# Patient Record
Sex: MALE | Race: WHITE | NOT HISPANIC OR LATINO | Employment: OTHER | URBAN - METROPOLITAN AREA
[De-identification: names, ages, dates, MRNs, and addresses within clinical notes are randomized per-mention and may not be internally consistent; named-entity substitution may affect disease eponyms.]

---

## 2019-01-01 ENCOUNTER — TELEPHONE (OUTPATIENT)
Dept: HEMATOLOGY ONCOLOGY | Facility: CLINIC | Age: 74
End: 2019-01-01

## 2019-01-01 ENCOUNTER — APPOINTMENT (EMERGENCY)
Dept: RADIOLOGY | Facility: HOSPITAL | Age: 74
End: 2019-01-01
Payer: MEDICARE

## 2019-01-01 ENCOUNTER — HOSPITAL ENCOUNTER (EMERGENCY)
Facility: HOSPITAL | Age: 74
Discharge: HOME | End: 2019-11-08
Attending: EMERGENCY MEDICINE | Admitting: EMERGENCY MEDICINE
Payer: MEDICARE

## 2019-01-01 ENCOUNTER — TELEPHONE (OUTPATIENT)
Dept: GASTROENTEROLOGY | Facility: AMBULARY SURGERY CENTER | Age: 74
End: 2019-01-01

## 2019-01-01 ENCOUNTER — HOSPITAL ENCOUNTER (EMERGENCY)
Facility: HOSPITAL | Age: 74
Discharge: HOME | End: 2019-12-27
Attending: EMERGENCY MEDICINE | Admitting: EMERGENCY MEDICINE
Payer: MEDICARE

## 2019-01-01 VITALS
OXYGEN SATURATION: 99 % | DIASTOLIC BLOOD PRESSURE: 76 MMHG | SYSTOLIC BLOOD PRESSURE: 125 MMHG | TEMPERATURE: 98.2 F | HEART RATE: 89 BPM | RESPIRATION RATE: 18 BRPM

## 2019-01-01 VITALS
HEIGHT: 70 IN | TEMPERATURE: 98.1 F | HEART RATE: 84 BPM | SYSTOLIC BLOOD PRESSURE: 140 MMHG | DIASTOLIC BLOOD PRESSURE: 90 MMHG | WEIGHT: 230.16 LBS | OXYGEN SATURATION: 98 % | RESPIRATION RATE: 16 BRPM | BODY MASS INDEX: 32.95 KG/M2

## 2019-01-01 DIAGNOSIS — K83.1 OBSTRUCTIVE JAUNDICE: Primary | ICD-10-CM

## 2019-01-01 DIAGNOSIS — C79.9 METASTATIC CANCER (HCC): ICD-10-CM

## 2019-01-01 DIAGNOSIS — T85.590A: ICD-10-CM

## 2019-01-01 LAB
ALBUMIN SERPL BCP-MCNC: 2.4 G/DL (ref 3.5–5)
ALBUMIN SERPL BCP-MCNC: 3 G/DL (ref 3.5–5)
ALP SERPL-CCNC: 632 U/L (ref 46–116)
ALP SERPL-CCNC: 854 U/L (ref 46–116)
ALT SERPL W P-5'-P-CCNC: 132 U/L (ref 12–78)
ALT SERPL W P-5'-P-CCNC: 155 U/L (ref 12–78)
AMMONIA PLAS-SCNC: 17 UMOL/L (ref 11–35)
AMMONIA PLAS-SCNC: <10 UMOL/L (ref 11–35)
ANION GAP SERPL CALCULATED.3IONS-SCNC: 7 MMOL/L (ref 4–13)
ANION GAP SERPL CALCULATED.3IONS-SCNC: 9 MMOL/L (ref 4–13)
APTT PPP: 27 SECONDS (ref 23–37)
APTT PPP: 42 SECONDS (ref 25–32)
AST SERPL W P-5'-P-CCNC: 134 U/L (ref 5–45)
AST SERPL W P-5'-P-CCNC: 137 U/L (ref 5–45)
ATRIAL RATE: 375 BPM
BACTERIA UR QL AUTO: ABNORMAL /HPF
BACTERIA UR QL AUTO: ABNORMAL /HPF
BASOPHILS # BLD AUTO: 0.04 THOUSANDS/ΜL (ref 0–0.1)
BASOPHILS # BLD AUTO: 0.04 THOUSANDS/ΜL (ref 0–0.1)
BASOPHILS NFR BLD AUTO: 1 % (ref 0–1)
BASOPHILS NFR BLD AUTO: 1 % (ref 0–1)
BILIRUB DIRECT SERPL-MCNC: 14.01 MG/DL (ref 0–0.2)
BILIRUB SERPL-MCNC: 16.1 MG/DL (ref 0.2–1)
BILIRUB SERPL-MCNC: 24.7 MG/DL (ref 0.2–1)
BILIRUB UR QL STRIP: ABNORMAL
BILIRUB UR QL STRIP: ABNORMAL
BUN SERPL-MCNC: 13 MG/DL (ref 5–25)
BUN SERPL-MCNC: 15 MG/DL (ref 5–25)
CALCIUM SERPL-MCNC: 8.2 MG/DL (ref 8.3–10.1)
CALCIUM SERPL-MCNC: 8.5 MG/DL (ref 8.3–10.1)
CHLORIDE SERPL-SCNC: 95 MMOL/L (ref 100–108)
CHLORIDE SERPL-SCNC: 96 MMOL/L (ref 100–108)
CLARITY UR: ABNORMAL
CLARITY UR: CLEAR
CO2 SERPL-SCNC: 27 MMOL/L (ref 21–32)
CO2 SERPL-SCNC: 27 MMOL/L (ref 21–32)
COLOR UR: ABNORMAL
COLOR UR: ABNORMAL
CREAT SERPL-MCNC: 0.68 MG/DL (ref 0.6–1.3)
CREAT SERPL-MCNC: 0.95 MG/DL (ref 0.6–1.3)
EOSINOPHIL # BLD AUTO: 0.03 THOUSAND/ΜL (ref 0–0.61)
EOSINOPHIL # BLD AUTO: 0.05 THOUSAND/ΜL (ref 0–0.61)
EOSINOPHIL NFR BLD AUTO: 1 % (ref 0–6)
EOSINOPHIL NFR BLD AUTO: 1 % (ref 0–6)
ERYTHROCYTE [DISTWIDTH] IN BLOOD BY AUTOMATED COUNT: 13.3 % (ref 11.6–15.1)
ERYTHROCYTE [DISTWIDTH] IN BLOOD BY AUTOMATED COUNT: 19.9 % (ref 11.6–15.1)
GFR SERPL CREATININE-BSD FRML MDRD: 77 ML/MIN/1.73SQ M
GFR SERPL CREATININE-BSD FRML MDRD: 93 ML/MIN/1.73SQ M
GLUCOSE SERPL-MCNC: 106 MG/DL (ref 65–140)
GLUCOSE SERPL-MCNC: 123 MG/DL (ref 65–140)
GLUCOSE UR STRIP-MCNC: ABNORMAL MG/DL
GLUCOSE UR STRIP-MCNC: ABNORMAL MG/DL
HCT VFR BLD AUTO: 36.2 % (ref 36.5–49.3)
HCT VFR BLD AUTO: 37.8 % (ref 36.5–49.3)
HGB BLD-MCNC: 12.1 G/DL (ref 12–17)
HGB BLD-MCNC: 13.3 G/DL (ref 12–17)
HGB UR QL STRIP.AUTO: NEGATIVE
HGB UR QL STRIP.AUTO: NEGATIVE
HYALINE CASTS #/AREA URNS LPF: ABNORMAL /LPF
IMM GRANULOCYTES # BLD AUTO: 0.05 THOUSAND/UL (ref 0–0.2)
IMM GRANULOCYTES # BLD AUTO: 0.09 THOUSAND/UL (ref 0–0.2)
IMM GRANULOCYTES NFR BLD AUTO: 1 % (ref 0–2)
IMM GRANULOCYTES NFR BLD AUTO: 2 % (ref 0–2)
INR PPP: 0.96 (ref 0.91–1.09)
INR PPP: 1.18 (ref 0.91–1.09)
KETONES UR STRIP-MCNC: ABNORMAL MG/DL
KETONES UR STRIP-MCNC: NEGATIVE MG/DL
LEUKOCYTE ESTERASE UR QL STRIP: NEGATIVE
LEUKOCYTE ESTERASE UR QL STRIP: NEGATIVE
LIPASE SERPL-CCNC: 92 U/L (ref 73–393)
LYMPHOCYTES # BLD AUTO: 0.47 THOUSANDS/ΜL (ref 0.6–4.47)
LYMPHOCYTES # BLD AUTO: 0.69 THOUSANDS/ΜL (ref 0.6–4.47)
LYMPHOCYTES NFR BLD AUTO: 14 % (ref 14–44)
LYMPHOCYTES NFR BLD AUTO: 8 % (ref 14–44)
MCH RBC QN AUTO: 36.7 PG (ref 26.8–34.3)
MCH RBC QN AUTO: 38.7 PG (ref 26.8–34.3)
MCHC RBC AUTO-ENTMCNC: 33.4 G/DL (ref 31.4–37.4)
MCHC RBC AUTO-ENTMCNC: 35.2 G/DL (ref 31.4–37.4)
MCV RBC AUTO: 110 FL (ref 82–98)
MCV RBC AUTO: 110 FL (ref 82–98)
MONOCYTES # BLD AUTO: 0.94 THOUSAND/ΜL (ref 0.17–1.22)
MONOCYTES # BLD AUTO: 1.04 THOUSAND/ΜL (ref 0.17–1.22)
MONOCYTES NFR BLD AUTO: 16 % (ref 4–12)
MONOCYTES NFR BLD AUTO: 21 % (ref 4–12)
MUCOUS THREADS UR QL AUTO: ABNORMAL
MUCOUS THREADS UR QL AUTO: ABNORMAL
NEUTROPHILS # BLD AUTO: 3.05 THOUSANDS/ΜL (ref 1.85–7.62)
NEUTROPHILS # BLD AUTO: 4.32 THOUSANDS/ΜL (ref 1.85–7.62)
NEUTS SEG NFR BLD AUTO: 61 % (ref 43–75)
NEUTS SEG NFR BLD AUTO: 73 % (ref 43–75)
NITRITE UR QL STRIP: NEGATIVE
NITRITE UR QL STRIP: POSITIVE
NON-SQ EPI CELLS URNS QL MICRO: ABNORMAL /HPF
NON-SQ EPI CELLS URNS QL MICRO: ABNORMAL /HPF
NRBC BLD AUTO-RTO: 0 /100 WBCS
NRBC BLD AUTO-RTO: 0 /100 WBCS
OTHER STN SPEC: ABNORMAL
PH UR STRIP.AUTO: 6 [PH]
PH UR STRIP.AUTO: 6.5 [PH]
PLATELET # BLD AUTO: 160 THOUSANDS/UL (ref 149–390)
PLATELET # BLD AUTO: 168 THOUSANDS/UL (ref 149–390)
PMV BLD AUTO: 10.4 FL (ref 8.9–12.7)
PMV BLD AUTO: 10.9 FL (ref 8.9–12.7)
POTASSIUM SERPL-SCNC: 3.9 MMOL/L (ref 3.5–5.3)
POTASSIUM SERPL-SCNC: 4.1 MMOL/L (ref 3.5–5.3)
PROT SERPL-MCNC: 6.2 G/DL (ref 6.4–8.2)
PROT SERPL-MCNC: 6.7 G/DL (ref 6.4–8.2)
PROT UR STRIP-MCNC: ABNORMAL MG/DL
PROT UR STRIP-MCNC: ABNORMAL MG/DL
PROTHROMBIN TIME: 10.4 SECONDS (ref 9.8–12)
PROTHROMBIN TIME: 12.6 SECONDS (ref 9.8–12)
QRS AXIS: -38 DEGREES
QRSD INTERVAL: 150 MS
QT INTERVAL: 410 MS
QTC INTERVAL: 467 MS
RBC # BLD AUTO: 3.3 MILLION/UL (ref 3.88–5.62)
RBC # BLD AUTO: 3.44 MILLION/UL (ref 3.88–5.62)
RBC #/AREA URNS AUTO: ABNORMAL /HPF
RBC #/AREA URNS AUTO: ABNORMAL /HPF
SODIUM SERPL-SCNC: 129 MMOL/L (ref 136–145)
SODIUM SERPL-SCNC: 132 MMOL/L (ref 136–145)
SP GR UR STRIP.AUTO: 1.01 (ref 1–1.03)
SP GR UR STRIP.AUTO: 1.02 (ref 1–1.03)
T WAVE AXIS: -20 DEGREES
UROBILINOGEN UR QL STRIP.AUTO: 0.2 E.U./DL
UROBILINOGEN UR QL STRIP.AUTO: 1 E.U./DL
VENTRICULAR RATE: 78 BPM
WBC # BLD AUTO: 4.96 THOUSAND/UL (ref 4.31–10.16)
WBC # BLD AUTO: 5.85 THOUSAND/UL (ref 4.31–10.16)
WBC #/AREA URNS AUTO: ABNORMAL /HPF
WBC #/AREA URNS AUTO: ABNORMAL /HPF

## 2019-01-01 PROCEDURE — 81001 URINALYSIS AUTO W/SCOPE: CPT | Performed by: EMERGENCY MEDICINE

## 2019-01-01 PROCEDURE — 85025 COMPLETE CBC W/AUTO DIFF WBC: CPT | Performed by: EMERGENCY MEDICINE

## 2019-01-01 PROCEDURE — 85610 PROTHROMBIN TIME: CPT | Performed by: EMERGENCY MEDICINE

## 2019-01-01 PROCEDURE — 74177 CT ABD & PELVIS W/CONTRAST: CPT

## 2019-01-01 PROCEDURE — 36415 COLL VENOUS BLD VENIPUNCTURE: CPT | Performed by: EMERGENCY MEDICINE

## 2019-01-01 PROCEDURE — 80053 COMPREHEN METABOLIC PANEL: CPT | Performed by: EMERGENCY MEDICINE

## 2019-01-01 PROCEDURE — 82140 ASSAY OF AMMONIA: CPT | Performed by: EMERGENCY MEDICINE

## 2019-01-01 PROCEDURE — 99285 EMERGENCY DEPT VISIT HI MDM: CPT

## 2019-01-01 PROCEDURE — 80048 BASIC METABOLIC PNL TOTAL CA: CPT | Performed by: EMERGENCY MEDICINE

## 2019-01-01 PROCEDURE — 93010 ELECTROCARDIOGRAM REPORT: CPT | Performed by: INTERNAL MEDICINE

## 2019-01-01 PROCEDURE — 83690 ASSAY OF LIPASE: CPT | Performed by: EMERGENCY MEDICINE

## 2019-01-01 PROCEDURE — 85730 THROMBOPLASTIN TIME PARTIAL: CPT | Performed by: EMERGENCY MEDICINE

## 2019-01-01 PROCEDURE — 93005 ELECTROCARDIOGRAM TRACING: CPT

## 2019-01-01 PROCEDURE — 80076 HEPATIC FUNCTION PANEL: CPT | Performed by: EMERGENCY MEDICINE

## 2019-01-01 RX ORDER — TAMSULOSIN HYDROCHLORIDE 0.4 MG/1
0.4 CAPSULE ORAL
COMMUNITY
End: 2020-01-01 | Stop reason: SDUPTHER

## 2019-01-01 RX ORDER — CARBAMAZEPINE 400 MG/1
400 TABLET, EXTENDED RELEASE ORAL EVERY EVENING
COMMUNITY
End: 2020-01-01 | Stop reason: HOSPADM

## 2019-01-01 RX ORDER — CARBAMAZEPINE 300 MG/1
300 CAPSULE, EXTENDED RELEASE ORAL EVERY MORNING
COMMUNITY
End: 2020-01-01 | Stop reason: HOSPADM

## 2019-01-01 RX ORDER — OXYCODONE HYDROCHLORIDE 5 MG/1
5 CAPSULE ORAL EVERY 6 HOURS PRN
COMMUNITY
End: 2020-01-01 | Stop reason: HOSPADM

## 2019-01-01 RX ORDER — METOPROLOL SUCCINATE 50 MG/1
50 TABLET, EXTENDED RELEASE ORAL DAILY
COMMUNITY
End: 2020-01-01 | Stop reason: SDUPTHER

## 2019-01-01 RX ORDER — AMLODIPINE BESYLATE 10 MG/1
10 TABLET ORAL DAILY
COMMUNITY
End: 2019-01-01

## 2019-01-01 RX ORDER — CAPECITABINE 500 MG/1
1250 TABLET, FILM COATED ORAL 2 TIMES DAILY
COMMUNITY
End: 2019-01-01

## 2019-01-01 RX ORDER — ASCORBIC ACID 500 MG
500 TABLET ORAL DAILY
COMMUNITY
End: 2019-01-01

## 2019-01-01 RX ORDER — LOSARTAN POTASSIUM 100 MG/1
100 TABLET ORAL DAILY
COMMUNITY
End: 2019-01-01

## 2019-01-01 RX ADMIN — IOHEXOL 100 ML: 350 INJECTION, SOLUTION INTRAVENOUS at 14:48

## 2019-01-01 RX ADMIN — IOHEXOL 100 ML: 350 INJECTION, SOLUTION INTRAVENOUS at 18:34

## 2019-11-08 NOTE — ED PROVIDER NOTES
History  Chief Complaint   Patient presents with    Jaundice     "I have a pretty heavy case of yellow jaundice" patient noted "some color change" in the last week- hx of colon ca dx 7 years ago, Adeola Rios to liver & lung      Patient has a history of metastatic colon cancer which he has been treating for the last 7 years  He recently moved to this area and has been under the care of an oncology team at [facility]   In recent weeks the patient has noted steady increase in yellow discoloration of the skin and eyes  He is not having abdominal or chest pain  States that sometimes his skin is itchy  He has had no fever  Patient has had progression of disease and has been on multiple rounds of chemo  He has had a left hemicolectomy as also had wedge resection of metastatic lesions to the liver  Patient was referred to the ED today with worsening painless jaundice  He has upcoming radiation therapy scheduled          Prior to Admission Medications   Prescriptions Last Dose Informant Patient Reported? Taking?    VITAMIN D PO   Yes Yes   Sig: Take 12 5 mg by mouth   amLODIPine (NORVASC) 10 mg tablet   Yes Yes   Sig: Take 10 mg by mouth daily   ascorbic acid (VITAMIN C) 500 mg tablet   Yes Yes   Sig: Take 500 mg by mouth daily   capecitabine (XELODA) 500 MG tablet   Yes Yes   Sig: Take 1,250 mg/m2 by mouth 2 (two) times a day   carBAMazepine (CARBATROL) 300 MG 12 hr capsule   Yes Yes   Sig: Take 300 mg by mouth every morning   carBAMazepine (TEGretol XR) 400 mg 12 hr tablet   Yes Yes   Sig: Take 400 mg by mouth every evening   losartan (COZAAR) 100 MG tablet   Yes Yes   Sig: Take 100 mg by mouth daily   oxyCODONE (OXY-IR) 5 MG capsule   Yes Yes   Sig: Take 5 mg by mouth 2 (two) times a day   tamsulosin (FLOMAX) 0 4 mg   Yes Yes   Sig: Take 0 4 mg by mouth daily with dinner      Facility-Administered Medications: None       Past Medical History:   Diagnosis Date    Atrial fibrillation (HCC)     BPH (benign prostatic hyperplasia)     Cancer ([facility] [address] )     colon- johnny liver & lungs    Hypertension     Stroke [facility])     2008- mild weakness left hand/arm    [facility], [facility]     Tumor[facility] metastatic ([facility] )        Past Surgical History:   Procedure Laterality Date    COLON SURGERY      2013    EYE SURGERY      TONSILLECTOMY         No family history on file  I have reviewed and agree with the history as documented  Social History     Tobacco Use    Smoking status: Former Smoker   Substance Use Topics    Alcohol use: Never     Frequency: Never    Drug use: Never        Review of Systems   Constitutional: Positive for unexpected weight change  Negative for chills and fever  HENT: Negative for congestion and trouble swallowing  Eyes: Negative for photophobia  Respiratory: Negative for shortness of breath  Cardiovascular: Negative for chest pain  Gastrointestinal: Negative for abdominal pain and vomiting  Genitourinary: Negative for dysuria  Musculoskeletal: Negative for back pain, neck pain and neck stiffness  Skin: Positive for color change and wound  Neurological: Negative for light-headedness and headaches  Hematological: Does not bruise/bleed easily  Psychiatric/Behavioral: Negative for confusion  All other systems reviewed and are negative  Physical Exam  Physical Exam   Constitutional: He is oriented to person, place, and time  He appears well-developed and well-nourished  HENT:   Head: Normocephalic and atraumatic  Mouth/Throat: Oropharynx is clear and moist    Eyes: EOM are normal  Scleral icterus is present  Neck: Normal range of motion  Neck supple  Cardiovascular: Normal rate, regular rhythm and normal heart sounds  Pulmonary/Chest: Effort normal and breath sounds normal    Abdominal: Soft  Bowel sounds are normal    There is a hypertrophic wound growing from a biopsy site in the right abdominal wall   Musculoskeletal: Normal range of motion   He exhibits no edema or tenderness  Neurological: He is alert and oriented to person, place, and time  No cranial nerve deficit  Coordination normal    No asteriskis   Skin: Skin is warm and dry  Capillary refill takes less than 2 seconds  Patient is diffusely jaundiced   Psychiatric: He has a normal mood and affect  His behavior is normal    Nursing note and vitals reviewed        Vital Signs  ED Triage Vitals   Temperature Pulse Respirations Blood Pressure SpO2   11/08/19 1817 11/08/19 1619 11/08/19 1619 11/08/19 1619 11/08/19 1619   98 1 °F (36 7 °C) 93 18 132/79 98 %      Temp Source Heart Rate Source Patient Position - Orthostatic VS BP Location FiO2 (%)   11/08/19 1817 11/08/19 1619 11/08/19 1619 11/08/19 1619 --   Oral Monitor Lying Left arm       Pain Score       11/08/19 1619       2           Vitals:    11/08/19 1700 11/08/19 1817 11/08/19 1930 11/08/19 2030   BP: 120/80 117/85 125/80 124/91   Pulse: 86 80 94 82   Patient Position - Orthostatic VS:  Lying           Visual Acuity      ED Medications  Medications   iohexol (OMNIPAQUE) 350 MG/ML injection (MULTI-DOSE) 100 mL (100 mL Intravenous Given 11/8/19 1834)       Diagnostic Studies  Results Reviewed     Procedure Component Value Units Date/Time    Urine Microscopic [[id number]]  (Abnormal) Collected:  11/08/19 1913    Lab Status:  Final result Specimen:  Urine, Clean Catch Updated:  11/08/19 1931     RBC, UA 0-1 /hpf      WBC, UA 0-1 /hpf      Epithelial Cells None Seen /hpf      Bacteria, UA Moderate /hpf      MUCUS THREADS Occasional    UA w Reflex to Microscopic [[id number]]  (Abnormal) Collected:  11/08/19 1913    Lab Status:  Final result Specimen:  Urine, Clean Catch Updated:  11/08/19 1919     Color, UA Vani     Clarity, UA Clear     Specific Gravity, UA 1 015     pH, UA 6 0     Leukocytes, UA Negative     Nitrite, UA Negative     Protein, UA Trace mg/dl      Glucose,  (1/10%) mg/dl      Ketones, UA Negative mg/dl      Urobilinogen, UA 0 2 E U /dl Bilirubin, UA Interference- unable to analyze     Blood, UA Negative    Comprehensive metabolic panel [[id number]]  (Abnormal) Collected:  11/08/19 1720    Lab Status:  Final result Specimen:  Blood from Arm, Left Updated:  11/08/19 1759     Sodium 129 mmol/L      Potassium 4 1 mmol/L      Chloride 95 mmol/L      CO2 27 mmol/L      ANION GAP 7 mmol/L      BUN 13 mg/dL      Creatinine 0 68 mg/dL      Glucose 106 mg/dL      Calcium 8 5 mg/dL       U/L       U/L      Alkaline Phosphatase 632 U/L      Total Protein 6 7 g/dL      Albumin 3 0 g/dL      Total Bilirubin 24 70 mg/dL      eGFR 93 ml/min/1 73sq m     Narrative:       [facility] guidelines for Chronic Kidney Disease (CKD):     Stage 1 with normal or high GFR (GFR > 90 mL/min/1 73 square meters)    Stage 2 Mild CKD (GFR = 60-89 mL/min/1 73 square meters)    Stage 3A Moderate CKD (GFR = 45-59 mL/min/1 73 square meters)    Stage 3B Moderate CKD (GFR = 30-44 mL/min/1 73 square meters)    Stage 4 Severe CKD (GFR = 15-29 mL/min/1 73 square meters)    Stage 5 End Stage CKD (GFR <15 mL/min/1 73 square meters)  Note: GFR calculation is accurate only with a steady state creatinine    Ammonia [[id number]]  (Normal) Collected:  11/08/19 1720    Lab Status:  Final result Specimen:  Blood from Arm, Left Updated:  11/08/19 1754     Ammonia 17 umol/L     Protime-INR [[id number]]  (Normal) Collected:  11/08/19 1720    Lab Status:  Final result Specimen:  Blood from Arm, Left Updated:  11/08/19 1754     Protime 10 4 seconds      INR 0 96    APTT [[id number]]  (Normal) Collected:  11/08/19 1720    Lab Status:  Final result Specimen:  Blood from Arm, Left Updated:  11/08/19 1754     PTT 27 seconds     CBC and differential [[id number]]  (Abnormal) Collected:  11/08/19 1720    Lab Status:  Final result Specimen:  Blood from Arm, Left Updated:  11/08/19 1728     WBC 4 96 Thousand/uL      RBC 3 44 Million/uL      Hemoglobin 13 3 g/dL Hematocrit 37 8 %       fL      MCH 38 7 pg      MCHC 35 2 g/dL      RDW 19 9 %      MPV 10 9 fL      Platelets 399 Thousands/uL      nRBC 0 /100 WBCs      Neutrophils Relative 61 %      Immat GRANS % 2 %      Lymphocytes Relative 14 %      Monocytes Relative 21 %      Eosinophils Relative 1 %      Basophils Relative 1 %      Neutrophils Absolute 3 05 Thousands/µL      Immature Grans Absolute 0 09 Thousand/uL      Lymphocytes Absolute 0 69 Thousands/µL      Monocytes Absolute 1 04 Thousand/µL      Eosinophils Absolute 0 05 Thousand/µL      Basophils Absolute 0 04 Thousands/µL                  CT abdomen pelvis with contrast   Final Result by Apple Alfredo MD (11/08 2011)         1  Stable, heterogeneous appearance of the liver right lobe of the liver adjacent to the gallbladder fossa and kemal hepatis likely to represent a metastatic lesion, possibly infiltrating the neck of the gallbladder and resulting in intrahepatic biliary    obstruction  2   Partially visualized metastases in the left lung and right paraspinal region  Right anterolateral chest wall cutaneous and deep soft tissue metastasis    Findings are stable when compared to the prior exam             Workstation performed: [id number]                    Procedures  ECG 12 Lead Documentation Only  Date/Time: 11/8/2019 5:35 PM  Performed by: Jerad Goodwin MD  Authorized by: Jerad Goodwin MD     Indications / Diagnosis:  Obstructive jaundice  ECG reviewed by me, the ED Provider: yes    Patient location:  ED  Interpretation:     Interpretation: abnormal    Rate:     ECG rate:  78    ECG rate assessment: normal    Rhythm:     Rhythm: atrial fibrillation and atrial flutter    Ectopy:     Ectopy: none    QRS:     QRS axis:  Left    QRS intervals:  Normal  Conduction:     Conduction: abnormal      Abnormal conduction: complete RBBB    ST segments:     ST segments:  Normal  T waves:     T waves: non-specific             ED Course MDM  Number of Diagnoses or Management Options  Diagnosis management comments: Patient has a written report from her prior CT about a month ago that showed some intrahepatic ductal dilatation, likely secondary to near by metastatic lesion  I have no prior lab tests available to me      Disposition  Final diagnoses:   Obstructive jaundice   Metastatic cancer ([facility] [address] )     Time reflects when diagnosis was documented in both MDM as applicable and the Disposition within this note     Time User Action Codes Description Comment    11/8/2019  9:08 PM Renetta Treviño Add [K83 1] Obstructive jaundice     11/8/2019  9:08 PM Jose HOOVER Add [C79 9] Metastatic cancer [facility])       ED Disposition     ED Disposition Condition Date/Time Comment    Discharge Stable Fri Nov 8, 2019  9:08 PM Tami Lopez discharge to home/self care  Follow-up Information     Follow up With Specialties Details Why Ronny [address]    [phone]            Patient's Medications   Discharge Prescriptions    No medications on file     No discharge procedures on file      ED Provider  Electronically Signed by           Salomón Berkowitz MD  11/08/19 2117

## 2019-12-27 NOTE — ED PROVIDER NOTES
History  Chief Complaint   Patient presents with    Jaundice     pt presents to the ed for jaunice symptoms  pt has a prior hx of jaundice and had a stent placed around thanks giving  pt states he noticed his skin was more yellow and his urine was dark so he came in for evaluation      HPI    This is a 67 yo M with hx of liver cancer and follows with [address] he was here in November and found to have obstructive jaundice and he followed up and got a stent in the bile duct and jaudice resolved  States now coming back  Skin is more yellow and change in color of stool and urine  States He feels like it is obstructed again  Currently going thru radiation  Jaundice is painless  Prior to Admission Medications   Prescriptions Last Dose Informant Patient Reported? Taking?   carBAMazepine (CARBATROL) 300 MG 12 hr capsule   Yes No   Sig: Take 300 mg by mouth every morning   carBAMazepine (TEGretol XR) 400 mg 12 hr tablet   Yes No   Sig: Take 400 mg by mouth every evening   enoxaparin (LOVENOX) 100 mg/mL   Yes Yes   Sig: Inject under the skin   metoprolol succinate (TOPROL-XL) 50 mg 24 hr tablet   Yes Yes   Sig: Take 50 mg by mouth daily   oxyCODONE (OXY-IR) 5 MG capsule   Yes No   Sig: Take 5 mg by mouth 2 (two) times a day   tamsulosin (FLOMAX) 0 4 mg   Yes No   Sig: Take 0 4 mg by mouth daily with dinner      Facility-Administered Medications: None       Past Medical History:   Diagnosis Date    [facility] )     BPH (benign prostatic hyperplasia)     Cancer ([facility] )     colon- johnny liver & lungs    Hypertension     Stroke ([facility] [address] )     2008- mild weakness left hand/arm    Tumor, metastatic ([address] )     Tumor, metastatic ([facility] [address] )        Past Surgical History:   Procedure Laterality Date    COLON SURGERY      2013    EYE SURGERY      TONSILLECTOMY         History reviewed  No pertinent family history  I have reviewed and agree with the history as documented      Social History Tobacco Use    Smoking status: Former Smoker   Substance Use Topics    Alcohol use: Never     Frequency: Never    Drug use: Never        Review of Systems   Constitutional: Positive for fatigue  Negative for diaphoresis and fever  HENT: Negative  Respiratory: Negative  Negative for cough, shortness of breath and wheezing  Cardiovascular: Negative  Negative for chest pain, palpitations and leg swelling  Gastrointestinal: Negative for abdominal distention, abdominal pain, nausea and vomiting  Genitourinary: Negative  Musculoskeletal: Negative  Skin: Positive for color change  Neurological: Negative  Psychiatric/Behavioral: Negative  All other systems reviewed and are negative  Physical Exam  Physical Exam   Constitutional: He is oriented to person, place, and time  He appears well-developed and well-nourished  No distress  Severely jaundice   HENT:   Head: Normocephalic and atraumatic  Nose: Nose normal    Mouth/Throat: Oropharynx is clear and moist    Eyes: Pupils are equal, round, and reactive to light  Conjunctivae and EOM are normal    Neck: Normal range of motion  Neck supple  Cardiovascular: Normal rate, regular rhythm and normal heart sounds  No murmur heard  Pulmonary/Chest: Effort normal and breath sounds normal  No respiratory distress  He has no wheezes  He has no rales  Abdominal: Soft  Bowel sounds are normal  He exhibits no distension  There is no tenderness  There is no rebound and no guarding  Lesion on RUQ    Musculoskeletal: Normal range of motion  He exhibits no edema, tenderness or deformity  Neurological: He is alert and oriented to person, place, and time  No cranial nerve deficit  Skin: Skin is warm and dry  No rash noted  He is not diaphoretic  No pallor  Psychiatric: He has a normal mood and affect  Vitals reviewed        Vital Signs  ED Triage Vitals [12/27/19 1400]   Temperature Pulse Respirations Blood Pressure SpO2   98 2 °F (36 8 °C) 89 18 125/76 99 %      Temp Source Heart Rate Source Patient Position - Orthostatic VS BP Location FiO2 (%)   Tympanic Monitor -- -- --      Pain Score       --           Vitals:    12/27/19 1400   BP: 125/76   Pulse: 89         Visual Acuity      ED Medications  Medications   iohexol (OMNIPAQUE) 350 MG/ML injection (MULTI-DOSE) 100 mL (100 mL Intravenous Given 12/27/19 1448)       Diagnostic Studies  Results Reviewed     Procedure Component Value Units Date/Time    Urine Microscopic [[id number]]  (Abnormal) Collected:  12/27/19 1429    Lab Status:  Final result Specimen:  Urine, Clean Catch Updated:  12/27/19 1448     RBC, UA 0-1 /hpf      WBC, UA 2-4 /hpf      Epithelial Cells Occasional /hpf      Bacteria, UA Moderate /hpf      Hyaline Casts, UA 4-10 /lpf      OTHER OBSERVATIONS Renal Epithelial Cells Present     MUCUS THREADS Innumerable    UA (URINE) with reflex to Scope [[id number]]  (Abnormal) Collected:  12/27/19 1429    Lab Status:  Final result Specimen:  Urine, Clean Catch Updated:  12/27/19 1444     Color, UA Brown     Clarity, UA Slightly Cloudy     Specific [place], UA 1 020     pH, UA 6 5     Leukocytes, UA Negative     Nitrite, UA Positive     Protein, UA 30 (1+) mg/dl      Glucose,  (1/10%) mg/dl      Ketones, UA Trace mg/dl      Urobilinogen, UA 1 0 E U /dl      Bilirubin, UA Interference- unable to analyze     Blood, UA Negative    Ammonia [[id number]]  (Abnormal) Collected:  12/27/19 1416    Lab Status:  Final result Specimen:  Blood from Arm, Left Updated:  12/27/19 1440     Ammonia <10 umol/L     Hepatic function panel [[id number]]  (Abnormal) Collected:  12/27/19 1416    Lab Status:  Final result Specimen:  Blood from Arm, Left Updated:  12/27/19 1440     Total Bilirubin 16 10 mg/dL      Bilirubin, Direct 14 01 mg/dL      Alkaline Phosphatase 854 U/L       U/L       U/L      Total Protein 6 2 g/dL      Albumin 2 4 g/dL     Basic metabolic panel [[id number]]  (Abnormal) Collected:  12/27/19 1416    Lab Status:  Final result Specimen:  Blood from Arm, Left Updated:  12/27/19 1438     Sodium 132 mmol/L      Potassium 3 9 mmol/L      Chloride 96 mmol/L      CO2 27 mmol/L      ANION GAP 9 mmol/L      BUN 15 mg/dL      Creatinine 0 95 mg/dL      Glucose 123 mg/dL      Calcium 8 2 mg/dL      eGFR 77 ml/min/1 73sq m     Narrative:       [facility] guidelines for Chronic Kidney Disease (CKD):     Stage 1 with normal or high GFR (GFR > 90 mL/min/1 73 square meters)    Stage 2 Mild CKD (GFR = 60-89 mL/min/1 73 square meters)    Stage 3A Moderate CKD (GFR = 45-59 mL/min/1 73 square meters)    Stage 3B Moderate CKD (GFR = 30-44 mL/min/1 73 square meters)    Stage 4 Severe CKD (GFR = 15-29 mL/min/1 73 square meters)    Stage 5 End Stage CKD (GFR <15 mL/min/1 73 square meters)  Note: GFR calculation is accurate only with a steady state creatinine    Lipase [[id number]]  (Normal) Collected:  12/27/19 1416    Lab Status:  Final result Specimen:  Blood from Arm, Left Updated:  12/27/19 1438     Lipase 92 u/L     Protime-INR [[id number]]  (Abnormal) Collected:  12/27/19 1416    Lab Status:  Final result Specimen:  Blood from Arm, Left Updated:  12/27/19 1437     Protime 12 6 seconds      INR 1 18    APTT [[id number]]  (Abnormal) Collected:  12/27/19 1416    Lab Status:  Final result Specimen:  Blood from Arm, Left Updated:  12/27/19 1437     PTT 42 seconds     CBC and differential [[id number]]  (Abnormal) Collected:  12/27/19 1416    Lab Status:  Final result Specimen:  Blood from Arm, Left Updated:  12/27/19 1422     WBC 5 85 Thousand/uL      RBC 3 30 Million/uL      Hemoglobin 12 1 g/dL      Hematocrit 36 2 %       fL      MCH 36 7 pg      MCHC 33 4 g/dL      RDW 13 3 %      MPV 10 4 fL      Platelets 714 Thousands/uL      nRBC 0 /100 WBCs      Neutrophils Relative 73 %      Immat GRANS % 1 %      Lymphocytes Relative 8 %      Monocytes Relative 16 % Eosinophils Relative 1 %      Basophils Relative 1 %      Neutrophils Absolute 4 32 Thousands/µL      Immature Grans Absolute 0 05 Thousand/uL      Lymphocytes Absolute 0 47 Thousands/µL      Monocytes Absolute 0 94 Thousand/µL      Eosinophils Absolute 0 03 Thousand/µL      Basophils Absolute 0 04 Thousands/µL                  CT abdomen pelvis with contrast   Final Result by Sudhakar Grove MD (12/27 9966)      There is intrahepatic biliary dilatation despite the presence of the biliary stent  Compared with the prior study the biliary dilatation seems slightly more prominent  Therefore, the stent may not be functioning properly  Stable appearance of pre-existing liver lesions  Stable appearance of lung masses  Stable appearance of right upper abdominal wall tumor  Localized area of thickening of the wall of the gallbladder immediately adjacent to liver tumor which might indicate direct invasion of gallbladder wall  Workstation performed: [id number]                    Procedures  Procedures         ED Course  ED Course as of Dec 27 1707   Fri Dec 27, 2019   1607 Paged DR Fallon 77 with Dr Марина Reyes nurse who will arrange appointment set up       [phone] I spoke with Dr Giorgio Tierney from [facility], who recommended elijah come straight to [facility] in [facility]  Patient's daughter will be driving him to the hospital  I gave them to address and physician name                                     MDM      Disposition  Final diagnoses:   Obstructive jaundice   Obstruction of biliary stent     Time reflects when diagnosis was documented in both MDM as applicable and the Disposition within this note     Time User Action Codes Description Comment    12/27/2019  4:46 PM Letty Leonard U  8  [K83 1] Obstructive jaundice     12/27/2019  4:46 PM Sharad Leonard 61 Add [T85 590A] Obstruction of biliary stent       ED Disposition     ED Disposition Condition Date/Time Comment Discharge Stable Fri Dec 27, 2019  4:46 PM Nina Sung discharge to home/self care  Follow-up Information     Follow up With Specialties Details Why Contact Info Additional Information    Dr Ritchie Chau to   Urgent Care  [facility] (between [facility] and [address] )  [address] [facility] Schedule an appointment as soon as possible for a visit   Jose  [phone] [facility] Surgical Oncology K[address]le [address] [place], [place], [address] [facility]          Discharge Medication List as of 12/27/2019  5:01 PM      CONTINUE these medications which have NOT CHANGED    Details   enoxaparin (LOVENOX) 100 mg/mL Inject under the skin, Historical Med      metoprolol succinate (TOPROL-XL) 50 mg 24 hr tablet Take 50 mg by mouth daily, Historical Med      carBAMazepine (CARBATROL) 300 MG 12 hr capsule Take 300 mg by mouth every morning, Historical Med      carBAMazepine (TEGretol XR) 400 mg 12 hr tablet Take 400 mg by mouth every evening, Historical Med      oxyCODONE (OXY-IR) 5 MG capsule Take 5 mg by mouth 2 (two) times a day, Historical Med      tamsulosin (FLOMAX) 0 4 mg Take 0 4 mg by mouth daily with dinner, Historical Med           No discharge procedures on file      ED Provider  Electronically Signed by           Mali Ovalle MD  12/27/19 5836

## 2019-12-27 NOTE — DISCHARGE INSTRUCTIONS
Please go straight to [facility]  The address is given   The physician I spoke with was Dr Sundeep Crowley from Dr Tracie Severance team

## 2019-12-30 NOTE — TELEPHONE ENCOUNTER
New Patient Encounter    New Patient Intake Form   Patient Details:  Kain Chau  1945  [id number]    Background Information:  [address] starts by opening a telephone encounter and gathering the following information   Who is calling to schedule? If not self, relationship to patient? self   Referring Provider self   What is the diagnosis? Metastatic colon ca   Is there any prior history of Cancer? Yes   If yes, please explain 2016 colon ca   When was the diagnosis? 2016   Is patient aware of diagnosis? Yes   Reason for visit? Second Opinion   Have you had any testing done? If so: when, where? Yes   Are records in [facility]? yes   Was the patient told to bring a disk? yes   Scheduling Information:   Preferred [place]:  [facility]     Requesting Specific Provider? no   Are there any dates/time the patient cannot be seen? no      Miscellaneous: pt requested records from [facility] be faxed to me  [address] records are in care everywhere  Pt has GI appt 1/3/19   After completing the above information, please route to Financial Counselor and the appropriate Nurse Navigator for review

## 2019-12-30 NOTE — TELEPHONE ENCOUNTER
Patients GI provider:   new patient   Number to return call: ( [phone]    Reason for call: Pt calling to schedule a sooner appt for elev liver functions, s/p ed, jaundice    Scheduled procedure/appointment date if applicable: Apt/procedure n/ a

## 2019-12-30 NOTE — TELEPHONE ENCOUNTER
Called patient, lvm to see if would be able to come to [place] office with Dr Onelia Coates on 1/3 as she just opened up  Told him to call back

## 2019-12-31 NOTE — TELEPHONE ENCOUNTER
Called patient to follow up that received message  He states that it works well for him and will see him on Friday

## 2020-01-01 ENCOUNTER — TRANSCRIBE ORDERS (OUTPATIENT)
Dept: ADMINISTRATIVE | Facility: HOSPITAL | Age: 75
End: 2020-01-01

## 2020-01-01 ENCOUNTER — ANESTHESIA (INPATIENT)
Dept: GASTROENTEROLOGY | Facility: HOSPITAL | Age: 75
DRG: 435 | End: 2020-01-01
Payer: MEDICARE

## 2020-01-01 ENCOUNTER — TELEPHONE (OUTPATIENT)
Dept: SURGERY | Facility: HOSPITAL | Age: 75
End: 2020-01-01

## 2020-01-01 ENCOUNTER — HOSPITAL ENCOUNTER (INPATIENT)
Facility: HOSPITAL | Age: 75
LOS: 10 days | DRG: 871 | End: 2020-05-09
Attending: EMERGENCY MEDICINE | Admitting: INTERNAL MEDICINE
Payer: MEDICARE

## 2020-01-01 ENCOUNTER — DOCUMENTATION (OUTPATIENT)
Dept: HEMATOLOGY ONCOLOGY | Facility: CLINIC | Age: 75
End: 2020-01-01

## 2020-01-01 ENCOUNTER — TELEPHONE (OUTPATIENT)
Dept: FAMILY MEDICINE CLINIC | Facility: CLINIC | Age: 75
End: 2020-01-01

## 2020-01-01 ENCOUNTER — APPOINTMENT (EMERGENCY)
Dept: RADIOLOGY | Facility: HOSPITAL | Age: 75
DRG: 871 | End: 2020-01-01
Payer: MEDICARE

## 2020-01-01 ENCOUNTER — TRANSITIONAL CARE MANAGEMENT (OUTPATIENT)
Dept: FAMILY MEDICINE CLINIC | Facility: CLINIC | Age: 75
End: 2020-01-01

## 2020-01-01 ENCOUNTER — CONSULT (OUTPATIENT)
Dept: CARDIOLOGY CLINIC | Facility: CLINIC | Age: 75
End: 2020-01-01
Payer: MEDICARE

## 2020-01-01 ENCOUNTER — APPOINTMENT (OUTPATIENT)
Dept: LAB | Facility: HOSPITAL | Age: 75
DRG: 871 | End: 2020-01-01
Payer: MEDICARE

## 2020-01-01 ENCOUNTER — APPOINTMENT (OUTPATIENT)
Dept: LAB | Facility: HOSPITAL | Age: 75
End: 2020-01-01
Payer: MEDICARE

## 2020-01-01 ENCOUNTER — TELEPHONE (OUTPATIENT)
Dept: OTHER | Facility: OTHER | Age: 75
End: 2020-01-01

## 2020-01-01 ENCOUNTER — OFFICE VISIT (OUTPATIENT)
Dept: HEMATOLOGY ONCOLOGY | Facility: CLINIC | Age: 75
End: 2020-01-01
Payer: MEDICARE

## 2020-01-01 ENCOUNTER — HOSPITAL ENCOUNTER (INPATIENT)
Facility: HOSPITAL | Age: 75
LOS: 8 days | Discharge: HOME HEALTH | DRG: 435 | End: 2020-02-28
Attending: EMERGENCY MEDICINE | Admitting: HOSPITALIST
Payer: MEDICARE

## 2020-01-01 ENCOUNTER — TELEPHONE (OUTPATIENT)
Dept: PALLIATIVE MEDICINE | Facility: CLINIC | Age: 75
End: 2020-01-01

## 2020-01-01 ENCOUNTER — PATIENT OUTREACH (OUTPATIENT)
Dept: FAMILY MEDICINE CLINIC | Facility: CLINIC | Age: 75
End: 2020-01-01

## 2020-01-01 ENCOUNTER — HOSPITAL ENCOUNTER (OUTPATIENT)
Dept: RADIOLOGY | Facility: HOSPITAL | Age: 75
Discharge: HOME | End: 2020-04-02
Attending: INTERNAL MEDICINE | Admitting: RADIOLOGY
Payer: MEDICARE

## 2020-01-01 ENCOUNTER — ANESTHESIA EVENT (OUTPATIENT)
Dept: ANESTHESIOLOGY | Facility: HOSPITAL | Age: 75
End: 2020-01-01

## 2020-01-01 ENCOUNTER — HOSPITAL ENCOUNTER (INPATIENT)
Facility: HOSPITAL | Age: 75
LOS: 7 days | Discharge: HOME | DRG: 435 | End: 2020-01-16
Attending: INTERNAL MEDICINE | Admitting: INTERNAL MEDICINE
Payer: MEDICARE

## 2020-01-01 ENCOUNTER — TELEPHONE (OUTPATIENT)
Dept: RADIOLOGY | Facility: HOSPITAL | Age: 75
End: 2020-01-01

## 2020-01-01 ENCOUNTER — TELEMEDICINE (OUTPATIENT)
Dept: PALLIATIVE MEDICINE | Facility: CLINIC | Age: 75
End: 2020-01-01
Payer: MEDICARE

## 2020-01-01 ENCOUNTER — CONSULT (OUTPATIENT)
Dept: NEUROSURGERY | Facility: CLINIC | Age: 75
End: 2020-01-01
Payer: MEDICARE

## 2020-01-01 ENCOUNTER — DOCUMENTATION (OUTPATIENT)
Dept: RADIATION ONCOLOGY | Facility: HOSPITAL | Age: 75
End: 2020-01-01

## 2020-01-01 ENCOUNTER — DOCUMENTATION (OUTPATIENT)
Dept: INFUSION CENTER | Facility: HOSPITAL | Age: 75
End: 2020-01-01

## 2020-01-01 ENCOUNTER — EPISODE CHANGES (OUTPATIENT)
Dept: CASE MANAGEMENT | Facility: HOSPITAL | Age: 75
End: 2020-01-01

## 2020-01-01 ENCOUNTER — APPOINTMENT (INPATIENT)
Dept: RADIOLOGY | Facility: HOSPITAL | Age: 75
DRG: 435 | End: 2020-01-01
Payer: MEDICARE

## 2020-01-01 ENCOUNTER — HOSPITAL ENCOUNTER (INPATIENT)
Facility: HOSPITAL | Age: 75
LOS: 3 days | Discharge: HOME | DRG: 871 | End: 2020-02-02
Attending: EMERGENCY MEDICINE | Admitting: FAMILY MEDICINE
Payer: MEDICARE

## 2020-01-01 ENCOUNTER — OFFICE VISIT (OUTPATIENT)
Dept: FAMILY MEDICINE CLINIC | Facility: CLINIC | Age: 75
End: 2020-01-01
Payer: MEDICARE

## 2020-01-01 ENCOUNTER — OFFICE VISIT (OUTPATIENT)
Dept: PALLIATIVE MEDICINE | Facility: CLINIC | Age: 75
End: 2020-01-01
Payer: MEDICARE

## 2020-01-01 ENCOUNTER — APPOINTMENT (EMERGENCY)
Dept: RADIOLOGY | Facility: HOSPITAL | Age: 75
DRG: 435 | End: 2020-01-01
Payer: MEDICARE

## 2020-01-01 ENCOUNTER — TRANSCRIBE ORDERS (OUTPATIENT)
Dept: LAB | Facility: AMBULARY SURGERY CENTER | Age: 75
End: 2020-01-01

## 2020-01-01 ENCOUNTER — HOSPITAL ENCOUNTER (OUTPATIENT)
Facility: HOSPITAL | Age: 75
End: 2020-05-10
Attending: INTERNAL MEDICINE | Admitting: INTERNAL MEDICINE

## 2020-01-01 ENCOUNTER — TELEPHONE (OUTPATIENT)
Dept: VASCULAR SURGERY | Facility: CLINIC | Age: 75
End: 2020-01-01

## 2020-01-01 ENCOUNTER — HOSPITAL ENCOUNTER (OUTPATIENT)
Dept: RADIOLOGY | Facility: HOSPITAL | Age: 75
Discharge: HOME | End: 2020-04-08
Attending: INTERNAL MEDICINE | Admitting: RADIOLOGY
Payer: MEDICARE

## 2020-01-01 ENCOUNTER — HOSPITAL ENCOUNTER (OUTPATIENT)
Dept: RADIOLOGY | Facility: HOSPITAL | Age: 75
Discharge: HOME | End: 2020-03-06
Payer: MEDICARE

## 2020-01-01 ENCOUNTER — ANESTHESIA EVENT (INPATIENT)
Dept: RADIOLOGY | Facility: HOSPITAL | Age: 75
DRG: 435 | End: 2020-01-01
Payer: MEDICARE

## 2020-01-01 ENCOUNTER — CONSULT (OUTPATIENT)
Dept: HEMATOLOGY ONCOLOGY | Facility: CLINIC | Age: 75
End: 2020-01-01
Payer: MEDICARE

## 2020-01-01 ENCOUNTER — APPOINTMENT (INPATIENT)
Dept: RADIOLOGY | Facility: HOSPITAL | Age: 75
DRG: 871 | End: 2020-01-01
Payer: MEDICARE

## 2020-01-01 ENCOUNTER — ANESTHESIA EVENT (INPATIENT)
Dept: GASTROENTEROLOGY | Facility: HOSPITAL | Age: 75
DRG: 435 | End: 2020-01-01
Payer: MEDICARE

## 2020-01-01 ENCOUNTER — HOSPITAL ENCOUNTER (OUTPATIENT)
Dept: RADIOLOGY | Facility: HOSPITAL | Age: 75
Discharge: HOME | DRG: 871 | End: 2020-01-29
Payer: MEDICARE

## 2020-01-01 ENCOUNTER — TELEMEDICINE (OUTPATIENT)
Dept: FAMILY MEDICINE CLINIC | Facility: CLINIC | Age: 75
End: 2020-01-01
Payer: MEDICARE

## 2020-01-01 ENCOUNTER — APPOINTMENT (INPATIENT)
Dept: GASTROENTEROLOGY | Facility: HOSPITAL | Age: 75
DRG: 435 | End: 2020-01-01
Payer: MEDICARE

## 2020-01-01 ENCOUNTER — APPOINTMENT (OUTPATIENT)
Dept: LAB | Facility: AMBULARY SURGERY CENTER | Age: 75
End: 2020-01-01
Attending: INTERNAL MEDICINE
Payer: MEDICARE

## 2020-01-01 ENCOUNTER — TELEPHONE (OUTPATIENT)
Dept: HEMATOLOGY ONCOLOGY | Facility: CLINIC | Age: 75
End: 2020-01-01

## 2020-01-01 ENCOUNTER — TELEPHONE (OUTPATIENT)
Dept: NUTRITION | Facility: CLINIC | Age: 75
End: 2020-01-01

## 2020-01-01 ENCOUNTER — ANESTHESIA (OUTPATIENT)
Dept: ANESTHESIOLOGY | Facility: HOSPITAL | Age: 75
End: 2020-01-01

## 2020-01-01 ENCOUNTER — HOSPITAL ENCOUNTER (INPATIENT)
Facility: HOSPITAL | Age: 75
LOS: 2 days | DRG: 435 | End: 2020-01-09
Attending: EMERGENCY MEDICINE | Admitting: INTERNAL MEDICINE
Payer: MEDICARE

## 2020-01-01 ENCOUNTER — HOSPITAL ENCOUNTER (OUTPATIENT)
Dept: RADIOLOGY | Facility: HOSPITAL | Age: 75
Discharge: HOME | End: 2020-01-21
Attending: RADIOLOGY | Admitting: RADIOLOGY
Payer: MEDICARE

## 2020-01-01 ENCOUNTER — HOSPITAL ENCOUNTER (OUTPATIENT)
Dept: MRI IMAGING | Facility: HOSPITAL | Age: 75
Discharge: HOME | DRG: 435 | End: 2020-01-06
Attending: INTERNAL MEDICINE
Payer: MEDICARE

## 2020-01-01 ENCOUNTER — ANESTHESIA (INPATIENT)
Dept: RADIOLOGY | Facility: HOSPITAL | Age: 75
DRG: 435 | End: 2020-01-01
Payer: MEDICARE

## 2020-01-01 ENCOUNTER — PATIENT OUTREACH (OUTPATIENT)
Dept: CASE MANAGEMENT | Facility: OTHER | Age: 75
End: 2020-01-01

## 2020-01-01 ENCOUNTER — TELEPHONE (OUTPATIENT)
Dept: GASTROENTEROLOGY | Facility: AMBULARY SURGERY CENTER | Age: 75
End: 2020-01-01

## 2020-01-01 ENCOUNTER — TELEPHONE (OUTPATIENT)
Dept: INFUSION CENTER | Facility: HOSPITAL | Age: 75
End: 2020-01-01

## 2020-01-01 ENCOUNTER — ANESTHESIA EVENT (OUTPATIENT)
Dept: RADIOLOGY | Facility: HOSPITAL | Age: 75
End: 2020-01-01
Payer: MEDICARE

## 2020-01-01 ENCOUNTER — TELEPHONE (OUTPATIENT)
Dept: NEUROLOGY | Facility: CLINIC | Age: 75
End: 2020-01-01

## 2020-01-01 ENCOUNTER — APPOINTMENT (INPATIENT)
Dept: RADIOLOGY | Facility: HOSPITAL | Age: 75
DRG: 435 | End: 2020-01-01
Attending: HOSPITALIST
Payer: MEDICARE

## 2020-01-01 ENCOUNTER — OFFICE VISIT (OUTPATIENT)
Dept: GASTROENTEROLOGY | Facility: AMBULARY SURGERY CENTER | Age: 75
End: 2020-01-01
Payer: MEDICARE

## 2020-01-01 ENCOUNTER — ANESTHESIA (OUTPATIENT)
Dept: RADIOLOGY | Facility: HOSPITAL | Age: 75
End: 2020-01-01
Payer: MEDICARE

## 2020-01-01 VITALS
SYSTOLIC BLOOD PRESSURE: 113 MMHG | BODY MASS INDEX: 34.65 KG/M2 | RESPIRATION RATE: 14 BRPM | OXYGEN SATURATION: 96 % | HEIGHT: 70 IN | WEIGHT: 242 LBS | HEART RATE: 93 BPM | TEMPERATURE: 98.1 F | DIASTOLIC BLOOD PRESSURE: 68 MMHG

## 2020-01-01 VITALS
HEART RATE: 118 BPM | TEMPERATURE: 98.3 F | BODY MASS INDEX: 33.64 KG/M2 | OXYGEN SATURATION: 99 % | RESPIRATION RATE: 16 BRPM | DIASTOLIC BLOOD PRESSURE: 64 MMHG | WEIGHT: 235 LBS | SYSTOLIC BLOOD PRESSURE: 118 MMHG | HEIGHT: 70 IN

## 2020-01-01 VITALS
OXYGEN SATURATION: 100 % | HEART RATE: 104 BPM | SYSTOLIC BLOOD PRESSURE: 98 MMHG | RESPIRATION RATE: 14 BRPM | TEMPERATURE: 97 F | DIASTOLIC BLOOD PRESSURE: 64 MMHG

## 2020-01-01 VITALS
HEIGHT: 70 IN | BODY MASS INDEX: 27.24 KG/M2 | TEMPERATURE: 97.7 F | DIASTOLIC BLOOD PRESSURE: 63 MMHG | RESPIRATION RATE: 12 BRPM | HEART RATE: 92 BPM | OXYGEN SATURATION: 100 % | SYSTOLIC BLOOD PRESSURE: 108 MMHG | WEIGHT: 190.26 LBS

## 2020-01-01 VITALS
BODY MASS INDEX: 28.25 KG/M2 | HEIGHT: 71 IN | TEMPERATURE: 98.1 F | WEIGHT: 201.8 LBS | SYSTOLIC BLOOD PRESSURE: 102 MMHG | RESPIRATION RATE: 20 BRPM | OXYGEN SATURATION: 96 % | DIASTOLIC BLOOD PRESSURE: 58 MMHG | HEART RATE: 71 BPM

## 2020-01-01 VITALS
TEMPERATURE: 97 F | SYSTOLIC BLOOD PRESSURE: 123 MMHG | HEIGHT: 70 IN | WEIGHT: 222.6 LBS | DIASTOLIC BLOOD PRESSURE: 84 MMHG | RESPIRATION RATE: 20 BRPM | BODY MASS INDEX: 31.87 KG/M2 | OXYGEN SATURATION: 99 % | HEART RATE: 83 BPM

## 2020-01-01 VITALS
HEART RATE: 72 BPM | TEMPERATURE: 98.2 F | RESPIRATION RATE: 16 BRPM | BODY MASS INDEX: 31.35 KG/M2 | WEIGHT: 219 LBS | DIASTOLIC BLOOD PRESSURE: 70 MMHG | HEIGHT: 70 IN | SYSTOLIC BLOOD PRESSURE: 114 MMHG

## 2020-01-01 VITALS
OXYGEN SATURATION: 100 % | BODY MASS INDEX: 30.33 KG/M2 | TEMPERATURE: 98.2 F | HEIGHT: 70 IN | RESPIRATION RATE: 18 BRPM | SYSTOLIC BLOOD PRESSURE: 102 MMHG | DIASTOLIC BLOOD PRESSURE: 70 MMHG | HEART RATE: 77 BPM | WEIGHT: 211.86 LBS

## 2020-01-01 VITALS
RESPIRATION RATE: 20 BRPM | WEIGHT: 191 LBS | SYSTOLIC BLOOD PRESSURE: 80 MMHG | BODY MASS INDEX: 26.74 KG/M2 | TEMPERATURE: 98.8 F | HEART RATE: 92 BPM | DIASTOLIC BLOOD PRESSURE: 60 MMHG | HEIGHT: 71 IN

## 2020-01-01 VITALS
DIASTOLIC BLOOD PRESSURE: 60 MMHG | HEIGHT: 71 IN | BODY MASS INDEX: 28.14 KG/M2 | SYSTOLIC BLOOD PRESSURE: 102 MMHG | OXYGEN SATURATION: 98 % | WEIGHT: 201 LBS | HEART RATE: 72 BPM

## 2020-01-01 VITALS
TEMPERATURE: 97.4 F | HEIGHT: 70 IN | RESPIRATION RATE: 16 BRPM | DIASTOLIC BLOOD PRESSURE: 84 MMHG | HEART RATE: 106 BPM | BODY MASS INDEX: 32.21 KG/M2 | WEIGHT: 225 LBS | SYSTOLIC BLOOD PRESSURE: 122 MMHG

## 2020-01-01 VITALS
DIASTOLIC BLOOD PRESSURE: 70 MMHG | HEART RATE: 110 BPM | WEIGHT: 243 LBS | TEMPERATURE: 101.4 F | BODY MASS INDEX: 34.79 KG/M2 | HEIGHT: 70 IN | RESPIRATION RATE: 16 BRPM | SYSTOLIC BLOOD PRESSURE: 110 MMHG

## 2020-01-01 VITALS
SYSTOLIC BLOOD PRESSURE: 98 MMHG | HEIGHT: 70 IN | TEMPERATURE: 98.3 F | HEART RATE: 76 BPM | WEIGHT: 232 LBS | DIASTOLIC BLOOD PRESSURE: 62 MMHG | BODY MASS INDEX: 33.21 KG/M2 | RESPIRATION RATE: 14 BRPM

## 2020-01-01 VITALS
RESPIRATION RATE: 15 BRPM | DIASTOLIC BLOOD PRESSURE: 64 MMHG | HEART RATE: 100 BPM | SYSTOLIC BLOOD PRESSURE: 107 MMHG | OXYGEN SATURATION: 93 % | HEIGHT: 70 IN | TEMPERATURE: 99.3 F | WEIGHT: 190.26 LBS | BODY MASS INDEX: 27.24 KG/M2

## 2020-01-01 VITALS
RESPIRATION RATE: 20 BRPM | HEIGHT: 70 IN | BODY MASS INDEX: 32.21 KG/M2 | HEART RATE: 92 BPM | SYSTOLIC BLOOD PRESSURE: 114 MMHG | DIASTOLIC BLOOD PRESSURE: 74 MMHG | WEIGHT: 225 LBS | OXYGEN SATURATION: 99 % | TEMPERATURE: 98.5 F

## 2020-01-01 VITALS
TEMPERATURE: 97.6 F | DIASTOLIC BLOOD PRESSURE: 77 MMHG | HEART RATE: 88 BPM | BODY MASS INDEX: 30.09 KG/M2 | WEIGHT: 210.2 LBS | OXYGEN SATURATION: 99 % | RESPIRATION RATE: 18 BRPM | HEIGHT: 70 IN | SYSTOLIC BLOOD PRESSURE: 125 MMHG

## 2020-01-01 DIAGNOSIS — G50.0 TRIGEMINAL NEURALGIA: ICD-10-CM

## 2020-01-01 DIAGNOSIS — R53.83 OTHER FATIGUE: Primary | ICD-10-CM

## 2020-01-01 DIAGNOSIS — I48.20 CHRONIC ATRIAL FIBRILLATION (HCC): ICD-10-CM

## 2020-01-01 DIAGNOSIS — Z51.5 PALLIATIVE CARE PATIENT: ICD-10-CM

## 2020-01-01 DIAGNOSIS — U07.1 COVID-19: Primary | ICD-10-CM

## 2020-01-01 DIAGNOSIS — C18.9 METASTATIC COLON CANCER TO LIVER (HCC): ICD-10-CM

## 2020-01-01 DIAGNOSIS — G50.0 TRIGEMINAL NEURALGIA: Primary | ICD-10-CM

## 2020-01-01 DIAGNOSIS — C79.9 TUMOR, METASTATIC (HCC): ICD-10-CM

## 2020-01-01 DIAGNOSIS — R50.9 FEBRILE ILLNESS: ICD-10-CM

## 2020-01-01 DIAGNOSIS — E43 SEVERE PROTEIN-CALORIE MALNUTRITION (HCC): ICD-10-CM

## 2020-01-01 DIAGNOSIS — I48.91 ATRIAL FIBRILLATION, UNSPECIFIED TYPE (HCC): ICD-10-CM

## 2020-01-01 DIAGNOSIS — C18.9 COLON CANCER METASTASIZED TO MULTIPLE SITES (HCC): Chronic | ICD-10-CM

## 2020-01-01 DIAGNOSIS — K83.1 OBSTRUCTIVE JAUNDICE DUE TO CANCER (HCC): ICD-10-CM

## 2020-01-01 DIAGNOSIS — C78.00 MALIGNANT NEOPLASM METASTATIC TO LUNG, UNSPECIFIED LATERALITY (HCC): ICD-10-CM

## 2020-01-01 DIAGNOSIS — C41.3: Primary | ICD-10-CM

## 2020-01-01 DIAGNOSIS — C80.1 OBSTRUCTIVE JAUNDICE DUE TO CANCER (HCC): ICD-10-CM

## 2020-01-01 DIAGNOSIS — N40.0 BENIGN PROSTATIC HYPERPLASIA, UNSPECIFIED WHETHER LOWER URINARY TRACT SYMPTOMS PRESENT: ICD-10-CM

## 2020-01-01 DIAGNOSIS — I48.91 ATRIAL FIBRILLATION, UNSPECIFIED TYPE (HCC): Primary | ICD-10-CM

## 2020-01-01 DIAGNOSIS — G89.3 CANCER ASSOCIATED PAIN: ICD-10-CM

## 2020-01-01 DIAGNOSIS — C78.7 METASTATIC COLON CANCER TO LIVER (HCC): ICD-10-CM

## 2020-01-01 DIAGNOSIS — C44.509 MALIGNANT NEOPLASM OF ABDOMINAL WALL: ICD-10-CM

## 2020-01-01 DIAGNOSIS — L03.311 ABDOMINAL WALL CELLULITIS: Primary | ICD-10-CM

## 2020-01-01 DIAGNOSIS — S31.109A CHRONIC ABDOMINAL WOUND INFECTION: ICD-10-CM

## 2020-01-01 DIAGNOSIS — R17 PAINLESS JAUNDICE: ICD-10-CM

## 2020-01-01 DIAGNOSIS — C18.9 COLON CANCER METASTASIZED TO MULTIPLE SITES (HCC): ICD-10-CM

## 2020-01-01 DIAGNOSIS — C18.9 METASTATIC COLON CANCER TO LIVER (HCC): Primary | ICD-10-CM

## 2020-01-01 DIAGNOSIS — Z00.00 MEDICARE ANNUAL WELLNESS VISIT, SUBSEQUENT: Primary | ICD-10-CM

## 2020-01-01 DIAGNOSIS — R17 JAUNDICE: ICD-10-CM

## 2020-01-01 DIAGNOSIS — C18.9 COLON CANCER (HCC): ICD-10-CM

## 2020-01-01 DIAGNOSIS — I10 ESSENTIAL HYPERTENSION: ICD-10-CM

## 2020-01-01 DIAGNOSIS — I95.0 IDIOPATHIC HYPOTENSION: ICD-10-CM

## 2020-01-01 DIAGNOSIS — C78.7 METASTATIC COLON CANCER TO LIVER (HCC): Primary | ICD-10-CM

## 2020-01-01 DIAGNOSIS — Z71.89 COMPLEX CARE COORDINATION: Primary | ICD-10-CM

## 2020-01-01 DIAGNOSIS — L08.9 CHRONIC ABDOMINAL WOUND INFECTION: ICD-10-CM

## 2020-01-01 DIAGNOSIS — R79.89 ELEVATED LFTS: ICD-10-CM

## 2020-01-01 DIAGNOSIS — R18.0 ASCITES, MALIGNANT: ICD-10-CM

## 2020-01-01 DIAGNOSIS — C76.2: ICD-10-CM

## 2020-01-01 DIAGNOSIS — L08.9 SKIN INFECTION: Primary | ICD-10-CM

## 2020-01-01 DIAGNOSIS — L03.90 WOUND CELLULITIS: ICD-10-CM

## 2020-01-01 DIAGNOSIS — I48.91 ATRIAL FIBRILLATION, UNSPECIFIED TYPE (HCC): Chronic | ICD-10-CM

## 2020-01-01 DIAGNOSIS — K59.03 THERAPEUTIC OPIOID-INDUCED CONSTIPATION (OIC): ICD-10-CM

## 2020-01-01 DIAGNOSIS — C18.9 COLON CANCER METASTASIZED TO MULTIPLE SITES (HCC): Primary | ICD-10-CM

## 2020-01-01 DIAGNOSIS — C18.9 COLON CANCER METASTASIZED TO MULTIPLE SITES (HCC): Primary | Chronic | ICD-10-CM

## 2020-01-01 DIAGNOSIS — E80.6 HYPERBILIRUBINEMIA: ICD-10-CM

## 2020-01-01 DIAGNOSIS — R17 ELEVATED BILIRUBIN: ICD-10-CM

## 2020-01-01 DIAGNOSIS — T40.2X5A THERAPEUTIC OPIOID-INDUCED CONSTIPATION (OIC): ICD-10-CM

## 2020-01-01 DIAGNOSIS — E87.1 HYPONATREMIA: ICD-10-CM

## 2020-01-01 DIAGNOSIS — E87.6 HYPOKALEMIA: Primary | ICD-10-CM

## 2020-01-01 DIAGNOSIS — I10 HYPERTENSION, UNSPECIFIED TYPE: ICD-10-CM

## 2020-01-01 DIAGNOSIS — F41.9 ANXIOUSNESS: ICD-10-CM

## 2020-01-01 DIAGNOSIS — R19.01 ABDOMINAL WALL MASS OF RIGHT UPPER QUADRANT: ICD-10-CM

## 2020-01-01 DIAGNOSIS — I10 HYPERTENSION, UNSPECIFIED TYPE: Primary | ICD-10-CM

## 2020-01-01 DIAGNOSIS — R60.0 LOWER EXTREMITY EDEMA: ICD-10-CM

## 2020-01-01 DIAGNOSIS — D49.9 TUMOR: ICD-10-CM

## 2020-01-01 DIAGNOSIS — L03.311 CELLULITIS OF ABDOMINAL WALL: Primary | ICD-10-CM

## 2020-01-01 DIAGNOSIS — K13.79 MOUTH SORES: ICD-10-CM

## 2020-01-01 DIAGNOSIS — E80.6 HYPERBILIRUBINEMIA: Primary | ICD-10-CM

## 2020-01-01 DIAGNOSIS — R50.9 FEVER AND CHILLS: Primary | ICD-10-CM

## 2020-01-01 LAB
ALBUMIN FLD-MCNC: 0.6 G/DL
ALBUMIN SERPL BCP-MCNC: 1.1 G/DL (ref 3.5–5)
ALBUMIN SERPL BCP-MCNC: 1.2 G/DL (ref 3.5–5)
ALBUMIN SERPL BCP-MCNC: 1.2 G/DL (ref 3.5–5)
ALBUMIN SERPL BCP-MCNC: 1.3 G/DL (ref 3.5–5)
ALBUMIN SERPL BCP-MCNC: 1.3 G/DL (ref 3.5–5)
ALBUMIN SERPL BCP-MCNC: 1.6 G/DL (ref 3.5–5)
ALBUMIN SERPL BCP-MCNC: 1.7 G/DL (ref 3.5–5)
ALBUMIN SERPL BCP-MCNC: 1.8 G/DL (ref 3.5–5)
ALBUMIN SERPL BCP-MCNC: 1.9 G/DL (ref 3.5–5)
ALBUMIN SERPL BCP-MCNC: 2 G/DL (ref 3.5–5)
ALBUMIN SERPL BCP-MCNC: 2 G/DL (ref 3.5–5)
ALBUMIN SERPL BCP-MCNC: 2.1 G/DL (ref 3.5–5)
ALBUMIN SERPL BCP-MCNC: 2.2 G/DL (ref 3.5–5)
ALBUMIN SERPL BCP-MCNC: 2.3 G/DL (ref 3.5–5)
ALP SERPL-CCNC: 1016 U/L (ref 46–116)
ALP SERPL-CCNC: 1038 U/L (ref 46–116)
ALP SERPL-CCNC: 1120 U/L (ref 46–116)
ALP SERPL-CCNC: 1262 U/L (ref 46–116)
ALP SERPL-CCNC: 206 U/L (ref 46–116)
ALP SERPL-CCNC: 289 U/L (ref 46–116)
ALP SERPL-CCNC: 328 U/L (ref 46–116)
ALP SERPL-CCNC: 335 U/L (ref 46–116)
ALP SERPL-CCNC: 339 U/L (ref 46–116)
ALP SERPL-CCNC: 383 U/L (ref 46–116)
ALP SERPL-CCNC: 429 U/L (ref 46–116)
ALP SERPL-CCNC: 433 U/L (ref 46–116)
ALP SERPL-CCNC: 484 U/L (ref 46–116)
ALP SERPL-CCNC: 504 U/L (ref 46–116)
ALP SERPL-CCNC: 511 U/L (ref 46–116)
ALP SERPL-CCNC: 551 U/L (ref 46–116)
ALP SERPL-CCNC: 562 U/L (ref 46–116)
ALP SERPL-CCNC: 572 U/L (ref 46–116)
ALP SERPL-CCNC: 628 U/L (ref 46–116)
ALP SERPL-CCNC: 633 U/L (ref 46–116)
ALP SERPL-CCNC: 659 U/L (ref 46–116)
ALP SERPL-CCNC: 736 U/L (ref 46–116)
ALP SERPL-CCNC: 794 U/L (ref 46–116)
ALP SERPL-CCNC: 823 U/L (ref 46–116)
ALP SERPL-CCNC: 835 U/L (ref 46–116)
ALP SERPL-CCNC: 838 U/L (ref 46–116)
ALP SERPL-CCNC: 859 U/L (ref 46–116)
ALP SERPL-CCNC: 865 U/L (ref 46–116)
ALP SERPL-CCNC: 887 U/L (ref 46–116)
ALP SERPL-CCNC: 902 U/L (ref 46–116)
ALP SERPL-CCNC: 937 U/L (ref 46–116)
ALP SERPL-CCNC: 967 U/L (ref 46–116)
ALP SERPL-CCNC: 981 U/L (ref 46–116)
ALP SERPL-CCNC: 983 U/L (ref 46–116)
ALP SERPL-CCNC: 987 U/L (ref 46–116)
ALT SERPL W P-5'-P-CCNC: 105 U/L (ref 12–78)
ALT SERPL W P-5'-P-CCNC: 107 U/L (ref 12–78)
ALT SERPL W P-5'-P-CCNC: 108 U/L (ref 12–78)
ALT SERPL W P-5'-P-CCNC: 117 U/L (ref 12–78)
ALT SERPL W P-5'-P-CCNC: 121 U/L (ref 12–78)
ALT SERPL W P-5'-P-CCNC: 122 U/L (ref 12–78)
ALT SERPL W P-5'-P-CCNC: 157 U/L (ref 12–78)
ALT SERPL W P-5'-P-CCNC: 188 U/L (ref 12–78)
ALT SERPL W P-5'-P-CCNC: 220 U/L (ref 12–78)
ALT SERPL W P-5'-P-CCNC: 239 U/L (ref 12–78)
ALT SERPL W P-5'-P-CCNC: 30 U/L (ref 12–78)
ALT SERPL W P-5'-P-CCNC: 32 U/L (ref 12–78)
ALT SERPL W P-5'-P-CCNC: 35 U/L (ref 12–78)
ALT SERPL W P-5'-P-CCNC: 35 U/L (ref 12–78)
ALT SERPL W P-5'-P-CCNC: 39 U/L (ref 12–78)
ALT SERPL W P-5'-P-CCNC: 44 U/L (ref 12–78)
ALT SERPL W P-5'-P-CCNC: 47 U/L (ref 12–78)
ALT SERPL W P-5'-P-CCNC: 55 U/L (ref 12–78)
ALT SERPL W P-5'-P-CCNC: 56 U/L (ref 12–78)
ALT SERPL W P-5'-P-CCNC: 59 U/L (ref 12–78)
ALT SERPL W P-5'-P-CCNC: 71 U/L (ref 12–78)
ALT SERPL W P-5'-P-CCNC: 74 U/L (ref 12–78)
ALT SERPL W P-5'-P-CCNC: 77 U/L (ref 12–78)
ALT SERPL W P-5'-P-CCNC: 79 U/L (ref 12–78)
ALT SERPL W P-5'-P-CCNC: 83 U/L (ref 12–78)
ALT SERPL W P-5'-P-CCNC: 83 U/L (ref 12–78)
ALT SERPL W P-5'-P-CCNC: 85 U/L (ref 12–78)
ALT SERPL W P-5'-P-CCNC: 85 U/L (ref 12–78)
ALT SERPL W P-5'-P-CCNC: 89 U/L (ref 12–78)
ALT SERPL W P-5'-P-CCNC: 90 U/L (ref 12–78)
ALT SERPL W P-5'-P-CCNC: 94 U/L (ref 12–78)
ALT SERPL W P-5'-P-CCNC: 95 U/L (ref 12–78)
ALT SERPL W P-5'-P-CCNC: 97 U/L (ref 12–78)
AMMONIA PLAS-SCNC: 13 UMOL/L (ref 11–35)
AMMONIA PLAS-SCNC: <10 UMOL/L (ref 11–35)
AMMONIA PLAS-SCNC: <10 UMOL/L (ref 11–35)
ANION GAP SERPL CALCULATED.3IONS-SCNC: 10 MMOL/L (ref 4–13)
ANION GAP SERPL CALCULATED.3IONS-SCNC: 10 MMOL/L (ref 4–13)
ANION GAP SERPL CALCULATED.3IONS-SCNC: 11 MMOL/L (ref 4–13)
ANION GAP SERPL CALCULATED.3IONS-SCNC: 12 MMOL/L (ref 4–13)
ANION GAP SERPL CALCULATED.3IONS-SCNC: 13 MMOL/L (ref 4–13)
ANION GAP SERPL CALCULATED.3IONS-SCNC: 4 MMOL/L (ref 4–13)
ANION GAP SERPL CALCULATED.3IONS-SCNC: 5 MMOL/L (ref 4–13)
ANION GAP SERPL CALCULATED.3IONS-SCNC: 6 MMOL/L (ref 4–13)
ANION GAP SERPL CALCULATED.3IONS-SCNC: 7 MMOL/L (ref 4–13)
ANION GAP SERPL CALCULATED.3IONS-SCNC: 8 MMOL/L (ref 4–13)
APPEARANCE FLD: ABNORMAL
APTT PPP: 113 SECONDS (ref 23–37)
APTT PPP: 157 SECONDS (ref 23–37)
APTT PPP: 29 SECONDS (ref 23–37)
APTT PPP: 32 SECONDS (ref 25–32)
APTT PPP: 33 SECONDS (ref 23–37)
APTT PPP: 38 SECONDS (ref 23–37)
APTT PPP: 52 SECONDS (ref 23–37)
APTT PPP: 53 SECONDS (ref 23–37)
APTT PPP: 62 SECONDS (ref 23–37)
APTT PPP: 71 SECONDS (ref 23–37)
APTT PPP: 72 SECONDS (ref 23–37)
APTT PPP: 80 SECONDS (ref 23–37)
AST SERPL W P-5'-P-CCNC: 104 U/L (ref 5–45)
AST SERPL W P-5'-P-CCNC: 107 U/L (ref 5–45)
AST SERPL W P-5'-P-CCNC: 114 U/L (ref 5–45)
AST SERPL W P-5'-P-CCNC: 117 U/L (ref 5–45)
AST SERPL W P-5'-P-CCNC: 121 U/L (ref 5–45)
AST SERPL W P-5'-P-CCNC: 122 U/L (ref 5–45)
AST SERPL W P-5'-P-CCNC: 126 U/L (ref 5–45)
AST SERPL W P-5'-P-CCNC: 128 U/L (ref 5–45)
AST SERPL W P-5'-P-CCNC: 195 U/L (ref 5–45)
AST SERPL W P-5'-P-CCNC: 220 U/L (ref 5–45)
AST SERPL W P-5'-P-CCNC: 31 U/L (ref 5–45)
AST SERPL W P-5'-P-CCNC: 333 U/L (ref 5–45)
AST SERPL W P-5'-P-CCNC: 35 U/L (ref 5–45)
AST SERPL W P-5'-P-CCNC: 354 U/L (ref 5–45)
AST SERPL W P-5'-P-CCNC: 43 U/L (ref 5–45)
AST SERPL W P-5'-P-CCNC: 58 U/L (ref 5–45)
AST SERPL W P-5'-P-CCNC: 61 U/L (ref 5–45)
AST SERPL W P-5'-P-CCNC: 63 U/L (ref 5–45)
AST SERPL W P-5'-P-CCNC: 71 U/L (ref 5–45)
AST SERPL W P-5'-P-CCNC: 71 U/L (ref 5–45)
AST SERPL W P-5'-P-CCNC: 73 U/L (ref 5–45)
AST SERPL W P-5'-P-CCNC: 76 U/L (ref 5–45)
AST SERPL W P-5'-P-CCNC: 78 U/L (ref 5–45)
AST SERPL W P-5'-P-CCNC: 79 U/L (ref 5–45)
AST SERPL W P-5'-P-CCNC: 80 U/L (ref 5–45)
AST SERPL W P-5'-P-CCNC: 81 U/L (ref 5–45)
AST SERPL W P-5'-P-CCNC: 82 U/L (ref 5–45)
AST SERPL W P-5'-P-CCNC: 83 U/L (ref 5–45)
AST SERPL W P-5'-P-CCNC: 85 U/L (ref 5–45)
AST SERPL W P-5'-P-CCNC: 87 U/L (ref 5–45)
AST SERPL W P-5'-P-CCNC: 88 U/L (ref 5–45)
AST SERPL W P-5'-P-CCNC: 88 U/L (ref 5–45)
AST SERPL W P-5'-P-CCNC: 94 U/L (ref 5–45)
AST SERPL W P-5'-P-CCNC: 97 U/L (ref 5–45)
AST SERPL W P-5'-P-CCNC: 99 U/L (ref 5–45)
ATRIAL RATE: 125 BPM
ATRIAL RATE: 300 BPM
ATRIAL RATE: 394 BPM
ATRIAL RATE: 91 BPM
ATRIAL RATE: 93 BPM
BACTERIA BLD CULT: NORMAL
BACTERIA SPEC BFLD CULT: NO GROWTH
BACTERIA UR CULT: NORMAL
BACTERIA UR QL AUTO: ABNORMAL /HPF
BASOPHILS # BLD AUTO: 0.01 THOUSANDS/ΜL (ref 0–0.1)
BASOPHILS # BLD AUTO: 0.01 THOUSANDS/ΜL (ref 0–0.1)
BASOPHILS # BLD AUTO: 0.02 THOUSANDS/ΜL (ref 0–0.1)
BASOPHILS # BLD AUTO: 0.03 THOUSANDS/ΜL (ref 0–0.1)
BASOPHILS # BLD AUTO: 0.04 THOUSANDS/ΜL (ref 0–0.1)
BASOPHILS # BLD AUTO: 0.05 THOUSANDS/ΜL (ref 0–0.1)
BASOPHILS # BLD AUTO: 0.07 THOUSANDS/ΜL (ref 0–0.1)
BASOPHILS NFR BLD AUTO: 0 % (ref 0–1)
BASOPHILS NFR BLD AUTO: 1 % (ref 0–1)
BILIRUB DIRECT SERPL-MCNC: 13.08 MG/DL (ref 0–0.2)
BILIRUB DIRECT SERPL-MCNC: 13.39 MG/DL (ref 0–0.2)
BILIRUB DIRECT SERPL-MCNC: 18.54 MG/DL (ref 0–0.2)
BILIRUB DIRECT SERPL-MCNC: 19.55 MG/DL (ref 0–0.2)
BILIRUB DIRECT SERPL-MCNC: 2.93 MG/DL (ref 0–0.2)
BILIRUB DIRECT SERPL-MCNC: 4.44 MG/DL (ref 0–0.2)
BILIRUB DIRECT SERPL-MCNC: 6.67 MG/DL (ref 0–0.2)
BILIRUB SERPL-MCNC: 10.55 MG/DL (ref 0.2–1)
BILIRUB SERPL-MCNC: 12.59 MG/DL (ref 0.2–1)
BILIRUB SERPL-MCNC: 13.49 MG/DL (ref 0.2–1)
BILIRUB SERPL-MCNC: 14.19 MG/DL (ref 0.2–1)
BILIRUB SERPL-MCNC: 15 MG/DL (ref 0.2–1)
BILIRUB SERPL-MCNC: 15.23 MG/DL (ref 0.2–1)
BILIRUB SERPL-MCNC: 15.38 MG/DL (ref 0.2–1)
BILIRUB SERPL-MCNC: 16.01 MG/DL (ref 0.2–1)
BILIRUB SERPL-MCNC: 16.19 MG/DL (ref 0.2–1)
BILIRUB SERPL-MCNC: 16.35 MG/DL (ref 0.2–1)
BILIRUB SERPL-MCNC: 17.04 MG/DL (ref 0.2–1)
BILIRUB SERPL-MCNC: 18.17 MG/DL (ref 0.2–1)
BILIRUB SERPL-MCNC: 21 MG/DL (ref 0.2–1)
BILIRUB SERPL-MCNC: 21.97 MG/DL (ref 0.2–1)
BILIRUB SERPL-MCNC: 23.07 MG/DL (ref 0.2–1)
BILIRUB SERPL-MCNC: 23.81 MG/DL (ref 0.2–1)
BILIRUB SERPL-MCNC: 24.09 MG/DL (ref 0.2–1)
BILIRUB SERPL-MCNC: 24.88 MG/DL (ref 0.2–1)
BILIRUB SERPL-MCNC: 25.73 MG/DL (ref 0.2–1)
BILIRUB SERPL-MCNC: 3.1 MG/DL (ref 0.2–1)
BILIRUB SERPL-MCNC: 3.6 MG/DL (ref 0.2–1)
BILIRUB SERPL-MCNC: 3.7 MG/DL (ref 0.2–1)
BILIRUB SERPL-MCNC: 4.1 MG/DL (ref 0.2–1)
BILIRUB SERPL-MCNC: 4.4 MG/DL (ref 0.2–1)
BILIRUB SERPL-MCNC: 4.4 MG/DL (ref 0.2–1)
BILIRUB SERPL-MCNC: 4.6 MG/DL (ref 0.2–1)
BILIRUB SERPL-MCNC: 4.8 MG/DL (ref 0.2–1)
BILIRUB SERPL-MCNC: 4.9 MG/DL (ref 0.2–1)
BILIRUB SERPL-MCNC: 5 MG/DL (ref 0.2–1)
BILIRUB SERPL-MCNC: 5.1 MG/DL (ref 0.2–1)
BILIRUB SERPL-MCNC: 5.3 MG/DL (ref 0.2–1)
BILIRUB SERPL-MCNC: 5.9 MG/DL (ref 0.2–1)
BILIRUB SERPL-MCNC: 7.13 MG/DL (ref 0.2–1)
BILIRUB SERPL-MCNC: 8.06 MG/DL (ref 0.2–1)
BILIRUB SERPL-MCNC: 8.29 MG/DL (ref 0.2–1)
BILIRUB SERPL-MCNC: 9.36 MG/DL (ref 0.2–1)
BILIRUB UR QL STRIP: ABNORMAL
BILIRUB UR QL STRIP: NEGATIVE
BUN SERPL-MCNC: 10 MG/DL (ref 5–25)
BUN SERPL-MCNC: 11 MG/DL (ref 5–25)
BUN SERPL-MCNC: 11 MG/DL (ref 5–25)
BUN SERPL-MCNC: 12 MG/DL (ref 5–25)
BUN SERPL-MCNC: 13 MG/DL (ref 5–25)
BUN SERPL-MCNC: 14 MG/DL (ref 5–25)
BUN SERPL-MCNC: 14 MG/DL (ref 5–25)
BUN SERPL-MCNC: 15 MG/DL (ref 5–25)
BUN SERPL-MCNC: 15 MG/DL (ref 5–25)
BUN SERPL-MCNC: 17 MG/DL (ref 5–25)
BUN SERPL-MCNC: 17 MG/DL (ref 5–25)
BUN SERPL-MCNC: 18 MG/DL (ref 5–25)
BUN SERPL-MCNC: 19 MG/DL (ref 5–25)
BUN SERPL-MCNC: 19 MG/DL (ref 5–25)
BUN SERPL-MCNC: 20 MG/DL (ref 5–25)
BUN SERPL-MCNC: 20 MG/DL (ref 5–25)
BUN SERPL-MCNC: 21 MG/DL (ref 5–25)
BUN SERPL-MCNC: 21 MG/DL (ref 5–25)
BUN SERPL-MCNC: 24 MG/DL (ref 5–25)
BUN SERPL-MCNC: 33 MG/DL (ref 5–25)
BUN SERPL-MCNC: 39 MG/DL (ref 5–25)
BUN SERPL-MCNC: 47 MG/DL (ref 5–25)
BUN SERPL-MCNC: 8 MG/DL (ref 5–25)
BUN SERPL-MCNC: 9 MG/DL (ref 5–25)
CALCIUM SERPL-MCNC: 7.2 MG/DL (ref 8.3–10.1)
CALCIUM SERPL-MCNC: 7.2 MG/DL (ref 8.3–10.1)
CALCIUM SERPL-MCNC: 7.4 MG/DL (ref 8.3–10.1)
CALCIUM SERPL-MCNC: 7.5 MG/DL (ref 8.3–10.1)
CALCIUM SERPL-MCNC: 7.5 MG/DL (ref 8.3–10.1)
CALCIUM SERPL-MCNC: 7.6 MG/DL (ref 8.3–10.1)
CALCIUM SERPL-MCNC: 7.7 MG/DL (ref 8.3–10.1)
CALCIUM SERPL-MCNC: 7.8 MG/DL (ref 8.3–10.1)
CALCIUM SERPL-MCNC: 7.9 MG/DL (ref 8.3–10.1)
CALCIUM SERPL-MCNC: 7.9 MG/DL (ref 8.3–10.1)
CALCIUM SERPL-MCNC: 8 MG/DL (ref 8.3–10.1)
CALCIUM SERPL-MCNC: 8.1 MG/DL (ref 8.3–10.1)
CALCIUM SERPL-MCNC: 8.2 MG/DL (ref 8.3–10.1)
CALCIUM SERPL-MCNC: 8.3 MG/DL (ref 8.3–10.1)
CALCIUM SERPL-MCNC: 8.4 MG/DL (ref 8.3–10.1)
CALCIUM SERPL-MCNC: 8.4 MG/DL (ref 8.3–10.1)
CALCIUM SERPL-MCNC: 8.5 MG/DL (ref 8.3–10.1)
CALCIUM SERPL-MCNC: 8.5 MG/DL (ref 8.3–10.1)
CALCIUM SERPL-MCNC: 8.8 MG/DL (ref 8.3–10.1)
CHLORIDE SERPL-SCNC: 100 MMOL/L (ref 100–108)
CHLORIDE SERPL-SCNC: 101 MMOL/L (ref 100–108)
CHLORIDE SERPL-SCNC: 102 MMOL/L (ref 100–108)
CHLORIDE SERPL-SCNC: 103 MMOL/L (ref 100–108)
CHLORIDE SERPL-SCNC: 103 MMOL/L (ref 100–108)
CHLORIDE SERPL-SCNC: 104 MMOL/L (ref 100–108)
CHLORIDE SERPL-SCNC: 105 MMOL/L (ref 100–108)
CHLORIDE SERPL-SCNC: 107 MMOL/L (ref 100–108)
CHLORIDE SERPL-SCNC: 91 MMOL/L (ref 100–108)
CHLORIDE SERPL-SCNC: 92 MMOL/L (ref 100–108)
CHLORIDE SERPL-SCNC: 92 MMOL/L (ref 100–108)
CHLORIDE SERPL-SCNC: 94 MMOL/L (ref 100–108)
CHLORIDE SERPL-SCNC: 96 MMOL/L (ref 100–108)
CHLORIDE SERPL-SCNC: 96 MMOL/L (ref 100–108)
CHLORIDE SERPL-SCNC: 98 MMOL/L (ref 100–108)
CHLORIDE SERPL-SCNC: 99 MMOL/L (ref 100–108)
CLARITY UR: CLEAR
CLARITY UR: CLEAR
CO2 SERPL-SCNC: 20 MMOL/L (ref 21–32)
CO2 SERPL-SCNC: 21 MMOL/L (ref 21–32)
CO2 SERPL-SCNC: 22 MMOL/L (ref 21–32)
CO2 SERPL-SCNC: 23 MMOL/L (ref 21–32)
CO2 SERPL-SCNC: 24 MMOL/L (ref 21–32)
CO2 SERPL-SCNC: 25 MMOL/L (ref 21–32)
CO2 SERPL-SCNC: 26 MMOL/L (ref 21–32)
CO2 SERPL-SCNC: 27 MMOL/L (ref 21–32)
CO2 SERPL-SCNC: 28 MMOL/L (ref 21–32)
CO2 SERPL-SCNC: 29 MMOL/L (ref 21–32)
CO2 SERPL-SCNC: 29 MMOL/L (ref 21–32)
CO2 SERPL-SCNC: 30 MMOL/L (ref 21–32)
COARSE GRAN CASTS URNS QL MICRO: ABNORMAL /LPF
COLOR FLD: YELLOW
COLOR UR: ABNORMAL
COLOR UR: NORMAL
CORTIS AM PEAK SERPL-MCNC: 30.5 UG/DL (ref 4.2–22.4)
CREAT SERPL-MCNC: 0.57 MG/DL (ref 0.6–1.3)
CREAT SERPL-MCNC: 0.58 MG/DL (ref 0.6–1.3)
CREAT SERPL-MCNC: 0.59 MG/DL (ref 0.6–1.3)
CREAT SERPL-MCNC: 0.6 MG/DL (ref 0.6–1.3)
CREAT SERPL-MCNC: 0.63 MG/DL (ref 0.6–1.3)
CREAT SERPL-MCNC: 0.63 MG/DL (ref 0.6–1.3)
CREAT SERPL-MCNC: 0.64 MG/DL (ref 0.6–1.3)
CREAT SERPL-MCNC: 0.66 MG/DL (ref 0.6–1.3)
CREAT SERPL-MCNC: 0.67 MG/DL (ref 0.6–1.3)
CREAT SERPL-MCNC: 0.68 MG/DL (ref 0.6–1.3)
CREAT SERPL-MCNC: 0.7 MG/DL (ref 0.6–1.3)
CREAT SERPL-MCNC: 0.72 MG/DL (ref 0.6–1.3)
CREAT SERPL-MCNC: 0.73 MG/DL (ref 0.6–1.3)
CREAT SERPL-MCNC: 0.74 MG/DL (ref 0.6–1.3)
CREAT SERPL-MCNC: 0.8 MG/DL (ref 0.6–1.3)
CREAT SERPL-MCNC: 0.8 MG/DL (ref 0.6–1.3)
CREAT SERPL-MCNC: 0.82 MG/DL (ref 0.6–1.3)
CREAT SERPL-MCNC: 0.83 MG/DL (ref 0.6–1.3)
CREAT SERPL-MCNC: 0.89 MG/DL (ref 0.6–1.3)
CREAT SERPL-MCNC: 0.92 MG/DL (ref 0.6–1.3)
CREAT SERPL-MCNC: 0.95 MG/DL (ref 0.6–1.3)
CREAT SERPL-MCNC: 0.95 MG/DL (ref 0.6–1.3)
CREAT SERPL-MCNC: 0.96 MG/DL (ref 0.6–1.3)
CREAT SERPL-MCNC: 0.98 MG/DL (ref 0.6–1.3)
CREAT SERPL-MCNC: 0.99 MG/DL (ref 0.6–1.3)
CREAT SERPL-MCNC: 0.99 MG/DL (ref 0.6–1.3)
CREAT SERPL-MCNC: 1.01 MG/DL (ref 0.6–1.3)
CREAT SERPL-MCNC: 1.02 MG/DL (ref 0.6–1.3)
CREAT SERPL-MCNC: 1.04 MG/DL (ref 0.6–1.3)
CREAT SERPL-MCNC: 1.06 MG/DL (ref 0.6–1.3)
CREAT SERPL-MCNC: 1.07 MG/DL (ref 0.6–1.3)
CREAT SERPL-MCNC: 1.1 MG/DL (ref 0.6–1.3)
CREAT SERPL-MCNC: 1.11 MG/DL (ref 0.6–1.3)
CREAT SERPL-MCNC: 1.13 MG/DL (ref 0.6–1.3)
CREAT SERPL-MCNC: 1.17 MG/DL (ref 0.6–1.3)
CREAT SERPL-MCNC: 1.25 MG/DL (ref 0.6–1.3)
CREAT SERPL-MCNC: 1.28 MG/DL (ref 0.6–1.3)
CREAT SERPL-MCNC: 1.43 MG/DL (ref 0.6–1.3)
CREAT SERPL-MCNC: 1.51 MG/DL (ref 0.6–1.3)
CRP SERPL HS-MCNC: 73.67 MG/L
CRP SERPL QL: 42.2 MG/L
CRP SERPL QL: 48.3 MG/L
CRP SERPL QL: 51.8 MG/L
CRP SERPL QL: 54.1 MG/L
CRP SERPL QL: 68.8 MG/L
CRP SERPL QL: 75.9 MG/L
D DIMER PPP FEU-MCNC: 1.61 UG/ML FEU
D DIMER PPP FEU-MCNC: 1.89 UG/ML FEU
D DIMER PPP FEU-MCNC: 2.02 UG/ML FEU
D DIMER PPP FEU-MCNC: 2.67 UG/ML FEU
DIGITOXIN SERPL-MCNC: <5 NG/ML (ref 10–25)
EOSINOPHIL # BLD AUTO: 0 THOUSAND/ΜL (ref 0–0.61)
EOSINOPHIL # BLD AUTO: 0.01 THOUSAND/ΜL (ref 0–0.61)
EOSINOPHIL # BLD AUTO: 0.02 THOUSAND/ΜL (ref 0–0.61)
EOSINOPHIL # BLD AUTO: 0.03 THOUSAND/ΜL (ref 0–0.61)
EOSINOPHIL # BLD AUTO: 0.04 THOUSAND/ΜL (ref 0–0.61)
EOSINOPHIL # BLD AUTO: 0.06 THOUSAND/ΜL (ref 0–0.61)
EOSINOPHIL # BLD AUTO: 0.06 THOUSAND/ΜL (ref 0–0.61)
EOSINOPHIL # BLD AUTO: 0.09 THOUSAND/ΜL (ref 0–0.61)
EOSINOPHIL # BLD AUTO: 0.1 THOUSAND/ΜL (ref 0–0.61)
EOSINOPHIL NFR BLD AUTO: 0 % (ref 0–6)
EOSINOPHIL NFR BLD AUTO: 1 % (ref 0–6)
EOSINOPHIL NFR BLD AUTO: 2 % (ref 0–6)
ERYTHROCYTE [DISTWIDTH] IN BLOOD BY AUTOMATED COUNT: 13.8 % (ref 11.6–15.1)
ERYTHROCYTE [DISTWIDTH] IN BLOOD BY AUTOMATED COUNT: 14 % (ref 11.6–15.1)
ERYTHROCYTE [DISTWIDTH] IN BLOOD BY AUTOMATED COUNT: 14.1 % (ref 11.6–15.1)
ERYTHROCYTE [DISTWIDTH] IN BLOOD BY AUTOMATED COUNT: 14.2 % (ref 11.6–15.1)
ERYTHROCYTE [DISTWIDTH] IN BLOOD BY AUTOMATED COUNT: 14.3 % (ref 11.6–15.1)
ERYTHROCYTE [DISTWIDTH] IN BLOOD BY AUTOMATED COUNT: 14.3 % (ref 11.6–15.1)
ERYTHROCYTE [DISTWIDTH] IN BLOOD BY AUTOMATED COUNT: 14.4 % (ref 11.6–15.1)
ERYTHROCYTE [DISTWIDTH] IN BLOOD BY AUTOMATED COUNT: 14.5 % (ref 11.6–15.1)
ERYTHROCYTE [DISTWIDTH] IN BLOOD BY AUTOMATED COUNT: 14.6 % (ref 11.6–15.1)
ERYTHROCYTE [DISTWIDTH] IN BLOOD BY AUTOMATED COUNT: 14.8 % (ref 11.6–15.1)
ERYTHROCYTE [DISTWIDTH] IN BLOOD BY AUTOMATED COUNT: 15 % (ref 11.6–15.1)
ERYTHROCYTE [DISTWIDTH] IN BLOOD BY AUTOMATED COUNT: 17.2 % (ref 11.6–15.1)
ERYTHROCYTE [DISTWIDTH] IN BLOOD BY AUTOMATED COUNT: 17.9 % (ref 11.6–15.1)
ERYTHROCYTE [DISTWIDTH] IN BLOOD BY AUTOMATED COUNT: 18.3 % (ref 11.6–15.1)
ERYTHROCYTE [DISTWIDTH] IN BLOOD BY AUTOMATED COUNT: 18.4 % (ref 11.6–15.1)
ERYTHROCYTE [DISTWIDTH] IN BLOOD BY AUTOMATED COUNT: 18.5 % (ref 11.6–15.1)
ERYTHROCYTE [DISTWIDTH] IN BLOOD BY AUTOMATED COUNT: 19.2 % (ref 11.6–15.1)
ERYTHROCYTE [SEDIMENTATION RATE] IN BLOOD: 44 MM/HOUR (ref 2–10)
FERRITIN SERPL-MCNC: 1591 NG/ML (ref 8–388)
FERRITIN SERPL-MCNC: 1776 NG/ML (ref 8–388)
FERRITIN SERPL-MCNC: 1817 NG/ML (ref 8–388)
FERRITIN SERPL-MCNC: 1830 NG/ML (ref 8–388)
FERRITIN SERPL-MCNC: 1864 NG/ML (ref 8–388)
FERRITIN SERPL-MCNC: 1976 NG/ML (ref 8–388)
FLUAV RNA NPH QL NAA+PROBE: NORMAL
FLUBV RNA NPH QL NAA+PROBE: NORMAL
FOLATE SERPL-MCNC: 9.3 NG/ML (ref 3.1–17.5)
GFR SERPL CREATININE-BSD FRML MDRD: 100 ML/MIN/1.73SQ M
GFR SERPL CREATININE-BSD FRML MDRD: 44 ML/MIN/1.73SQ M
GFR SERPL CREATININE-BSD FRML MDRD: 47 ML/MIN/1.73SQ M
GFR SERPL CREATININE-BSD FRML MDRD: 54 ML/MIN/1.73SQ M
GFR SERPL CREATININE-BSD FRML MDRD: 56 ML/MIN/1.73SQ M
GFR SERPL CREATININE-BSD FRML MDRD: 60 ML/MIN/1.73SQ M
GFR SERPL CREATININE-BSD FRML MDRD: 63 ML/MIN/1.73SQ M
GFR SERPL CREATININE-BSD FRML MDRD: 64 ML/MIN/1.73SQ M
GFR SERPL CREATININE-BSD FRML MDRD: 65 ML/MIN/1.73SQ M
GFR SERPL CREATININE-BSD FRML MDRD: 67 ML/MIN/1.73SQ M
GFR SERPL CREATININE-BSD FRML MDRD: 68 ML/MIN/1.73SQ M
GFR SERPL CREATININE-BSD FRML MDRD: 69 ML/MIN/1.73SQ M
GFR SERPL CREATININE-BSD FRML MDRD: 71 ML/MIN/1.73SQ M
GFR SERPL CREATININE-BSD FRML MDRD: 72 ML/MIN/1.73SQ M
GFR SERPL CREATININE-BSD FRML MDRD: 74 ML/MIN/1.73SQ M
GFR SERPL CREATININE-BSD FRML MDRD: 74 ML/MIN/1.73SQ M
GFR SERPL CREATININE-BSD FRML MDRD: 75 ML/MIN/1.73SQ M
GFR SERPL CREATININE-BSD FRML MDRD: 76 ML/MIN/1.73SQ M
GFR SERPL CREATININE-BSD FRML MDRD: 77 ML/MIN/1.73SQ M
GFR SERPL CREATININE-BSD FRML MDRD: 77 ML/MIN/1.73SQ M
GFR SERPL CREATININE-BSD FRML MDRD: 81 ML/MIN/1.73SQ M
GFR SERPL CREATININE-BSD FRML MDRD: 83 ML/MIN/1.73SQ M
GFR SERPL CREATININE-BSD FRML MDRD: 85 ML/MIN/1.73SQ M
GFR SERPL CREATININE-BSD FRML MDRD: 86 ML/MIN/1.73SQ M
GFR SERPL CREATININE-BSD FRML MDRD: 87 ML/MIN/1.73SQ M
GFR SERPL CREATININE-BSD FRML MDRD: 87 ML/MIN/1.73SQ M
GFR SERPL CREATININE-BSD FRML MDRD: 90 ML/MIN/1.73SQ M
GFR SERPL CREATININE-BSD FRML MDRD: 90 ML/MIN/1.73SQ M
GFR SERPL CREATININE-BSD FRML MDRD: 91 ML/MIN/1.73SQ M
GFR SERPL CREATININE-BSD FRML MDRD: 92 ML/MIN/1.73SQ M
GFR SERPL CREATININE-BSD FRML MDRD: 93 ML/MIN/1.73SQ M
GFR SERPL CREATININE-BSD FRML MDRD: 94 ML/MIN/1.73SQ M
GFR SERPL CREATININE-BSD FRML MDRD: 95 ML/MIN/1.73SQ M
GFR SERPL CREATININE-BSD FRML MDRD: 96 ML/MIN/1.73SQ M
GFR SERPL CREATININE-BSD FRML MDRD: 96 ML/MIN/1.73SQ M
GFR SERPL CREATININE-BSD FRML MDRD: 98 ML/MIN/1.73SQ M
GFR SERPL CREATININE-BSD FRML MDRD: 98 ML/MIN/1.73SQ M
GFR SERPL CREATININE-BSD FRML MDRD: 99 ML/MIN/1.73SQ M
GLUCOSE FLD-MCNC: 101 MG/DL
GLUCOSE P FAST SERPL-MCNC: 109 MG/DL (ref 65–99)
GLUCOSE P FAST SERPL-MCNC: 111 MG/DL (ref 65–99)
GLUCOSE P FAST SERPL-MCNC: 115 MG/DL (ref 65–99)
GLUCOSE P FAST SERPL-MCNC: 133 MG/DL (ref 65–99)
GLUCOSE SERPL-MCNC: 100 MG/DL (ref 65–140)
GLUCOSE SERPL-MCNC: 100 MG/DL (ref 65–140)
GLUCOSE SERPL-MCNC: 101 MG/DL (ref 65–140)
GLUCOSE SERPL-MCNC: 102 MG/DL (ref 65–140)
GLUCOSE SERPL-MCNC: 103 MG/DL (ref 65–140)
GLUCOSE SERPL-MCNC: 103 MG/DL (ref 65–140)
GLUCOSE SERPL-MCNC: 105 MG/DL (ref 65–140)
GLUCOSE SERPL-MCNC: 105 MG/DL (ref 65–140)
GLUCOSE SERPL-MCNC: 108 MG/DL (ref 65–140)
GLUCOSE SERPL-MCNC: 109 MG/DL (ref 65–140)
GLUCOSE SERPL-MCNC: 110 MG/DL (ref 65–140)
GLUCOSE SERPL-MCNC: 112 MG/DL (ref 65–140)
GLUCOSE SERPL-MCNC: 114 MG/DL (ref 65–140)
GLUCOSE SERPL-MCNC: 115 MG/DL (ref 65–140)
GLUCOSE SERPL-MCNC: 120 MG/DL (ref 65–140)
GLUCOSE SERPL-MCNC: 120 MG/DL (ref 65–140)
GLUCOSE SERPL-MCNC: 123 MG/DL (ref 65–140)
GLUCOSE SERPL-MCNC: 124 MG/DL (ref 65–140)
GLUCOSE SERPL-MCNC: 126 MG/DL (ref 65–140)
GLUCOSE SERPL-MCNC: 127 MG/DL (ref 65–140)
GLUCOSE SERPL-MCNC: 128 MG/DL (ref 65–140)
GLUCOSE SERPL-MCNC: 130 MG/DL (ref 65–140)
GLUCOSE SERPL-MCNC: 131 MG/DL (ref 65–140)
GLUCOSE SERPL-MCNC: 131 MG/DL (ref 65–140)
GLUCOSE SERPL-MCNC: 155 MG/DL (ref 65–140)
GLUCOSE SERPL-MCNC: 159 MG/DL (ref 65–140)
GLUCOSE SERPL-MCNC: 177 MG/DL (ref 65–140)
GLUCOSE SERPL-MCNC: 177 MG/DL (ref 65–140)
GLUCOSE SERPL-MCNC: 187 MG/DL (ref 65–140)
GLUCOSE SERPL-MCNC: 75 MG/DL (ref 65–140)
GLUCOSE SERPL-MCNC: 81 MG/DL (ref 65–140)
GLUCOSE SERPL-MCNC: 84 MG/DL (ref 65–140)
GLUCOSE SERPL-MCNC: 85 MG/DL (ref 65–140)
GLUCOSE SERPL-MCNC: 85 MG/DL (ref 65–140)
GLUCOSE SERPL-MCNC: 87 MG/DL (ref 65–140)
GLUCOSE SERPL-MCNC: 88 MG/DL (ref 65–140)
GLUCOSE SERPL-MCNC: 89 MG/DL (ref 65–140)
GLUCOSE SERPL-MCNC: 92 MG/DL (ref 65–140)
GLUCOSE SERPL-MCNC: 95 MG/DL (ref 65–140)
GLUCOSE SERPL-MCNC: 96 MG/DL (ref 65–140)
GLUCOSE SERPL-MCNC: 96 MG/DL (ref 65–140)
GLUCOSE SERPL-MCNC: 98 MG/DL (ref 65–140)
GLUCOSE SERPL-MCNC: 99 MG/DL (ref 65–140)
GLUCOSE UR STRIP-MCNC: ABNORMAL MG/DL
GLUCOSE UR STRIP-MCNC: NEGATIVE MG/DL
GRAM STN SPEC: NORMAL
GRAM STN SPEC: NORMAL
HCT VFR BLD AUTO: 25.3 % (ref 36.5–49.3)
HCT VFR BLD AUTO: 29.8 % (ref 36.5–49.3)
HCT VFR BLD AUTO: 30.2 % (ref 36.5–49.3)
HCT VFR BLD AUTO: 30.2 % (ref 36.5–49.3)
HCT VFR BLD AUTO: 30.3 % (ref 36.5–49.3)
HCT VFR BLD AUTO: 30.4 % (ref 36.5–49.3)
HCT VFR BLD AUTO: 31.2 % (ref 36.5–49.3)
HCT VFR BLD AUTO: 31.6 % (ref 36.5–49.3)
HCT VFR BLD AUTO: 31.7 % (ref 36.5–49.3)
HCT VFR BLD AUTO: 31.9 % (ref 36.5–49.3)
HCT VFR BLD AUTO: 32.3 % (ref 36.5–49.3)
HCT VFR BLD AUTO: 32.3 % (ref 36.5–49.3)
HCT VFR BLD AUTO: 32.5 % (ref 36.5–49.3)
HCT VFR BLD AUTO: 32.8 % (ref 36.5–49.3)
HCT VFR BLD AUTO: 33.2 % (ref 36.5–49.3)
HCT VFR BLD AUTO: 33.4 % (ref 36.5–49.3)
HCT VFR BLD AUTO: 33.5 % (ref 36.5–49.3)
HCT VFR BLD AUTO: 33.9 % (ref 36.5–49.3)
HCT VFR BLD AUTO: 34.1 % (ref 36.5–49.3)
HCT VFR BLD AUTO: 34.5 % (ref 36.5–49.3)
HCT VFR BLD AUTO: 35.3 % (ref 36.5–49.3)
HCT VFR BLD AUTO: 35.4 % (ref 36.5–49.3)
HCT VFR BLD AUTO: 35.6 % (ref 36.5–49.3)
HCT VFR BLD AUTO: 38.9 % (ref 36.5–49.3)
HCT VFR BLD AUTO: 42.2 % (ref 36.5–49.3)
HGB BLD-MCNC: 10.1 G/DL (ref 12–17)
HGB BLD-MCNC: 10.2 G/DL (ref 12–17)
HGB BLD-MCNC: 10.3 G/DL (ref 12–17)
HGB BLD-MCNC: 10.3 G/DL (ref 12–17)
HGB BLD-MCNC: 10.4 G/DL (ref 12–17)
HGB BLD-MCNC: 10.5 G/DL (ref 12–17)
HGB BLD-MCNC: 10.5 G/DL (ref 12–17)
HGB BLD-MCNC: 10.8 G/DL (ref 12–17)
HGB BLD-MCNC: 10.8 G/DL (ref 12–17)
HGB BLD-MCNC: 10.9 G/DL (ref 12–17)
HGB BLD-MCNC: 11 G/DL (ref 12–17)
HGB BLD-MCNC: 11.3 G/DL (ref 12–17)
HGB BLD-MCNC: 11.4 G/DL (ref 12–17)
HGB BLD-MCNC: 11.5 G/DL (ref 12–17)
HGB BLD-MCNC: 11.6 G/DL (ref 12–17)
HGB BLD-MCNC: 11.6 G/DL (ref 12–17)
HGB BLD-MCNC: 11.7 G/DL (ref 12–17)
HGB BLD-MCNC: 11.8 G/DL (ref 12–17)
HGB BLD-MCNC: 11.9 G/DL (ref 12–17)
HGB BLD-MCNC: 13 G/DL (ref 12–17)
HGB BLD-MCNC: 14.2 G/DL (ref 12–17)
HGB BLD-MCNC: 8.1 G/DL (ref 12–17)
HGB BLD-MCNC: 9.8 G/DL (ref 12–17)
HGB BLD-MCNC: 9.9 G/DL (ref 12–17)
HGB UR QL STRIP.AUTO: NEGATIVE
HGB UR QL STRIP.AUTO: NEGATIVE
HISTIOCYTES NFR FLD: 5 %
IL6 SERPL-MCNC: 21.2 PG/ML (ref 0–15.5)
IMM GRANULOCYTES # BLD AUTO: 0.05 THOUSAND/UL (ref 0–0.2)
IMM GRANULOCYTES # BLD AUTO: 0.06 THOUSAND/UL (ref 0–0.2)
IMM GRANULOCYTES # BLD AUTO: 0.07 THOUSAND/UL (ref 0–0.2)
IMM GRANULOCYTES # BLD AUTO: 0.07 THOUSAND/UL (ref 0–0.2)
IMM GRANULOCYTES # BLD AUTO: 0.08 THOUSAND/UL (ref 0–0.2)
IMM GRANULOCYTES # BLD AUTO: 0.08 THOUSAND/UL (ref 0–0.2)
IMM GRANULOCYTES # BLD AUTO: 0.09 THOUSAND/UL (ref 0–0.2)
IMM GRANULOCYTES # BLD AUTO: 0.09 THOUSAND/UL (ref 0–0.2)
IMM GRANULOCYTES # BLD AUTO: 0.14 THOUSAND/UL (ref 0–0.2)
IMM GRANULOCYTES # BLD AUTO: 0.14 THOUSAND/UL (ref 0–0.2)
IMM GRANULOCYTES # BLD AUTO: 0.15 THOUSAND/UL (ref 0–0.2)
IMM GRANULOCYTES # BLD AUTO: 0.16 THOUSAND/UL (ref 0–0.2)
IMM GRANULOCYTES # BLD AUTO: 0.17 THOUSAND/UL (ref 0–0.2)
IMM GRANULOCYTES # BLD AUTO: 0.17 THOUSAND/UL (ref 0–0.2)
IMM GRANULOCYTES # BLD AUTO: 0.19 THOUSAND/UL (ref 0–0.2)
IMM GRANULOCYTES NFR BLD AUTO: 1 % (ref 0–2)
IMM GRANULOCYTES NFR BLD AUTO: 2 % (ref 0–2)
INR PPP: 1.03 (ref 0.91–1.09)
INR PPP: 1.15 (ref 0.84–1.19)
INR PPP: 1.22 (ref 0.84–1.19)
INR PPP: 1.22 (ref 0.84–1.19)
INR PPP: 1.23 (ref 0.84–1.19)
INR PPP: 1.25 (ref 0.84–1.19)
INR PPP: 1.32 (ref 0.84–1.19)
INR PPP: 1.32 (ref 0.84–1.19)
INR PPP: 1.5 (ref 0.84–1.19)
KETONES UR STRIP-MCNC: NEGATIVE MG/DL
KETONES UR STRIP-MCNC: NEGATIVE MG/DL
LACTATE SERPL-SCNC: 0.5 MMOL/L (ref 0.5–2)
LACTATE SERPL-SCNC: 1.9 MMOL/L (ref 0.5–2)
LACTATE SERPL-SCNC: 2.1 MMOL/L (ref 0.5–2)
LACTATE SERPL-SCNC: 2.1 MMOL/L (ref 0.5–2)
LACTATE SERPL-SCNC: 2.5 MMOL/L (ref 0.5–2)
LACTATE SERPL-SCNC: 2.8 MMOL/L (ref 0.5–2)
LACTATE SERPL-SCNC: 3.4 MMOL/L (ref 0.5–2)
LDH FLD L TO P-CCNC: 52 U/L
LEUKOCYTE ESTERASE UR QL STRIP: NEGATIVE
LEUKOCYTE ESTERASE UR QL STRIP: NEGATIVE
LIPASE SERPL-CCNC: 60 U/L (ref 73–393)
LYMPHOCYTES # BLD AUTO: 0.3 THOUSANDS/ΜL (ref 0.6–4.47)
LYMPHOCYTES # BLD AUTO: 0.42 THOUSANDS/ΜL (ref 0.6–4.47)
LYMPHOCYTES # BLD AUTO: 0.45 THOUSANDS/ΜL (ref 0.6–4.47)
LYMPHOCYTES # BLD AUTO: 0.45 THOUSANDS/ΜL (ref 0.6–4.47)
LYMPHOCYTES # BLD AUTO: 0.51 THOUSANDS/ΜL (ref 0.6–4.47)
LYMPHOCYTES # BLD AUTO: 0.52 THOUSANDS/ΜL (ref 0.6–4.47)
LYMPHOCYTES # BLD AUTO: 0.53 THOUSANDS/ΜL (ref 0.6–4.47)
LYMPHOCYTES # BLD AUTO: 0.54 THOUSANDS/ΜL (ref 0.6–4.47)
LYMPHOCYTES # BLD AUTO: 0.54 THOUSANDS/ΜL (ref 0.6–4.47)
LYMPHOCYTES # BLD AUTO: 0.57 THOUSANDS/ΜL (ref 0.6–4.47)
LYMPHOCYTES # BLD AUTO: 0.59 THOUSANDS/ΜL (ref 0.6–4.47)
LYMPHOCYTES # BLD AUTO: 0.59 THOUSANDS/ΜL (ref 0.6–4.47)
LYMPHOCYTES # BLD AUTO: 0.61 THOUSANDS/ΜL (ref 0.6–4.47)
LYMPHOCYTES # BLD AUTO: 0.61 THOUSANDS/ΜL (ref 0.6–4.47)
LYMPHOCYTES # BLD AUTO: 0.65 THOUSANDS/ΜL (ref 0.6–4.47)
LYMPHOCYTES # BLD AUTO: 0.67 THOUSANDS/ΜL (ref 0.6–4.47)
LYMPHOCYTES # BLD AUTO: 0.68 THOUSANDS/ΜL (ref 0.6–4.47)
LYMPHOCYTES # BLD AUTO: 0.71 THOUSANDS/ΜL (ref 0.6–4.47)
LYMPHOCYTES # BLD AUTO: 0.71 THOUSANDS/ΜL (ref 0.6–4.47)
LYMPHOCYTES NFR BLD AUTO: 10 % (ref 14–44)
LYMPHOCYTES NFR BLD AUTO: 12 % (ref 14–44)
LYMPHOCYTES NFR BLD AUTO: 42 %
LYMPHOCYTES NFR BLD AUTO: 5 % (ref 14–44)
LYMPHOCYTES NFR BLD AUTO: 6 % (ref 14–44)
LYMPHOCYTES NFR BLD AUTO: 7 % (ref 14–44)
LYMPHOCYTES NFR BLD AUTO: 8 % (ref 14–44)
MAGNESIUM SERPL-MCNC: 1.8 MG/DL (ref 1.6–2.6)
MAGNESIUM SERPL-MCNC: 1.9 MG/DL (ref 1.6–2.6)
MAGNESIUM SERPL-MCNC: 2.2 MG/DL (ref 1.6–2.6)
MCH RBC QN AUTO: 32 PG (ref 26.8–34.3)
MCH RBC QN AUTO: 32.1 PG (ref 26.8–34.3)
MCH RBC QN AUTO: 32.2 PG (ref 26.8–34.3)
MCH RBC QN AUTO: 32.4 PG (ref 26.8–34.3)
MCH RBC QN AUTO: 32.6 PG (ref 26.8–34.3)
MCH RBC QN AUTO: 32.6 PG (ref 26.8–34.3)
MCH RBC QN AUTO: 32.8 PG (ref 26.8–34.3)
MCH RBC QN AUTO: 32.8 PG (ref 26.8–34.3)
MCH RBC QN AUTO: 33.1 PG (ref 26.8–34.3)
MCH RBC QN AUTO: 33.2 PG (ref 26.8–34.3)
MCH RBC QN AUTO: 34.2 PG (ref 26.8–34.3)
MCH RBC QN AUTO: 34.4 PG (ref 26.8–34.3)
MCH RBC QN AUTO: 34.9 PG (ref 26.8–34.3)
MCH RBC QN AUTO: 35 PG (ref 26.8–34.3)
MCH RBC QN AUTO: 35.1 PG (ref 26.8–34.3)
MCH RBC QN AUTO: 35.1 PG (ref 26.8–34.3)
MCH RBC QN AUTO: 35.2 PG (ref 26.8–34.3)
MCH RBC QN AUTO: 35.3 PG (ref 26.8–34.3)
MCH RBC QN AUTO: 35.4 PG (ref 26.8–34.3)
MCH RBC QN AUTO: 35.4 PG (ref 26.8–34.3)
MCH RBC QN AUTO: 35.6 PG (ref 26.8–34.3)
MCH RBC QN AUTO: 35.7 PG (ref 26.8–34.3)
MCH RBC QN AUTO: 35.8 PG (ref 26.8–34.3)
MCH RBC QN AUTO: 35.8 PG (ref 26.8–34.3)
MCH RBC QN AUTO: 35.9 PG (ref 26.8–34.3)
MCH RBC QN AUTO: 35.9 PG (ref 26.8–34.3)
MCHC RBC AUTO-ENTMCNC: 31.6 G/DL (ref 31.4–37.4)
MCHC RBC AUTO-ENTMCNC: 32 G/DL (ref 31.4–37.4)
MCHC RBC AUTO-ENTMCNC: 32.2 G/DL (ref 31.4–37.4)
MCHC RBC AUTO-ENTMCNC: 32.3 G/DL (ref 31.4–37.4)
MCHC RBC AUTO-ENTMCNC: 32.5 G/DL (ref 31.4–37.4)
MCHC RBC AUTO-ENTMCNC: 32.8 G/DL (ref 31.4–37.4)
MCHC RBC AUTO-ENTMCNC: 33 G/DL (ref 31.4–37.4)
MCHC RBC AUTO-ENTMCNC: 33.1 G/DL (ref 31.4–37.4)
MCHC RBC AUTO-ENTMCNC: 33.2 G/DL (ref 31.4–37.4)
MCHC RBC AUTO-ENTMCNC: 33.4 G/DL (ref 31.4–37.4)
MCHC RBC AUTO-ENTMCNC: 33.6 G/DL (ref 31.4–37.4)
MCHC RBC AUTO-ENTMCNC: 33.6 G/DL (ref 31.4–37.4)
MCHC RBC AUTO-ENTMCNC: 33.7 G/DL (ref 31.4–37.4)
MCHC RBC AUTO-ENTMCNC: 33.7 G/DL (ref 31.4–37.4)
MCHC RBC AUTO-ENTMCNC: 33.8 G/DL (ref 31.4–37.4)
MCHC RBC AUTO-ENTMCNC: 33.8 G/DL (ref 31.4–37.4)
MCHC RBC AUTO-ENTMCNC: 34 G/DL (ref 31.4–37.4)
MCHC RBC AUTO-ENTMCNC: 34.1 G/DL (ref 31.4–37.4)
MCHC RBC AUTO-ENTMCNC: 34.2 G/DL (ref 31.4–37.4)
MCHC RBC AUTO-ENTMCNC: 34.5 G/DL (ref 31.4–37.4)
MCV RBC AUTO: 101 FL (ref 82–98)
MCV RBC AUTO: 105 FL (ref 82–98)
MCV RBC AUTO: 106 FL (ref 82–98)
MCV RBC AUTO: 107 FL (ref 82–98)
MCV RBC AUTO: 108 FL (ref 82–98)
MCV RBC AUTO: 109 FL (ref 82–98)
MCV RBC AUTO: 110 FL (ref 82–98)
MCV RBC AUTO: 110 FL (ref 82–98)
MCV RBC AUTO: 111 FL (ref 82–98)
MCV RBC AUTO: 94 FL (ref 82–98)
MCV RBC AUTO: 95 FL (ref 82–98)
MCV RBC AUTO: 96 FL (ref 82–98)
MCV RBC AUTO: 96 FL (ref 82–98)
MCV RBC AUTO: 97 FL (ref 82–98)
MCV RBC AUTO: 98 FL (ref 82–98)
MCV RBC AUTO: 99 FL (ref 82–98)
MONOCYTES # BLD AUTO: 0.27 THOUSAND/ΜL (ref 0.17–1.22)
MONOCYTES # BLD AUTO: 0.57 THOUSAND/ΜL (ref 0.17–1.22)
MONOCYTES # BLD AUTO: 0.69 THOUSAND/ΜL (ref 0.17–1.22)
MONOCYTES # BLD AUTO: 0.75 THOUSAND/ΜL (ref 0.17–1.22)
MONOCYTES # BLD AUTO: 0.79 THOUSAND/ΜL (ref 0.17–1.22)
MONOCYTES # BLD AUTO: 0.81 THOUSAND/ΜL (ref 0.17–1.22)
MONOCYTES # BLD AUTO: 0.81 THOUSAND/ΜL (ref 0.17–1.22)
MONOCYTES # BLD AUTO: 0.86 THOUSAND/ΜL (ref 0.17–1.22)
MONOCYTES # BLD AUTO: 0.93 THOUSAND/ΜL (ref 0.17–1.22)
MONOCYTES # BLD AUTO: 0.98 THOUSAND/ΜL (ref 0.17–1.22)
MONOCYTES # BLD AUTO: 1.07 THOUSAND/ΜL (ref 0.17–1.22)
MONOCYTES # BLD AUTO: 1.11 THOUSAND/ΜL (ref 0.17–1.22)
MONOCYTES # BLD AUTO: 1.14 THOUSAND/ΜL (ref 0.17–1.22)
MONOCYTES # BLD AUTO: 1.37 THOUSAND/ΜL (ref 0.17–1.22)
MONOCYTES # BLD AUTO: 1.42 THOUSAND/ΜL (ref 0.17–1.22)
MONOCYTES # BLD AUTO: 1.47 THOUSAND/ΜL (ref 0.17–1.22)
MONOCYTES # BLD AUTO: 1.54 THOUSAND/ΜL (ref 0.17–1.22)
MONOCYTES # BLD AUTO: 1.75 THOUSAND/ΜL (ref 0.17–1.22)
MONOCYTES # BLD AUTO: 1.84 THOUSAND/ΜL (ref 0.17–1.22)
MONOCYTES NFR BLD AUTO: 13 % (ref 4–12)
MONOCYTES NFR BLD AUTO: 14 % (ref 4–12)
MONOCYTES NFR BLD AUTO: 15 % (ref 4–12)
MONOCYTES NFR BLD AUTO: 16 % (ref 4–12)
MONOCYTES NFR BLD AUTO: 16 % (ref 4–12)
MONOCYTES NFR BLD AUTO: 17 % (ref 4–12)
MONOCYTES NFR BLD AUTO: 18 % (ref 4–12)
MONOCYTES NFR BLD AUTO: 19 % (ref 4–12)
MONOCYTES NFR BLD AUTO: 19 % (ref 4–12)
MONOCYTES NFR BLD AUTO: 27 %
MONOCYTES NFR BLD AUTO: 5 % (ref 4–12)
MONOCYTES NFR BLD AUTO: 7 % (ref 4–12)
MONOCYTES NFR BLD AUTO: 8 % (ref 4–12)
MONOCYTES NFR BLD AUTO: 8 % (ref 4–12)
MONOCYTES NFR BLD AUTO: 9 % (ref 4–12)
MRSA NOSE QL CULT: NORMAL
NEUTROPHILS # BLD AUTO: 4.26 THOUSANDS/ΜL (ref 1.85–7.62)
NEUTROPHILS # BLD AUTO: 4.41 THOUSANDS/ΜL (ref 1.85–7.62)
NEUTROPHILS # BLD AUTO: 4.44 THOUSANDS/ΜL (ref 1.85–7.62)
NEUTROPHILS # BLD AUTO: 4.63 THOUSANDS/ΜL (ref 1.85–7.62)
NEUTROPHILS # BLD AUTO: 4.84 THOUSANDS/ΜL (ref 1.85–7.62)
NEUTROPHILS # BLD AUTO: 5.06 THOUSANDS/ΜL (ref 1.85–7.62)
NEUTROPHILS # BLD AUTO: 5.33 THOUSANDS/ΜL (ref 1.85–7.62)
NEUTROPHILS # BLD AUTO: 5.48 THOUSANDS/ΜL (ref 1.85–7.62)
NEUTROPHILS # BLD AUTO: 6.12 THOUSANDS/ΜL (ref 1.85–7.62)
NEUTROPHILS # BLD AUTO: 6.55 THOUSANDS/ΜL (ref 1.85–7.62)
NEUTROPHILS # BLD AUTO: 6.61 THOUSANDS/ΜL (ref 1.85–7.62)
NEUTROPHILS # BLD AUTO: 7.23 THOUSANDS/ΜL (ref 1.85–7.62)
NEUTROPHILS # BLD AUTO: 7.57 THOUSANDS/ΜL (ref 1.85–7.62)
NEUTROPHILS # BLD AUTO: 7.71 THOUSANDS/ΜL (ref 1.85–7.62)
NEUTROPHILS # BLD AUTO: 8.21 THOUSANDS/ΜL (ref 1.85–7.62)
NEUTROPHILS # BLD AUTO: 8.54 THOUSANDS/ΜL (ref 1.85–7.62)
NEUTROPHILS # BLD AUTO: 8.54 THOUSANDS/ΜL (ref 1.85–7.62)
NEUTROPHILS # BLD AUTO: 9.1 THOUSANDS/ΜL (ref 1.85–7.62)
NEUTROPHILS # BLD AUTO: 9.55 THOUSANDS/ΜL (ref 1.85–7.62)
NEUTS BAND NFR FLD MANUAL: 1 %
NEUTS SEG NFR BLD AUTO: 25 %
NEUTS SEG NFR BLD AUTO: 70 % (ref 43–75)
NEUTS SEG NFR BLD AUTO: 70 % (ref 43–75)
NEUTS SEG NFR BLD AUTO: 72 % (ref 43–75)
NEUTS SEG NFR BLD AUTO: 73 % (ref 43–75)
NEUTS SEG NFR BLD AUTO: 74 % (ref 43–75)
NEUTS SEG NFR BLD AUTO: 74 % (ref 43–75)
NEUTS SEG NFR BLD AUTO: 76 % (ref 43–75)
NEUTS SEG NFR BLD AUTO: 76 % (ref 43–75)
NEUTS SEG NFR BLD AUTO: 77 % (ref 43–75)
NEUTS SEG NFR BLD AUTO: 79 % (ref 43–75)
NEUTS SEG NFR BLD AUTO: 82 % (ref 43–75)
NEUTS SEG NFR BLD AUTO: 84 % (ref 43–75)
NEUTS SEG NFR BLD AUTO: 84 % (ref 43–75)
NEUTS SEG NFR BLD AUTO: 85 % (ref 43–75)
NEUTS SEG NFR BLD AUTO: 88 % (ref 43–75)
NITRITE UR QL STRIP: NEGATIVE
NITRITE UR QL STRIP: NEGATIVE
NON-SQ EPI CELLS URNS QL MICRO: ABNORMAL /HPF
NRBC BLD AUTO-RTO: 0 /100 WBCS
NT-PROBNP SERPL-MCNC: 2370 PG/ML
NT-PROBNP SERPL-MCNC: 892 PG/ML
OSMOLALITY UR/SERPL-RTO: 268 MMOL/KG (ref 282–298)
OSMOLALITY UR: 724 MMOL/KG
OSMOLALITY UR: 735 MMOL/KG
P AXIS: 203 DEGREES
PH UR STRIP.AUTO: 5.5 [PH]
PH UR STRIP.AUTO: 6.5 [PH]
PLATELET # BLD AUTO: 102 THOUSANDS/UL (ref 149–390)
PLATELET # BLD AUTO: 106 THOUSANDS/UL (ref 149–390)
PLATELET # BLD AUTO: 110 THOUSANDS/UL (ref 149–390)
PLATELET # BLD AUTO: 112 THOUSANDS/UL (ref 149–390)
PLATELET # BLD AUTO: 118 THOUSANDS/UL (ref 149–390)
PLATELET # BLD AUTO: 131 THOUSANDS/UL (ref 149–390)
PLATELET # BLD AUTO: 133 THOUSANDS/UL (ref 149–390)
PLATELET # BLD AUTO: 133 THOUSANDS/UL (ref 149–390)
PLATELET # BLD AUTO: 136 THOUSANDS/UL (ref 149–390)
PLATELET # BLD AUTO: 138 THOUSANDS/UL (ref 149–390)
PLATELET # BLD AUTO: 138 THOUSANDS/UL (ref 149–390)
PLATELET # BLD AUTO: 139 THOUSANDS/UL (ref 149–390)
PLATELET # BLD AUTO: 141 THOUSANDS/UL (ref 149–390)
PLATELET # BLD AUTO: 143 THOUSANDS/UL (ref 149–390)
PLATELET # BLD AUTO: 145 THOUSANDS/UL (ref 149–390)
PLATELET # BLD AUTO: 147 THOUSANDS/UL (ref 149–390)
PLATELET # BLD AUTO: 148 THOUSANDS/UL (ref 149–390)
PLATELET # BLD AUTO: 148 THOUSANDS/UL (ref 149–390)
PLATELET # BLD AUTO: 150 THOUSANDS/UL (ref 149–390)
PLATELET # BLD AUTO: 153 THOUSANDS/UL (ref 149–390)
PLATELET # BLD AUTO: 156 THOUSANDS/UL (ref 149–390)
PLATELET # BLD AUTO: 156 THOUSANDS/UL (ref 149–390)
PLATELET # BLD AUTO: 159 THOUSANDS/UL (ref 149–390)
PLATELET # BLD AUTO: 162 THOUSANDS/UL (ref 149–390)
PLATELET # BLD AUTO: 167 THOUSANDS/UL (ref 149–390)
PLATELET # BLD AUTO: 167 THOUSANDS/UL (ref 149–390)
PLATELET # BLD AUTO: 183 THOUSANDS/UL (ref 149–390)
PLATELET # BLD AUTO: 184 THOUSANDS/UL (ref 149–390)
PLATELET # BLD AUTO: 185 THOUSANDS/UL (ref 149–390)
PLATELET # BLD AUTO: 219 THOUSANDS/UL (ref 149–390)
PMV BLD AUTO: 10.3 FL (ref 8.9–12.7)
PMV BLD AUTO: 10.5 FL (ref 8.9–12.7)
PMV BLD AUTO: 10.6 FL (ref 8.9–12.7)
PMV BLD AUTO: 10.7 FL (ref 8.9–12.7)
PMV BLD AUTO: 10.8 FL (ref 8.9–12.7)
PMV BLD AUTO: 10.9 FL (ref 8.9–12.7)
PMV BLD AUTO: 11 FL (ref 8.9–12.7)
PMV BLD AUTO: 11.1 FL (ref 8.9–12.7)
PMV BLD AUTO: 11.2 FL (ref 8.9–12.7)
PMV BLD AUTO: 9.9 FL (ref 8.9–12.7)
POTASSIUM SERPL-SCNC: 3 MMOL/L (ref 3.5–5.3)
POTASSIUM SERPL-SCNC: 3.4 MMOL/L (ref 3.5–5.3)
POTASSIUM SERPL-SCNC: 3.5 MMOL/L (ref 3.5–5.3)
POTASSIUM SERPL-SCNC: 3.5 MMOL/L (ref 3.5–5.3)
POTASSIUM SERPL-SCNC: 3.6 MMOL/L (ref 3.5–5.3)
POTASSIUM SERPL-SCNC: 3.7 MMOL/L (ref 3.5–5.3)
POTASSIUM SERPL-SCNC: 3.8 MMOL/L (ref 3.5–5.3)
POTASSIUM SERPL-SCNC: 3.9 MMOL/L (ref 3.5–5.3)
POTASSIUM SERPL-SCNC: 4 MMOL/L (ref 3.5–5.3)
POTASSIUM SERPL-SCNC: 4.1 MMOL/L (ref 3.5–5.3)
POTASSIUM SERPL-SCNC: 4.1 MMOL/L (ref 3.5–5.3)
POTASSIUM SERPL-SCNC: 4.2 MMOL/L (ref 3.5–5.3)
POTASSIUM SERPL-SCNC: 4.3 MMOL/L (ref 3.5–5.3)
POTASSIUM SERPL-SCNC: 4.3 MMOL/L (ref 3.5–5.3)
POTASSIUM SERPL-SCNC: 4.4 MMOL/L (ref 3.5–5.3)
POTASSIUM SERPL-SCNC: 4.5 MMOL/L (ref 3.5–5.3)
POTASSIUM SERPL-SCNC: 4.6 MMOL/L (ref 3.5–5.3)
POTASSIUM SERPL-SCNC: 4.7 MMOL/L (ref 3.5–5.3)
POTASSIUM SERPL-SCNC: 4.7 MMOL/L (ref 3.5–5.3)
POTASSIUM SERPL-SCNC: 4.8 MMOL/L (ref 3.5–5.3)
POTASSIUM SERPL-SCNC: 5.1 MMOL/L (ref 3.5–5.3)
POTASSIUM SERPL-SCNC: 5.2 MMOL/L (ref 3.5–5.3)
PROCALCITONIN SERPL-MCNC: 0.07 NG/ML
PROCALCITONIN SERPL-MCNC: 0.24 NG/ML
PROCALCITONIN SERPL-MCNC: 0.26 NG/ML
PROCALCITONIN SERPL-MCNC: 0.36 NG/ML
PROCALCITONIN SERPL-MCNC: 0.54 NG/ML
PROCALCITONIN SERPL-MCNC: 0.64 NG/ML
PROCALCITONIN SERPL-MCNC: 0.7 NG/ML
PROCALCITONIN SERPL-MCNC: 1.02 NG/ML
PROT FLD-MCNC: <2 G/DL
PROT SERPL-MCNC: 4.8 G/DL (ref 6.4–8.2)
PROT SERPL-MCNC: 4.9 G/DL (ref 6.4–8.2)
PROT SERPL-MCNC: 5.1 G/DL (ref 6.4–8.2)
PROT SERPL-MCNC: 5.2 G/DL (ref 6.4–8.2)
PROT SERPL-MCNC: 5.3 G/DL (ref 6.4–8.2)
PROT SERPL-MCNC: 5.3 G/DL (ref 6.4–8.2)
PROT SERPL-MCNC: 5.4 G/DL (ref 6.4–8.2)
PROT SERPL-MCNC: 5.5 G/DL (ref 6.4–8.2)
PROT SERPL-MCNC: 5.6 G/DL (ref 6.4–8.2)
PROT SERPL-MCNC: 5.7 G/DL (ref 6.4–8.2)
PROT SERPL-MCNC: 5.8 G/DL (ref 6.4–8.2)
PROT SERPL-MCNC: 5.8 G/DL (ref 6.4–8.2)
PROT SERPL-MCNC: 5.9 G/DL (ref 6.4–8.2)
PROT SERPL-MCNC: 6 G/DL (ref 6.4–8.2)
PROT SERPL-MCNC: 6.2 G/DL (ref 6.4–8.2)
PROT SERPL-MCNC: 6.3 G/DL (ref 6.4–8.2)
PROT SERPL-MCNC: 6.6 G/DL (ref 6.4–8.2)
PROT SERPL-MCNC: 7 G/DL (ref 6.4–8.2)
PROT UR STRIP-MCNC: ABNORMAL MG/DL
PROT UR STRIP-MCNC: NEGATIVE MG/DL
PROTHROMBIN TIME: 11 SECONDS (ref 9.8–12)
PROTHROMBIN TIME: 15 SECONDS (ref 11.6–14.5)
PROTHROMBIN TIME: 15 SECONDS (ref 11.6–14.5)
PROTHROMBIN TIME: 15.1 SECONDS (ref 11.6–14.5)
PROTHROMBIN TIME: 15.1 SECONDS (ref 11.6–14.5)
PROTHROMBIN TIME: 15.7 SECONDS (ref 11.6–14.5)
PROTHROMBIN TIME: 15.9 SECONDS (ref 11.6–14.5)
PROTHROMBIN TIME: 16 SECONDS (ref 11.6–14.5)
PROTHROMBIN TIME: 17.7 SECONDS (ref 11.6–14.5)
QRS AXIS: -35 DEGREES
QRS AXIS: -39 DEGREES
QRS AXIS: -39 DEGREES
QRS AXIS: -40 DEGREES
QRS AXIS: -60 DEGREES
QRSD INTERVAL: 122 MS
QRSD INTERVAL: 134 MS
QRSD INTERVAL: 134 MS
QRSD INTERVAL: 136 MS
QRSD INTERVAL: 146 MS
QT INTERVAL: 316 MS
QT INTERVAL: 360 MS
QT INTERVAL: 388 MS
QT INTERVAL: 392 MS
QT INTERVAL: 406 MS
QTC INTERVAL: 462 MS
QTC INTERVAL: 474 MS
QTC INTERVAL: 475 MS
QTC INTERVAL: 480 MS
QTC INTERVAL: 480 MS
RBC # BLD AUTO: 2.31 MILLION/UL (ref 3.88–5.62)
RBC # BLD AUTO: 2.78 MILLION/UL (ref 3.88–5.62)
RBC # BLD AUTO: 2.84 MILLION/UL (ref 3.88–5.62)
RBC # BLD AUTO: 2.88 MILLION/UL (ref 3.88–5.62)
RBC # BLD AUTO: 2.94 MILLION/UL (ref 3.88–5.62)
RBC # BLD AUTO: 2.95 MILLION/UL (ref 3.88–5.62)
RBC # BLD AUTO: 2.98 MILLION/UL (ref 3.88–5.62)
RBC # BLD AUTO: 3.02 MILLION/UL (ref 3.88–5.62)
RBC # BLD AUTO: 3.02 MILLION/UL (ref 3.88–5.62)
RBC # BLD AUTO: 3.05 MILLION/UL (ref 3.88–5.62)
RBC # BLD AUTO: 3.08 MILLION/UL (ref 3.88–5.62)
RBC # BLD AUTO: 3.1 MILLION/UL (ref 3.88–5.62)
RBC # BLD AUTO: 3.11 MILLION/UL (ref 3.88–5.62)
RBC # BLD AUTO: 3.11 MILLION/UL (ref 3.88–5.62)
RBC # BLD AUTO: 3.13 MILLION/UL (ref 3.88–5.62)
RBC # BLD AUTO: 3.13 MILLION/UL (ref 3.88–5.62)
RBC # BLD AUTO: 3.14 MILLION/UL (ref 3.88–5.62)
RBC # BLD AUTO: 3.23 MILLION/UL (ref 3.88–5.62)
RBC # BLD AUTO: 3.26 MILLION/UL (ref 3.88–5.62)
RBC # BLD AUTO: 3.28 MILLION/UL (ref 3.88–5.62)
RBC # BLD AUTO: 3.29 MILLION/UL (ref 3.88–5.62)
RBC # BLD AUTO: 3.3 MILLION/UL (ref 3.88–5.62)
RBC # BLD AUTO: 3.3 MILLION/UL (ref 3.88–5.62)
RBC # BLD AUTO: 3.32 MILLION/UL (ref 3.88–5.62)
RBC # BLD AUTO: 3.36 MILLION/UL (ref 3.88–5.62)
RBC # BLD AUTO: 3.55 MILLION/UL (ref 3.88–5.62)
RBC # BLD AUTO: 3.61 MILLION/UL (ref 3.88–5.62)
RBC # BLD AUTO: 3.69 MILLION/UL (ref 3.88–5.62)
RBC # BLD AUTO: 4.06 MILLION/UL (ref 3.88–5.62)
RBC # BLD AUTO: 4.43 MILLION/UL (ref 3.88–5.62)
RBC #/AREA URNS AUTO: ABNORMAL /HPF
RSV RNA NPH QL NAA+PROBE: NORMAL
SARS-COV-2 RNA RESP QL NAA+PROBE: POSITIVE
SITE: ABNORMAL
SODIUM 24H UR-SCNC: 6 MOL/L
SODIUM 24H UR-SCNC: 8 MOL/L
SODIUM SERPL-SCNC: 123 MMOL/L (ref 136–145)
SODIUM SERPL-SCNC: 124 MMOL/L (ref 136–145)
SODIUM SERPL-SCNC: 125 MMOL/L (ref 136–145)
SODIUM SERPL-SCNC: 126 MMOL/L (ref 136–145)
SODIUM SERPL-SCNC: 127 MMOL/L (ref 136–145)
SODIUM SERPL-SCNC: 128 MMOL/L (ref 136–145)
SODIUM SERPL-SCNC: 128 MMOL/L (ref 136–145)
SODIUM SERPL-SCNC: 129 MMOL/L (ref 136–145)
SODIUM SERPL-SCNC: 130 MMOL/L (ref 136–145)
SODIUM SERPL-SCNC: 131 MMOL/L (ref 136–145)
SODIUM SERPL-SCNC: 131 MMOL/L (ref 136–145)
SODIUM SERPL-SCNC: 132 MMOL/L (ref 136–145)
SODIUM SERPL-SCNC: 133 MMOL/L (ref 136–145)
SODIUM SERPL-SCNC: 134 MMOL/L (ref 136–145)
SODIUM SERPL-SCNC: 135 MMOL/L (ref 136–145)
SODIUM SERPL-SCNC: 137 MMOL/L (ref 136–145)
SP GR UR STRIP.AUTO: 1.02 (ref 1–1.03)
SP GR UR STRIP.AUTO: <=1.005 (ref 1–1.03)
T WAVE AXIS: -17 DEGREES
T WAVE AXIS: -29 DEGREES
T WAVE AXIS: -30 DEGREES
T WAVE AXIS: -36 DEGREES
T WAVE AXIS: 13 DEGREES
TOTAL CELLS COUNTED SPEC: 100
TROPONIN I SERPL-MCNC: <0.02 NG/ML
TSH SERPL DL<=0.05 MIU/L-ACNC: 2.82 UIU/ML (ref 0.36–3.74)
TSH SERPL DL<=0.05 MIU/L-ACNC: 4.45 UIU/ML (ref 0.36–3.74)
UROBILINOGEN UR QL STRIP.AUTO: 0.2 E.U./DL
UROBILINOGEN UR QL STRIP.AUTO: 1 E.U./DL
VANCOMYCIN TROUGH SERPL-MCNC: 13.2 UG/ML (ref 10–20)
VENTRICULAR RATE: 107 BPM
VENTRICULAR RATE: 139 BPM
VENTRICULAR RATE: 78 BPM
VENTRICULAR RATE: 88 BPM
VENTRICULAR RATE: 90 BPM
VIT B12 SERPL-MCNC: 815 PG/ML (ref 100–900)
WBC # BLD AUTO: 10.07 THOUSAND/UL (ref 4.31–10.16)
WBC # BLD AUTO: 10.08 THOUSAND/UL (ref 4.31–10.16)
WBC # BLD AUTO: 10.73 THOUSAND/UL (ref 4.31–10.16)
WBC # BLD AUTO: 10.74 THOUSAND/UL (ref 4.31–10.16)
WBC # BLD AUTO: 10.89 THOUSAND/UL (ref 4.31–10.16)
WBC # BLD AUTO: 11.22 THOUSAND/UL (ref 4.31–10.16)
WBC # BLD AUTO: 5.27 THOUSAND/UL (ref 4.31–10.16)
WBC # BLD AUTO: 5.99 THOUSAND/UL (ref 4.31–10.16)
WBC # BLD AUTO: 6.03 THOUSAND/UL (ref 4.31–10.16)
WBC # BLD AUTO: 6.09 THOUSAND/UL (ref 4.31–10.16)
WBC # BLD AUTO: 6.09 THOUSAND/UL (ref 4.31–10.16)
WBC # BLD AUTO: 6.33 THOUSAND/UL (ref 4.31–10.16)
WBC # BLD AUTO: 6.39 THOUSAND/UL (ref 4.31–10.16)
WBC # BLD AUTO: 6.47 THOUSAND/UL (ref 4.31–10.16)
WBC # BLD AUTO: 6.66 THOUSAND/UL (ref 4.31–10.16)
WBC # BLD AUTO: 6.93 THOUSAND/UL (ref 4.31–10.16)
WBC # BLD AUTO: 6.97 THOUSAND/UL (ref 4.31–10.16)
WBC # BLD AUTO: 6.98 THOUSAND/UL (ref 4.31–10.16)
WBC # BLD AUTO: 7.17 THOUSAND/UL (ref 4.31–10.16)
WBC # BLD AUTO: 7.19 THOUSAND/UL (ref 4.31–10.16)
WBC # BLD AUTO: 7.61 THOUSAND/UL (ref 4.31–10.16)
WBC # BLD AUTO: 8.06 THOUSAND/UL (ref 4.31–10.16)
WBC # BLD AUTO: 8.31 THOUSAND/UL (ref 4.31–10.16)
WBC # BLD AUTO: 8.67 THOUSAND/UL (ref 4.31–10.16)
WBC # BLD AUTO: 8.82 THOUSAND/UL (ref 4.31–10.16)
WBC # BLD AUTO: 8.91 THOUSAND/UL (ref 4.31–10.16)
WBC # BLD AUTO: 9.08 THOUSAND/UL (ref 4.31–10.16)
WBC # BLD AUTO: 9.47 THOUSAND/UL (ref 4.31–10.16)
WBC # BLD AUTO: 9.79 THOUSAND/UL (ref 4.31–10.16)
WBC # BLD AUTO: 9.87 THOUSAND/UL (ref 4.31–10.16)
WBC # FLD MANUAL: 244 /UL
WBC #/AREA URNS AUTO: ABNORMAL /HPF
WBC CASTS URNS QL MICRO: ABNORMAL /LPF

## 2020-01-01 PROCEDURE — 99239 HOSP IP/OBS DSCHRG MGMT >30: CPT | Performed by: FAMILY MEDICINE

## 2020-01-01 PROCEDURE — 85025 COMPLETE CBC W/AUTO DIFF WBC: CPT | Performed by: PHYSICIAN ASSISTANT

## 2020-01-01 PROCEDURE — 97530 THERAPEUTIC ACTIVITIES: CPT

## 2020-01-01 PROCEDURE — 85027 COMPLETE CBC AUTOMATED: CPT | Performed by: STUDENT IN AN ORGANIZED HEALTH CARE EDUCATION/TRAINING PROGRAM

## 2020-01-01 PROCEDURE — 43273 ENDOSCOPIC PANCREATOSCOPY: CPT | Performed by: INTERNAL MEDICINE

## 2020-01-01 PROCEDURE — 96360 HYDRATION IV INFUSION INIT: CPT

## 2020-01-01 PROCEDURE — 85610 PROTHROMBIN TIME: CPT | Performed by: EMERGENCY MEDICINE

## 2020-01-01 PROCEDURE — 99232 SBSQ HOSP IP/OBS MODERATE 35: CPT | Performed by: PHYSICIAN ASSISTANT

## 2020-01-01 PROCEDURE — 1170F FXNL STATUS ASSESSED: CPT | Performed by: FAMILY MEDICINE

## 2020-01-01 PROCEDURE — 94003 VENT MGMT INPAT SUBQ DAY: CPT

## 2020-01-01 PROCEDURE — 71045 X-RAY EXAM CHEST 1 VIEW: CPT

## 2020-01-01 PROCEDURE — 99024 POSTOP FOLLOW-UP VISIT: CPT | Performed by: PHYSICIAN ASSISTANT

## 2020-01-01 PROCEDURE — 80053 COMPREHEN METABOLIC PANEL: CPT | Performed by: INTERNAL MEDICINE

## 2020-01-01 PROCEDURE — 96361 HYDRATE IV INFUSION ADD-ON: CPT

## 2020-01-01 PROCEDURE — 94760 N-INVAS EAR/PLS OXIMETRY 1: CPT

## 2020-01-01 PROCEDURE — 99232 SBSQ HOSP IP/OBS MODERATE 35: CPT | Performed by: SURGERY

## 2020-01-01 PROCEDURE — 82248 BILIRUBIN DIRECT: CPT | Performed by: PHYSICIAN ASSISTANT

## 2020-01-01 PROCEDURE — 99238 HOSP IP/OBS DSCHRG MGMT 30/<: CPT | Performed by: INTERNAL MEDICINE

## 2020-01-01 PROCEDURE — 85652 RBC SED RATE AUTOMATED: CPT | Performed by: EMERGENCY MEDICINE

## 2020-01-01 PROCEDURE — 80053 COMPREHEN METABOLIC PANEL: CPT | Performed by: STUDENT IN AN ORGANIZED HEALTH CARE EDUCATION/TRAINING PROGRAM

## 2020-01-01 PROCEDURE — 3008F BODY MASS INDEX DOCD: CPT | Performed by: INTERNAL MEDICINE

## 2020-01-01 PROCEDURE — 71260 CT THORAX DX C+: CPT

## 2020-01-01 PROCEDURE — 80053 COMPREHEN METABOLIC PANEL: CPT

## 2020-01-01 PROCEDURE — 47534 PLMT BILIARY DRAINAGE CATH: CPT | Performed by: RADIOLOGY

## 2020-01-01 PROCEDURE — ND001 PR NO DOCUMENTATION: Performed by: INTERNAL MEDICINE

## 2020-01-01 PROCEDURE — 1123F ACP DISCUSS/DSCN MKR DOCD: CPT | Performed by: PHYSICIAN ASSISTANT

## 2020-01-01 PROCEDURE — 84145 PROCALCITONIN (PCT): CPT | Performed by: EMERGENCY MEDICINE

## 2020-01-01 PROCEDURE — 47536 EXCHANGE BILIARY DRG CATH: CPT | Performed by: RADIOLOGY

## 2020-01-01 PROCEDURE — 93010 ELECTROCARDIOGRAM REPORT: CPT | Performed by: INTERNAL MEDICINE

## 2020-01-01 PROCEDURE — 84484 ASSAY OF TROPONIN QUANT: CPT | Performed by: EMERGENCY MEDICINE

## 2020-01-01 PROCEDURE — 82948 REAGENT STRIP/BLOOD GLUCOSE: CPT

## 2020-01-01 PROCEDURE — 83605 ASSAY OF LACTIC ACID: CPT | Performed by: EMERGENCY MEDICINE

## 2020-01-01 PROCEDURE — 99285 EMERGENCY DEPT VISIT HI MDM: CPT

## 2020-01-01 PROCEDURE — 99214 OFFICE O/P EST MOD 30 MIN: CPT | Performed by: INTERNAL MEDICINE

## 2020-01-01 PROCEDURE — 94762 N-INVAS EAR/PLS OXIMTRY CONT: CPT

## 2020-01-01 PROCEDURE — 99232 SBSQ HOSP IP/OBS MODERATE 35: CPT | Performed by: INTERNAL MEDICINE

## 2020-01-01 PROCEDURE — G0379 DIRECT REFER HOSPITAL OBSERV: HCPCS

## 2020-01-01 PROCEDURE — 99215 OFFICE O/P EST HI 40 MIN: CPT | Performed by: NURSE PRACTITIONER

## 2020-01-01 PROCEDURE — 99204 OFFICE O/P NEW MOD 45 MIN: CPT | Performed by: INTERNAL MEDICINE

## 2020-01-01 PROCEDURE — 86140 C-REACTIVE PROTEIN: CPT | Performed by: STUDENT IN AN ORGANIZED HEALTH CARE EDUCATION/TRAINING PROGRAM

## 2020-01-01 PROCEDURE — 99222 1ST HOSP IP/OBS MODERATE 55: CPT | Performed by: INTERNAL MEDICINE

## 2020-01-01 PROCEDURE — 97110 THERAPEUTIC EXERCISES: CPT

## 2020-01-01 PROCEDURE — 74177 CT ABD & PELVIS W/CONTRAST: CPT

## 2020-01-01 PROCEDURE — 87040 BLOOD CULTURE FOR BACTERIA: CPT | Performed by: EMERGENCY MEDICINE

## 2020-01-01 PROCEDURE — 80048 BASIC METABOLIC PNL TOTAL CA: CPT | Performed by: INTERNAL MEDICINE

## 2020-01-01 PROCEDURE — 80053 COMPREHEN METABOLIC PANEL: CPT | Performed by: EMERGENCY MEDICINE

## 2020-01-01 PROCEDURE — C1757 CATH, THROMBECTOMY/EMBOLECT: HCPCS

## 2020-01-01 PROCEDURE — 0F9930Z DRAINAGE OF COMMON BILE DUCT WITH DRAINAGE DEVICE, PERCUTANEOUS APPROACH: ICD-10-PCS | Performed by: RADIOLOGY

## 2020-01-01 PROCEDURE — 87070 CULTURE OTHR SPECIMN AEROBIC: CPT | Performed by: RADIOLOGY

## 2020-01-01 PROCEDURE — 83605 ASSAY OF LACTIC ACID: CPT | Performed by: STUDENT IN AN ORGANIZED HEALTH CARE EDUCATION/TRAINING PROGRAM

## 2020-01-01 PROCEDURE — 99233 SBSQ HOSP IP/OBS HIGH 50: CPT | Performed by: INTERNAL MEDICINE

## 2020-01-01 PROCEDURE — 94002 VENT MGMT INPAT INIT DAY: CPT

## 2020-01-01 PROCEDURE — 83615 LACTATE (LD) (LDH) ENZYME: CPT | Performed by: RADIOLOGY

## 2020-01-01 PROCEDURE — 96374 THER/PROPH/DIAG INJ IV PUSH: CPT

## 2020-01-01 PROCEDURE — 84300 ASSAY OF URINE SODIUM: CPT | Performed by: INTERNAL MEDICINE

## 2020-01-01 PROCEDURE — 93005 ELECTROCARDIOGRAM TRACING: CPT

## 2020-01-01 PROCEDURE — 99215 OFFICE O/P EST HI 40 MIN: CPT | Performed by: INTERNAL MEDICINE

## 2020-01-01 PROCEDURE — 80053 COMPREHEN METABOLIC PANEL: CPT | Performed by: PHYSICIAN ASSISTANT

## 2020-01-01 PROCEDURE — 82397 CHEMILUMINESCENT ASSAY: CPT | Performed by: STUDENT IN AN ORGANIZED HEALTH CARE EDUCATION/TRAINING PROGRAM

## 2020-01-01 PROCEDURE — C2618 PROBE/NEEDLE, CRYO: HCPCS

## 2020-01-01 PROCEDURE — 85025 COMPLETE CBC W/AUTO DIFF WBC: CPT | Performed by: STUDENT IN AN ORGANIZED HEALTH CARE EDUCATION/TRAINING PROGRAM

## 2020-01-01 PROCEDURE — C1876 STENT, NON-COA/NON-COV W/DEL: HCPCS

## 2020-01-01 PROCEDURE — 84443 ASSAY THYROID STIM HORMONE: CPT | Performed by: NURSE PRACTITIONER

## 2020-01-01 PROCEDURE — NC001 PR NO CHARGE: Performed by: INTERNAL MEDICINE

## 2020-01-01 PROCEDURE — 36415 COLL VENOUS BLD VENIPUNCTURE: CPT | Performed by: HOSPITALIST

## 2020-01-01 PROCEDURE — 1036F TOBACCO NON-USER: CPT | Performed by: INTERNAL MEDICINE

## 2020-01-01 PROCEDURE — 85027 COMPLETE CBC AUTOMATED: CPT | Performed by: INTERNAL MEDICINE

## 2020-01-01 PROCEDURE — 99443 PR PHYS/QHP TELEPHONE EVALUATION 21-30 MIN: CPT | Performed by: FAMILY MEDICINE

## 2020-01-01 PROCEDURE — C1726 CATH, BAL DIL, NON-VASCULAR: HCPCS

## 2020-01-01 PROCEDURE — 85379 FIBRIN DEGRADATION QUANT: CPT | Performed by: STUDENT IN AN ORGANIZED HEALTH CARE EDUCATION/TRAINING PROGRAM

## 2020-01-01 PROCEDURE — 82533 TOTAL CORTISOL: CPT | Performed by: INTERNAL MEDICINE

## 2020-01-01 PROCEDURE — 80076 HEPATIC FUNCTION PANEL: CPT | Performed by: HOSPITALIST

## 2020-01-01 PROCEDURE — 80053 COMPREHEN METABOLIC PANEL: CPT | Performed by: NURSE PRACTITIONER

## 2020-01-01 PROCEDURE — 1160F RVW MEDS BY RX/DR IN RCRD: CPT | Performed by: INTERNAL MEDICINE

## 2020-01-01 PROCEDURE — 80053 COMPREHEN METABOLIC PANEL: CPT | Performed by: RADIOLOGY

## 2020-01-01 PROCEDURE — 85730 THROMBOPLASTIN TIME PARTIAL: CPT | Performed by: STUDENT IN AN ORGANIZED HEALTH CARE EDUCATION/TRAINING PROGRAM

## 2020-01-01 PROCEDURE — 84157 ASSAY OF PROTEIN OTHER: CPT | Performed by: RADIOLOGY

## 2020-01-01 PROCEDURE — 82728 ASSAY OF FERRITIN: CPT | Performed by: STUDENT IN AN ORGANIZED HEALTH CARE EDUCATION/TRAINING PROGRAM

## 2020-01-01 PROCEDURE — 3074F SYST BP LT 130 MM HG: CPT | Performed by: NURSE PRACTITIONER

## 2020-01-01 PROCEDURE — C1769 GUIDE WIRE: HCPCS

## 2020-01-01 PROCEDURE — 99153 MOD SED SAME PHYS/QHP EA: CPT

## 2020-01-01 PROCEDURE — 82607 VITAMIN B-12: CPT | Performed by: STUDENT IN AN ORGANIZED HEALTH CARE EDUCATION/TRAINING PROGRAM

## 2020-01-01 PROCEDURE — 36415 COLL VENOUS BLD VENIPUNCTURE: CPT | Performed by: EMERGENCY MEDICINE

## 2020-01-01 PROCEDURE — C1894 INTRO/SHEATH, NON-LASER: HCPCS

## 2020-01-01 PROCEDURE — 83520 IMMUNOASSAY QUANT NOS NONAB: CPT | Performed by: INTERNAL MEDICINE

## 2020-01-01 PROCEDURE — 86140 C-REACTIVE PROTEIN: CPT | Performed by: EMERGENCY MEDICINE

## 2020-01-01 PROCEDURE — 80076 HEPATIC FUNCTION PANEL: CPT | Performed by: PSYCHIATRY & NEUROLOGY

## 2020-01-01 PROCEDURE — 99232 SBSQ HOSP IP/OBS MODERATE 35: CPT | Performed by: STUDENT IN AN ORGANIZED HEALTH CARE EDUCATION/TRAINING PROGRAM

## 2020-01-01 PROCEDURE — 80048 BASIC METABOLIC PNL TOTAL CA: CPT | Performed by: STUDENT IN AN ORGANIZED HEALTH CARE EDUCATION/TRAINING PROGRAM

## 2020-01-01 PROCEDURE — 99232 SBSQ HOSP IP/OBS MODERATE 35: CPT | Performed by: FAMILY MEDICINE

## 2020-01-01 PROCEDURE — 87081 CULTURE SCREEN ONLY: CPT | Performed by: INTERNAL MEDICINE

## 2020-01-01 PROCEDURE — 85610 PROTHROMBIN TIME: CPT

## 2020-01-01 PROCEDURE — 82248 BILIRUBIN DIRECT: CPT | Performed by: RADIOLOGY

## 2020-01-01 PROCEDURE — 3008F BODY MASS INDEX DOCD: CPT | Performed by: FAMILY MEDICINE

## 2020-01-01 PROCEDURE — 70553 MRI BRAIN STEM W/O & W/DYE: CPT

## 2020-01-01 PROCEDURE — 0W9G3ZZ DRAINAGE OF PERITONEAL CAVITY, PERCUTANEOUS APPROACH: ICD-10-PCS | Performed by: RADIOLOGY

## 2020-01-01 PROCEDURE — C2617 STENT, NON-COR, TEM W/O DEL: HCPCS

## 2020-01-01 PROCEDURE — A9585 GADOBUTROL INJECTION: HCPCS | Performed by: NURSE PRACTITIONER

## 2020-01-01 PROCEDURE — 85025 COMPLETE CBC W/AUTO DIFF WBC: CPT | Performed by: PSYCHIATRY & NEUROLOGY

## 2020-01-01 PROCEDURE — 88112 CYTOPATH CELL ENHANCE TECH: CPT | Performed by: PATHOLOGY

## 2020-01-01 PROCEDURE — 85730 THROMBOPLASTIN TIME PARTIAL: CPT | Performed by: EMERGENCY MEDICINE

## 2020-01-01 PROCEDURE — 47531 INJECTION FOR CHOLANGIOGRAM: CPT

## 2020-01-01 PROCEDURE — 87205 SMEAR GRAM STAIN: CPT | Performed by: RADIOLOGY

## 2020-01-01 PROCEDURE — 3074F SYST BP LT 130 MM HG: CPT | Performed by: FAMILY MEDICINE

## 2020-01-01 PROCEDURE — 83930 ASSAY OF BLOOD OSMOLALITY: CPT | Performed by: NURSE PRACTITIONER

## 2020-01-01 PROCEDURE — NS001 PR NO SIGNATURE OR ATTESTATION: Performed by: INTERNAL MEDICINE

## 2020-01-01 PROCEDURE — 99285 EMERGENCY DEPT VISIT HI MDM: CPT | Performed by: EMERGENCY MEDICINE

## 2020-01-01 PROCEDURE — 99443 PR PHYS/QHP TELEPHONE EVALUATION 21-30 MIN: CPT | Performed by: INTERNAL MEDICINE

## 2020-01-01 PROCEDURE — 82247 BILIRUBIN TOTAL: CPT | Performed by: PHYSICIAN ASSISTANT

## 2020-01-01 PROCEDURE — 93970 EXTREMITY STUDY: CPT | Performed by: SURGERY

## 2020-01-01 PROCEDURE — 82746 ASSAY OF FOLIC ACID SERUM: CPT | Performed by: STUDENT IN AN ORGANIZED HEALTH CARE EDUCATION/TRAINING PROGRAM

## 2020-01-01 PROCEDURE — 3074F SYST BP LT 130 MM HG: CPT | Performed by: INTERNAL MEDICINE

## 2020-01-01 PROCEDURE — 1111F DSCHRG MED/CURRENT MED MERGE: CPT | Performed by: INTERNAL MEDICINE

## 2020-01-01 PROCEDURE — 99285 EMERGENCY DEPT VISIT HI MDM: CPT | Performed by: PHYSICIAN ASSISTANT

## 2020-01-01 PROCEDURE — 3008F BODY MASS INDEX DOCD: CPT | Performed by: NURSE PRACTITIONER

## 2020-01-01 PROCEDURE — 99223 1ST HOSP IP/OBS HIGH 75: CPT | Performed by: INTERNAL MEDICINE

## 2020-01-01 PROCEDURE — 89051 BODY FLUID CELL COUNT: CPT | Performed by: RADIOLOGY

## 2020-01-01 PROCEDURE — 99223 1ST HOSP IP/OBS HIGH 75: CPT | Performed by: PHYSICIAN ASSISTANT

## 2020-01-01 PROCEDURE — 99223 1ST HOSP IP/OBS HIGH 75: CPT | Performed by: HOSPITALIST

## 2020-01-01 PROCEDURE — 3078F DIAST BP <80 MM HG: CPT | Performed by: INTERNAL MEDICINE

## 2020-01-01 PROCEDURE — 80048 BASIC METABOLIC PNL TOTAL CA: CPT | Performed by: NURSE PRACTITIONER

## 2020-01-01 PROCEDURE — 82042 OTHER SOURCE ALBUMIN QUAN EA: CPT | Performed by: RADIOLOGY

## 2020-01-01 PROCEDURE — 1160F RVW MEDS BY RX/DR IN RCRD: CPT | Performed by: NURSE PRACTITIONER

## 2020-01-01 PROCEDURE — 81001 URINALYSIS AUTO W/SCOPE: CPT | Performed by: NURSE PRACTITIONER

## 2020-01-01 PROCEDURE — 47542 DILATE BILIARY DUCT/AMPULLA: CPT

## 2020-01-01 PROCEDURE — 97116 GAIT TRAINING THERAPY: CPT

## 2020-01-01 PROCEDURE — 99233 SBSQ HOSP IP/OBS HIGH 50: CPT | Performed by: FAMILY MEDICINE

## 2020-01-01 PROCEDURE — 80053 COMPREHEN METABOLIC PANEL: CPT | Performed by: FAMILY MEDICINE

## 2020-01-01 PROCEDURE — 99223 1ST HOSP IP/OBS HIGH 75: CPT | Performed by: STUDENT IN AN ORGANIZED HEALTH CARE EDUCATION/TRAINING PROGRAM

## 2020-01-01 PROCEDURE — 77013 CT GUIDE FOR TISSUE ABLATION: CPT

## 2020-01-01 PROCEDURE — 53899 UNLISTED PX URINARY SYSTEM: CPT

## 2020-01-01 PROCEDURE — C1729 CATH, DRAINAGE: HCPCS

## 2020-01-01 PROCEDURE — 82945 GLUCOSE OTHER FLUID: CPT | Performed by: RADIOLOGY

## 2020-01-01 PROCEDURE — 99222 1ST HOSP IP/OBS MODERATE 55: CPT | Performed by: PSYCHIATRY & NEUROLOGY

## 2020-01-01 PROCEDURE — 36415 COLL VENOUS BLD VENIPUNCTURE: CPT

## 2020-01-01 PROCEDURE — 85025 COMPLETE CBC W/AUTO DIFF WBC: CPT

## 2020-01-01 PROCEDURE — 99239 HOSP IP/OBS DSCHRG MGMT >30: CPT | Performed by: INTERNAL MEDICINE

## 2020-01-01 PROCEDURE — 87086 URINE CULTURE/COLONY COUNT: CPT | Performed by: STUDENT IN AN ORGANIZED HEALTH CARE EDUCATION/TRAINING PROGRAM

## 2020-01-01 PROCEDURE — 1125F AMNT PAIN NOTED PAIN PRSNT: CPT | Performed by: FAMILY MEDICINE

## 2020-01-01 PROCEDURE — G0438 PPPS, INITIAL VISIT: HCPCS | Performed by: FAMILY MEDICINE

## 2020-01-01 PROCEDURE — BF101ZZ FLUOROSCOPY OF BILE DUCTS USING LOW OSMOLAR CONTRAST: ICD-10-PCS | Performed by: RADIOLOGY

## 2020-01-01 PROCEDURE — 99356 PR PROLONGED SVC I/P OR OBS SETTING 1ST HOUR: CPT | Performed by: STUDENT IN AN ORGANIZED HEALTH CARE EDUCATION/TRAINING PROGRAM

## 2020-01-01 PROCEDURE — 97167 OT EVAL HIGH COMPLEX 60 MIN: CPT

## 2020-01-01 PROCEDURE — 97163 PT EVAL HIGH COMPLEX 45 MIN: CPT

## 2020-01-01 PROCEDURE — 84295 ASSAY OF SERUM SODIUM: CPT | Performed by: INTERNAL MEDICINE

## 2020-01-01 PROCEDURE — 85025 COMPLETE CBC W/AUTO DIFF WBC: CPT | Performed by: EMERGENCY MEDICINE

## 2020-01-01 PROCEDURE — 80048 BASIC METABOLIC PNL TOTAL CA: CPT | Performed by: HOSPITALIST

## 2020-01-01 PROCEDURE — 87635 SARS-COV-2 COVID-19 AMP PRB: CPT | Performed by: EMERGENCY MEDICINE

## 2020-01-01 PROCEDURE — 3078F DIAST BP <80 MM HG: CPT | Performed by: FAMILY MEDICINE

## 2020-01-01 PROCEDURE — 99442 PR PHYS/QHP TELEPHONE EVALUATION 11-20 MIN: CPT | Performed by: INTERNAL MEDICINE

## 2020-01-01 PROCEDURE — 47536 EXCHANGE BILIARY DRG CATH: CPT

## 2020-01-01 PROCEDURE — 85025 COMPLETE CBC W/AUTO DIFF WBC: CPT | Performed by: INTERNAL MEDICINE

## 2020-01-01 PROCEDURE — 85610 PROTHROMBIN TIME: CPT | Performed by: PSYCHIATRY & NEUROLOGY

## 2020-01-01 PROCEDURE — 99221 1ST HOSP IP/OBS SF/LOW 40: CPT | Performed by: RADIOLOGY

## 2020-01-01 PROCEDURE — BF111ZZ FLUOROSCOPY OF BILIARY AND PANCREATIC DUCTS USING LOW OSMOLAR CONTRAST: ICD-10-PCS | Performed by: RADIOLOGY

## 2020-01-01 PROCEDURE — C1725 CATH, TRANSLUMIN NON-LASER: HCPCS

## 2020-01-01 PROCEDURE — 84145 PROCALCITONIN (PCT): CPT | Performed by: STUDENT IN AN ORGANIZED HEALTH CARE EDUCATION/TRAINING PROGRAM

## 2020-01-01 PROCEDURE — 85610 PROTHROMBIN TIME: CPT | Performed by: STUDENT IN AN ORGANIZED HEALTH CARE EDUCATION/TRAINING PROGRAM

## 2020-01-01 PROCEDURE — 1111F DSCHRG MED/CURRENT MED MERGE: CPT | Performed by: FAMILY MEDICINE

## 2020-01-01 PROCEDURE — 80076 HEPATIC FUNCTION PANEL: CPT | Performed by: FAMILY MEDICINE

## 2020-01-01 PROCEDURE — 99214 OFFICE O/P EST MOD 30 MIN: CPT | Performed by: FAMILY MEDICINE

## 2020-01-01 PROCEDURE — 1036F TOBACCO NON-USER: CPT | Performed by: FAMILY MEDICINE

## 2020-01-01 PROCEDURE — 43277 ERCP EA DUCT/AMPULLA DILATE: CPT | Performed by: INTERNAL MEDICINE

## 2020-01-01 PROCEDURE — 83880 ASSAY OF NATRIURETIC PEPTIDE: CPT

## 2020-01-01 PROCEDURE — 97535 SELF CARE MNGMENT TRAINING: CPT

## 2020-01-01 PROCEDURE — 1111F DSCHRG MED/CURRENT MED MERGE: CPT | Performed by: NURSE PRACTITIONER

## 2020-01-01 PROCEDURE — 99222 1ST HOSP IP/OBS MODERATE 55: CPT | Performed by: PHYSICIAN ASSISTANT

## 2020-01-01 PROCEDURE — 74328 X-RAY BILE DUCT ENDOSCOPY: CPT

## 2020-01-01 PROCEDURE — 85610 PROTHROMBIN TIME: CPT | Performed by: NURSE PRACTITIONER

## 2020-01-01 PROCEDURE — 82140 ASSAY OF AMMONIA: CPT | Performed by: STUDENT IN AN ORGANIZED HEALTH CARE EDUCATION/TRAINING PROGRAM

## 2020-01-01 PROCEDURE — 84300 ASSAY OF URINE SODIUM: CPT | Performed by: NURSE PRACTITIONER

## 2020-01-01 PROCEDURE — 99233 SBSQ HOSP IP/OBS HIGH 50: CPT | Performed by: STUDENT IN AN ORGANIZED HEALTH CARE EDUCATION/TRAINING PROGRAM

## 2020-01-01 PROCEDURE — 99152 MOD SED SAME PHYS/QHP 5/>YRS: CPT

## 2020-01-01 PROCEDURE — 85027 COMPLETE CBC AUTOMATED: CPT | Performed by: HOSPITALIST

## 2020-01-01 PROCEDURE — 99238 HOSP IP/OBS DSCHRG MGMT 30/<: CPT | Performed by: NURSE PRACTITIONER

## 2020-01-01 PROCEDURE — 82140 ASSAY OF AMMONIA: CPT | Performed by: PHYSICIAN ASSISTANT

## 2020-01-01 PROCEDURE — 84484 ASSAY OF TROPONIN QUANT: CPT | Performed by: PHYSICIAN ASSISTANT

## 2020-01-01 PROCEDURE — 82140 ASSAY OF AMMONIA: CPT | Performed by: FAMILY MEDICINE

## 2020-01-01 PROCEDURE — 84443 ASSAY THYROID STIM HORMONE: CPT | Performed by: INTERNAL MEDICINE

## 2020-01-01 PROCEDURE — 43264 ERCP REMOVE DUCT CALCULI: CPT | Performed by: INTERNAL MEDICINE

## 2020-01-01 PROCEDURE — 83735 ASSAY OF MAGNESIUM: CPT | Performed by: FAMILY MEDICINE

## 2020-01-01 PROCEDURE — 83735 ASSAY OF MAGNESIUM: CPT | Performed by: NURSE PRACTITIONER

## 2020-01-01 PROCEDURE — 85025 COMPLETE CBC W/AUTO DIFF WBC: CPT | Performed by: NURSE PRACTITIONER

## 2020-01-01 PROCEDURE — 83880 ASSAY OF NATRIURETIC PEPTIDE: CPT | Performed by: EMERGENCY MEDICINE

## 2020-01-01 PROCEDURE — 43274 ERCP DUCT STENT PLACEMENT: CPT | Performed by: INTERNAL MEDICINE

## 2020-01-01 PROCEDURE — 83935 ASSAY OF URINE OSMOLALITY: CPT | Performed by: INTERNAL MEDICINE

## 2020-01-01 PROCEDURE — 85027 COMPLETE CBC AUTOMATED: CPT | Performed by: FAMILY MEDICINE

## 2020-01-01 PROCEDURE — 99221 1ST HOSP IP/OBS SF/LOW 40: CPT | Performed by: PHYSICIAN ASSISTANT

## 2020-01-01 PROCEDURE — 83735 ASSAY OF MAGNESIUM: CPT | Performed by: HOSPITALIST

## 2020-01-01 PROCEDURE — 0FC98ZZ EXTIRPATION OF MATTER FROM COMMON BILE DUCT, VIA NATURAL OR ARTIFICIAL OPENING ENDOSCOPIC: ICD-10-PCS | Performed by: INTERNAL MEDICINE

## 2020-01-01 PROCEDURE — 99204 OFFICE O/P NEW MOD 45 MIN: CPT | Performed by: FAMILY MEDICINE

## 2020-01-01 PROCEDURE — 93000 ELECTROCARDIOGRAM COMPLETE: CPT | Performed by: INTERNAL MEDICINE

## 2020-01-01 PROCEDURE — 99284 EMERGENCY DEPT VISIT MOD MDM: CPT | Performed by: EMERGENCY MEDICINE

## 2020-01-01 PROCEDURE — 93005 ELECTROCARDIOGRAM TRACING: CPT | Performed by: STUDENT IN AN ORGANIZED HEALTH CARE EDUCATION/TRAINING PROGRAM

## 2020-01-01 PROCEDURE — NC001 PR NO CHARGE: Performed by: STUDENT IN AN ORGANIZED HEALTH CARE EDUCATION/TRAINING PROGRAM

## 2020-01-01 PROCEDURE — A9585 GADOBUTROL INJECTION: HCPCS | Performed by: INTERNAL MEDICINE

## 2020-01-01 PROCEDURE — 87631 RESP VIRUS 3-5 TARGETS: CPT | Performed by: EMERGENCY MEDICINE

## 2020-01-01 PROCEDURE — 93970 EXTREMITY STUDY: CPT

## 2020-01-01 PROCEDURE — 85610 PROTHROMBIN TIME: CPT | Performed by: INTERNAL MEDICINE

## 2020-01-01 PROCEDURE — 82728 ASSAY OF FERRITIN: CPT | Performed by: EMERGENCY MEDICINE

## 2020-01-01 PROCEDURE — 0F7D8DZ DILATION OF PANCREATIC DUCT WITH INTRALUMINAL DEVICE, VIA NATURAL OR ARTIFICIAL OPENING ENDOSCOPIC: ICD-10-PCS | Performed by: INTERNAL MEDICINE

## 2020-01-01 PROCEDURE — 83605 ASSAY OF LACTIC ACID: CPT | Performed by: PHYSICIAN ASSISTANT

## 2020-01-01 PROCEDURE — 85027 COMPLETE CBC AUTOMATED: CPT | Performed by: PHYSICIAN ASSISTANT

## 2020-01-01 PROCEDURE — 49083 ABD PARACENTESIS W/IMAGING: CPT | Performed by: RADIOLOGY

## 2020-01-01 PROCEDURE — 1160F RVW MEDS BY RX/DR IN RCRD: CPT | Performed by: FAMILY MEDICINE

## 2020-01-01 PROCEDURE — 86141 C-REACTIVE PROTEIN HS: CPT | Performed by: PHYSICIAN ASSISTANT

## 2020-01-01 PROCEDURE — 47532 INJECTION FOR CHOLANGIOGRAM: CPT

## 2020-01-01 PROCEDURE — 83935 ASSAY OF URINE OSMOLALITY: CPT | Performed by: NURSE PRACTITIONER

## 2020-01-01 PROCEDURE — 80202 ASSAY OF VANCOMYCIN: CPT | Performed by: INTERNAL MEDICINE

## 2020-01-01 PROCEDURE — 0F798ZZ DILATION OF COMMON BILE DUCT, VIA NATURAL OR ARTIFICIAL OPENING ENDOSCOPIC: ICD-10-PCS | Performed by: INTERNAL MEDICINE

## 2020-01-01 PROCEDURE — 3079F DIAST BP 80-89 MM HG: CPT | Performed by: NURSE PRACTITIONER

## 2020-01-01 PROCEDURE — 80048 BASIC METABOLIC PNL TOTAL CA: CPT | Performed by: PSYCHIATRY & NEUROLOGY

## 2020-01-01 PROCEDURE — 47532 INJECTION FOR CHOLANGIOGRAM: CPT | Performed by: RADIOLOGY

## 2020-01-01 PROCEDURE — 1036F TOBACCO NON-USER: CPT | Performed by: NURSE PRACTITIONER

## 2020-01-01 PROCEDURE — NC001 PR NO CHARGE: Performed by: PHYSICIAN ASSISTANT

## 2020-01-01 PROCEDURE — 83690 ASSAY OF LIPASE: CPT | Performed by: PHYSICIAN ASSISTANT

## 2020-01-01 PROCEDURE — 80048 BASIC METABOLIC PNL TOTAL CA: CPT | Performed by: FAMILY MEDICINE

## 2020-01-01 PROCEDURE — 81003 URINALYSIS AUTO W/O SCOPE: CPT | Performed by: INTERNAL MEDICINE

## 2020-01-01 PROCEDURE — 74183 MRI ABD W/O CNTR FLWD CNTR: CPT

## 2020-01-01 PROCEDURE — 22999 UNLISTED PX ABDOMEN MUSCSKEL: CPT | Performed by: RADIOLOGY

## 2020-01-01 PROCEDURE — 99222 1ST HOSP IP/OBS MODERATE 55: CPT | Performed by: FAMILY MEDICINE

## 2020-01-01 PROCEDURE — 99152 MOD SED SAME PHYS/QHP 5/>YRS: CPT | Performed by: RADIOLOGY

## 2020-01-01 RX ORDER — SODIUM CHLORIDE 9 MG/ML
INJECTION, SOLUTION INTRAVENOUS CONTINUOUS PRN
Status: DISCONTINUED | OUTPATIENT
Start: 2020-01-01 | End: 2020-01-01 | Stop reason: SURG

## 2020-01-01 RX ORDER — KETAMINE HYDROCHLORIDE 50 MG/ML
50 INJECTION, SOLUTION, CONCENTRATE INTRAMUSCULAR; INTRAVENOUS ONCE
Status: CANCELLED | OUTPATIENT
Start: 2020-01-01

## 2020-01-01 RX ORDER — SODIUM CHLORIDE AND POTASSIUM CHLORIDE .9; .15 G/100ML; G/100ML
100 SOLUTION INTRAVENOUS CONTINUOUS
Status: DISPENSED | OUTPATIENT
Start: 2020-01-01 | End: 2020-01-01

## 2020-01-01 RX ORDER — SENNOSIDES 8.6 MG
1 TABLET ORAL 2 TIMES DAILY PRN
Qty: 120 EACH | Refills: 0
Start: 2020-01-01

## 2020-01-01 RX ORDER — LORAZEPAM 2 MG/ML
0.5 INJECTION INTRAMUSCULAR
Status: DISCONTINUED | OUTPATIENT
Start: 2020-01-01 | End: 2020-01-01 | Stop reason: HOSPADM

## 2020-01-01 RX ORDER — METOCLOPRAMIDE HYDROCHLORIDE 5 MG/ML
10 INJECTION INTRAMUSCULAR; INTRAVENOUS ONCE AS NEEDED
Status: DISCONTINUED | OUTPATIENT
Start: 2020-01-01 | End: 2020-01-01 | Stop reason: HOSPADM

## 2020-01-01 RX ORDER — LORAZEPAM 2 MG/ML
1 INJECTION INTRAMUSCULAR EVERY 8 HOURS
Status: DISCONTINUED | OUTPATIENT
Start: 2020-01-01 | End: 2020-01-01 | Stop reason: HOSPADM

## 2020-01-01 RX ORDER — ACETAMINOPHEN 325 MG/1
650 TABLET ORAL EVERY 4 HOURS PRN
Status: DISCONTINUED | OUTPATIENT
Start: 2020-01-01 | End: 2020-01-01 | Stop reason: HOSPADM

## 2020-01-01 RX ORDER — LIDOCAINE HYDROCHLORIDE 10 MG/ML
INJECTION, SOLUTION EPIDURAL; INFILTRATION; INTRACAUDAL; PERINEURAL AS NEEDED
Status: DISCONTINUED | OUTPATIENT
Start: 2020-01-01 | End: 2020-01-01 | Stop reason: SURG

## 2020-01-01 RX ORDER — BISACODYL 10 MG
10 SUPPOSITORY, RECTAL RECTAL DAILY PRN
Status: DISCONTINUED | OUTPATIENT
Start: 2020-01-01 | End: 2020-01-01 | Stop reason: HOSPADM

## 2020-01-01 RX ORDER — DILTIAZEM HYDROCHLORIDE 5 MG/ML
15 INJECTION INTRAVENOUS ONCE
Status: COMPLETED | OUTPATIENT
Start: 2020-01-01 | End: 2020-01-01

## 2020-01-01 RX ORDER — DOXYCYCLINE 100 MG/1
100 CAPSULE ORAL 2 TIMES DAILY
Qty: 20 CAPSULE | Refills: 0 | Status: SHIPPED | OUTPATIENT
Start: 2020-01-01 | End: 2020-01-01

## 2020-01-01 RX ORDER — METOPROLOL SUCCINATE 25 MG/1
25 TABLET, EXTENDED RELEASE ORAL DAILY
Status: DISCONTINUED | OUTPATIENT
Start: 2020-01-01 | End: 2020-01-01 | Stop reason: HOSPADM

## 2020-01-01 RX ORDER — DOCUSATE SODIUM 100 MG/1
100 CAPSULE, LIQUID FILLED ORAL 2 TIMES DAILY
Status: DISCONTINUED | OUTPATIENT
Start: 2020-01-01 | End: 2020-01-01 | Stop reason: HOSPADM

## 2020-01-01 RX ORDER — DEXAMETHASONE SODIUM PHOSPHATE 10 MG/ML
INJECTION, SOLUTION INTRAMUSCULAR; INTRAVENOUS AS NEEDED
Status: DISCONTINUED | OUTPATIENT
Start: 2020-01-01 | End: 2020-01-01 | Stop reason: SURG

## 2020-01-01 RX ORDER — OXYCODONE HYDROCHLORIDE 10 MG/1
10 TABLET ORAL EVERY 4 HOURS PRN
Status: DISCONTINUED | OUTPATIENT
Start: 2020-01-01 | End: 2020-01-01 | Stop reason: HOSPADM

## 2020-01-01 RX ORDER — SENNOSIDES 8.6 MG
1 TABLET ORAL DAILY
Status: DISCONTINUED | OUTPATIENT
Start: 2020-01-01 | End: 2020-01-01 | Stop reason: HOSPADM

## 2020-01-01 RX ORDER — FLUDROCORTISONE ACETATE 0.1 MG/1
0.1 TABLET ORAL 2 TIMES DAILY
Qty: 60 TABLET | Refills: 5 | Status: SHIPPED | OUTPATIENT
Start: 2020-01-01 | End: 2020-01-01 | Stop reason: SDUPTHER

## 2020-01-01 RX ORDER — POTASSIUM CHLORIDE 20 MEQ/1
40 TABLET, EXTENDED RELEASE ORAL ONCE
Status: COMPLETED | OUTPATIENT
Start: 2020-01-01 | End: 2020-01-01

## 2020-01-01 RX ORDER — LEVOFLOXACIN 5 MG/ML
500 INJECTION, SOLUTION INTRAVENOUS EVERY 24 HOURS
Status: DISCONTINUED | OUTPATIENT
Start: 2020-01-01 | End: 2020-01-01 | Stop reason: HOSPADM

## 2020-01-01 RX ORDER — MIDODRINE HYDROCHLORIDE 5 MG/1
5 TABLET ORAL
Status: DISCONTINUED | OUTPATIENT
Start: 2020-01-01 | End: 2020-01-01

## 2020-01-01 RX ORDER — CEFAZOLIN SODIUM 2 G/50ML
2000 SOLUTION INTRAVENOUS EVERY 8 HOURS
Status: DISCONTINUED | OUTPATIENT
Start: 2020-01-01 | End: 2020-01-01 | Stop reason: HOSPADM

## 2020-01-01 RX ORDER — SODIUM CHLORIDE 9 MG/ML
75 INJECTION, SOLUTION INTRAVENOUS CONTINUOUS
Status: DISCONTINUED | OUTPATIENT
Start: 2020-01-01 | End: 2020-01-01 | Stop reason: HOSPADM

## 2020-01-01 RX ORDER — FENTANYL CITRATE 50 UG/ML
INJECTION, SOLUTION INTRAMUSCULAR; INTRAVENOUS AS NEEDED
Status: DISCONTINUED | OUTPATIENT
Start: 2020-01-01 | End: 2020-01-01 | Stop reason: SURG

## 2020-01-01 RX ORDER — ONDANSETRON 2 MG/ML
4 INJECTION INTRAMUSCULAR; INTRAVENOUS ONCE AS NEEDED
Status: DISCONTINUED | OUTPATIENT
Start: 2020-01-01 | End: 2020-01-01 | Stop reason: HOSPADM

## 2020-01-01 RX ORDER — LORAZEPAM 2 MG/ML
1 INJECTION INTRAMUSCULAR EVERY 8 HOURS
Status: CANCELLED | OUTPATIENT
Start: 2020-01-01

## 2020-01-01 RX ORDER — TAMSULOSIN HYDROCHLORIDE 0.4 MG/1
0.4 CAPSULE ORAL
Status: DISCONTINUED | OUTPATIENT
Start: 2020-01-01 | End: 2020-01-01 | Stop reason: HOSPADM

## 2020-01-01 RX ORDER — CEPHALEXIN 500 MG/1
500 CAPSULE ORAL 4 TIMES DAILY
Qty: 24 CAPSULE | Refills: 0
Start: 2020-01-01 | End: 2020-01-01 | Stop reason: SDUPTHER

## 2020-01-01 RX ORDER — SODIUM CHLORIDE 9 MG/ML
75 INJECTION, SOLUTION INTRAVENOUS CONTINUOUS
Status: DISCONTINUED | OUTPATIENT
Start: 2020-01-01 | End: 2020-01-01

## 2020-01-01 RX ORDER — ATORVASTATIN CALCIUM 40 MG/1
40 TABLET, FILM COATED ORAL
Status: DISCONTINUED | OUTPATIENT
Start: 2020-01-01 | End: 2020-01-01

## 2020-01-01 RX ORDER — CEPHALEXIN 500 MG/1
500 CAPSULE ORAL 3 TIMES DAILY
Qty: 30 CAPSULE | Refills: 0 | Status: SHIPPED | OUTPATIENT
Start: 2020-01-01 | End: 2020-01-01

## 2020-01-01 RX ORDER — FUROSEMIDE 20 MG/1
20 TABLET ORAL DAILY
Qty: 30 TABLET | Refills: 2 | Status: ON HOLD | OUTPATIENT
Start: 2020-01-01 | End: 2020-01-01

## 2020-01-01 RX ORDER — POLYETHYLENE GLYCOL 3350 17 G/17G
17 POWDER, FOR SOLUTION ORAL DAILY
Status: DISCONTINUED | OUTPATIENT
Start: 2020-01-01 | End: 2020-01-01 | Stop reason: HOSPADM

## 2020-01-01 RX ORDER — HYDROMORPHONE HCL/PF 1 MG/ML
0.2 SYRINGE (ML) INJECTION
Status: DISCONTINUED | OUTPATIENT
Start: 2020-01-01 | End: 2020-01-01 | Stop reason: HOSPADM

## 2020-01-01 RX ORDER — GABAPENTIN 300 MG/1
CAPSULE ORAL
Qty: 150 CAPSULE | Refills: 2 | Status: SHIPPED | OUTPATIENT
Start: 2020-01-01

## 2020-01-01 RX ORDER — VASOPRESSIN 20 U/ML
INJECTION PARENTERAL AS NEEDED
Status: DISCONTINUED | OUTPATIENT
Start: 2020-01-01 | End: 2020-01-01 | Stop reason: SURG

## 2020-01-01 RX ORDER — GABAPENTIN 300 MG/1
600 CAPSULE ORAL
Status: DISCONTINUED | OUTPATIENT
Start: 2020-01-01 | End: 2020-01-01 | Stop reason: HOSPADM

## 2020-01-01 RX ORDER — OXYCODONE HYDROCHLORIDE 5 MG/1
5 TABLET ORAL EVERY 6 HOURS PRN
Status: DISCONTINUED | OUTPATIENT
Start: 2020-01-01 | End: 2020-01-01

## 2020-01-01 RX ORDER — BACLOFEN 10 MG/1
15 TABLET ORAL 3 TIMES DAILY
Status: DISCONTINUED | OUTPATIENT
Start: 2020-01-01 | End: 2020-01-01 | Stop reason: HOSPADM

## 2020-01-01 RX ORDER — GABAPENTIN 300 MG/1
300 CAPSULE ORAL 3 TIMES DAILY
Status: DISCONTINUED | OUTPATIENT
Start: 2020-01-01 | End: 2020-01-01 | Stop reason: HOSPADM

## 2020-01-01 RX ORDER — TAMSULOSIN HYDROCHLORIDE 0.4 MG/1
0.4 CAPSULE ORAL
Qty: 30 CAPSULE | Refills: 5 | Status: SHIPPED | OUTPATIENT
Start: 2020-01-01

## 2020-01-01 RX ORDER — SUCCINYLCHOLINE/SOD CL,ISO/PF 100 MG/5ML
SYRINGE (ML) INTRAVENOUS AS NEEDED
Status: DISCONTINUED | OUTPATIENT
Start: 2020-01-01 | End: 2020-01-01 | Stop reason: SURG

## 2020-01-01 RX ORDER — TAMSULOSIN HYDROCHLORIDE 0.4 MG/1
0.4 CAPSULE ORAL
Status: CANCELLED | OUTPATIENT
Start: 2020-01-01

## 2020-01-01 RX ORDER — ONDANSETRON 2 MG/ML
INJECTION INTRAMUSCULAR; INTRAVENOUS AS NEEDED
Status: DISCONTINUED | OUTPATIENT
Start: 2020-01-01 | End: 2020-01-01 | Stop reason: SURG

## 2020-01-01 RX ORDER — DILTIAZEM HYDROCHLORIDE 5 MG/ML
5 INJECTION INTRAVENOUS ONCE
Status: COMPLETED | OUTPATIENT
Start: 2020-01-01 | End: 2020-01-01

## 2020-01-01 RX ORDER — LORAZEPAM 2 MG/ML
0.5 INJECTION INTRAMUSCULAR
Status: CANCELLED | OUTPATIENT
Start: 2020-01-01

## 2020-01-01 RX ORDER — GLYCOPYRROLATE 0.2 MG/ML
0.1 INJECTION INTRAMUSCULAR; INTRAVENOUS EVERY 4 HOURS
Status: DISCONTINUED | OUTPATIENT
Start: 2020-01-01 | End: 2020-01-01

## 2020-01-01 RX ORDER — FENTANYL CITRATE/PF 50 MCG/ML
50 SYRINGE (ML) INJECTION
Status: COMPLETED | OUTPATIENT
Start: 2020-01-01 | End: 2020-01-01

## 2020-01-01 RX ORDER — BACLOFEN 5 MG/1
15 TABLET ORAL 3 TIMES DAILY
Qty: 63 TABLET | Refills: 0 | Status: SHIPPED | OUTPATIENT
Start: 2020-01-01 | End: 2020-01-01 | Stop reason: SDUPTHER

## 2020-01-01 RX ORDER — SODIUM CHLORIDE 9 MG/ML
20 INJECTION, SOLUTION INTRAVENOUS CONTINUOUS
Status: DISCONTINUED | OUTPATIENT
Start: 2020-01-01 | End: 2020-01-01 | Stop reason: HOSPADM

## 2020-01-01 RX ORDER — OXYCODONE HYDROCHLORIDE 5 MG/1
5 TABLET ORAL EVERY 4 HOURS PRN
Status: DISCONTINUED | OUTPATIENT
Start: 2020-01-01 | End: 2020-01-01 | Stop reason: HOSPADM

## 2020-01-01 RX ORDER — MELATONIN
2000 DAILY
Status: DISCONTINUED | OUTPATIENT
Start: 2020-01-01 | End: 2020-01-01

## 2020-01-01 RX ORDER — MIDODRINE HYDROCHLORIDE 5 MG/1
10 TABLET ORAL
Status: DISCONTINUED | OUTPATIENT
Start: 2020-01-01 | End: 2020-01-01

## 2020-01-01 RX ORDER — METOPROLOL SUCCINATE 50 MG/1
50 TABLET, EXTENDED RELEASE ORAL DAILY
Qty: 20 TABLET | Refills: 0 | Status: ON HOLD | OUTPATIENT
Start: 2020-01-01 | End: 2020-01-01 | Stop reason: SDUPTHER

## 2020-01-01 RX ORDER — GABAPENTIN 300 MG/1
600 CAPSULE ORAL
Status: DISCONTINUED | OUTPATIENT
Start: 2020-01-01 | End: 2020-01-01

## 2020-01-01 RX ORDER — LIDOCAINE WITH 8.4% SOD BICARB 0.9%(10ML)
SYRINGE (ML) INJECTION CODE/TRAUMA/SEDATION MEDICATION
Status: COMPLETED | OUTPATIENT
Start: 2020-01-01 | End: 2020-01-01

## 2020-01-01 RX ORDER — CEPHALEXIN 500 MG/1
500 CAPSULE ORAL 4 TIMES DAILY
Qty: 24 CAPSULE | Refills: 0 | Status: SHIPPED | OUTPATIENT
Start: 2020-01-01 | End: 2020-01-01

## 2020-01-01 RX ORDER — ALBUTEROL SULFATE 2.5 MG/3ML
2.5 SOLUTION RESPIRATORY (INHALATION) ONCE AS NEEDED
Status: DISCONTINUED | OUTPATIENT
Start: 2020-01-01 | End: 2020-01-01 | Stop reason: HOSPADM

## 2020-01-01 RX ORDER — COMPR.STOCKING,KNEE,LONG,LARGE
1 EACH MISCELLANEOUS DAILY
Qty: 7 EACH | Refills: 1 | Status: SHIPPED | OUTPATIENT
Start: 2020-01-01 | End: 2020-01-01

## 2020-01-01 RX ORDER — BISACODYL 10 MG
10 SUPPOSITORY, RECTAL RECTAL DAILY PRN
Status: CANCELLED | OUTPATIENT
Start: 2020-01-01

## 2020-01-01 RX ORDER — GABAPENTIN 300 MG/1
300 CAPSULE ORAL EVERY MORNING
Status: DISCONTINUED | OUTPATIENT
Start: 2020-01-01 | End: 2020-01-01

## 2020-01-01 RX ORDER — POLYETHYLENE GLYCOL 3350 17 G/17G
17 POWDER, FOR SOLUTION ORAL DAILY PRN
Status: DISCONTINUED | OUTPATIENT
Start: 2020-01-01 | End: 2020-01-01

## 2020-01-01 RX ORDER — HEPARIN SODIUM 1000 [USP'U]/ML
2000 INJECTION, SOLUTION INTRAVENOUS; SUBCUTANEOUS
Status: DISCONTINUED | OUTPATIENT
Start: 2020-01-01 | End: 2020-01-01

## 2020-01-01 RX ORDER — FENTANYL CITRATE/PF 50 MCG/ML
25 SYRINGE (ML) INJECTION
Status: DISCONTINUED | OUTPATIENT
Start: 2020-01-01 | End: 2020-01-01 | Stop reason: HOSPADM

## 2020-01-01 RX ORDER — OXYCODONE HYDROCHLORIDE 5 MG/1
5 TABLET ORAL EVERY 4 HOURS PRN
Qty: 20 TABLET | Refills: 0 | Status: SHIPPED | OUTPATIENT
Start: 2020-01-01 | End: 2020-01-01 | Stop reason: SDUPTHER

## 2020-01-01 RX ORDER — SODIUM CHLORIDE 9 MG/ML
125 INJECTION, SOLUTION INTRAVENOUS CONTINUOUS
Status: DISCONTINUED | OUTPATIENT
Start: 2020-01-01 | End: 2020-01-01

## 2020-01-01 RX ORDER — METRONIDAZOLE 500 MG/1
TABLET ORAL
Qty: 60 TABLET | Refills: 0
Start: 2020-01-01 | End: 2020-01-01

## 2020-01-01 RX ORDER — SODIUM CHLORIDE 9 MG/ML
50 INJECTION, SOLUTION INTRAVENOUS CONTINUOUS
Status: DISCONTINUED | OUTPATIENT
Start: 2020-01-01 | End: 2020-01-01

## 2020-01-01 RX ORDER — NEOSTIGMINE METHYLSULFATE 1 MG/ML
INJECTION INTRAVENOUS AS NEEDED
Status: DISCONTINUED | OUTPATIENT
Start: 2020-01-01 | End: 2020-01-01 | Stop reason: SURG

## 2020-01-01 RX ORDER — SODIUM CHLORIDE 9 MG/ML
10 INJECTION INTRAVENOUS DAILY
Qty: 30 SYRINGE | Refills: 3 | Status: SHIPPED | OUTPATIENT
Start: 2020-01-01

## 2020-01-01 RX ORDER — GABAPENTIN 100 MG/1
100 CAPSULE ORAL 2 TIMES DAILY
Status: CANCELLED | OUTPATIENT
Start: 2020-01-01

## 2020-01-01 RX ORDER — HYDROMORPHONE HCL/PF 1 MG/ML
0.2 SYRINGE (ML) INJECTION EVERY 2 HOUR PRN
Status: DISCONTINUED | OUTPATIENT
Start: 2020-01-01 | End: 2020-01-01 | Stop reason: HOSPADM

## 2020-01-01 RX ORDER — OXYCODONE HYDROCHLORIDE 10 MG/1
20 TABLET ORAL
Status: DISCONTINUED | OUTPATIENT
Start: 2020-01-01 | End: 2020-01-01

## 2020-01-01 RX ORDER — ROCURONIUM BROMIDE 10 MG/ML
INJECTION, SOLUTION INTRAVENOUS AS NEEDED
Status: DISCONTINUED | OUTPATIENT
Start: 2020-01-01 | End: 2020-01-01 | Stop reason: SURG

## 2020-01-01 RX ORDER — DIPHENHYDRAMINE HYDROCHLORIDE 50 MG/ML
12.5 INJECTION INTRAMUSCULAR; INTRAVENOUS ONCE AS NEEDED
Status: DISCONTINUED | OUTPATIENT
Start: 2020-01-01 | End: 2020-01-01 | Stop reason: HOSPADM

## 2020-01-01 RX ORDER — HEPARIN SODIUM 1000 [USP'U]/ML
4000 INJECTION, SOLUTION INTRAVENOUS; SUBCUTANEOUS ONCE
Status: COMPLETED | OUTPATIENT
Start: 2020-01-01 | End: 2020-01-01

## 2020-01-01 RX ORDER — PROPOFOL 10 MG/ML
INJECTION, EMULSION INTRAVENOUS AS NEEDED
Status: DISCONTINUED | OUTPATIENT
Start: 2020-01-01 | End: 2020-01-01 | Stop reason: SURG

## 2020-01-01 RX ORDER — ALBUMIN, HUMAN INJ 5% 5 %
SOLUTION INTRAVENOUS CONTINUOUS PRN
Status: DISCONTINUED | OUTPATIENT
Start: 2020-01-01 | End: 2020-01-01 | Stop reason: SURG

## 2020-01-01 RX ORDER — GABAPENTIN 300 MG/1
300 CAPSULE ORAL 3 TIMES DAILY
Status: DISCONTINUED | OUTPATIENT
Start: 2020-01-01 | End: 2020-01-01

## 2020-01-01 RX ORDER — ASPIRIN 325 MG
325 TABLET ORAL ONCE
Status: COMPLETED | OUTPATIENT
Start: 2020-01-01 | End: 2020-01-01

## 2020-01-01 RX ORDER — LIDOCAINE HYDROCHLORIDE 10 MG/ML
INJECTION, SOLUTION EPIDURAL; INFILTRATION; INTRACAUDAL; PERINEURAL CODE/TRAUMA/SEDATION MEDICATION
Status: COMPLETED | OUTPATIENT
Start: 2020-01-01 | End: 2020-01-01

## 2020-01-01 RX ORDER — OXYCODONE HYDROCHLORIDE 5 MG/1
5 TABLET ORAL EVERY 4 HOURS PRN
Qty: 90 TABLET | Refills: 0 | Status: SHIPPED | OUTPATIENT
Start: 2020-01-01

## 2020-01-01 RX ORDER — OXYCODONE HYDROCHLORIDE 5 MG/1
5 TABLET ORAL EVERY 4 HOURS PRN
Qty: 60 TABLET | Refills: 0 | Status: SHIPPED | OUTPATIENT
Start: 2020-01-01 | End: 2020-01-01 | Stop reason: HOSPADM

## 2020-01-01 RX ORDER — ALBUTEROL SULFATE 90 UG/1
2 AEROSOL, METERED RESPIRATORY (INHALATION) EVERY 4 HOURS PRN
Status: DISCONTINUED | OUTPATIENT
Start: 2020-01-01 | End: 2020-01-01 | Stop reason: HOSPADM

## 2020-01-01 RX ORDER — METOPROLOL SUCCINATE 25 MG/1
25 TABLET, EXTENDED RELEASE ORAL DAILY
Status: CANCELLED | OUTPATIENT
Start: 2020-01-01

## 2020-01-01 RX ORDER — MIDAZOLAM HYDROCHLORIDE 2 MG/2ML
INJECTION, SOLUTION INTRAMUSCULAR; INTRAVENOUS CODE/TRAUMA/SEDATION MEDICATION
Status: COMPLETED | OUTPATIENT
Start: 2020-01-01 | End: 2020-01-01

## 2020-01-01 RX ORDER — POLYETHYLENE GLYCOL 3350 17 G/17G
17 POWDER, FOR SOLUTION ORAL DAILY PRN
Status: CANCELLED | OUTPATIENT
Start: 2020-01-01

## 2020-01-01 RX ORDER — GABAPENTIN 300 MG/1
300 CAPSULE ORAL 3 TIMES DAILY
Qty: 45 CAPSULE | Refills: 0 | Status: SHIPPED | OUTPATIENT
Start: 2020-01-01 | End: 2020-01-01 | Stop reason: SDUPTHER

## 2020-01-01 RX ORDER — DIGOXIN 0.25 MG/ML
500 INJECTION INTRAMUSCULAR; INTRAVENOUS ONCE
Status: COMPLETED | OUTPATIENT
Start: 2020-01-01 | End: 2020-01-01

## 2020-01-01 RX ORDER — SODIUM CHLORIDE, SODIUM LACTATE, POTASSIUM CHLORIDE, CALCIUM CHLORIDE 600; 310; 30; 20 MG/100ML; MG/100ML; MG/100ML; MG/100ML
INJECTION, SOLUTION INTRAVENOUS CONTINUOUS PRN
Status: DISCONTINUED | OUTPATIENT
Start: 2020-01-01 | End: 2020-01-01 | Stop reason: SURG

## 2020-01-01 RX ORDER — OXYCODONE HYDROCHLORIDE 5 MG/1
5 TABLET ORAL EVERY 6 HOURS PRN
Status: CANCELLED | OUTPATIENT
Start: 2020-01-01

## 2020-01-01 RX ORDER — HEPARIN SODIUM 5000 [USP'U]/ML
5000 INJECTION, SOLUTION INTRAVENOUS; SUBCUTANEOUS EVERY 8 HOURS SCHEDULED
Status: DISCONTINUED | OUTPATIENT
Start: 2020-01-01 | End: 2020-01-01 | Stop reason: HOSPADM

## 2020-01-01 RX ORDER — ONDANSETRON 2 MG/ML
4 INJECTION INTRAMUSCULAR; INTRAVENOUS EVERY 4 HOURS PRN
Status: DISCONTINUED | OUTPATIENT
Start: 2020-01-01 | End: 2020-01-01 | Stop reason: HOSPADM

## 2020-01-01 RX ORDER — GABAPENTIN 100 MG/1
200 CAPSULE ORAL 2 TIMES DAILY
Status: DISCONTINUED | OUTPATIENT
Start: 2020-01-01 | End: 2020-01-01

## 2020-01-01 RX ORDER — LEVOFLOXACIN 5 MG/ML
500 INJECTION, SOLUTION INTRAVENOUS EVERY 24 HOURS
Status: CANCELLED | OUTPATIENT
Start: 2020-01-01

## 2020-01-01 RX ORDER — MORPHINE SULFATE 4 MG/ML
4 INJECTION, SOLUTION INTRAMUSCULAR; INTRAVENOUS ONCE
Status: COMPLETED | OUTPATIENT
Start: 2020-01-01 | End: 2020-01-01

## 2020-01-01 RX ORDER — OXYCODONE HYDROCHLORIDE 5 MG/1
5 TABLET ORAL EVERY 4 HOURS PRN
Qty: 90 TABLET | Refills: 0 | Status: CANCELLED | OUTPATIENT
Start: 2020-01-01

## 2020-01-01 RX ORDER — DOXYCYCLINE HYCLATE 100 MG/1
100 CAPSULE ORAL EVERY 12 HOURS
Status: DISCONTINUED | OUTPATIENT
Start: 2020-01-01 | End: 2020-01-01

## 2020-01-01 RX ORDER — GABAPENTIN 100 MG/1
100 CAPSULE ORAL 2 TIMES DAILY
Status: DISCONTINUED | OUTPATIENT
Start: 2020-01-01 | End: 2020-01-01

## 2020-01-01 RX ORDER — METOPROLOL SUCCINATE 25 MG/1
25 TABLET, EXTENDED RELEASE ORAL DAILY
Status: DISCONTINUED | OUTPATIENT
Start: 2020-01-01 | End: 2020-01-01

## 2020-01-01 RX ORDER — GLYCOPYRROLATE 0.2 MG/ML
0.2 INJECTION INTRAMUSCULAR; INTRAVENOUS
Status: DISCONTINUED | OUTPATIENT
Start: 2020-01-01 | End: 2020-01-01 | Stop reason: HOSPADM

## 2020-01-01 RX ORDER — GLYCOPYRROLATE 0.2 MG/ML
INJECTION INTRAMUSCULAR; INTRAVENOUS AS NEEDED
Status: DISCONTINUED | OUTPATIENT
Start: 2020-01-01 | End: 2020-01-01 | Stop reason: SURG

## 2020-01-01 RX ORDER — PSEUDOEPHEDRINE HYDROCHLORIDE 30 MG/1
60 TABLET ORAL EVERY 6 HOURS PRN
Status: DISCONTINUED | OUTPATIENT
Start: 2020-01-01 | End: 2020-01-01 | Stop reason: HOSPADM

## 2020-01-01 RX ORDER — HYDROXYCHLOROQUINE SULFATE 200 MG/1
200 TABLET, FILM COATED ORAL EVERY 12 HOURS
Status: DISCONTINUED | OUTPATIENT
Start: 2020-01-01 | End: 2020-01-01

## 2020-01-01 RX ORDER — ASCORBIC ACID 500 MG
1000 TABLET ORAL EVERY 12 HOURS SCHEDULED
Status: DISCONTINUED | OUTPATIENT
Start: 2020-01-01 | End: 2020-01-01

## 2020-01-01 RX ORDER — POTASSIUM CHLORIDE 29.8 MG/ML
40 INJECTION INTRAVENOUS ONCE
Status: DISCONTINUED | OUTPATIENT
Start: 2020-01-01 | End: 2020-01-01

## 2020-01-01 RX ORDER — CEFAZOLIN SODIUM 1 G/3ML
INJECTION, POWDER, FOR SOLUTION INTRAMUSCULAR; INTRAVENOUS AS NEEDED
Status: DISCONTINUED | OUTPATIENT
Start: 2020-01-01 | End: 2020-01-01 | Stop reason: SURG

## 2020-01-01 RX ORDER — ACETAMINOPHEN 325 MG/1
325 TABLET ORAL EVERY 6 HOURS PRN
Status: DISCONTINUED | OUTPATIENT
Start: 2020-01-01 | End: 2020-01-01 | Stop reason: HOSPADM

## 2020-01-01 RX ORDER — BACLOFEN 5 MG/1
15 TABLET ORAL 3 TIMES DAILY
Qty: 90 TABLET | Refills: 0 | Status: SHIPPED | OUTPATIENT
Start: 2020-01-01

## 2020-01-01 RX ORDER — EPHEDRINE SULFATE 50 MG/ML
INJECTION INTRAVENOUS AS NEEDED
Status: DISCONTINUED | OUTPATIENT
Start: 2020-01-01 | End: 2020-01-01 | Stop reason: SURG

## 2020-01-01 RX ORDER — METOPROLOL SUCCINATE 50 MG/1
50 TABLET, EXTENDED RELEASE ORAL DAILY
Status: DISCONTINUED | OUTPATIENT
Start: 2020-01-01 | End: 2020-01-01

## 2020-01-01 RX ORDER — OXYCODONE HYDROCHLORIDE 5 MG/1
5 TABLET ORAL EVERY 4 HOURS PRN
Status: DISCONTINUED | OUTPATIENT
Start: 2020-01-01 | End: 2020-01-01

## 2020-01-01 RX ORDER — FUROSEMIDE 20 MG/1
20 TABLET ORAL DAILY
Status: DISCONTINUED | OUTPATIENT
Start: 2020-01-01 | End: 2020-01-01 | Stop reason: HOSPADM

## 2020-01-01 RX ORDER — POTASSIUM CHLORIDE 14.9 MG/ML
20 INJECTION INTRAVENOUS ONCE
Status: COMPLETED | OUTPATIENT
Start: 2020-01-01 | End: 2020-01-01

## 2020-01-01 RX ORDER — GABAPENTIN 100 MG/1
100 CAPSULE ORAL DAILY
Status: DISCONTINUED | OUTPATIENT
Start: 2020-01-01 | End: 2020-01-01

## 2020-01-01 RX ORDER — FENTANYL CITRATE 50 UG/ML
INJECTION, SOLUTION INTRAMUSCULAR; INTRAVENOUS CODE/TRAUMA/SEDATION MEDICATION
Status: COMPLETED | OUTPATIENT
Start: 2020-01-01 | End: 2020-01-01

## 2020-01-01 RX ORDER — OXYCODONE HYDROCHLORIDE 10 MG/1
10 TABLET ORAL
Status: DISCONTINUED | OUTPATIENT
Start: 2020-01-01 | End: 2020-01-01

## 2020-01-01 RX ORDER — SODIUM CHLORIDE 9 MG/ML
75 INJECTION, SOLUTION INTRAVENOUS CONTINUOUS
Status: CANCELLED | OUTPATIENT
Start: 2020-01-01

## 2020-01-01 RX ORDER — HYDROMORPHONE HCL/PF 1 MG/ML
0.2 SYRINGE (ML) INJECTION EVERY 2 HOUR PRN
Status: CANCELLED | OUTPATIENT
Start: 2020-01-01

## 2020-01-01 RX ORDER — BACLOFEN 10 MG/1
15 TABLET ORAL 3 TIMES DAILY
Status: DISCONTINUED | OUTPATIENT
Start: 2020-01-01 | End: 2020-01-01

## 2020-01-01 RX ORDER — GLYCOPYRROLATE 0.2 MG/ML
0.2 INJECTION INTRAMUSCULAR; INTRAVENOUS
Status: DISCONTINUED | OUTPATIENT
Start: 2020-01-01 | End: 2020-01-01

## 2020-01-01 RX ORDER — FUROSEMIDE 20 MG/1
TABLET ORAL
Qty: 30 TABLET | Refills: 2 | Status: SHIPPED | OUTPATIENT
Start: 2020-01-01

## 2020-01-01 RX ORDER — POLYETHYLENE GLYCOL 3350 17 G/17G
17 POWDER, FOR SOLUTION ORAL DAILY PRN
Status: DISCONTINUED | OUTPATIENT
Start: 2020-01-01 | End: 2020-01-01 | Stop reason: HOSPADM

## 2020-01-01 RX ORDER — MULTIVITAMIN/IRON/FOLIC ACID 18MG-0.4MG
1 TABLET ORAL DAILY
Status: DISCONTINUED | OUTPATIENT
Start: 2020-01-01 | End: 2020-01-01

## 2020-01-01 RX ORDER — TAMSULOSIN HYDROCHLORIDE 0.4 MG/1
0.4 CAPSULE ORAL
Status: DISCONTINUED | OUTPATIENT
Start: 2020-01-01 | End: 2020-01-01

## 2020-01-01 RX ORDER — METOPROLOL SUCCINATE 25 MG/1
25 TABLET, EXTENDED RELEASE ORAL DAILY
Qty: 30 TABLET | Refills: 5 | Status: SHIPPED | OUTPATIENT
Start: 2020-01-01

## 2020-01-01 RX ORDER — HYDROXYCHLOROQUINE SULFATE 200 MG/1
800 TABLET, FILM COATED ORAL EVERY 24 HOURS
Status: COMPLETED | OUTPATIENT
Start: 2020-01-01 | End: 2020-01-01

## 2020-01-01 RX ORDER — BACLOFEN 5 MG/1
15 TABLET ORAL 3 TIMES DAILY
Qty: 45 TABLET | Refills: 0 | Status: SHIPPED | OUTPATIENT
Start: 2020-01-01 | End: 2020-01-01 | Stop reason: SDUPTHER

## 2020-01-01 RX ORDER — CEFTRIAXONE 1 G/1
1000 INJECTION, POWDER, FOR SOLUTION INTRAMUSCULAR; INTRAVENOUS ONCE
Status: DISCONTINUED | OUTPATIENT
Start: 2020-01-01 | End: 2020-01-01

## 2020-01-01 RX ORDER — ALBUTEROL SULFATE 90 UG/1
2 AEROSOL, METERED RESPIRATORY (INHALATION) EVERY 4 HOURS PRN
Status: CANCELLED | OUTPATIENT
Start: 2020-01-01

## 2020-01-01 RX ORDER — BACLOFEN 5 MG/1
15 TABLET ORAL 3 TIMES DAILY
Qty: 90 TABLET | Refills: 0 | Status: SHIPPED | OUTPATIENT
Start: 2020-01-01 | End: 2020-01-01 | Stop reason: SDUPTHER

## 2020-01-01 RX ORDER — GABAPENTIN 400 MG/1
400 CAPSULE ORAL DAILY
Status: DISCONTINUED | OUTPATIENT
Start: 2020-01-01 | End: 2020-01-01

## 2020-01-01 RX ORDER — FLUDROCORTISONE ACETATE 0.1 MG/1
0.1 TABLET ORAL 3 TIMES DAILY
Status: DISCONTINUED | OUTPATIENT
Start: 2020-01-01 | End: 2020-01-01

## 2020-01-01 RX ORDER — LORAZEPAM 0.5 MG/1
0.5 TABLET ORAL 2 TIMES DAILY
Qty: 2 TABLET | Refills: 0 | Status: SHIPPED | OUTPATIENT
Start: 2020-01-01 | End: 2020-01-01 | Stop reason: HOSPADM

## 2020-01-01 RX ORDER — FUROSEMIDE 10 MG/ML
20 INJECTION INTRAMUSCULAR; INTRAVENOUS
Status: DISCONTINUED | OUTPATIENT
Start: 2020-01-01 | End: 2020-01-01 | Stop reason: HOSPADM

## 2020-01-01 RX ORDER — HEPARIN SODIUM 1000 [USP'U]/ML
4000 INJECTION, SOLUTION INTRAVENOUS; SUBCUTANEOUS
Status: DISCONTINUED | OUTPATIENT
Start: 2020-01-01 | End: 2020-01-01

## 2020-01-01 RX ORDER — HYDROMORPHONE HCL/PF 1 MG/ML
1 SYRINGE (ML) INJECTION EVERY 2 HOUR PRN
Status: DISCONTINUED | OUTPATIENT
Start: 2020-01-01 | End: 2020-01-01

## 2020-01-01 RX ORDER — MORPHINE SULFATE 4 MG/ML
4 INJECTION, SOLUTION INTRAMUSCULAR; INTRAVENOUS EVERY 4 HOURS PRN
Status: DISCONTINUED | OUTPATIENT
Start: 2020-01-01 | End: 2020-01-01

## 2020-01-01 RX ORDER — SENNOSIDES 8.6 MG
1 TABLET ORAL 2 TIMES DAILY PRN
Status: DISCONTINUED | OUTPATIENT
Start: 2020-01-01 | End: 2020-01-01

## 2020-01-01 RX ORDER — OXYCODONE HYDROCHLORIDE 5 MG/1
5 TABLET ORAL EVERY 4 HOURS PRN
Qty: 20 TABLET | Refills: 0 | Status: ON HOLD | OUTPATIENT
Start: 2020-01-01 | End: 2020-01-01 | Stop reason: SDUPTHER

## 2020-01-01 RX ORDER — CEFAZOLIN SODIUM 2 G/50ML
2000 SOLUTION INTRAVENOUS ONCE
Status: DISCONTINUED | OUTPATIENT
Start: 2020-01-01 | End: 2020-01-01 | Stop reason: HOSPADM

## 2020-01-01 RX ORDER — GABAPENTIN 300 MG/1
300 CAPSULE ORAL DAILY
Status: DISCONTINUED | OUTPATIENT
Start: 2020-01-01 | End: 2020-01-01 | Stop reason: HOSPADM

## 2020-01-01 RX ORDER — LORAZEPAM 0.5 MG/1
0.5 TABLET ORAL 2 TIMES DAILY PRN
Qty: 30 TABLET | Refills: 0
Start: 2020-01-01

## 2020-01-01 RX ORDER — HYDROMORPHONE HCL/PF 1 MG/ML
0.2 SYRINGE (ML) INJECTION EVERY 2 HOUR PRN
Status: DISCONTINUED | OUTPATIENT
Start: 2020-01-01 | End: 2020-01-01

## 2020-01-01 RX ORDER — SODIUM CHLORIDE 1000 MG
2 TABLET, SOLUBLE MISCELLANEOUS
Status: DISCONTINUED | OUTPATIENT
Start: 2020-01-01 | End: 2020-01-01

## 2020-01-01 RX ORDER — BACLOFEN 5 MG/1
15 TABLET ORAL 3 TIMES DAILY
Qty: 45 TABLET | Refills: 0 | Status: CANCELLED | OUTPATIENT
Start: 2020-01-01

## 2020-01-01 RX ORDER — LANOLIN ALCOHOL/MO/W.PET/CERES
CREAM (GRAM) TOPICAL 3 TIMES DAILY PRN
Status: CANCELLED | OUTPATIENT
Start: 2020-01-01

## 2020-01-01 RX ORDER — FLUDROCORTISONE ACETATE 0.1 MG/1
0.1 TABLET ORAL 3 TIMES DAILY
Qty: 90 TABLET | Refills: 1 | Status: SHIPPED | OUTPATIENT
Start: 2020-01-01

## 2020-01-01 RX ORDER — ZINC SULFATE 50(220)MG
220 CAPSULE ORAL DAILY
Status: DISCONTINUED | OUTPATIENT
Start: 2020-01-01 | End: 2020-01-01

## 2020-01-01 RX ORDER — METHYLPREDNISOLONE SODIUM SUCCINATE 125 MG/2ML
80 INJECTION, POWDER, LYOPHILIZED, FOR SOLUTION INTRAMUSCULAR; INTRAVENOUS DAILY
Status: DISCONTINUED | OUTPATIENT
Start: 2020-01-01 | End: 2020-01-01

## 2020-01-01 RX ORDER — HEPARIN SODIUM 10000 [USP'U]/100ML
3-20 INJECTION, SOLUTION INTRAVENOUS
Status: DISCONTINUED | OUTPATIENT
Start: 2020-01-01 | End: 2020-01-01

## 2020-01-01 RX ORDER — GABAPENTIN 300 MG/1
300 CAPSULE ORAL 2 TIMES DAILY
Status: DISCONTINUED | OUTPATIENT
Start: 2020-01-01 | End: 2020-01-01 | Stop reason: HOSPADM

## 2020-01-01 RX ORDER — OXYCODONE HYDROCHLORIDE 5 MG/1
5 TABLET ORAL EVERY 6 HOURS PRN
Status: DISCONTINUED | OUTPATIENT
Start: 2020-01-01 | End: 2020-01-01 | Stop reason: HOSPADM

## 2020-01-01 RX ORDER — MORPHINE SULFATE 15 MG/1
30 TABLET ORAL EVERY 4 HOURS PRN
Status: DISCONTINUED | OUTPATIENT
Start: 2020-01-01 | End: 2020-01-01

## 2020-01-01 RX ORDER — SODIUM CHLORIDE 9 MG/ML
10 INJECTION INTRAVENOUS DAILY
Status: DISCONTINUED | OUTPATIENT
Start: 2020-01-01 | End: 2020-01-01

## 2020-01-01 RX ORDER — GABAPENTIN 300 MG/1
600 CAPSULE ORAL DAILY
Status: DISCONTINUED | OUTPATIENT
Start: 2020-01-01 | End: 2020-01-01 | Stop reason: HOSPADM

## 2020-01-01 RX ORDER — METRONIDAZOLE 500 MG/1
1000 TABLET ORAL DAILY
Status: DISCONTINUED | OUTPATIENT
Start: 2020-01-01 | End: 2020-01-01 | Stop reason: HOSPADM

## 2020-01-01 RX ORDER — GABAPENTIN 300 MG/1
CAPSULE ORAL
Qty: 150 CAPSULE | Refills: 2 | Status: SHIPPED | OUTPATIENT
Start: 2020-01-01 | End: 2020-01-01 | Stop reason: SDUPTHER

## 2020-01-01 RX ORDER — GABAPENTIN 300 MG/1
300 CAPSULE ORAL
Status: DISCONTINUED | OUTPATIENT
Start: 2020-01-01 | End: 2020-01-01

## 2020-01-01 RX ORDER — ACETAMINOPHEN 650 MG/1
325 SUPPOSITORY RECTAL EVERY 4 HOURS PRN
Status: DISCONTINUED | OUTPATIENT
Start: 2020-01-01 | End: 2020-01-01 | Stop reason: HOSPADM

## 2020-01-01 RX ORDER — POTASSIUM CHLORIDE 14.9 MG/ML
20 INJECTION INTRAVENOUS ONCE
Status: DISCONTINUED | OUTPATIENT
Start: 2020-01-01 | End: 2020-01-01

## 2020-01-01 RX ORDER — OXYCODONE HYDROCHLORIDE 5 MG/1
5 TABLET ORAL EVERY 4 HOURS PRN
Qty: 90 TABLET | Refills: 0 | Status: SHIPPED | OUTPATIENT
Start: 2020-01-01 | End: 2020-01-01 | Stop reason: SDUPTHER

## 2020-01-01 RX ORDER — METRONIDAZOLE 500 MG/1
TABLET ORAL
Qty: 60 TABLET | Refills: 0 | Status: SHIPPED | OUTPATIENT
Start: 2020-01-01 | End: 2020-01-01 | Stop reason: HOSPADM

## 2020-01-01 RX ORDER — HYDROMORPHONE HCL/PF 1 MG/ML
0.2 SYRINGE (ML) INJECTION EVERY 4 HOURS PRN
Status: DISCONTINUED | OUTPATIENT
Start: 2020-01-01 | End: 2020-01-01

## 2020-01-01 RX ORDER — GABAPENTIN 300 MG/1
300 CAPSULE ORAL 2 TIMES DAILY
Status: DISCONTINUED | OUTPATIENT
Start: 2020-01-01 | End: 2020-01-01

## 2020-01-01 RX ORDER — LORAZEPAM 0.5 MG/1
0.5 TABLET ORAL 2 TIMES DAILY PRN
Qty: 30 TABLET | Refills: 0 | Status: SHIPPED | OUTPATIENT
Start: 2020-01-01 | End: 2020-01-01

## 2020-01-01 RX ORDER — METOPROLOL SUCCINATE 50 MG/1
25 TABLET, EXTENDED RELEASE ORAL DAILY
Start: 2020-01-01 | End: 2020-01-01 | Stop reason: SDUPTHER

## 2020-01-01 RX ORDER — FUROSEMIDE 20 MG/1
20 TABLET ORAL DAILY
Status: DISCONTINUED | OUTPATIENT
Start: 2020-01-01 | End: 2020-01-01

## 2020-01-01 RX ORDER — CEFTRIAXONE 1 G/50ML
1000 INJECTION, SOLUTION INTRAVENOUS EVERY 24 HOURS
Status: DISCONTINUED | OUTPATIENT
Start: 2020-01-01 | End: 2020-01-01

## 2020-01-01 RX ORDER — GLYCOPYRROLATE 0.2 MG/ML
0.2 INJECTION INTRAMUSCULAR; INTRAVENOUS
Status: CANCELLED | OUTPATIENT
Start: 2020-01-01

## 2020-01-01 RX ORDER — DOCUSATE SODIUM 100 MG/1
100 CAPSULE, LIQUID FILLED ORAL 2 TIMES DAILY
Status: DISCONTINUED | OUTPATIENT
Start: 2020-01-01 | End: 2020-01-01

## 2020-01-01 RX ORDER — LORAZEPAM 0.5 MG/1
0.5 TABLET ORAL 2 TIMES DAILY PRN
Status: DISCONTINUED | OUTPATIENT
Start: 2020-01-01 | End: 2020-01-01

## 2020-01-01 RX ORDER — HYDROMORPHONE HCL/PF 1 MG/ML
0.5 SYRINGE (ML) INJECTION
Status: DISCONTINUED | OUTPATIENT
Start: 2020-01-01 | End: 2020-01-01 | Stop reason: HOSPADM

## 2020-01-01 RX ORDER — GABAPENTIN 300 MG/1
300 CAPSULE ORAL 3 TIMES DAILY
Qty: 90 CAPSULE | Refills: 0 | Status: ON HOLD | OUTPATIENT
Start: 2020-01-01 | End: 2020-01-01 | Stop reason: SDUPTHER

## 2020-01-01 RX ADMIN — OXYCODONE HYDROCHLORIDE AND ACETAMINOPHEN 1000 MG: 500 TABLET ORAL at 22:55

## 2020-01-01 RX ADMIN — OXYCODONE HYDROCHLORIDE 5 MG: 5 TABLET ORAL at 21:31

## 2020-01-01 RX ADMIN — PHENYLEPHRINE HYDROCHLORIDE 200 MCG: 10 INJECTION INTRAVENOUS at 15:11

## 2020-01-01 RX ADMIN — METOPROLOL SUCCINATE 25 MG: 25 TABLET, EXTENDED RELEASE ORAL at 08:36

## 2020-01-01 RX ADMIN — METOPROLOL SUCCINATE 25 MG: 25 TABLET, EXTENDED RELEASE ORAL at 11:36

## 2020-01-01 RX ADMIN — MORPHINE SULFATE 2 MG: 2 INJECTION, SOLUTION INTRAMUSCULAR; INTRAVENOUS at 18:13

## 2020-01-01 RX ADMIN — Medication 5 ML: at 15:50

## 2020-01-01 RX ADMIN — DOCUSATE SODIUM 100 MG: 100 CAPSULE, LIQUID FILLED ORAL at 08:23

## 2020-01-01 RX ADMIN — HEPARIN SODIUM AND DEXTROSE 7.76 UNITS/KG/HR: 10000; 5 INJECTION INTRAVENOUS at 09:37

## 2020-01-01 RX ADMIN — TAMSULOSIN HYDROCHLORIDE 0.4 MG: 0.4 CAPSULE ORAL at 17:51

## 2020-01-01 RX ADMIN — FLUDROCORTISONE ACETATE 0.1 MG: 0.1 TABLET ORAL at 20:04

## 2020-01-01 RX ADMIN — Medication 100 MG: at 14:45

## 2020-01-01 RX ADMIN — PHENYLEPHRINE HYDROCHLORIDE 200 MCG: 10 INJECTION INTRAVENOUS at 15:16

## 2020-01-01 RX ADMIN — Medication 1 APPLICATION: at 13:21

## 2020-01-01 RX ADMIN — BACLOFEN 15 MG: 10 TABLET ORAL at 08:17

## 2020-01-01 RX ADMIN — OXYCODONE HYDROCHLORIDE 5 MG: 5 TABLET ORAL at 16:22

## 2020-01-01 RX ADMIN — OXYCODONE HYDROCHLORIDE 20 MG: 10 TABLET ORAL at 01:30

## 2020-01-01 RX ADMIN — PHENYLEPHRINE HYDROCHLORIDE 100 MCG: 10 INJECTION INTRAVENOUS at 15:03

## 2020-01-01 RX ADMIN — OXYCODONE HYDROCHLORIDE AND ACETAMINOPHEN 1000 MG: 500 TABLET ORAL at 20:04

## 2020-01-01 RX ADMIN — Medication 2 G: at 16:08

## 2020-01-01 RX ADMIN — Medication 1 APPLICATION: at 13:57

## 2020-01-01 RX ADMIN — GLYCOPYRROLATE 0.2 MG: 0.2 INJECTION, SOLUTION INTRAMUSCULAR; INTRAVENOUS at 20:23

## 2020-01-01 RX ADMIN — OXYCODONE HYDROCHLORIDE 10 MG: 10 TABLET ORAL at 07:23

## 2020-01-01 RX ADMIN — BACLOFEN 15 MG: 10 TABLET ORAL at 11:02

## 2020-01-01 RX ADMIN — DOCUSATE SODIUM 100 MG: 100 CAPSULE, LIQUID FILLED ORAL at 08:56

## 2020-01-01 RX ADMIN — HYDROXYCHLOROQUINE SULFATE 200 MG: 200 TABLET, FILM COATED ORAL at 15:06

## 2020-01-01 RX ADMIN — POTASSIUM CHLORIDE 20 MEQ: 14.9 INJECTION, SOLUTION INTRAVENOUS at 12:14

## 2020-01-01 RX ADMIN — CEFAZOLIN SODIUM 2000 MG: 2 SOLUTION INTRAVENOUS at 15:18

## 2020-01-01 RX ADMIN — MORPHINE SULFATE 2 MG: 2 INJECTION, SOLUTION INTRAMUSCULAR; INTRAVENOUS at 23:34

## 2020-01-01 RX ADMIN — GLYCOPYRROLATE 0.6 MG: 0.2 INJECTION, SOLUTION INTRAMUSCULAR; INTRAVENOUS at 13:21

## 2020-01-01 RX ADMIN — SENNOSIDES 8.6 MG: 8.6 TABLET, FILM COATED ORAL at 08:00

## 2020-01-01 RX ADMIN — BACLOFEN 15 MG: 10 TABLET ORAL at 08:33

## 2020-01-01 RX ADMIN — BACLOFEN 15 MG: 10 TABLET ORAL at 20:04

## 2020-01-01 RX ADMIN — GLYCOPYRROLATE 0.2 MG: 0.2 INJECTION, SOLUTION INTRAMUSCULAR; INTRAVENOUS at 16:42

## 2020-01-01 RX ADMIN — OXYCODONE HYDROCHLORIDE 10 MG: 10 TABLET ORAL at 03:08

## 2020-01-01 RX ADMIN — SENNOSIDES 8.6 MG: 8.6 TABLET, FILM COATED ORAL at 08:56

## 2020-01-01 RX ADMIN — GABAPENTIN 300 MG: 300 CAPSULE ORAL at 08:56

## 2020-01-01 RX ADMIN — FENTANYL CITRATE 50 MCG: 50 INJECTION, SOLUTION INTRAMUSCULAR; INTRAVENOUS at 15:04

## 2020-01-01 RX ADMIN — ZINC SULFATE 220 MG (50 MG) CAPSULE 220 MG: CAPSULE at 08:30

## 2020-01-01 RX ADMIN — FENTANYL CITRATE 50 MCG: 50 INJECTION, SOLUTION INTRAMUSCULAR; INTRAVENOUS at 15:08

## 2020-01-01 RX ADMIN — CEFTRIAXONE SODIUM 1000 MG: 10 INJECTION, POWDER, FOR SOLUTION INTRAVENOUS at 12:05

## 2020-01-01 RX ADMIN — VITAMIN D, TAB 1000IU (100/BT) 2000 UNITS: 25 TAB at 10:41

## 2020-01-01 RX ADMIN — METRONIDAZOLE 1000 MG: 500 TABLET ORAL at 14:32

## 2020-01-01 RX ADMIN — GABAPENTIN 300 MG: 300 CAPSULE ORAL at 17:50

## 2020-01-01 RX ADMIN — PHENYLEPHRINE HYDROCHLORIDE 100 MCG: 10 INJECTION INTRAVENOUS at 15:08

## 2020-01-01 RX ADMIN — DOXYCYCLINE 100 MG: 100 CAPSULE ORAL at 15:07

## 2020-01-01 RX ADMIN — FLUDROCORTISONE ACETATE 0.1 MG: 0.1 TABLET ORAL at 21:19

## 2020-01-01 RX ADMIN — BACLOFEN 15 MG: 10 TABLET ORAL at 08:22

## 2020-01-01 RX ADMIN — MORPHINE SULFATE 2 MG: 2 INJECTION, SOLUTION INTRAMUSCULAR; INTRAVENOUS at 01:36

## 2020-01-01 RX ADMIN — PROPOFOL 150 MG: 10 INJECTION, EMULSION INTRAVENOUS at 15:04

## 2020-01-01 RX ADMIN — CEFAZOLIN SODIUM 2000 MG: 2 SOLUTION INTRAVENOUS at 07:27

## 2020-01-01 RX ADMIN — METOPROLOL SUCCINATE 25 MG: 25 TABLET, EXTENDED RELEASE ORAL at 08:53

## 2020-01-01 RX ADMIN — ASPIRIN 325 MG: 325 TABLET, FILM COATED ORAL at 22:08

## 2020-01-01 RX ADMIN — METOPROLOL SUCCINATE 25 MG: 25 TABLET, EXTENDED RELEASE ORAL at 08:40

## 2020-01-01 RX ADMIN — LIDOCAINE HYDROCHLORIDE 50 MG: 10 INJECTION, SOLUTION EPIDURAL; INFILTRATION; INTRACAUDAL; PERINEURAL at 12:29

## 2020-01-01 RX ADMIN — ZINC SULFATE 220 MG (50 MG) CAPSULE 220 MG: CAPSULE at 08:22

## 2020-01-01 RX ADMIN — BACLOFEN 15 MG: 10 TABLET ORAL at 17:19

## 2020-01-01 RX ADMIN — METHYLPREDNISOLONE SODIUM SUCCINATE 80 MG: 125 INJECTION, POWDER, FOR SOLUTION INTRAMUSCULAR; INTRAVENOUS at 08:33

## 2020-01-01 RX ADMIN — OXYCODONE HYDROCHLORIDE 10 MG: 10 TABLET ORAL at 08:48

## 2020-01-01 RX ADMIN — Medication 2 G: at 11:25

## 2020-01-01 RX ADMIN — PROPOFOL 150 MG: 10 INJECTION, EMULSION INTRAVENOUS at 10:24

## 2020-01-01 RX ADMIN — Medication 5 ML: at 16:40

## 2020-01-01 RX ADMIN — BACLOFEN 15 MG: 10 TABLET ORAL at 15:34

## 2020-01-01 RX ADMIN — FENTANYL CITRATE 100 MCG: 50 INJECTION, SOLUTION INTRAMUSCULAR; INTRAVENOUS at 12:29

## 2020-01-01 RX ADMIN — BACLOFEN 15 MG: 10 TABLET ORAL at 08:03

## 2020-01-01 RX ADMIN — GABAPENTIN 300 MG: 300 CAPSULE ORAL at 22:05

## 2020-01-01 RX ADMIN — GABAPENTIN 300 MG: 300 CAPSULE ORAL at 18:35

## 2020-01-01 RX ADMIN — OXYCODONE HYDROCHLORIDE 5 MG: 5 TABLET ORAL at 10:02

## 2020-01-01 RX ADMIN — GABAPENTIN 300 MG: 300 CAPSULE ORAL at 10:38

## 2020-01-01 RX ADMIN — OXYCODONE HYDROCHLORIDE 10 MG: 10 TABLET ORAL at 14:00

## 2020-01-01 RX ADMIN — FUROSEMIDE 20 MG: 20 TABLET ORAL at 08:34

## 2020-01-01 RX ADMIN — EPHEDRINE SULFATE 10 MG: 50 INJECTION, SOLUTION INTRAVENOUS at 15:17

## 2020-01-01 RX ADMIN — TAMSULOSIN HYDROCHLORIDE 0.4 MG: 0.4 CAPSULE ORAL at 17:32

## 2020-01-01 RX ADMIN — OXYCODONE HYDROCHLORIDE 5 MG: 5 TABLET ORAL at 12:43

## 2020-01-01 RX ADMIN — OXYCODONE HYDROCHLORIDE 10 MG: 10 TABLET ORAL at 01:35

## 2020-01-01 RX ADMIN — EPHEDRINE SULFATE 5 MG: 50 INJECTION, SOLUTION INTRAVENOUS at 12:33

## 2020-01-01 RX ADMIN — MORPHINE SULFATE 2 MG: 2 INJECTION, SOLUTION INTRAMUSCULAR; INTRAVENOUS at 15:32

## 2020-01-01 RX ADMIN — POTASSIUM CHLORIDE 20 MEQ: 14.9 INJECTION, SOLUTION INTRAVENOUS at 10:24

## 2020-01-01 RX ADMIN — Medication 2 G: at 15:30

## 2020-01-01 RX ADMIN — BACLOFEN 15 MG: 10 TABLET ORAL at 21:03

## 2020-01-01 RX ADMIN — OXYCODONE HYDROCHLORIDE 10 MG: 10 TABLET ORAL at 16:39

## 2020-01-01 RX ADMIN — DOCUSATE SODIUM 100 MG: 100 CAPSULE, LIQUID FILLED ORAL at 10:34

## 2020-01-01 RX ADMIN — BACLOFEN 15 MG: 10 TABLET ORAL at 22:04

## 2020-01-01 RX ADMIN — HEPARIN SODIUM 5000 UNITS: 5000 INJECTION INTRAVENOUS; SUBCUTANEOUS at 22:12

## 2020-01-01 RX ADMIN — FUROSEMIDE 20 MG: 10 INJECTION, SOLUTION INTRAMUSCULAR; INTRAVENOUS at 08:40

## 2020-01-01 RX ADMIN — ENOXAPARIN SODIUM 40 MG: 40 INJECTION SUBCUTANEOUS at 15:56

## 2020-01-01 RX ADMIN — ALBUTEROL SULFATE 2 PUFF: 90 AEROSOL, METERED RESPIRATORY (INHALATION) at 19:56

## 2020-01-01 RX ADMIN — METOPROLOL SUCCINATE 25 MG: 25 TABLET, EXTENDED RELEASE ORAL at 08:22

## 2020-01-01 RX ADMIN — SODIUM CHLORIDE: 0.9 INJECTION, SOLUTION INTRAVENOUS at 10:10

## 2020-01-01 RX ADMIN — LORAZEPAM 0.5 MG: 2 INJECTION INTRAMUSCULAR; INTRAVENOUS at 13:31

## 2020-01-01 RX ADMIN — MORPHINE SULFATE 2 MG: 2 INJECTION, SOLUTION INTRAMUSCULAR; INTRAVENOUS at 02:40

## 2020-01-01 RX ADMIN — MORPHINE SULFATE 2 MG: 2 INJECTION, SOLUTION INTRAMUSCULAR; INTRAVENOUS at 09:18

## 2020-01-01 RX ADMIN — BACLOFEN 15 MG: 10 TABLET ORAL at 21:05

## 2020-01-01 RX ADMIN — BACLOFEN 15 MG: 10 TABLET ORAL at 17:15

## 2020-01-01 RX ADMIN — LIDOCAINE HYDROCHLORIDE 50 MG: 10 INJECTION, SOLUTION EPIDURAL; INFILTRATION; INTRACAUDAL; PERINEURAL at 10:24

## 2020-01-01 RX ADMIN — BACLOFEN 15 MG: 10 TABLET ORAL at 16:56

## 2020-01-01 RX ADMIN — FUROSEMIDE 20 MG: 10 INJECTION, SOLUTION INTRAMUSCULAR; INTRAVENOUS at 07:50

## 2020-01-01 RX ADMIN — PHENYLEPHRINE HYDROCHLORIDE 200 MCG: 10 INJECTION INTRAVENOUS at 15:13

## 2020-01-01 RX ADMIN — PSEUDOEPHEDRINE HCL 60 MG: 30 TABLET, FILM COATED ORAL at 10:23

## 2020-01-01 RX ADMIN — METOPROLOL SUCCINATE 25 MG: 25 TABLET, EXTENDED RELEASE ORAL at 08:11

## 2020-01-01 RX ADMIN — ATORVASTATIN CALCIUM 40 MG: 40 TABLET, FILM COATED ORAL at 21:17

## 2020-01-01 RX ADMIN — OXYCODONE HYDROCHLORIDE 5 MG: 5 TABLET ORAL at 02:10

## 2020-01-01 RX ADMIN — DOXYCYCLINE 100 MG: 100 CAPSULE ORAL at 15:56

## 2020-01-01 RX ADMIN — LORAZEPAM 0.5 MG: 2 INJECTION INTRAMUSCULAR; INTRAVENOUS at 18:31

## 2020-01-01 RX ADMIN — HEPARIN SODIUM AND DEXTROSE 10.8 UNITS/KG/HR: 10000; 5 INJECTION INTRAVENOUS at 13:14

## 2020-01-01 RX ADMIN — DOCUSATE SODIUM 100 MG: 100 CAPSULE, LIQUID FILLED ORAL at 08:03

## 2020-01-01 RX ADMIN — SODIUM CHLORIDE 125 ML/HR: 0.9 INJECTION, SOLUTION INTRAVENOUS at 12:20

## 2020-01-01 RX ADMIN — HEPARIN SODIUM AND DEXTROSE 11.8 UNITS/KG/HR: 10000; 5 INJECTION INTRAVENOUS at 12:31

## 2020-01-01 RX ADMIN — OXYCODONE HYDROCHLORIDE 10 MG: 10 TABLET ORAL at 09:28

## 2020-01-01 RX ADMIN — POLYETHYLENE GLYCOL 3350 17 G: 17 POWDER, FOR SOLUTION ORAL at 08:03

## 2020-01-01 RX ADMIN — VASOPRESSIN 2 UNITS: 20 INJECTION INTRAVENOUS at 15:28

## 2020-01-01 RX ADMIN — FLUDROCORTISONE ACETATE 0.1 MG: 0.1 TABLET ORAL at 08:33

## 2020-01-01 RX ADMIN — MORPHINE SULFATE 4 MG: 4 INJECTION INTRAVENOUS at 22:11

## 2020-01-01 RX ADMIN — AMIODARONE HYDROCHLORIDE 1 MG/MIN: 50 INJECTION, SOLUTION INTRAVENOUS at 22:21

## 2020-01-01 RX ADMIN — FENTANYL CITRATE 25 MCG: 50 INJECTION, SOLUTION INTRAMUSCULAR; INTRAVENOUS at 14:35

## 2020-01-01 RX ADMIN — POLYETHYLENE GLYCOL 3350 17 G: 17 POWDER, FOR SOLUTION ORAL at 21:22

## 2020-01-01 RX ADMIN — DILTIAZEM HYDROCHLORIDE 5 MG/HR: 5 INJECTION INTRAVENOUS at 14:17

## 2020-01-01 RX ADMIN — IOHEXOL 100 ML: 350 INJECTION, SOLUTION INTRAVENOUS at 22:20

## 2020-01-01 RX ADMIN — MORPHINE SULFATE 2 MG: 2 INJECTION, SOLUTION INTRAMUSCULAR; INTRAVENOUS at 13:46

## 2020-01-01 RX ADMIN — SENNOSIDES 8.6 MG: 8.6 TABLET, FILM COATED ORAL at 08:23

## 2020-01-01 RX ADMIN — GABAPENTIN 600 MG: 300 CAPSULE ORAL at 21:06

## 2020-01-01 RX ADMIN — BACLOFEN 15 MG: 10 TABLET ORAL at 09:15

## 2020-01-01 RX ADMIN — BACLOFEN 15 MG: 10 TABLET ORAL at 22:41

## 2020-01-01 RX ADMIN — HEPARIN SODIUM AND DEXTROSE 7.8 UNITS/KG/HR: 10000; 5 INJECTION INTRAVENOUS at 21:05

## 2020-01-01 RX ADMIN — DOCUSATE SODIUM 100 MG: 100 CAPSULE, LIQUID FILLED ORAL at 08:00

## 2020-01-01 RX ADMIN — ONDANSETRON 4 MG: 2 INJECTION INTRAMUSCULAR; INTRAVENOUS at 13:21

## 2020-01-01 RX ADMIN — GLYCOPYRROLATE 0.1 MG: 0.2 INJECTION, SOLUTION INTRAMUSCULAR; INTRAVENOUS at 23:34

## 2020-01-01 RX ADMIN — METOPROLOL SUCCINATE 25 MG: 25 TABLET, EXTENDED RELEASE ORAL at 08:17

## 2020-01-01 RX ADMIN — GABAPENTIN 300 MG: 300 CAPSULE ORAL at 08:33

## 2020-01-01 RX ADMIN — HYDROMORPHONE HYDROCHLORIDE 0.2 MG: 1 INJECTION, SOLUTION INTRAMUSCULAR; INTRAVENOUS; SUBCUTANEOUS at 20:21

## 2020-01-01 RX ADMIN — FENTANYL CITRATE 25 MCG: 50 INJECTION INTRAMUSCULAR; INTRAVENOUS at 11:53

## 2020-01-01 RX ADMIN — DOCUSATE SODIUM 100 MG: 100 CAPSULE, LIQUID FILLED ORAL at 17:09

## 2020-01-01 RX ADMIN — GABAPENTIN 600 MG: 300 CAPSULE ORAL at 21:04

## 2020-01-01 RX ADMIN — GABAPENTIN 400 MG: 400 CAPSULE ORAL at 16:56

## 2020-01-01 RX ADMIN — OXYCODONE HYDROCHLORIDE AND ACETAMINOPHEN 1000 MG: 500 TABLET ORAL at 10:39

## 2020-01-01 RX ADMIN — GABAPENTIN 300 MG: 300 CAPSULE ORAL at 17:38

## 2020-01-01 RX ADMIN — OXYCODONE HYDROCHLORIDE 20 MG: 10 TABLET ORAL at 22:04

## 2020-01-01 RX ADMIN — Medication 1000 MG: at 14:37

## 2020-01-01 RX ADMIN — BACLOFEN 15 MG: 10 TABLET ORAL at 08:35

## 2020-01-01 RX ADMIN — BACLOFEN 15 MG: 10 TABLET ORAL at 15:17

## 2020-01-01 RX ADMIN — TAMSULOSIN HYDROCHLORIDE 0.4 MG: 0.4 CAPSULE ORAL at 18:36

## 2020-01-01 RX ADMIN — PHENYLEPHRINE HYDROCHLORIDE 100 MCG: 10 INJECTION INTRAVENOUS at 15:17

## 2020-01-01 RX ADMIN — OXYCODONE HYDROCHLORIDE 10 MG: 10 TABLET ORAL at 18:09

## 2020-01-01 RX ADMIN — BACLOFEN 15 MG: 10 TABLET ORAL at 08:55

## 2020-01-01 RX ADMIN — PROPOFOL 180 MG: 10 INJECTION, EMULSION INTRAVENOUS at 14:45

## 2020-01-01 RX ADMIN — MORPHINE SULFATE 2 MG: 2 INJECTION, SOLUTION INTRAMUSCULAR; INTRAVENOUS at 14:35

## 2020-01-01 RX ADMIN — HEPARIN SODIUM 5000 UNITS: 5000 INJECTION INTRAVENOUS; SUBCUTANEOUS at 05:05

## 2020-01-01 RX ADMIN — MORPHINE SULFATE 2 MG: 2 INJECTION, SOLUTION INTRAMUSCULAR; INTRAVENOUS at 13:35

## 2020-01-01 RX ADMIN — VANCOMYCIN HYDROCHLORIDE 1750 MG: 1 INJECTION, POWDER, LYOPHILIZED, FOR SOLUTION INTRAVENOUS at 03:45

## 2020-01-01 RX ADMIN — VITAMIN D, TAB 1000IU (100/BT) 2000 UNITS: 25 TAB at 15:56

## 2020-01-01 RX ADMIN — OXYCODONE HYDROCHLORIDE 5 MG: 5 TABLET ORAL at 11:55

## 2020-01-01 RX ADMIN — FENTANYL CITRATE 25 MCG: 50 INJECTION INTRAMUSCULAR; INTRAVENOUS at 12:18

## 2020-01-01 RX ADMIN — GLYCOPYRROLATE 0.1 MG: 0.2 INJECTION, SOLUTION INTRAMUSCULAR; INTRAVENOUS at 15:55

## 2020-01-01 RX ADMIN — ENOXAPARIN SODIUM 40 MG: 40 INJECTION SUBCUTANEOUS at 08:10

## 2020-01-01 RX ADMIN — LORAZEPAM 0.5 MG: 2 INJECTION INTRAMUSCULAR; INTRAVENOUS at 12:54

## 2020-01-01 RX ADMIN — SODIUM CHLORIDE 20 ML/HR: 0.9 INJECTION, SOLUTION INTRAVENOUS at 06:40

## 2020-01-01 RX ADMIN — GLYCOPYRROLATE 0.2 MG: 0.2 INJECTION, SOLUTION INTRAMUSCULAR; INTRAVENOUS at 12:54

## 2020-01-01 RX ADMIN — Medication 5 ML: at 15:33

## 2020-01-01 RX ADMIN — VANCOMYCIN HYDROCHLORIDE 1750 MG: 10 INJECTION, POWDER, LYOPHILIZED, FOR SOLUTION INTRAVENOUS at 17:52

## 2020-01-01 RX ADMIN — AMIODARONE HYDROCHLORIDE 1 MG/MIN: 50 INJECTION, SOLUTION INTRAVENOUS at 16:49

## 2020-01-01 RX ADMIN — BACLOFEN 15 MG: 10 TABLET ORAL at 21:24

## 2020-01-01 RX ADMIN — DOCUSATE SODIUM 100 MG: 100 CAPSULE, LIQUID FILLED ORAL at 17:00

## 2020-01-01 RX ADMIN — BACLOFEN 15 MG: 10 TABLET ORAL at 21:21

## 2020-01-01 RX ADMIN — BACLOFEN 15 MG: 10 TABLET ORAL at 18:32

## 2020-01-01 RX ADMIN — BACLOFEN 15 MG: 10 TABLET ORAL at 18:36

## 2020-01-01 RX ADMIN — VANCOMYCIN HYDROCHLORIDE 1250 MG: 10 INJECTION, POWDER, LYOPHILIZED, FOR SOLUTION INTRAVENOUS at 09:59

## 2020-01-01 RX ADMIN — PHENYLEPHRINE HYDROCHLORIDE 200 MCG: 10 INJECTION INTRAVENOUS at 16:18

## 2020-01-01 RX ADMIN — BACLOFEN 15 MG: 10 TABLET ORAL at 15:31

## 2020-01-01 RX ADMIN — FUROSEMIDE 20 MG: 10 INJECTION, SOLUTION INTRAMUSCULAR; INTRAVENOUS at 15:16

## 2020-01-01 RX ADMIN — BACLOFEN 15 MG: 10 TABLET ORAL at 15:27

## 2020-01-01 RX ADMIN — LORAZEPAM 1 MG: 2 INJECTION INTRAMUSCULAR; INTRAVENOUS at 23:33

## 2020-01-01 RX ADMIN — BACLOFEN 15 MG: 10 TABLET ORAL at 10:06

## 2020-01-01 RX ADMIN — OXYCODONE HYDROCHLORIDE 20 MG: 10 TABLET ORAL at 18:35

## 2020-01-01 RX ADMIN — TAMSULOSIN HYDROCHLORIDE 0.4 MG: 0.4 CAPSULE ORAL at 15:31

## 2020-01-01 RX ADMIN — DOXYCYCLINE 100 MG: 100 CAPSULE ORAL at 15:06

## 2020-01-01 RX ADMIN — TAMSULOSIN HYDROCHLORIDE 0.4 MG: 0.4 CAPSULE ORAL at 17:00

## 2020-01-01 RX ADMIN — LORAZEPAM 0.5 MG: 2 INJECTION INTRAMUSCULAR; INTRAVENOUS at 16:42

## 2020-01-01 RX ADMIN — OXYCODONE HYDROCHLORIDE 5 MG: 5 TABLET ORAL at 22:09

## 2020-01-01 RX ADMIN — HEPARIN SODIUM 5000 UNITS: 5000 INJECTION INTRAVENOUS; SUBCUTANEOUS at 15:16

## 2020-01-01 RX ADMIN — GABAPENTIN 300 MG: 300 CAPSULE ORAL at 08:40

## 2020-01-01 RX ADMIN — FENTANYL CITRATE 25 MCG: 50 INJECTION, SOLUTION INTRAMUSCULAR; INTRAVENOUS at 13:55

## 2020-01-01 RX ADMIN — GABAPENTIN 300 MG: 300 CAPSULE ORAL at 17:51

## 2020-01-01 RX ADMIN — MORPHINE SULFATE 2 MG: 2 INJECTION, SOLUTION INTRAMUSCULAR; INTRAVENOUS at 17:04

## 2020-01-01 RX ADMIN — Medication 5 ML: at 12:03

## 2020-01-01 RX ADMIN — TAMSULOSIN HYDROCHLORIDE 0.4 MG: 0.4 CAPSULE ORAL at 18:34

## 2020-01-01 RX ADMIN — GABAPENTIN 200 MG: 100 CAPSULE ORAL at 08:26

## 2020-01-01 RX ADMIN — SODIUM CHLORIDE 50 ML/HR: 0.9 INJECTION, SOLUTION INTRAVENOUS at 04:47

## 2020-01-01 RX ADMIN — OXYCODONE HYDROCHLORIDE 10 MG: 10 TABLET ORAL at 14:43

## 2020-01-01 RX ADMIN — CEFTRIAXONE 1000 MG: 1 INJECTION, SOLUTION INTRAVENOUS at 16:17

## 2020-01-01 RX ADMIN — DOXYCYCLINE 100 MG: 100 CAPSULE ORAL at 02:52

## 2020-01-01 RX ADMIN — Medication 1 APPLICATION: at 13:15

## 2020-01-01 RX ADMIN — TAMSULOSIN HYDROCHLORIDE 0.4 MG: 0.4 CAPSULE ORAL at 16:56

## 2020-01-01 RX ADMIN — HEPARIN SODIUM 2000 UNITS: 1000 INJECTION, SOLUTION INTRAVENOUS; SUBCUTANEOUS at 11:27

## 2020-01-01 RX ADMIN — GABAPENTIN 300 MG: 300 CAPSULE ORAL at 11:00

## 2020-01-01 RX ADMIN — BISACODYL 10 MG: 10 SUPPOSITORY RECTAL at 13:43

## 2020-01-01 RX ADMIN — IOHEXOL 20 ML: 350 INJECTION, SOLUTION INTRAVENOUS at 12:45

## 2020-01-01 RX ADMIN — GLYCOPYRROLATE 0.1 MG: 0.2 INJECTION, SOLUTION INTRAMUSCULAR; INTRAVENOUS at 11:31

## 2020-01-01 RX ADMIN — SODIUM CHLORIDE: 0.9 INJECTION, SOLUTION INTRAVENOUS at 14:56

## 2020-01-01 RX ADMIN — OXYCODONE HYDROCHLORIDE AND ACETAMINOPHEN 1000 MG: 500 TABLET ORAL at 08:30

## 2020-01-01 RX ADMIN — ONDANSETRON 4 MG: 2 INJECTION INTRAMUSCULAR; INTRAVENOUS at 15:20

## 2020-01-01 RX ADMIN — METRONIDAZOLE 1000 MG: 500 TABLET ORAL at 08:17

## 2020-01-01 RX ADMIN — FLUDROCORTISONE ACETATE 0.1 MG: 0.1 TABLET ORAL at 15:07

## 2020-01-01 RX ADMIN — ATORVASTATIN CALCIUM 40 MG: 40 TABLET, FILM COATED ORAL at 21:15

## 2020-01-01 RX ADMIN — LORAZEPAM 0.5 MG: 2 INJECTION INTRAMUSCULAR; INTRAVENOUS at 09:44

## 2020-01-01 RX ADMIN — Medication 1 APPLICATION: at 23:23

## 2020-01-01 RX ADMIN — NEOSTIGMINE METHYLSULFATE 3 MG: 1 INJECTION INTRAVENOUS at 13:21

## 2020-01-01 RX ADMIN — FLUDROCORTISONE ACETATE 0.1 MG: 0.1 TABLET ORAL at 21:24

## 2020-01-01 RX ADMIN — PHENYLEPHRINE HYDROCHLORIDE 100 MCG: 10 INJECTION INTRAVENOUS at 12:33

## 2020-01-01 RX ADMIN — BACLOFEN 15 MG: 10 TABLET ORAL at 08:53

## 2020-01-01 RX ADMIN — LORAZEPAM 0.5 MG: 2 INJECTION INTRAMUSCULAR; INTRAVENOUS at 21:32

## 2020-01-01 RX ADMIN — FENTANYL CITRATE 25 MCG: 50 INJECTION, SOLUTION INTRAMUSCULAR; INTRAVENOUS at 14:40

## 2020-01-01 RX ADMIN — LORAZEPAM 0.5 MG: 2 INJECTION INTRAMUSCULAR; INTRAVENOUS at 13:35

## 2020-01-01 RX ADMIN — CEFTRIAXONE 1000 MG: 1 INJECTION, SOLUTION INTRAVENOUS at 15:31

## 2020-01-01 RX ADMIN — GABAPENTIN 600 MG: 300 CAPSULE ORAL at 21:16

## 2020-01-01 RX ADMIN — TAMSULOSIN HYDROCHLORIDE 0.4 MG: 0.4 CAPSULE ORAL at 15:30

## 2020-01-01 RX ADMIN — BISACODYL 10 MG: 10 SUPPOSITORY RECTAL at 06:09

## 2020-01-01 RX ADMIN — FENTANYL CITRATE 100 MCG: 50 INJECTION, SOLUTION INTRAMUSCULAR; INTRAVENOUS at 10:24

## 2020-01-01 RX ADMIN — IOHEXOL 120 ML: 350 INJECTION, SOLUTION INTRAVENOUS at 14:39

## 2020-01-01 RX ADMIN — GABAPENTIN 100 MG: 100 CAPSULE ORAL at 21:13

## 2020-01-01 RX ADMIN — BACLOFEN 15 MG: 10 TABLET ORAL at 08:30

## 2020-01-01 RX ADMIN — PHENYLEPHRINE HYDROCHLORIDE 100 MCG: 10 INJECTION INTRAVENOUS at 10:36

## 2020-01-01 RX ADMIN — ZINC SULFATE 220 MG (50 MG) CAPSULE 220 MG: CAPSULE at 10:38

## 2020-01-01 RX ADMIN — TAMSULOSIN HYDROCHLORIDE 0.4 MG: 0.4 CAPSULE ORAL at 21:13

## 2020-01-01 RX ADMIN — PHENYLEPHRINE HYDROCHLORIDE 70 MCG/MIN: 10 INJECTION INTRAVENOUS at 15:10

## 2020-01-01 RX ADMIN — POLYETHYLENE GLYCOL 3350 17 G: 17 POWDER, FOR SOLUTION ORAL at 08:34

## 2020-01-01 RX ADMIN — FLUDROCORTISONE ACETATE 0.1 MG: 0.1 TABLET ORAL at 08:22

## 2020-01-01 RX ADMIN — FUROSEMIDE 20 MG: 10 INJECTION, SOLUTION INTRAMUSCULAR; INTRAVENOUS at 07:42

## 2020-01-01 RX ADMIN — ACETAMINOPHEN 325 MG: 325 TABLET, FILM COATED ORAL at 20:15

## 2020-01-01 RX ADMIN — SENNOSIDES 8.6 MG: 8.6 TABLET, FILM COATED ORAL at 08:37

## 2020-01-01 RX ADMIN — MORPHINE SULFATE 30 MG: 15 TABLET ORAL at 02:36

## 2020-01-01 RX ADMIN — TAMSULOSIN HYDROCHLORIDE 0.4 MG: 0.4 CAPSULE ORAL at 17:09

## 2020-01-01 RX ADMIN — ENOXAPARIN SODIUM 100 MG: 100 INJECTION SUBCUTANEOUS at 15:50

## 2020-01-01 RX ADMIN — BACLOFEN 15 MG: 10 TABLET ORAL at 08:10

## 2020-01-01 RX ADMIN — BACLOFEN 15 MG: 10 TABLET ORAL at 08:40

## 2020-01-01 RX ADMIN — MIDAZOLAM HYDROCHLORIDE 0.5 MG: 1 INJECTION, SOLUTION INTRAMUSCULAR; INTRAVENOUS at 11:52

## 2020-01-01 RX ADMIN — FENTANYL CITRATE 50 MCG: 50 INJECTION, SOLUTION INTRAMUSCULAR; INTRAVENOUS at 14:34

## 2020-01-01 RX ADMIN — VITAMIN D, TAB 1000IU (100/BT) 2000 UNITS: 25 TAB at 08:22

## 2020-01-01 RX ADMIN — GABAPENTIN 600 MG: 300 CAPSULE ORAL at 21:03

## 2020-01-01 RX ADMIN — GABAPENTIN 300 MG: 300 CAPSULE ORAL at 21:48

## 2020-01-01 RX ADMIN — DOXYCYCLINE 100 MG: 100 CAPSULE ORAL at 03:03

## 2020-01-01 RX ADMIN — POTASSIUM CHLORIDE 40 MEQ: 1500 TABLET, EXTENDED RELEASE ORAL at 11:38

## 2020-01-01 RX ADMIN — BISACODYL 10 MG: 5 TABLET ORAL at 11:36

## 2020-01-01 RX ADMIN — OXYCODONE HYDROCHLORIDE 10 MG: 10 TABLET ORAL at 08:53

## 2020-01-01 RX ADMIN — BACLOFEN 15 MG: 10 TABLET ORAL at 17:51

## 2020-01-01 RX ADMIN — GABAPENTIN 300 MG: 300 CAPSULE ORAL at 08:53

## 2020-01-01 RX ADMIN — OXYCODONE HYDROCHLORIDE 5 MG: 5 TABLET ORAL at 22:10

## 2020-01-01 RX ADMIN — MORPHINE SULFATE 2 MG: 2 INJECTION, SOLUTION INTRAMUSCULAR; INTRAVENOUS at 20:46

## 2020-01-01 RX ADMIN — MIDODRINE HYDROCHLORIDE 10 MG: 5 TABLET ORAL at 15:28

## 2020-01-01 RX ADMIN — OXYCODONE HYDROCHLORIDE AND ACETAMINOPHEN 1000 MG: 500 TABLET ORAL at 21:25

## 2020-01-01 RX ADMIN — HYDROXYCHLOROQUINE SULFATE 200 MG: 200 TABLET, FILM COATED ORAL at 04:14

## 2020-01-01 RX ADMIN — IOHEXOL 85 ML: 350 INJECTION, SOLUTION INTRAVENOUS at 09:31

## 2020-01-01 RX ADMIN — Medication 1 G: at 14:56

## 2020-01-01 RX ADMIN — GABAPENTIN 600 MG: 300 CAPSULE ORAL at 21:18

## 2020-01-01 RX ADMIN — HYDROXYCHLOROQUINE SULFATE 200 MG: 200 TABLET, FILM COATED ORAL at 03:03

## 2020-01-01 RX ADMIN — GABAPENTIN 300 MG: 300 CAPSULE ORAL at 08:43

## 2020-01-01 RX ADMIN — GABAPENTIN 400 MG: 400 CAPSULE ORAL at 17:09

## 2020-01-01 RX ADMIN — FENTANYL CITRATE 25 MCG: 50 INJECTION, SOLUTION INTRAMUSCULAR; INTRAVENOUS at 14:12

## 2020-01-01 RX ADMIN — BACLOFEN 15 MG: 10 TABLET ORAL at 17:44

## 2020-01-01 RX ADMIN — PHENYLEPHRINE HYDROCHLORIDE 100 MCG: 10 INJECTION INTRAVENOUS at 14:59

## 2020-01-01 RX ADMIN — MORPHINE SULFATE 2 MG: 2 INJECTION, SOLUTION INTRAMUSCULAR; INTRAVENOUS at 12:54

## 2020-01-01 RX ADMIN — GABAPENTIN 200 MG: 100 CAPSULE ORAL at 08:10

## 2020-01-01 RX ADMIN — GABAPENTIN 300 MG: 300 CAPSULE ORAL at 08:00

## 2020-01-01 RX ADMIN — FENTANYL CITRATE 50 MCG: 50 INJECTION, SOLUTION INTRAMUSCULAR; INTRAVENOUS at 11:53

## 2020-01-01 RX ADMIN — GABAPENTIN 600 MG: 300 CAPSULE ORAL at 11:13

## 2020-01-01 RX ADMIN — Medication 2 G: at 10:40

## 2020-01-01 RX ADMIN — GABAPENTIN 200 MG: 100 CAPSULE ORAL at 17:07

## 2020-01-01 RX ADMIN — GABAPENTIN 300 MG: 300 CAPSULE ORAL at 15:16

## 2020-01-01 RX ADMIN — ATORVASTATIN CALCIUM 40 MG: 40 TABLET, FILM COATED ORAL at 21:24

## 2020-01-01 RX ADMIN — PHENYLEPHRINE HYDROCHLORIDE 30 MCG/MIN: 10 INJECTION INTRAVENOUS at 12:56

## 2020-01-01 RX ADMIN — CEFAZOLIN SODIUM 2000 MG: 2 SOLUTION INTRAVENOUS at 00:21

## 2020-01-01 RX ADMIN — METHYLPREDNISOLONE SODIUM SUCCINATE 80 MG: 125 INJECTION, POWDER, FOR SOLUTION INTRAMUSCULAR; INTRAVENOUS at 09:37

## 2020-01-01 RX ADMIN — BACLOFEN 15 MG: 10 TABLET ORAL at 08:43

## 2020-01-01 RX ADMIN — MORPHINE SULFATE 2 MG: 2 INJECTION, SOLUTION INTRAMUSCULAR; INTRAVENOUS at 11:31

## 2020-01-01 RX ADMIN — TAMSULOSIN HYDROCHLORIDE 0.4 MG: 0.4 CAPSULE ORAL at 16:00

## 2020-01-01 RX ADMIN — DOCUSATE SODIUM 100 MG: 100 CAPSULE, LIQUID FILLED ORAL at 08:37

## 2020-01-01 RX ADMIN — GABAPENTIN 200 MG: 100 CAPSULE ORAL at 17:27

## 2020-01-01 RX ADMIN — IOHEXOL 20 ML: 350 INJECTION, SOLUTION INTRAVENOUS at 17:09

## 2020-01-01 RX ADMIN — MORPHINE SULFATE 2 MG: 2 INJECTION, SOLUTION INTRAMUSCULAR; INTRAVENOUS at 05:45

## 2020-01-01 RX ADMIN — MIDODRINE HYDROCHLORIDE 10 MG: 5 TABLET ORAL at 06:27

## 2020-01-01 RX ADMIN — DOCUSATE SODIUM 100 MG: 100 CAPSULE, LIQUID FILLED ORAL at 08:36

## 2020-01-01 RX ADMIN — OXYCODONE HYDROCHLORIDE 10 MG: 10 TABLET ORAL at 11:05

## 2020-01-01 RX ADMIN — CEFAZOLIN 2000 MG: 1 INJECTION, POWDER, FOR SOLUTION INTRAVENOUS at 10:57

## 2020-01-01 RX ADMIN — SENNOSIDES 8.6 MG: 8.6 TABLET, FILM COATED ORAL at 09:15

## 2020-01-01 RX ADMIN — FLUDROCORTISONE ACETATE 0.1 MG: 0.1 TABLET ORAL at 15:26

## 2020-01-01 RX ADMIN — GABAPENTIN 600 MG: 300 CAPSULE ORAL at 11:17

## 2020-01-01 RX ADMIN — ROCURONIUM BROMIDE 30 MG: 50 INJECTION, SOLUTION INTRAVENOUS at 10:24

## 2020-01-01 RX ADMIN — ALBUTEROL SULFATE 2 PUFF: 90 AEROSOL, METERED RESPIRATORY (INHALATION) at 20:52

## 2020-01-01 RX ADMIN — BACLOFEN 15 MG: 10 TABLET ORAL at 17:07

## 2020-01-01 RX ADMIN — LORAZEPAM 1 MG: 2 INJECTION INTRAMUSCULAR; INTRAVENOUS at 06:21

## 2020-01-01 RX ADMIN — FENTANYL CITRATE 25 MCG: 50 INJECTION INTRAMUSCULAR; INTRAVENOUS at 12:01

## 2020-01-01 RX ADMIN — MORPHINE SULFATE 2 MG: 2 INJECTION, SOLUTION INTRAMUSCULAR; INTRAVENOUS at 17:43

## 2020-01-01 RX ADMIN — OXYCODONE HYDROCHLORIDE 5 MG: 5 TABLET ORAL at 05:59

## 2020-01-01 RX ADMIN — HYDROXYCHLOROQUINE SULFATE 200 MG: 200 TABLET, FILM COATED ORAL at 15:29

## 2020-01-01 RX ADMIN — PHENYLEPHRINE HYDROCHLORIDE 100 MCG: 10 INJECTION INTRAVENOUS at 15:01

## 2020-01-01 RX ADMIN — IOHEXOL 100 ML: 350 INJECTION, SOLUTION INTRAVENOUS at 23:49

## 2020-01-01 RX ADMIN — FENTANYL CITRATE 25 MCG: 50 INJECTION, SOLUTION INTRAMUSCULAR; INTRAVENOUS at 13:25

## 2020-01-01 RX ADMIN — SODIUM CHLORIDE 1000 ML: 0.9 INJECTION, SOLUTION INTRAVENOUS at 22:05

## 2020-01-01 RX ADMIN — GABAPENTIN 300 MG: 300 CAPSULE ORAL at 08:36

## 2020-01-01 RX ADMIN — METHYLPREDNISOLONE SODIUM SUCCINATE 80 MG: 125 INJECTION, POWDER, FOR SOLUTION INTRAMUSCULAR; INTRAVENOUS at 10:41

## 2020-01-01 RX ADMIN — VANCOMYCIN HYDROCHLORIDE 1500 MG: 10 INJECTION, POWDER, LYOPHILIZED, FOR SOLUTION INTRAVENOUS at 01:21

## 2020-01-01 RX ADMIN — PHENYLEPHRINE HYDROCHLORIDE 50 MCG/MIN: 10 INJECTION INTRAVENOUS at 15:29

## 2020-01-01 RX ADMIN — VASOPRESSIN 1 UNITS: 20 INJECTION INTRAVENOUS at 15:14

## 2020-01-01 RX ADMIN — ZINC SULFATE 220 MG (50 MG) CAPSULE 220 MG: CAPSULE at 08:33

## 2020-01-01 RX ADMIN — OXYCODONE HYDROCHLORIDE AND ACETAMINOPHEN 1000 MG: 500 TABLET ORAL at 15:56

## 2020-01-01 RX ADMIN — OXYCODONE HYDROCHLORIDE 10 MG: 10 TABLET ORAL at 00:18

## 2020-01-01 RX ADMIN — Medication 2 G: at 15:31

## 2020-01-01 RX ADMIN — GABAPENTIN 600 MG: 300 CAPSULE ORAL at 22:55

## 2020-01-01 RX ADMIN — HEPARIN SODIUM 5000 UNITS: 5000 INJECTION INTRAVENOUS; SUBCUTANEOUS at 14:31

## 2020-01-01 RX ADMIN — FLUDROCORTISONE ACETATE 0.1 MG: 0.1 TABLET ORAL at 15:28

## 2020-01-01 RX ADMIN — GLYCOPYRROLATE 0.1 MG: 0.2 INJECTION, SOLUTION INTRAMUSCULAR; INTRAVENOUS at 09:18

## 2020-01-01 RX ADMIN — DILTIAZEM HYDROCHLORIDE 5 MG: 5 INJECTION INTRAVENOUS at 13:58

## 2020-01-01 RX ADMIN — HYDROXYCHLOROQUINE SULFATE 800 MG: 200 TABLET, FILM COATED ORAL at 15:56

## 2020-01-01 RX ADMIN — Medication 1 APPLICATION: at 15:36

## 2020-01-01 RX ADMIN — LORAZEPAM 1 MG: 2 INJECTION INTRAMUSCULAR; INTRAVENOUS at 15:55

## 2020-01-01 RX ADMIN — OXYCODONE HYDROCHLORIDE 5 MG: 5 TABLET ORAL at 04:02

## 2020-01-01 RX ADMIN — MORPHINE SULFATE 2 MG: 2 INJECTION, SOLUTION INTRAMUSCULAR; INTRAVENOUS at 05:21

## 2020-01-01 RX ADMIN — VITAMIN D, TAB 1000IU (100/BT) 2000 UNITS: 25 TAB at 08:30

## 2020-01-01 RX ADMIN — BACLOFEN 15 MG: 10 TABLET ORAL at 22:03

## 2020-01-01 RX ADMIN — METOPROLOL SUCCINATE 25 MG: 25 TABLET, EXTENDED RELEASE ORAL at 08:33

## 2020-01-01 RX ADMIN — FENTANYL CITRATE 25 MCG: 50 INJECTION, SOLUTION INTRAMUSCULAR; INTRAVENOUS at 14:45

## 2020-01-01 RX ADMIN — OXYCODONE HYDROCHLORIDE 5 MG: 5 TABLET ORAL at 10:32

## 2020-01-01 RX ADMIN — METOPROLOL SUCCINATE 25 MG: 25 TABLET, EXTENDED RELEASE ORAL at 09:16

## 2020-01-01 RX ADMIN — GABAPENTIN 300 MG: 300 CAPSULE ORAL at 22:03

## 2020-01-01 RX ADMIN — PROPOFOL 130 MG: 10 INJECTION, EMULSION INTRAVENOUS at 12:29

## 2020-01-01 RX ADMIN — LORAZEPAM 0.5 MG: 2 INJECTION INTRAMUSCULAR; INTRAVENOUS at 13:55

## 2020-01-01 RX ADMIN — OXYCODONE HYDROCHLORIDE 5 MG: 5 TABLET ORAL at 06:41

## 2020-01-01 RX ADMIN — SODIUM CHLORIDE 75 ML/HR: 0.9 INJECTION, SOLUTION INTRAVENOUS at 10:35

## 2020-01-01 RX ADMIN — BACLOFEN 15 MG: 10 TABLET ORAL at 23:23

## 2020-01-01 RX ADMIN — BACLOFEN 15 MG: 10 TABLET ORAL at 15:26

## 2020-01-01 RX ADMIN — GABAPENTIN 200 MG: 100 CAPSULE ORAL at 18:18

## 2020-01-01 RX ADMIN — Medication 0.2 MG/HR: at 17:01

## 2020-01-01 RX ADMIN — SENNOSIDES 8.6 MG: 8.6 TABLET, FILM COATED ORAL at 08:03

## 2020-01-01 RX ADMIN — GABAPENTIN 300 MG: 300 CAPSULE ORAL at 22:12

## 2020-01-01 RX ADMIN — EPHEDRINE SULFATE 10 MG: 50 INJECTION, SOLUTION INTRAVENOUS at 15:03

## 2020-01-01 RX ADMIN — MORPHINE SULFATE 2 MG: 2 INJECTION, SOLUTION INTRAMUSCULAR; INTRAVENOUS at 10:45

## 2020-01-01 RX ADMIN — GABAPENTIN 300 MG: 300 CAPSULE ORAL at 22:04

## 2020-01-01 RX ADMIN — OXYCODONE HYDROCHLORIDE 10 MG: 10 TABLET ORAL at 20:15

## 2020-01-01 RX ADMIN — PHENYLEPHRINE HYDROCHLORIDE 30 MCG/MIN: 10 INJECTION INTRAVENOUS at 10:36

## 2020-01-01 RX ADMIN — MORPHINE SULFATE 2 MG: 2 INJECTION, SOLUTION INTRAMUSCULAR; INTRAVENOUS at 12:40

## 2020-01-01 RX ADMIN — LIDOCAINE HYDROCHLORIDE 50 MG: 10 INJECTION, SOLUTION EPIDURAL; INFILTRATION; INTRACAUDAL; PERINEURAL at 15:04

## 2020-01-01 RX ADMIN — DOXYCYCLINE 100 MG: 100 CAPSULE ORAL at 15:31

## 2020-01-01 RX ADMIN — OXYCODONE HYDROCHLORIDE 5 MG: 5 TABLET ORAL at 09:33

## 2020-01-01 RX ADMIN — GLYCOPYRROLATE 0.1 MG: 0.2 INJECTION, SOLUTION INTRAMUSCULAR; INTRAVENOUS at 20:44

## 2020-01-01 RX ADMIN — CEFTRIAXONE 1000 MG: 1 INJECTION, SOLUTION INTRAVENOUS at 16:56

## 2020-01-01 RX ADMIN — VASOPRESSIN 1 UNITS: 20 INJECTION INTRAVENOUS at 15:51

## 2020-01-01 RX ADMIN — MORPHINE SULFATE 2 MG: 2 INJECTION, SOLUTION INTRAMUSCULAR; INTRAVENOUS at 16:11

## 2020-01-01 RX ADMIN — OXYCODONE HYDROCHLORIDE 5 MG: 5 TABLET ORAL at 06:12

## 2020-01-01 RX ADMIN — GABAPENTIN 400 MG: 400 CAPSULE ORAL at 17:15

## 2020-01-01 RX ADMIN — GABAPENTIN 600 MG: 300 CAPSULE ORAL at 13:34

## 2020-01-01 RX ADMIN — IOHEXOL 30 ML: 350 INJECTION, SOLUTION INTRAVENOUS at 17:06

## 2020-01-01 RX ADMIN — Medication 2 G: at 22:55

## 2020-01-01 RX ADMIN — GABAPENTIN 300 MG: 300 CAPSULE ORAL at 22:14

## 2020-01-01 RX ADMIN — BACLOFEN 15 MG: 10 TABLET ORAL at 16:34

## 2020-01-01 RX ADMIN — Medication 100 MG: at 15:05

## 2020-01-01 RX ADMIN — TAMSULOSIN HYDROCHLORIDE 0.4 MG: 0.4 CAPSULE ORAL at 16:08

## 2020-01-01 RX ADMIN — TAMSULOSIN HYDROCHLORIDE 0.4 MG: 0.4 CAPSULE ORAL at 17:07

## 2020-01-01 RX ADMIN — MORPHINE SULFATE 2 MG: 2 INJECTION, SOLUTION INTRAMUSCULAR; INTRAVENOUS at 20:44

## 2020-01-01 RX ADMIN — IOHEXOL 100 ML: 350 INJECTION, SOLUTION INTRAVENOUS at 18:32

## 2020-01-01 RX ADMIN — FUROSEMIDE 20 MG: 20 TABLET ORAL at 08:36

## 2020-01-01 RX ADMIN — VANCOMYCIN HYDROCHLORIDE 1750 MG: 10 INJECTION, POWDER, LYOPHILIZED, FOR SOLUTION INTRAVENOUS at 17:18

## 2020-01-01 RX ADMIN — MORPHINE SULFATE 4 MG: 4 INJECTION INTRAVENOUS at 09:59

## 2020-01-01 RX ADMIN — FENTANYL CITRATE 25 MCG: 50 INJECTION, SOLUTION INTRAMUSCULAR; INTRAVENOUS at 11:11

## 2020-01-01 RX ADMIN — GABAPENTIN 600 MG: 300 CAPSULE ORAL at 20:04

## 2020-01-01 RX ADMIN — OXYCODONE HYDROCHLORIDE 5 MG: 5 TABLET ORAL at 03:14

## 2020-01-01 RX ADMIN — BACLOFEN 15 MG: 10 TABLET ORAL at 15:56

## 2020-01-01 RX ADMIN — BACLOFEN 15 MG: 10 TABLET ORAL at 08:26

## 2020-01-01 RX ADMIN — POTASSIUM CHLORIDE 40 MEQ: 1500 TABLET, EXTENDED RELEASE ORAL at 11:06

## 2020-01-01 RX ADMIN — POLYETHYLENE GLYCOL 3350 17 G: 17 POWDER, FOR SOLUTION ORAL at 08:58

## 2020-01-01 RX ADMIN — Medication 2 G: at 08:33

## 2020-01-01 RX ADMIN — OXYCODONE HYDROCHLORIDE AND ACETAMINOPHEN 1000 MG: 500 TABLET ORAL at 08:22

## 2020-01-01 RX ADMIN — TAMSULOSIN HYDROCHLORIDE 0.4 MG: 0.4 CAPSULE ORAL at 15:34

## 2020-01-01 RX ADMIN — GLYCOPYRROLATE 0.1 MG: 0.2 INJECTION, SOLUTION INTRAMUSCULAR; INTRAVENOUS at 20:50

## 2020-01-01 RX ADMIN — BACLOFEN 15 MG: 10 TABLET ORAL at 21:34

## 2020-01-01 RX ADMIN — FLUDROCORTISONE ACETATE 0.1 MG: 0.1 TABLET ORAL at 10:40

## 2020-01-01 RX ADMIN — BACLOFEN 15 MG: 10 TABLET ORAL at 17:38

## 2020-01-01 RX ADMIN — FLUDROCORTISONE ACETATE 0.1 MG: 0.1 TABLET ORAL at 15:31

## 2020-01-01 RX ADMIN — GABAPENTIN 400 MG: 400 CAPSULE ORAL at 16:34

## 2020-01-01 RX ADMIN — GADOBUTROL 9 ML: 604.72 INJECTION INTRAVENOUS at 08:33

## 2020-01-01 RX ADMIN — GABAPENTIN 300 MG: 300 CAPSULE ORAL at 17:09

## 2020-01-01 RX ADMIN — DOCUSATE SODIUM 100 MG: 100 CAPSULE, LIQUID FILLED ORAL at 17:23

## 2020-01-01 RX ADMIN — FLUDROCORTISONE ACETATE 0.1 MG: 0.1 TABLET ORAL at 08:30

## 2020-01-01 RX ADMIN — BACLOFEN 15 MG: 10 TABLET ORAL at 21:25

## 2020-01-01 RX ADMIN — BACLOFEN 15 MG: 10 TABLET ORAL at 22:35

## 2020-01-01 RX ADMIN — OXYCODONE HYDROCHLORIDE 5 MG: 5 TABLET ORAL at 17:45

## 2020-01-01 RX ADMIN — FLUDROCORTISONE ACETATE 0.1 MG: 0.1 TABLET ORAL at 21:05

## 2020-01-01 RX ADMIN — DOCUSATE SODIUM 100 MG: 100 CAPSULE, LIQUID FILLED ORAL at 09:17

## 2020-01-01 RX ADMIN — GABAPENTIN 600 MG: 300 CAPSULE ORAL at 21:37

## 2020-01-01 RX ADMIN — ATORVASTATIN CALCIUM 40 MG: 40 TABLET, FILM COATED ORAL at 21:05

## 2020-01-01 RX ADMIN — DOCUSATE SODIUM 100 MG: 100 CAPSULE, LIQUID FILLED ORAL at 10:38

## 2020-01-01 RX ADMIN — POLYETHYLENE GLYCOL 3350 17 G: 17 POWDER, FOR SOLUTION ORAL at 08:56

## 2020-01-01 RX ADMIN — DOXYCYCLINE 100 MG: 100 CAPSULE ORAL at 04:14

## 2020-01-01 RX ADMIN — Medication 2 G: at 21:05

## 2020-01-01 RX ADMIN — Medication 1 APPLICATION: at 07:23

## 2020-01-01 RX ADMIN — SODIUM CHLORIDE 500 ML: 0.9 INJECTION, SOLUTION INTRAVENOUS at 08:30

## 2020-01-01 RX ADMIN — GABAPENTIN 300 MG: 300 CAPSULE ORAL at 21:22

## 2020-01-01 RX ADMIN — GLYCOPYRROLATE 0.1 MG: 0.2 INJECTION, SOLUTION INTRAMUSCULAR; INTRAVENOUS at 10:55

## 2020-01-01 RX ADMIN — FUROSEMIDE 20 MG: 10 INJECTION, SOLUTION INTRAMUSCULAR; INTRAVENOUS at 17:51

## 2020-01-01 RX ADMIN — PHENYLEPHRINE HYDROCHLORIDE 100 MCG: 10 INJECTION INTRAVENOUS at 15:06

## 2020-01-01 RX ADMIN — POTASSIUM CHLORIDE 40 MEQ: 1500 TABLET, EXTENDED RELEASE ORAL at 22:45

## 2020-01-01 RX ADMIN — DEXAMETHASONE SODIUM PHOSPHATE 5 MG: 10 INJECTION, SOLUTION INTRAMUSCULAR; INTRAVENOUS at 15:20

## 2020-01-01 RX ADMIN — GLYCOPYRROLATE 0.1 MG: 0.2 INJECTION, SOLUTION INTRAMUSCULAR; INTRAVENOUS at 05:45

## 2020-01-01 RX ADMIN — GLYCOPYRROLATE 0.1 MG: 0.2 INJECTION, SOLUTION INTRAMUSCULAR; INTRAVENOUS at 05:21

## 2020-01-01 RX ADMIN — OXYCODONE HYDROCHLORIDE 5 MG: 5 TABLET ORAL at 14:27

## 2020-01-01 RX ADMIN — PHENYLEPHRINE HYDROCHLORIDE 100 MCG: 10 INJECTION INTRAVENOUS at 12:29

## 2020-01-01 RX ADMIN — BACLOFEN 15 MG: 10 TABLET ORAL at 21:06

## 2020-01-01 RX ADMIN — MORPHINE SULFATE 2 MG: 2 INJECTION, SOLUTION INTRAMUSCULAR; INTRAVENOUS at 20:55

## 2020-01-01 RX ADMIN — HYDROMORPHONE HYDROCHLORIDE 0.5 MG: 1 INJECTION, SOLUTION INTRAMUSCULAR; INTRAVENOUS; SUBCUTANEOUS at 15:38

## 2020-01-01 RX ADMIN — BACLOFEN 15 MG: 10 TABLET ORAL at 10:38

## 2020-01-01 RX ADMIN — OXYCODONE HYDROCHLORIDE 5 MG: 5 TABLET ORAL at 14:31

## 2020-01-01 RX ADMIN — DOXYCYCLINE 100 MG: 100 CAPSULE ORAL at 15:28

## 2020-01-01 RX ADMIN — INDOMETHACIN 100 MG: 50 SUPPOSITORY RECTAL at 14:58

## 2020-01-01 RX ADMIN — OXYCODONE HYDROCHLORIDE 10 MG: 10 TABLET ORAL at 05:14

## 2020-01-01 RX ADMIN — HEPARIN SODIUM 5000 UNITS: 5000 INJECTION INTRAVENOUS; SUBCUTANEOUS at 06:36

## 2020-01-01 RX ADMIN — GABAPENTIN 600 MG: 300 CAPSULE ORAL at 19:54

## 2020-01-01 RX ADMIN — FLUDROCORTISONE ACETATE 0.1 MG: 0.1 TABLET ORAL at 15:56

## 2020-01-01 RX ADMIN — LEVOFLOXACIN 500 MG: 5 INJECTION, SOLUTION INTRAVENOUS at 11:44

## 2020-01-01 RX ADMIN — GABAPENTIN 300 MG: 300 CAPSULE ORAL at 09:15

## 2020-01-01 RX ADMIN — CEFTRIAXONE 1000 MG: 1 INJECTION, SOLUTION INTRAVENOUS at 15:56

## 2020-01-01 RX ADMIN — GLYCOPYRROLATE 0.1 MG: 0.2 INJECTION, SOLUTION INTRAMUSCULAR; INTRAVENOUS at 01:01

## 2020-01-01 RX ADMIN — TAMSULOSIN HYDROCHLORIDE 0.4 MG: 0.4 CAPSULE ORAL at 17:27

## 2020-01-01 RX ADMIN — BACLOFEN 15 MG: 10 TABLET ORAL at 17:09

## 2020-01-01 RX ADMIN — METOPROLOL SUCCINATE 25 MG: 25 TABLET, EXTENDED RELEASE ORAL at 10:36

## 2020-01-01 RX ADMIN — DOCUSATE SODIUM 100 MG: 100 CAPSULE, LIQUID FILLED ORAL at 10:06

## 2020-01-01 RX ADMIN — OXYCODONE HYDROCHLORIDE 5 MG: 5 TABLET ORAL at 15:07

## 2020-01-01 RX ADMIN — ATORVASTATIN CALCIUM 40 MG: 40 TABLET, FILM COATED ORAL at 22:55

## 2020-01-01 RX ADMIN — MORPHINE SULFATE 2 MG: 2 INJECTION, SOLUTION INTRAMUSCULAR; INTRAVENOUS at 09:13

## 2020-01-01 RX ADMIN — GABAPENTIN 200 MG: 100 CAPSULE ORAL at 08:18

## 2020-01-01 RX ADMIN — GABAPENTIN 400 MG: 400 CAPSULE ORAL at 15:45

## 2020-01-01 RX ADMIN — DOCUSATE SODIUM 100 MG: 100 CAPSULE, LIQUID FILLED ORAL at 17:15

## 2020-01-01 RX ADMIN — VANCOMYCIN HYDROCHLORIDE 1750 MG: 10 INJECTION, POWDER, LYOPHILIZED, FOR SOLUTION INTRAVENOUS at 03:06

## 2020-01-01 RX ADMIN — SODIUM CHLORIDE, SODIUM LACTATE, POTASSIUM CHLORIDE, AND CALCIUM CHLORIDE: .6; .31; .03; .02 INJECTION, SOLUTION INTRAVENOUS at 14:30

## 2020-01-01 RX ADMIN — DOXYCYCLINE 100 MG: 100 CAPSULE ORAL at 05:09

## 2020-01-01 RX ADMIN — DOCUSATE SODIUM 100 MG: 100 CAPSULE, LIQUID FILLED ORAL at 15:43

## 2020-01-01 RX ADMIN — LORAZEPAM 1 MG: 2 INJECTION INTRAMUSCULAR; INTRAVENOUS at 22:28

## 2020-01-01 RX ADMIN — Medication 100 MG: at 12:30

## 2020-01-01 RX ADMIN — DILTIAZEM HYDROCHLORIDE 5 MG/HR: 5 INJECTION INTRAVENOUS at 10:58

## 2020-01-01 RX ADMIN — AMIODARONE HYDROCHLORIDE 150 MG: 50 INJECTION, SOLUTION INTRAVENOUS at 15:06

## 2020-01-01 RX ADMIN — GADOBUTROL 9 ML: 604.72 INJECTION INTRAVENOUS at 21:11

## 2020-01-01 RX ADMIN — TAMSULOSIN HYDROCHLORIDE 0.4 MG: 0.4 CAPSULE ORAL at 17:19

## 2020-01-01 RX ADMIN — GABAPENTIN 300 MG: 300 CAPSULE ORAL at 18:34

## 2020-01-01 RX ADMIN — METOPROLOL SUCCINATE 25 MG: 25 TABLET, EXTENDED RELEASE ORAL at 08:59

## 2020-01-01 RX ADMIN — FENTANYL CITRATE 25 MCG: 50 INJECTION, SOLUTION INTRAMUSCULAR; INTRAVENOUS at 17:05

## 2020-01-01 RX ADMIN — BACLOFEN 15 MG: 10 TABLET ORAL at 21:00

## 2020-01-01 RX ADMIN — HYDROXYCHLOROQUINE SULFATE 200 MG: 200 TABLET, FILM COATED ORAL at 02:39

## 2020-01-01 RX ADMIN — ZINC SULFATE 220 MG (50 MG) CAPSULE 220 MG: CAPSULE at 15:56

## 2020-01-01 RX ADMIN — DILTIAZEM HYDROCHLORIDE 15 MG: 5 INJECTION INTRAVENOUS at 11:30

## 2020-01-01 RX ADMIN — TAMSULOSIN HYDROCHLORIDE 0.4 MG: 0.4 CAPSULE ORAL at 16:34

## 2020-01-01 RX ADMIN — BACLOFEN 15 MG: 10 TABLET ORAL at 21:17

## 2020-01-01 RX ADMIN — CEFTRIAXONE 1000 MG: 1 INJECTION, SOLUTION INTRAVENOUS at 15:27

## 2020-01-01 RX ADMIN — METRONIDAZOLE 1000 MG: 500 TABLET ORAL at 08:55

## 2020-01-01 RX ADMIN — HYDROXYCHLOROQUINE SULFATE 200 MG: 200 TABLET, FILM COATED ORAL at 15:31

## 2020-01-01 RX ADMIN — FENTANYL CITRATE 25 MCG: 50 INJECTION, SOLUTION INTRAMUSCULAR; INTRAVENOUS at 15:58

## 2020-01-01 RX ADMIN — OXYCODONE HYDROCHLORIDE 10 MG: 10 TABLET ORAL at 07:41

## 2020-01-01 RX ADMIN — Medication 2 G: at 11:12

## 2020-01-01 RX ADMIN — GABAPENTIN 600 MG: 300 CAPSULE ORAL at 11:27

## 2020-01-01 RX ADMIN — HEPARIN SODIUM 4000 UNITS: 1000 INJECTION, SOLUTION INTRAVENOUS; SUBCUTANEOUS at 12:31

## 2020-01-01 RX ADMIN — OXYCODONE HYDROCHLORIDE 10 MG: 10 TABLET ORAL at 08:37

## 2020-01-01 RX ADMIN — ENOXAPARIN SODIUM 30 MG: 40 INJECTION SUBCUTANEOUS at 10:32

## 2020-01-01 RX ADMIN — OXYCODONE HYDROCHLORIDE 5 MG: 5 TABLET ORAL at 01:42

## 2020-01-01 RX ADMIN — BACLOFEN 15 MG: 10 TABLET ORAL at 17:00

## 2020-01-01 RX ADMIN — ONDANSETRON 4 MG: 2 INJECTION INTRAMUSCULAR; INTRAVENOUS at 17:16

## 2020-01-01 RX ADMIN — AMIODARONE HYDROCHLORIDE 0.5 MG/MIN: 50 INJECTION, SOLUTION INTRAVENOUS at 22:49

## 2020-01-01 RX ADMIN — TAMSULOSIN HYDROCHLORIDE 0.4 MG: 0.4 CAPSULE ORAL at 16:04

## 2020-01-01 RX ADMIN — METOPROLOL SUCCINATE 25 MG: 25 TABLET, EXTENDED RELEASE ORAL at 11:08

## 2020-01-01 RX ADMIN — SODIUM CHLORIDE AND POTASSIUM CHLORIDE 100 ML/HR: .9; .15 SOLUTION INTRAVENOUS at 02:54

## 2020-01-01 RX ADMIN — OXYCODONE HYDROCHLORIDE 5 MG: 5 TABLET ORAL at 08:44

## 2020-01-01 RX ADMIN — GABAPENTIN 300 MG: 300 CAPSULE ORAL at 08:22

## 2020-01-01 RX ADMIN — GABAPENTIN 300 MG: 300 CAPSULE ORAL at 12:18

## 2020-01-01 RX ADMIN — GABAPENTIN 300 MG: 300 CAPSULE ORAL at 17:19

## 2020-01-01 RX ADMIN — OXYCODONE HYDROCHLORIDE 5 MG: 5 TABLET ORAL at 20:52

## 2020-01-01 RX ADMIN — POLYETHYLENE GLYCOL 3350 17 G: 17 POWDER, FOR SOLUTION ORAL at 11:53

## 2020-01-01 RX ADMIN — METOPROLOL SUCCINATE 25 MG: 25 TABLET, EXTENDED RELEASE ORAL at 09:33

## 2020-01-01 RX ADMIN — Medication 2 G: at 08:30

## 2020-01-01 RX ADMIN — BACLOFEN 15 MG: 10 TABLET ORAL at 08:16

## 2020-01-01 RX ADMIN — POLYETHYLENE GLYCOL 3350 17 G: 17 POWDER, FOR SOLUTION ORAL at 08:36

## 2020-01-01 RX ADMIN — Medication 2 G: at 11:21

## 2020-01-01 RX ADMIN — GABAPENTIN 300 MG: 300 CAPSULE ORAL at 08:17

## 2020-01-01 RX ADMIN — BACLOFEN 15 MG: 10 TABLET ORAL at 21:48

## 2020-01-01 RX ADMIN — BACLOFEN 15 MG: 10 TABLET ORAL at 15:07

## 2020-01-01 RX ADMIN — MORPHINE SULFATE 2 MG: 2 INJECTION, SOLUTION INTRAMUSCULAR; INTRAVENOUS at 02:28

## 2020-01-01 RX ADMIN — OXYCODONE HYDROCHLORIDE 5 MG: 5 TABLET ORAL at 22:21

## 2020-01-01 RX ADMIN — Medication 2 G: at 11:27

## 2020-01-01 RX ADMIN — OXYCODONE HYDROCHLORIDE 10 MG: 10 TABLET ORAL at 03:28

## 2020-01-01 RX ADMIN — CEFTRIAXONE 1000 MG: 1 INJECTION, SOLUTION INTRAVENOUS at 16:08

## 2020-01-01 RX ADMIN — GABAPENTIN 300 MG: 300 CAPSULE ORAL at 08:30

## 2020-01-01 RX ADMIN — BACLOFEN 15 MG: 10 TABLET ORAL at 16:04

## 2020-01-01 RX ADMIN — DOXYCYCLINE 100 MG: 100 CAPSULE ORAL at 02:39

## 2020-01-01 RX ADMIN — BACLOFEN 15 MG: 10 TABLET ORAL at 08:11

## 2020-01-01 RX ADMIN — OXYCODONE HYDROCHLORIDE 5 MG: 5 TABLET ORAL at 10:33

## 2020-01-01 RX ADMIN — DEXAMETHASONE SODIUM PHOSPHATE 5 MG: 10 INJECTION, SOLUTION INTRAMUSCULAR; INTRAVENOUS at 13:03

## 2020-01-01 RX ADMIN — IOHEXOL 50 ML: 240 INJECTION, SOLUTION INTRATHECAL; INTRAVASCULAR; INTRAVENOUS; ORAL at 15:00

## 2020-01-01 RX ADMIN — TAMSULOSIN HYDROCHLORIDE 0.4 MG: 0.4 CAPSULE ORAL at 21:10

## 2020-01-01 RX ADMIN — OXYCODONE HYDROCHLORIDE 5 MG: 5 TABLET ORAL at 22:36

## 2020-01-01 RX ADMIN — DIGOXIN 500 MCG: 250 INJECTION, SOLUTION INTRAMUSCULAR; INTRAVENOUS; PARENTERAL at 12:49

## 2020-01-01 RX ADMIN — SODIUM CHLORIDE 20 ML/HR: 0.9 INJECTION, SOLUTION INTRAVENOUS at 20:48

## 2020-01-01 RX ADMIN — OXYCODONE HYDROCHLORIDE AND ACETAMINOPHEN 1000 MG: 500 TABLET ORAL at 21:18

## 2020-01-01 RX ADMIN — HYDROMORPHONE HYDROCHLORIDE 0.2 MG: 1 INJECTION, SOLUTION INTRAMUSCULAR; INTRAVENOUS; SUBCUTANEOUS at 23:50

## 2020-01-01 RX ADMIN — BACLOFEN 15 MG: 10 TABLET ORAL at 22:55

## 2020-01-01 RX ADMIN — BACLOFEN 15 MG: 10 TABLET ORAL at 22:09

## 2020-01-01 RX ADMIN — BACLOFEN 15 MG: 10 TABLET ORAL at 17:27

## 2020-01-01 RX ADMIN — OXYCODONE HYDROCHLORIDE 5 MG: 5 TABLET ORAL at 05:33

## 2020-01-01 RX ADMIN — FENTANYL CITRATE 50 MCG: 50 INJECTION, SOLUTION INTRAMUSCULAR; INTRAVENOUS at 15:21

## 2020-01-01 RX ADMIN — Medication 1 APPLICATION: at 19:34

## 2020-01-01 RX ADMIN — ENOXAPARIN SODIUM 40 MG: 40 INJECTION SUBCUTANEOUS at 22:09

## 2020-01-01 RX ADMIN — MIDAZOLAM HYDROCHLORIDE 0.5 MG: 1 INJECTION, SOLUTION INTRAMUSCULAR; INTRAVENOUS at 12:01

## 2020-01-01 RX ADMIN — HEPARIN SODIUM 5000 UNITS: 5000 INJECTION INTRAVENOUS; SUBCUTANEOUS at 05:19

## 2020-01-01 RX ADMIN — HYDROXYCHLOROQUINE SULFATE 200 MG: 200 TABLET, FILM COATED ORAL at 02:52

## 2020-01-01 RX ADMIN — OXYCODONE HYDROCHLORIDE AND ACETAMINOPHEN 1000 MG: 500 TABLET ORAL at 21:04

## 2020-01-01 RX ADMIN — LIDOCAINE HYDROCHLORIDE 5 ML: 10 INJECTION, SOLUTION EPIDURAL; INFILTRATION; INTRACAUDAL; PERINEURAL at 11:16

## 2020-01-01 RX ADMIN — SENNOSIDES 8.6 MG: 8.6 TABLET, FILM COATED ORAL at 08:36

## 2020-01-01 RX ADMIN — TAMSULOSIN HYDROCHLORIDE 0.4 MG: 0.4 CAPSULE ORAL at 16:43

## 2020-01-01 RX ADMIN — ALBUTEROL SULFATE 2 PUFF: 90 AEROSOL, METERED RESPIRATORY (INHALATION) at 21:20

## 2020-01-01 RX ADMIN — METOPROLOL SUCCINATE 25 MG: 25 TABLET, EXTENDED RELEASE ORAL at 08:03

## 2020-01-01 RX ADMIN — HEPARIN SODIUM 5000 UNITS: 5000 INJECTION INTRAVENOUS; SUBCUTANEOUS at 05:11

## 2020-01-01 RX ADMIN — HEPARIN SODIUM 5000 UNITS: 5000 INJECTION INTRAVENOUS; SUBCUTANEOUS at 22:03

## 2020-01-01 RX ADMIN — OXYCODONE HYDROCHLORIDE 5 MG: 5 TABLET ORAL at 18:18

## 2020-01-01 RX ADMIN — DEXAMETHASONE SODIUM PHOSPHATE 5 MG: 10 INJECTION, SOLUTION INTRAMUSCULAR; INTRAVENOUS at 11:10

## 2020-01-01 RX ADMIN — DOCUSATE SODIUM 100 MG: 100 CAPSULE, LIQUID FILLED ORAL at 18:05

## 2020-01-01 RX ADMIN — ONDANSETRON 4 MG: 2 INJECTION INTRAMUSCULAR; INTRAVENOUS at 14:30

## 2020-01-01 RX ADMIN — OXYCODONE HYDROCHLORIDE AND ACETAMINOPHEN 1000 MG: 500 TABLET ORAL at 08:33

## 2020-01-01 RX ADMIN — Medication 2 G: at 16:00

## 2020-01-01 RX ADMIN — GABAPENTIN 600 MG: 300 CAPSULE ORAL at 22:41

## 2020-01-01 RX ADMIN — SODIUM CHLORIDE: 0.9 INJECTION, SOLUTION INTRAVENOUS at 12:15

## 2020-01-01 RX ADMIN — TAMSULOSIN HYDROCHLORIDE 0.4 MG: 0.4 CAPSULE ORAL at 17:45

## 2020-01-01 RX ADMIN — TAMSULOSIN HYDROCHLORIDE 0.4 MG: 0.4 CAPSULE ORAL at 17:15

## 2020-01-01 RX ADMIN — BACLOFEN 15 MG: 10 TABLET ORAL at 22:11

## 2020-01-01 RX ADMIN — GABAPENTIN 300 MG: 300 CAPSULE ORAL at 08:23

## 2020-01-01 RX ADMIN — FENTANYL CITRATE 25 MCG: 50 INJECTION, SOLUTION INTRAMUSCULAR; INTRAVENOUS at 15:51

## 2020-01-01 RX ADMIN — OXYCODONE HYDROCHLORIDE 5 MG: 5 TABLET ORAL at 15:08

## 2020-01-01 RX ADMIN — OXYCODONE HYDROCHLORIDE 10 MG: 10 TABLET ORAL at 22:15

## 2020-01-01 RX ADMIN — ONDANSETRON 4 MG: 2 INJECTION INTRAMUSCULAR; INTRAVENOUS at 16:15

## 2020-01-01 RX ADMIN — GABAPENTIN 100 MG: 100 CAPSULE ORAL at 08:10

## 2020-01-01 RX ADMIN — OXYCODONE HYDROCHLORIDE 10 MG: 10 TABLET ORAL at 15:16

## 2020-01-01 RX ADMIN — BACLOFEN 15 MG: 10 TABLET ORAL at 08:00

## 2020-01-01 RX ADMIN — LORAZEPAM 0.5 MG: 2 INJECTION INTRAMUSCULAR; INTRAVENOUS at 17:43

## 2020-01-01 RX ADMIN — TAMSULOSIN HYDROCHLORIDE 0.4 MG: 0.4 CAPSULE ORAL at 17:39

## 2020-01-01 RX ADMIN — HEPARIN SODIUM 5000 UNITS: 5000 INJECTION INTRAVENOUS; SUBCUTANEOUS at 14:00

## 2020-01-01 RX ADMIN — FLUDROCORTISONE ACETATE 0.1 MG: 0.1 TABLET ORAL at 22:55

## 2020-01-01 RX ADMIN — HYDROXYCHLOROQUINE SULFATE 200 MG: 200 TABLET, FILM COATED ORAL at 15:07

## 2020-01-01 RX ADMIN — GABAPENTIN 300 MG: 300 CAPSULE ORAL at 10:06

## 2020-01-01 RX ADMIN — VITAMIN D, TAB 1000IU (100/BT) 2000 UNITS: 25 TAB at 08:33

## 2020-01-01 RX ADMIN — MORPHINE SULFATE 2 MG: 2 INJECTION, SOLUTION INTRAMUSCULAR; INTRAVENOUS at 10:55

## 2020-01-01 RX ADMIN — ENOXAPARIN SODIUM 40 MG: 40 INJECTION SUBCUTANEOUS at 22:35

## 2020-01-01 RX ADMIN — TAMSULOSIN HYDROCHLORIDE 0.4 MG: 0.4 CAPSULE ORAL at 15:56

## 2020-01-01 RX ADMIN — ALBUMIN (HUMAN): 12.5 SOLUTION INTRAVENOUS at 12:37

## 2020-01-01 RX ADMIN — GABAPENTIN 100 MG: 100 CAPSULE ORAL at 08:11

## 2020-01-01 RX ADMIN — FENTANYL CITRATE 25 MCG: 50 INJECTION, SOLUTION INTRAMUSCULAR; INTRAVENOUS at 14:51

## 2020-01-01 RX ADMIN — POLYETHYLENE GLYCOL 3350 17 G: 17 POWDER, FOR SOLUTION ORAL at 08:23

## 2020-01-01 RX ADMIN — GABAPENTIN 300 MG: 300 CAPSULE ORAL at 07:23

## 2020-01-01 RX ADMIN — OXYCODONE HYDROCHLORIDE 5 MG: 5 TABLET ORAL at 22:15

## 2020-01-01 RX ADMIN — FUROSEMIDE 20 MG: 10 INJECTION, SOLUTION INTRAMUSCULAR; INTRAVENOUS at 17:20

## 2020-01-03 NOTE — LETTER
January 3, 2020     Referral [address] [facility] [address]    Patient: Duane Perea   YOB: 1945   Date of Visit: 1/3/2020       Dear Dr Osborn Alpers:    Thank you for referring Duane Perea to me for evaluation  Below are my notes for this consultation  If you have questions, please do not hesitate to call me  I look forward to following your patient along with you  Sincerely,        Eileen Ramachandran MD        CC: No Recipients  Eileen Ramachandran MD  1/3/2020  5:03 PM  Sign at close encounter  Consultation - [facility]  Duane Perea 66 y o  male MRN: [id number]  Unit/Bed#:  Encounter: [id number]        Consults    ASSESSMENT/PLAN:     1  Painless jaundice likely secondary to parenchymal infiltration of the tumor along with biliary compression  He has a biliary stent in place which appears to be draining the right hepatic system, despite this he has had worsening jaundice  Given worsening intrahepatic dilation, hyperbilirubinemia, suspect either there is tumor ingrowth or the stent may be partially occluded  I have reviewed the reports from [facility] and appears that a wall flex stent was placed on November 27th  On the most recent hospitalization on December 27th, the plan was to repeat IR guided stent replacement the following day, however patient reports that a repeat procedure was not done and he was discharged home with GI follow-up  I do not have discharge summary notes or recommendations  It does not appear the patient has ever undergone an ERCP  -would recommend repeating CBC, CMP and coags  -will obtain MRI/MRCP to further assess the extent of the disease process within the biliary tree   -pending the results of the MRI/MRCP, will discuss whether patient needs a repeat IR stent exchange/revision with PTC versus ERCP  It  Is possible that ERCP may not be helpful if tumor infiltration is higher up in the biliary tract    I will review the images with Dr Jamar Cooper after the MRCP is available   -patient will need to make a very close follow-up with Oncology and establish care with PCP  I will fill 1 time prescription for metoprolol  Patient states that he needs metoprolol for AFib, he states that he ran out of medication 4 days ago  He does not have a PCP established here   -discussed with family that should he have fever, abdominal pain, nausea, vomiting, he is to immediately go to the emergency room  -stat MRI/MRCP will be scheduled for tomorrow   -will need to obtain the latest notes from [facility] as based on the notes from 12/27, patient was to undergo IR intervention however I do not have any further plan or recommendations from [facility] AUTHORITY from the time of discharge       ______________________________________________________________________    Reason for Consult / Principal Problem: [unfilled]    HPI: Tami Lopez is a 66y o  year old male with metastatic colon cancer status post left hemicolectomy with Mets to the liver with wedge resection, lungs, abdominal lesion, status post multiple trials of chemotherapy, recently undergoing radiation at [facility], presents to establish care  Patient was recently seen at [facility] and underwent biliary stent placement for new onset of jaundice at end of November followed by 7 days of Augmentin  Patient presented to [facility] again on December 27th after CT of abdomen and pelvis with contrast at [facility] showed persistent jaundice with bilirubin exceeding 14, and redemonstration of biliary obstruction  Previously placed stent is seen in the right biliary system extending into the common hepatic duct and intrahepatic appeared to be more prominent on the most recent CT scan  The gallbladder wall appears to be thickened at 7 mm concerning for tumor infiltration of the gallbladder    He was planned for IR intervention however as per patient report he was discharged home several days ago without any further stent changes  He is accompanied to today's visit with his wife, daughter and son-in-law who states that they would like to establish care at [facility] due to the proximity  They also states that they were recommended a GI follow-up for possible ERCP  On exam, patient is noted to be extremely jaundice, without any abdominal pain, pruritus, abdominal distention or peripheral edema  He denies nausea, vomiting or fever  Reports having his previous colonoscopy 2-3 years ago with which was unremarkable as per patient reports  He is currently on subcu Lovenox for history of clot  He also has a abdominal wall lesion noted on exam measuring almost 3 cm  Most recent labs are from  which showed normal hemoglobin, platelets 705  AST was 134, , total bilirubin of 16, direct 14  Lipase was 92, ammonia was less than 10  Review of Systems: The remainder of the review of systems was negative except for the pertinent positives noted in HPI       Historical Information   Past Medical History:   Diagnosis Date    Atrial fibrillation [facility] [address] [facility]     BPH (benign prostatic hyperplasia)     Cancer ([facility] [address] )     colon- johnny liver & lungs    Hypertension     Stroke ([facility] [address] )     2008- mild weakness left hand/arm    Tumor, metastatic ([facility] [address] )     Tumor, metastatic ([facility] [address] )      Past Surgical History:   Procedure Laterality Date    COLON SURGERY          EYE SURGERY      TONSILLECTOMY       Social History   Social History     Substance and Sexual Activity   Alcohol Use Never    Frequency: Never     Social History     Substance and Sexual Activity   Drug Use Never     Social History     Tobacco Use   Smoking Status Former Smoker    Types: Cigarettes    Last attempt to quit: Elza Garcia Years since quittin 0   Smokeless Tobacco Never Used     Family History   Problem Relation Age of Onset    Cancer Father     Colon cancer Father     Cancer Brother     Cancer Paternal Grandfather        Meds/Allergies       (Not in a hospital admission)  No current facility-administered medications for this visit  Allergies   Allergen Reactions    Ib Pro [Ibuprofen] Hives and Itching       Objective     Blood pressure 114/70, pulse 72, temperature 98 2 °F (36 8 °C), temperature source Tympanic, resp  rate 16, height 5' 10" (1 778 m), weight 99 3 kg (219 lb)  [unfilled]    PHYSICAL EXAM     GEN: well nourished, well developed, no acute distress  HEENT: jaundice, MMM  CV: RRR, no m/r/g  CHEST: CTA b/l, no WRR  ABD: +BS, soft mid line scar  Right upper quadrant was notable for abdominal lesion measuring almost 3 cm  This was not bleeding  EXT: no c/c/e  SKIN: no rashes, jaundiced skin    NEURO: aaox3    Lab Results:   Appointment on 01/03/2020   Component Date Value    WBC 01/03/2020 8 67     RBC 01/03/2020 3 30*    Hemoglobin 01/03/2020 11 8*    Hematocrit 01/03/2020 35 6*    MCV 01/03/2020 108*    MCH 01/03/2020 35 8*    MCHC 01/03/2020 33 1     RDW 01/03/2020 14 3     MPV 01/03/2020 11 1     Platelets 80/64/2700 148*    nRBC 01/03/2020 0     Neutrophils Relative 01/03/2020 76*    Immat GRANS % 01/03/2020 1     Lymphocytes Relative 01/03/2020 5*    Monocytes Relative 01/03/2020 17*    Eosinophils Relative 01/03/2020 0     Basophils Relative 01/03/2020 1     Neutrophils Absolute 01/03/2020 6 61     Immature Grans Absolute 01/03/2020 0 07     Lymphocytes Absolute 01/03/2020 0 45*    Monocytes Absolute 01/03/2020 1 47*    Eosinophils Absolute 01/03/2020 0 03     Basophils Absolute 01/03/2020 0 04     Sodium 01/03/2020 129*    Potassium 01/03/2020 4 0     Chloride 01/03/2020 99*    CO2 01/03/2020 26     ANION GAP 01/03/2020 4     BUN 01/03/2020 14     Creatinine 01/03/2020 0 83     Glucose 01/03/2020 114     Calcium 01/03/2020 8 8     AST 01/03/2020 122*    ALT 01/03/2020 121*    Alkaline Phosphatase 01/03/2020 838*    Total Protein 01/03/2020 6 2*    Albumin 01/03/2020 2 2*    Total Bilirubin 01/03/2020 21 97*    eGFR 01/03/2020 85     Protime 01/03/2020 16 0*    INR 01/03/2020 1 32*     Imaging Studies: I have personally reviewed pertinent films in PACS

## 2020-01-03 NOTE — PROGRESS NOTES
Consultation - [facility]  Marilynnkierra Judson 66 y o  male MRN: [id number]  Unit/Bed#:  Encounter: [id number]        Consults    ASSESSMENT/PLAN:     1  Painless jaundice likely secondary to parenchymal infiltration of the tumor along with biliary compression  He has a biliary stent in place which appears to be draining the right hepatic system, despite this he has had worsening jaundice  Given worsening intrahepatic dilation, hyperbilirubinemia, suspect either there is tumor ingrowth or the stent may be partially occluded  I have reviewed the reports from [facility] and appears that a wall flex stent was placed on November 27th  On the most recent hospitalization on December 27th, the plan was to repeat IR guided stent replacement the following day, however patient reports that a repeat procedure was not done and he was discharged home with GI follow-up  I do not have discharge summary notes or recommendations  It does not appear the patient has ever undergone an ERCP  -would recommend repeating CBC, CMP and coags  -will obtain MRI/MRCP to further assess the extent of the disease process within the biliary tree   -pending the results of the MRI/MRCP, will discuss whether patient needs a repeat IR stent exchange/revision with PTC versus ERCP  It  Is possible that ERCP may not be helpful if tumor infiltration is higher up in the biliary tract  I will review the images with Dr Jaxson Garcia after the MRCP is available   -patient will need to make a very close follow-up with Oncology and establish care with PCP  I will fill 1 time prescription for metoprolol  Patient states that he needs metoprolol for AFib, he states that he ran out of medication 4 days ago  He does not have a PCP established here   -discussed with family that should he have fever, abdominal pain, nausea, vomiting, he is to immediately go to the emergency room    -stat MRI/MRCP will be scheduled for tomorrow   -will need to obtain the latest notes from [facility] as based on the notes from 12/27, patient was to undergo IR intervention however I do not have any further plan or recommendations from [facility] AUTHORITY from the time of discharge       ______________________________________________________________________    Reason for Consult / Principal Problem: [unfilled]    HPI: Karie Ribera is a 66y o  year old male with metastatic colon cancer status post left hemicolectomy with Mets to the liver with wedge resection, lungs, abdominal lesion, status post multiple trials of chemotherapy, recently undergoing radiation at [facility], presents to establish care  Patient was recently seen at [facility] and underwent biliary stent placement for new onset of jaundice at end of November followed by 7 days of Augmentin  Patient presented to [facility] again on December 27th after CT of abdomen and pelvis with contrast at [facility] showed persistent jaundice with bilirubin exceeding 14, and redemonstration of biliary obstruction  Previously placed stent is seen in the right biliary system extending into the common hepatic duct and intrahepatic appeared to be more prominent on the most recent CT scan  The gallbladder wall appears to be thickened at 7 mm concerning for tumor infiltration of the gallbladder  He was planned for IR intervention however as per patient report he was discharged home several days ago without any further stent changes  He is accompanied to today's visit with his wife, daughter and son-in-law who states that they would like to establish care at [facility] due to the proximity  They also states that they were recommended a GI follow-up for possible ERCP  On exam, patient is noted to be extremely jaundice, without any abdominal pain, pruritus, abdominal distention or peripheral edema  He denies nausea, vomiting or fever    Reports having his previous colonoscopy 2-3 years ago with which was unremarkable as per patient reports  He is currently on subcu Lovenox for history of clot  He also has a abdominal wall lesion noted on exam measuring almost 3 cm  Most recent labs are from  which showed normal hemoglobin, platelets 483  AST was 134, , total bilirubin of 16, direct 14  Lipase was 92, ammonia was less than 10  Review of Systems: The remainder of the review of systems was negative except for the pertinent positives noted in HPI  Historical Information   Past Medical History:   Diagnosis Date    Atrial fibrillation ([place] [address] )     BPH (benign prostatic hyperplasia)     Cancer ([place] [address] )     colon- johnny liver & lungs    Hypertension     Stroke ([place] [address] )     - mild weakness left hand/arm    Tumor, metastatic ([place] [address] )     Tumor, metastatic ([place] [address] )      Past Surgical History:   Procedure Laterality Date    COLON SURGERY          EYE SURGERY      TONSILLECTOMY       Social History   Social History     Substance and Sexual Activity   Alcohol Use Never    Frequency: Never     Social History     Substance and Sexual Activity   Drug Use Never     Social History     Tobacco Use   Smoking Status Former Smoker    Types: Cigarettes    Last attempt to quit: Samara Galarza Years since quittin 0   Smokeless Tobacco Never Used     Family History   Problem Relation Age of Onset    Cancer Father     Colon cancer Father     Cancer Brother     Cancer Paternal Grandfather        Meds/Allergies       (Not in a hospital admission)  No current facility-administered medications for this visit  Allergies   Allergen Reactions    Ib Pro [Ibuprofen] Hives and Itching       Objective     Blood pressure 114/70, pulse 72, temperature 98 2 °F (36 8 °C), temperature source Tympanic, resp  rate 16, height 5' 10" (1 778 m), weight 99 3 kg (219 lb)      [unfilled]    PHYSICAL EXAM     GEN: well nourished, well developed, no acute distress  HEENT: jaundice, MMM  CV: RRR, no m/r/g  CHEST: CTA b/l, no WRR  ABD: +BS, soft mid line scar  Right upper quadrant was notable for abdominal lesion measuring almost 3 cm  This was not bleeding  EXT: no c/c/e  SKIN: no rashes, jaundiced skin    NEURO: aaox3    Lab Results:   Appointment on 01/03/2020   Component Date Value    WBC 01/03/2020 8 67     RBC 01/03/2020 3 30*    Hemoglobin 01/03/2020 11 8*    Hematocrit 01/03/2020 35 6*    MCV 01/03/2020 108*    MCH 01/03/2020 35 8*    MCHC 01/03/2020 33 1     RDW 01/03/2020 14 3     MPV 01/03/2020 11 1     Platelets 73/73/5224 148*    nRBC 01/03/2020 0     Neutrophils Relative 01/03/2020 76*    Immat GRANS % 01/03/2020 1     Lymphocytes Relative 01/03/2020 5*    Monocytes Relative 01/03/2020 17*    Eosinophils Relative 01/03/2020 0     Basophils Relative 01/03/2020 1     Neutrophils Absolute 01/03/2020 6 61     Immature Grans Absolute 01/03/2020 0 07     Lymphocytes Absolute 01/03/2020 0 45*    Monocytes Absolute 01/03/2020 1 47*    Eosinophils Absolute 01/03/2020 0 03     Basophils Absolute 01/03/2020 0 04     Sodium 01/03/2020 129*    Potassium 01/03/2020 4 0     Chloride 01/03/2020 99*    CO2 01/03/2020 26     ANION GAP 01/03/2020 4     BUN 01/03/2020 14     Creatinine 01/03/2020 0 83     Glucose 01/03/2020 114     Calcium 01/03/2020 8 8     AST 01/03/2020 122*    ALT 01/03/2020 121*    Alkaline Phosphatase 01/03/2020 838*    Total Protein 01/03/2020 6 2*    Albumin 01/03/2020 2 2*    Total Bilirubin 01/03/2020 21 97*    eGFR 01/03/2020 85     Protime 01/03/2020 16 0*    INR 01/03/2020 1 32*     Imaging Studies: I have personally reviewed pertinent films in PACS

## 2020-01-07 PROBLEM — I10 HYPERTENSION: Status: ACTIVE | Noted: 2020-01-01

## 2020-01-07 PROBLEM — R17 PAINLESS JAUNDICE: Status: ACTIVE | Noted: 2020-01-01

## 2020-01-07 PROBLEM — C18.9 COLON CANCER METASTASIZED TO MULTIPLE SITES (HCC): Status: ACTIVE | Noted: 2020-01-01

## 2020-01-07 NOTE — TELEPHONE ENCOUNTER
Earlier this morning I returned Dr Angie Dumont call, and then tried calling patient to relay results  Patient's daughter Connie Gravely answered, who after some discussion told me that she would respectfully prefer to speak with Dr Antonio Angel

## 2020-01-07 NOTE — TELEPHONE ENCOUNTER
Patients GI provider:  Dr Javed Mcdermott    Number to return call: ( [phone]    Reason for call: dr Mo Molina from [facility] [place] [facility] would like to speak with dr araujo about mri abdomen    Scheduled procedure/appointment date if applicable: Apt/procedure n /a

## 2020-01-08 PROBLEM — D64.9 LOW HEMOGLOBIN: Status: ACTIVE | Noted: 2020-01-01

## 2020-01-08 PROBLEM — G50.0 TRIGEMINAL NEURALGIA: Status: ACTIVE | Noted: 2020-01-01

## 2020-01-08 PROBLEM — E87.1 HYPONATREMIA: Status: ACTIVE | Noted: 2020-01-01

## 2020-01-08 NOTE — ASSESSMENT & PLAN NOTE
· Patient presents the emergency department for worsening jaundice, as directed by his gastroenterologist   Denies any abdominal pain, nausea, vomiting, diarrhea  Does report decreased appetite and fatigue, complains of pain at abdominal lesion  Denies any recent fevers or chills  · Outpatient GI Dr Rakan Moore   · Status post biliary stent, which appears to be draining the right hepatic system, however does have worsening jaundice  Given worsening of intrahepatic dilatation, hyperbilirubinemia, suspect there is tumor ingrowth were sent may be partially occluded  · Stent was placed November 27th at [facility]  · 1/6/20: MRCP:  Metastatic colon carcinoma with stable meds to lung bases, right anterolateral abdominal wall, liver  Infiltrating lesion in the right lobe adjacent to the gallbladder causes invasion of the gallbladder wall secondary intrahepatic biliary duct dual dilatation of both the right and left-sided ducts  Indwelling biliary stent extending from the common hepatic duct to the right sided bile ducts    · Consult GI - may need IR stent exchange, revision of PTC versus ERCP

## 2020-01-08 NOTE — ASSESSMENT & PLAN NOTE
· Home regimen:  Lovenox 100 mg every 12 hours, as recommended by his oncologist, metoprolol succinate 50 mg daily  · Hold Lovenox pending GI plan  · Currently rate controlled on monitor in emergency department, heart rates 90

## 2020-01-08 NOTE — CONSULTS
Consultation - Interventional Radiology  Kirk Jiang 66 y o  male MRN: [id number]  Unit/Bed#: S -01 Encounter: [id number]        Consults     Mr Mc is a 65 yo male with a complex medical history related to his colorectal cancer which has metastasized to liver  Briefly, he has had surgical and local regional therapy at [facility] with recurrent masses resulting in jaundice  Around late November 2019 he had percutaneous placement of a biliary stent at [facility], per the records this is a Wallstent  He now presents after brief outpatient workup with painless recurrent jaundice and fatigue  Outpatient labs with a bilirubin around 25  There are plans for possible liver radiation if his bilirubin can be lowered  He does not have itching and there is no fever abdominal pain or signs of infection    He reports never having had an ERCP  Based on most recent imaging available including MRI of January 6th and CT of December 27th there is an atrophic left lobe with dilated biliary ducts  These are behind stomach and colon and are not accessible to percutaneous methods  Nonetheless, this part of the liver is clearly nonfunctional and drainage would be of no benefit  In the right lobe there are staples surrounding a peripheral resection margin and a upper right hepatic lobe ablation cavity  There is marked dilation of biliary ducts  It is difficult to assess for stent patency but a inferior right lobe duct drains right into the stent, presumably this was the access site  There are superior ducts that terminate at the stent margin, these are also undrained  A partially calcified (post treatment?)  Mass is present in the gallbladder fossa  Additional mass is seen fungating out of the abdominal wall in the anterior right upper quadrant  For palliative purposes and to enable possible future therapy, liver decompression is indicated    After discussion with his family it appears he failed this stent after about 3 weeks clinically with evidence of his urine turning yellow/dark again, implying hyperbilirubinemia  The discussion now centers on whether endoscopic drainage or percutaneous drainage is more appropriate as first-line  Endoscopic drainage has a lower risk profile and would in able him to be less burden by tubes  It may not be successful, however  Percutaneous drainage has a higher risk profile but would provide the best drainage  I believe that his right superior biliary ducts may be J held off and may not be accessible via endoscopic techniques  I discussed this case with gastroenterology team today  We will continue the discussion with the gastroenterology service  I discussed extensively with the patient and his family that percutaneous drainage would involve leaving a tube that will drain bile externally  This bag will probably remain for life in the absence of passing a capping trial, but the tube itself would be in indefinitely  It is unlikely that additional stent placement is appropriate given how quickly he failed his current stent  I also discussed how the current stent will remain in for life and is permanent  I discussed the complication rate with the patient including the risks of bleeding, liver damage, biliary sepsis  I also discussed how even appropriate drainage does not reverse liver parenchymal dysfunction and may not lower his bilirubin      Recommendations:  - continue discussion about treatment approach  - NPO after midnight  - hold anticoagulation, keep current platelets and INR value  - if no evidence of sepsis, no need for antibiotics at this point, we will give periprocedural antibiotics  - anesthesia support will be required for any IR intervention      ASSESSMENT/PLAN:  Problem List     * (Principal) Painless jaundice secondary to parenchymal infiltration of tumor along with biliary compression     BPH (benign prostatic hyperplasia) Atrial fibrillation ([facility] )    Colon cancer metastasized to multiple sites [facility])    Hypertension    History of Trigeminal neuralgia    Hyponatremia    Low hemoglobin          Reason for Consult / Principal Problem:    HPI: Toña Napier is a 66y o  year old male who presents with Painless jaundice  Review of Systems   Fatigue  Otherwise negative      Past Medical History:  Past Medical History:   Diagnosis Date    Atrial fibrillation ([facility] )     BPH (benign prostatic hyperplasia)     Cancer ([facility] )     colon- johnny liver & lungs    Hypertension     Stroke ([place] [id number] )     2008- mild weakness left hand/arm    Tumor, metastatic ([place] [address] )     Tumor, metastatic ([place] [address] )        Past Surgical History:  Past Surgical History:   Procedure Laterality Date    COLON SURGERY      2013    EYE SURGERY      LIVER SURGERY      TONSILLECTOMY         Social History:  Social History     Substance and Sexual Activity   Alcohol Use Never    Frequency: Never     Social History     Substance and Sexual Activity   Drug Use Never     Social History     Tobacco Use   Smoking Status Former Smoker    Types: Cigarettes    Last attempt to quit: Jaxson Godwin Years since quittin 0   Smokeless Tobacco Never Used       Family History:  Family History   Problem Relation Age of Onset    Cancer Father     Colon cancer Father     Cancer Brother     Cancer Paternal Grandfather        Allergies:   Allergies   Allergen Reactions    Ib Pro [Ibuprofen] Hives and Itching       Medications:  Medications Prior to Admission   Medication    carBAMazepine (CARBATROL) 300 MG 12 hr capsule    carBAMazepine (TEGretol XR) 400 mg 12 hr tablet    enoxaparin (LOVENOX) 100 mg/mL    metoprolol succinate (TOPROL-XL) 50 mg 24 hr tablet    oxyCODONE (OXY-IR) 5 MG capsule    tamsulosin (FLOMAX) 0 4 mg     Current Facility-Administered Medications   Medication Dose Route Frequency    [START ON 2020] metoprolol succinate (TOPROL-XL) 24 hr tablet 25 mg  25 mg Oral Daily    oxyCODONE (ROXICODONE) IR tablet 5 mg  5 mg Oral Q6H PRN    polyethylene glycol (MIRALAX) packet 17 g  17 g Oral Daily PRN    sodium chloride 0 9 % infusion  50 mL/hr Intravenous Continuous    tamsulosin (FLOMAX) capsule 0 4 mg  0 4 mg Oral Daily With Dinner       Vitals:  /72 (BP Location: Right arm)   Pulse 72   Temp 98 1 °F (36 7 °C) (Oral)   Resp 18   Ht 5' 10" (1 778 m)   Wt 96 2 kg (212 lb)   SpO2 99%   BMI 30 42 kg/m²   Body mass index is 30 42 kg/m²    Weight (last 2 days)     Date/Time   Weight    01/08/20 0600   96 2 (212)    01/07/20 2244   98 6 (217 37)    01/07/20 1837   98 4 (217)              I/Os:    Intake/Output Summary (Last 24 hours) at 1/8/2020 1829  Last data filed at 1/8/2020 1035  Gross per 24 hour   Intake 950 ml   Output    Net 950 ml       PHYSICAL EXAM  General appearance: alert and oriented, in no acute distress  Skin: Skin color, texture, turgor normal  No rashes or lesions or  On RUQ enlarged mass growing out of abd wall, black and tan  Head: Normocephalic, without obvious abnormality  Lungs: on room air  Abdomen: soft nontender RUQ skin mass, prior stent site  Neurological: normal without focal findings, mental status, speech normal, alert and oriented x3, RANDI and reflexes normal and symmetric    Lab Results and Cultures:   CBC with diff:   Lab Results   Component Value Date    WBC 6 03 01/08/2020    HGB 10 8 (L) 01/08/2020    HCT 31 6 (L) 01/08/2020     (H) 01/08/2020     (L) 01/08/2020    MCH 35 8 (H) 01/08/2020    MCHC 34 2 01/08/2020    RDW 14 5 01/08/2020    MPV 10 6 01/08/2020    NRBC 0 01/08/2020      BMP/CMP:  Lab Results   Component Value Date    K 3 6 01/08/2020    CL 96 (L) 01/08/2020    CO2 25 01/08/2020    BUN 9 01/08/2020    CREATININE 0 68 01/08/2020    CALCIUM 8 5 01/08/2020     (H) 01/08/2020     (H) 01/08/2020    ALKPHOS 937 (H) 01/08/2020    EGFR 93 01/08/2020   ,     Coags:   Lab Results   Component Value Date    PTT 53 (H) 01/07/2020    INR 1 25 (H) 01/08/2020   ,   Results from last 7 days   Lab Units 01/08/20  0500 01/07/20  1942 01/03/20  1223   PTT seconds  --  53*  --    INR  1 25* 1 32* 1 32*        Lipid Panel: No results found for: CHOL  No results found for: HDL  No results found for: HDL  No results found for: LDLCALC  No results found for: TRIG    HgbA1c: No results found for: HGBA1C    Blood Culture: No results found for: BLOODCX,   Urinalysis:   Lab Results   Component Value Date    Filbert Eth 12/27/2019    CLARITYU Slightly Cloudy 12/27/2019    SPECGRAV 1 020 12/27/2019    PHUR 6 5 12/27/2019    LEUKOCYTESUR Negative 12/27/2019    NITRITE Positive (A) 12/27/2019    GLUCOSEU 100 (1/10%) (A) 12/27/2019    KETONESU Trace (A) 12/27/2019    BILIRUBINUR Interference- unable to analyze (A) 12/27/2019    BLOODU Negative 12/27/2019   ,   Urine Culture: No results found for: URINECX,   Wound Culure:  No results found for: WOUNDCULT    Imaging Studies: I have personally reviewed pertinent films in PACS    Counseling / Coordination of Care  Total time spent today  45 minutes  Greater than 50% of total time was spent with the patient and / or family counseling and / or coordination of care  Thank you for allowing me to participate in the care of Richard Curling  Please don't hesitate to call, text, email, or TigerText with any questions  This text is generated with voice recognition software  There may be translation, syntax,  or grammatical errors  If you have any questions, please contact the dictating provider

## 2020-01-08 NOTE — H&P
H&P- Tyler Rashid 1945, 66 y o  male MRN: [id number]    Unit/Bed#: S -01 Encounter: [id number]    Primary Care Provider: No primary care provider on file  Date and time admitted to hospital: 1/7/2020  6:56 PM        * Painless jaundice secondary to parenchymal infiltration of tumor along with biliary compression   Assessment & Plan  · Patient presents the emergency department for worsening jaundice, as directed by his gastroenterologist   Denies any abdominal pain, nausea, vomiting, diarrhea  Does report decreased appetite and fatigue, complains of pain at abdominal lesion  Denies any recent fevers or chills  · Outpatient GI Dr Bernie Moseley   · Status post biliary stent, which appears to be draining the right hepatic system, however does have worsening jaundice  Given worsening of intrahepatic dilatation, hyperbilirubinemia, suspect there is tumor ingrowth were sent may be partially occluded  · Stent was placed November 27th at [facility]  · 1/6/20: MRCP:  Metastatic colon carcinoma with stable meds to lung bases, right anterolateral abdominal wall, liver  Infiltrating lesion in the right lobe adjacent to the gallbladder causes invasion of the gallbladder wall secondary intrahepatic biliary duct dual dilatation of both the right and left-sided ducts  Indwelling biliary stent extending from the common hepatic duct to the right sided bile ducts    · Consult GI - may need IR stent exchange, revision of PTC versus ERCP    Colon cancer metastasized to multiple sites [facility])  Assessment & Plan  · With mets to liver, lungs, abdominal lesion   · Status post multiple trials of chemotherapy, currently undergoing radiation at [facility]    Atrial fibrillation [facility])  Assessment & Plan  · Home regimen:  Lovenox 100 mg every 12 hours, as recommended by his oncologist, metoprolol succinate 50 mg daily  · Hold Lovenox pending GI plan  · Currently rate controlled on monitor in emergency department, heart rates 90    Hyponatremia  Assessment & Plan  ·  Sodium 127 on admission   · Suspect secondary to cancer diagnosis   · Received on liter NS bolus in ED   · Recheck in AM     History of Trigeminal neuralgia  Assessment & Plan  · Diagnosed greater than 10 years ago  · Home regimen:  Tegretol --- will hold due to adverse effects of worsening liver function and hepatic failure    Hypertension  Assessment & Plan  · Home regimen:  Metoprolol succinate 50 mg daily, continue        VTE Prophylaxis: Pharmacologic VTE Prophylaxis contraindicated due to Pending GI consult  / sequential compression device   Code Status:  Level 1  POLST: POLST is not applicable to this patient  Discussion with family:  Patient, wife, daughter at bedside    Anticipated Length of Stay:  Patient will be admitted on an Inpatient basis with an anticipated length of stay of  greater than 2 midnights  Justification for Hospital Stay:  Worsening jaundice, consult GI pending    Total Time for Visit, including Counseling / Coordination of Care: 45 minutes  Greater than 50% of this total time spent on direct patient counseling and coordination of care  Chief Complaint:   Worsening jaundice, directed by outpatient gastroenterologist to come to emergency department    History of Present Illness: Duane Perea is a 66 y o  male who presents with worsening jaundice, directed by outpatient gastroenterologist to come to emergency department for further workup  Patient is status post biliary stent that was placed November 27th at [facility]  He has history of metastatic colon cancer with mets to lungs, liver, abdominal lesion  He has undergone multiple trials of chemotherapy  Currently receiving radiation at [facility]  Patient's family had called GI earlier today and reported worsening jaundice  They recommended he come to the ER for further workup  Per recent GI note, patient may need IR stent exchange revision of PTC versus ERCP  Past medical history significant for hypertension, atrial fibrillation anticoagulated on therapeutic Lovenox 100 mg every 12 hours, trigeminal neuralgia, BPH    On arrival to emergency department, patient is hemodynamically stable  He did initially have a heart rate of 107  However, this has improved  Reveal of blood work shows worsening total bilirubin, currently 24 8  Appears this has been trending up since mid December  Blood work also reveals hyponatremia, likely secondary to cancer  Patient will be admitted to the hospital as an inpatient, pending GI consult  Review of Systems:    Review of Systems   Constitutional: Positive for activity change and fatigue  Negative for appetite change, chills, fever and unexpected weight change  HENT: Negative  Eyes: Negative  Respiratory: Positive for shortness of breath  Negative for cough and wheezing  Cardiovascular: Negative  Gastrointestinal: Positive for constipation  Negative for abdominal distention, abdominal pain, anal bleeding, blood in stool, diarrhea, nausea, rectal pain and vomiting  Genitourinary: Negative  Musculoskeletal: Negative  Skin: Positive for color change and wound  Neurological: Negative  Hematological: Negative  Psychiatric/Behavioral: Negative  Past Medical and Surgical History:     Past Medical History:   Diagnosis Date    [facility] )     BPH (benign prostatic hyperplasia)     Cancer ([facility] )     colon- johnny liver & lungs    Hypertension     [facility] )     2008- mild weakness left hand/arm    Tumor, metastatic ([facility] )     Tumor, metastatic ([facility] )        Past Surgical History:   Procedure Laterality Date    COLON SURGERY      2013    EYE SURGERY      LIVER SURGERY      TONSILLECTOMY         Meds/Allergies:    Prior to Admission medications    Medication Sig Start Date End Date Taking?  Authorizing Provider   carBAMazepine (CARBATROL) 300 MG 12 hr capsule Take 300 mg by mouth every morning   Yes Historical Provider, MD   carBAMazepine (TEGretol XR) 400 mg 12 hr tablet Take 400 mg by mouth every evening   Yes Historical Provider, MD   enoxaparin (LOVENOX) 100 mg/mL Inject 100 mg under the skin every 12 (twelve) hours    Yes Historical Provider, MD   metoprolol succinate (TOPROL-XL) 50 mg 24 hr tablet Take 1 tablet (50 mg total) by mouth daily 1/3/20  Yes Kenzie Gabriel MD   oxyCODONE (OXY-IR) 5 MG capsule Take 5 mg by mouth every 6 (six) hours as needed    Yes Historical Provider, MD   tamsulosin (FLOMAX) 0 4 mg Take 0 4 mg by mouth daily with dinner   Yes Historical Provider, MD     I have reviewed home medications with patient personally  Allergies: Allergies   Allergen Reactions    Ib Pro [Ibuprofen] Hives and Itching       Social History:     Marital Status: /Civil Union     Substance Use History:   Social History     Substance and Sexual Activity   Alcohol Use Never    Frequency: Never     Social History     Tobacco Use   Smoking Status Former Smoker    Types: Cigarettes    Last attempt to quit: Nonda Jann Years since quittin 0   Smokeless Tobacco Never Used     Social History     Substance and Sexual Activity   Drug Use Never       Family History:    non-contributory    Physical Exam:     Vitals:   Blood Pressure: 125/81 (20)  Pulse: 78 (20)  Temperature: 98 3 °F (36 8 °C) (20)  Temp Source: Oral (20)  Respirations: 18 (20)  Height: 5' 10" (177 8 cm) (20)  Weight - Scale: 98 6 kg (217 lb 6 oz) (20)  SpO2: 96 % (20)    Physical Exam   Constitutional: He is oriented to person, place, and time  No distress  HENT:   Head: Normocephalic and atraumatic  Eyes: Pupils are equal, round, and reactive to light  EOM are normal  Scleral icterus is present  Neck: Normal range of motion  Neck supple  No JVD present     Cardiovascular: Normal rate, regular rhythm, normal heart sounds and intact distal pulses  Exam reveals no friction rub  No murmur heard  Pulmonary/Chest: Effort normal and breath sounds normal  No respiratory distress  He has no wheezes  He has no rales  Abdominal: Soft  Bowel sounds are normal  He exhibits no distension and no mass  There is no tenderness  There is no rebound and no guarding  Musculoskeletal: Normal range of motion  He exhibits no edema or tenderness  Neurological: He is alert and oriented to person, place, and time  Skin:   jaundiced   Psychiatric: He has a normal mood and affect  Right abdomen      Additional Data:     Lab Results: I have personally reviewed pertinent reports  Results from last 7 days   Lab Units 01/07/20 1942   WBC Thousand/uL 7 19   HEMOGLOBIN g/dL 11 6*   HEMATOCRIT % 34 5*   PLATELETS Thousands/uL 141*   NEUTROS PCT % 77*   LYMPHS PCT % 6*   MONOS PCT % 16*   EOS PCT % 0     Results from last 7 days   Lab Units 01/07/20 1942   SODIUM mmol/L 127*   POTASSIUM mmol/L 3 9   CHLORIDE mmol/L 96*   CO2 mmol/L 21   BUN mg/dL 12   CREATININE mg/dL 0 68   ANION GAP mmol/L 10   CALCIUM mg/dL 8 1*   ALBUMIN g/dL 2 0*   TOTAL BILIRUBIN mg/dL 24 88*   ALK PHOS U/L 967*   ALT U/L 108*   AST U/L 117*   GLUCOSE RANDOM mg/dL 92     Results from last 7 days   Lab Units 01/07/20 1942   INR  1 32*             Results from last 7 days   Lab Units 01/07/20 1942   LACTIC ACID mmol/L 0 5       Imaging: I have personally reviewed pertinent reports  No orders to display       EKG, Pathology, and Other Studies Reviewed on Admission:   · MRCP 1/6/2020    [facility] Records Reviewed: Yes     ** Please Note: This note has been constructed using a voice recognition system   **

## 2020-01-08 NOTE — ASSESSMENT & PLAN NOTE
· Diagnosed greater than 10 years ago  · Home regimen:  Tegretol --- will hold due to adverse effects of worsening liver function and hepatic failure

## 2020-01-08 NOTE — CONSULTS
Consultation - 126 [facility]  Richard Curling 66 y o  male MRN: [id number]  Unit/Bed#: S -01 Encounter: [id number]        Inpatient consult to gastroenterology  Consult performed by: Ryanne Vazquez PA-C  Consult ordered by: Marcia Morales          Reason for Consult / Principal Problem: biliary obstruction, elevated LFTs, liver mass secondary to metastatic colon cancer  ASSESSMENT and PLAN:    Principal Problem:    Painless jaundice secondary to parenchymal infiltration of tumor along with biliary compression   Active Problems:    Atrial fibrillation (HCC)    Colon cancer metastasized to multiple sites [facility])    Hypertension    History of Trigeminal neuralgia    Hyponatremia    Low hemoglobin    #1  Elevated LFTs secondary to biliary obstruction and liver mass secondary to metastatic colon cancer: MRI on 1/6 showing a 2 6 x 2 6 cm right lobe lesion, an infiltrating mass in the anterior segment right lobe adjacent to the gallbladder fossa measures 5 5 x 3 7 x 4 7 cm causing invasion of the posterior gallbladder wall and extending to the bifurcation of the intrahepatic bile ducts evoking both   intrahepatic biliary ductal dilatation of both the right side and left-sided ducts  There are surgical changes of the inferior subcapsular right hepatic lobe from tumor resection  Also a biliary stent is located in the common hepatic duct extending to the level of the right-sided intrahepatic ducts  There is persistent intrahepatic biliary ductal dilatation of both the right and left-sided ducts despite stent placement  T bili is elevated at 24  -IR consult was placed and personally discussed with Dr Krissy Alcantara in regards to this patient    We are currently further discussing with Dr Jazmyn Taveras and Dr Enolia Litten team to determine if ERCP verses IR intervention is the best route for this patient   -would also recommend a Surgical Oncology evaluation for their input  -patient already has a medical oncology visit with Dr John Paul Guerra scheduled for the 14th of this month  I did explain to them that at this time there is no utility in asking Medical Oncology to see the patient as his bilirubin is too elevated to consider any chemotherapy options at this time  -continue to monitor patient's LFTs closely  -no indication for antibiotics at this time  -patient is currently NPO until we determine plan  -spent a long time discussing with the patient and his family at bedside    -------------------------------------------------------------------------------------------------------------------    HPI:  This is a 47-year-old male with history of metastatic colon cancer status post left hemicolectomy with mets to the liver with wedge resection, mets to the lungs, and abdominal wall lesion status post multiple trials of chemotherapy recently undergoing radiation at [facility] presented to our office originally on Friday to establish care through [facility]  He was prompted to come to the hospital secondary to abnormal outpatient labs with an elevated bilirubin of 24  The patient was recently seen at [facility] and underwent a biliary stent placement for new onset of jaundice at the end of November via interventional radiology  He presented again to [facility] on December 27th after CT the abdomen and pelvis with contrast at [facility] showed persistent jaundice with bilirubin exceeding 14 and re-demonstrated biliary obstruction  The previously placed stent is seen in the right biliary system extending into the common hepatic duct and the intrahepatic duct appeared to be more prominent on most recent CT scan  He reports that he was plan for repeat IR intervention however per the patient was discharged home several days ago without any further stent changes  He then had an outpatient MRI that was ordered by us which is again showing intrahepatic ductal dilation with a large right-sided liver mass    He reports that he was supposed to follow-up with a GI physician to consider ERCP  The patient is extremely jaundice  He does not have any significant abdominal pain  He reports a lack of appetite and reports that he has lost about 40 lb in the last few months  He reports that his bowel movements typically are on the side of constipation but reports having a normal stool today  Denies any blood in the stool or black tarry stool  Last colonoscopy was about 2 to 3 years ago  He denies any nausea or vomiting  He denies any pruritus at this time  REVIEW OF SYSTEMS:    CONSTITUTIONAL: Denies any fever, chills, or rigors  Decreased appetite, and recent weight loss  HEENT: No earache or tinnitus  Denies hearing loss or visual disturbances  CARDIOVASCULAR: No chest pain or palpitations  RESPIRATORY: Denies any cough, hemoptysis, dyspnea on exertion  Shortness of breath  GASTROINTESTINAL: As noted in the History of Present Illness  GENITOURINARY: No problems with urination  Denies any hematuria or dysuria  NEUROLOGIC: No dizziness or vertigo, denies headaches  MUSCULOSKELETAL: Denies any muscle or joint pain  SKIN: Denies skin rashes or itching  ENDOCRINE: Denies excessive thirst  Denies intolerance to heat or cold  PSYCHOSOCIAL: Denies depression or anxiety  Denies any recent memory loss         Historical Information   Past Medical History:   Diagnosis Date    Atrial fibrillation ([facility] )     BPH (benign prostatic hyperplasia)     Cancer ([address] )     colon- johnny liver & lungs    Hypertension     Stroke ([address] )     2008- mild weakness left hand/arm    Tumor, metastatic ([address] )     Tumor, metastatic ([facility] [address] )      Past Surgical History:   Procedure Laterality Date    COLON SURGERY      2013    EYE SURGERY      LIVER SURGERY      TONSILLECTOMY       Social History   Social History     Substance and Sexual Activity   Alcohol Use Never    Frequency: Never     Social History     Substance and Sexual Activity   Drug Use Never     Social History     Tobacco Use   Smoking Status Former Smoker    Types: Cigarettes    Last attempt to quit:     Years since quittin 0   Smokeless Tobacco Never Used     Family History   Problem Relation Age of Onset    Cancer Father     Colon cancer Father     Cancer Brother     Cancer Paternal Grandfather        Meds/Allergies     Medications Prior to Admission   Medication    carBAMazepine (CARBATROL) 300 MG 12 hr capsule    carBAMazepine (TEGretol XR) 400 mg 12 hr tablet    enoxaparin (LOVENOX) 100 mg/mL    metoprolol succinate (TOPROL-XL) 50 mg 24 hr tablet    oxyCODONE (OXY-IR) 5 MG capsule    tamsulosin (FLOMAX) 0 4 mg     Current Facility-Administered Medications   Medication Dose Route Frequency    metoprolol succinate (TOPROL-XL) 24 hr tablet 50 mg  50 mg Oral Daily    oxyCODONE (ROXICODONE) IR tablet 5 mg  5 mg Oral Q6H PRN    polyethylene glycol (MIRALAX) packet 17 g  17 g Oral Daily PRN    sodium chloride 0 9 % infusion  75 mL/hr Intravenous Continuous    tamsulosin (FLOMAX) capsule 0 4 mg  0 4 mg Oral Daily With Dinner       Allergies   Allergen Reactions    Ib Pro [Ibuprofen] Hives and Itching           Objective     Blood pressure 124/80, pulse 88, temperature 98 3 °F (36 8 °C), temperature source Oral, resp  rate 18, height 5' 10" (1 778 m), weight 96 2 kg (212 lb), SpO2 99 %  No intake or output data in the 24 hours ending 20 1017      PHYSICAL EXAM:      General Appearance:   Alert, cooperative, no distress, appears stated age    HEENT:   Normocephalic, atraumatic, scleral icterus present, no oropharyngeal thrush present      Neck:  Supple, symmetrical, trachea midline, no adenopathy;    thyroid: no enlargement/tenderness/nodules; no carotid  bruit or JVD    Lungs:   Clear to auscultation bilaterally; no rales, rhonchi or wheezing; respirations unlabored    Heart[de-identified]   S1 and S2 normal; regular rate and rhythm; no murmur, rub, or gallop     Abdomen:   Soft, non-tender, non-distended; normal bowel sounds; no masses, no organomegaly; right sided abdominal wall lesion present    Genitalia:   Deferred    Rectal:   Deferred    Extremities:  No cyanosis, clubbing or edema    Pulses:  2+ and symmetric all extremities    Skin:  Skin texture, turgor normal, no rashes or lesions; jaundice present    Lymph nodes:  No palpable cervical, axillary or inguinal lymphadenopathy          Lab Results:   Results from last 7 days   Lab Units 01/08/20  0500   WBC Thousand/uL 6 03   HEMOGLOBIN g/dL 10 8*   HEMATOCRIT % 31 6*   PLATELETS Thousands/uL 133*   NEUTROS PCT % 72   LYMPHS PCT % 7*   MONOS PCT % 18*   EOS PCT % 1     Results from last 7 days   Lab Units 01/08/20  0500   POTASSIUM mmol/L 3 6   CHLORIDE mmol/L 96*   CO2 mmol/L 25   BUN mg/dL 9   CREATININE mg/dL 0 68   CALCIUM mg/dL 8 5   ALK PHOS U/L 937*   ALT U/L 105*   AST U/L 114*     Results from last 7 days   Lab Units 01/08/20  0500   INR  1 25*     Results from last 7 days   Lab Units 01/07/20  1942   LIPASE u/L 60*       Imaging Studies: I have personally reviewed pertinent imaging studies  No results found  Patient was seen and examined by Dr Valeria Watts  All [place] medical decisions were made by Dr Valeria Watts  Thank you for allowing us to participate in the care of this present patient  We will follow-up with you closely

## 2020-01-08 NOTE — ASSESSMENT & PLAN NOTE
· With mets to liver, lungs, abdominal lesion   · Status post multiple trials of chemotherapy, currently undergoing radiation at [facility]

## 2020-01-08 NOTE — PLAN OF CARE
Problem: Potential for Falls  Goal: Patient will remain free of falls  Description  INTERVENTIONS:  - Assess patient frequently for physical needs  -  Identify cognitive and physical deficits and behaviors that affect risk of falls  -  [place] fall precautions as indicated by assessment   - Educate patient/family on patient safety including physical limitations  - Instruct patient to call for assistance with activity based on assessment  - Modify environment to reduce risk of injury  - Consider OT/PT consult to assist with strengthening/mobility  Outcome: Progressing     Problem: Nutrition/Hydration-ADULT  Goal: Nutrient/Hydration intake appropriate for improving, restoring or maintaining nutritional needs  Description  Monitor and assess patient's nutrition/hydration status for malnutrition  Collaborate with interdisciplinary team and initiate plan and interventions as ordered  Monitor patient's weight and dietary intake as ordered or per policy  Utilize nutrition screening tool and intervene as necessary  Determine patient's food preferences and provide high-protein, high-caloric foods as appropriate       INTERVENTIONS:  - Monitor oral intake, urinary output, labs, and treatment plans  - Assess nutrition and hydration status and recommend course of action  - Evaluate amount of meals eaten  - Assist patient with eating if necessary   - Allow adequate time for meals  - Recommend/ encourage appropriate diets, oral nutritional supplements, and vitamin/mineral supplements  - Order, calculate, and assess calorie counts as needed  - Recommend, monitor, and adjust tube feedings and TPN/PPN based on assessed needs  - Assess need for intravenous fluids  - Provide specific nutrition/hydration education as appropriate  - Include patient/family/caregiver in decisions related to nutrition  Outcome: Progressing     Problem: Knowledge Deficit  Goal: Patient/family/caregiver demonstrates understanding of disease process, treatment plan, medications, and discharge instructions  Description  Complete learning assessment and assess knowledge base    Interventions:  - Provide teaching at level of understanding  - Provide teaching via preferred learning methods  Outcome: Progressing

## 2020-01-08 NOTE — PLAN OF CARE
Problem: Potential for Falls  Goal: Patient will remain free of falls  Description  INTERVENTIONS:  - Assess patient frequently for physical needs  -  Identify cognitive and physical deficits and behaviors that affect risk of falls  -  [place] fall precautions as indicated by assessment   - Educate patient/family on patient safety including physical limitations  - Instruct patient to call for assistance with activity based on assessment  - Modify environment to reduce risk of injury  - Consider OT/PT consult to assist with strengthening/mobility  Outcome: Progressing     Problem: Nutrition/Hydration-ADULT  Goal: Nutrient/Hydration intake appropriate for improving, restoring or maintaining nutritional needs  Description  Monitor and assess patient's nutrition/hydration status for malnutrition  Collaborate with interdisciplinary team and initiate plan and interventions as ordered  Monitor patient's weight and dietary intake as ordered or per policy  Utilize nutrition screening tool and intervene as necessary  Determine patient's food preferences and provide high-protein, high-caloric foods as appropriate       INTERVENTIONS:  - Monitor oral intake, urinary output, labs, and treatment plans  - Assess nutrition and hydration status and recommend course of action  - Evaluate amount of meals eaten  - Assist patient with eating if necessary   - Allow adequate time for meals  - Recommend/ encourage appropriate diets, oral nutritional supplements, and vitamin/mineral supplements  - Order, calculate, and assess calorie counts as needed  - Recommend, monitor, and adjust tube feedings and TPN/PPN based on assessed needs  - Assess need for intravenous fluids  - Provide specific nutrition/hydration education as appropriate  - Include patient/family/caregiver in decisions related to nutrition  Outcome: Progressing     Problem: Knowledge Deficit  Goal: Patient/family/caregiver demonstrates understanding of disease process, treatment plan, medications, and discharge instructions  Description  Complete learning assessment and assess knowledge base    Interventions:  - Provide teaching at level of understanding  - Provide teaching via preferred learning methods  Outcome: Progressing

## 2020-01-08 NOTE — ASSESSMENT & PLAN NOTE
·  Sodium 127 on admission   · Suspect secondary to cancer diagnosis   · Received on liter NS bolus in ED   · Recheck in AM

## 2020-01-08 NOTE — ED PROVIDER NOTES
History  Chief Complaint   Patient presents with    Jaundice     Pt told to come to the ED for admission for a procedure for his elevated blirubin levels  Patient is a 78-year-old male history of atrial fibrillation, colon cancer with mets to liver and lungs, hypertension, stroke, and metastatic tumor and left hemicolectomy the presents emergency department with painless jaundice and indication to comes emergency department for admission for hepatobiliary stent procedure  Patient denies associated symptoms  Patient denies pruritic symptoms  Patient with recent office visit to GI due to elevated LFTs  GI provider note indicates that patient has "painless jaundice likely secondary to parenchymal infiltration of the tumor along with biliary compression  Patient has biliary stent place with drain into right hepatic system  Patient's jaundice worsening  Patient is a [address] patient with wall flex stent placed November 27th  Recent hospitalization December 27th, with repeat high are guided stent placement  Patient with recent MRI abdomen with without contrast and MRCP showing metastatic colon carcinoma with stable mets and lesion on right lobe adjacent to gallbladder abating gallbladder wall with secondary intrahepatic biliary ductal dilatation of right and left-sided ducts; indwelling biliary stent standing to the common hepatic duct to the right-sided bile ducts  Patient had called GI today MRI results relayed  Patient states that Dr William Lovett had instructed patient to come to the Emergency department to the ED for inpatient evaluation for worsening jaundice  Patient is accompanied by his wife and daughter that provides part patient history  Patient denies palliative in provocative factors  Patient denies not effective treatment  Patient denies fevers chills, nausea, vomiting  Patient denies diarrhea, constipation urinary symptoms  Patient denies sick contacts or recent travel    Patient denies recent fall or recent trauma  Patient is not currently undergoing chemotherapy  Patient denies chest pain, shortness of breath, and abdominal pain  History provided by:  Patient   used: No        Prior to Admission Medications   Prescriptions Last Dose Informant Patient Reported? Taking?   carBAMazepine (CARBATROL) 300 MG 12 hr capsule   Yes Yes   Sig: Take 300 mg by mouth every morning   carBAMazepine (TEGretol XR) 400 mg 12 hr tablet   Yes Yes   Sig: Take 400 mg by mouth every evening   enoxaparin (LOVENOX) 100 mg/mL   Yes Yes   Sig: Inject 100 mg under the skin every 12 (twelve) hours    metoprolol succinate (TOPROL-XL) 50 mg 24 hr tablet   No Yes   Sig: Take 1 tablet (50 mg total) by mouth daily   oxyCODONE (OXY-IR) 5 MG capsule   Yes Yes   Sig: Take 5 mg by mouth every 6 (six) hours as needed    tamsulosin (FLOMAX) 0 4 mg   Yes Yes   Sig: Take 0 4 mg by mouth daily with dinner      Facility-Administered Medications: None       Past Medical History:   Diagnosis Date    Atrial fibrillation (HCC)     BPH (benign prostatic hyperplasia)     Cancer (HCC)     colon- johnny liver & lungs    Hypertension     Stroke ([facility] )     2008- mild weakness left hand/arm    Tumor, metastatic (HCC)     Tumor, metastatic ([facility] )        Past Surgical History:   Procedure Laterality Date    COLON SURGERY      2013    EYE SURGERY      LIVER SURGERY      TONSILLECTOMY         Family History   Problem Relation Age of Onset    Cancer Father     Colon cancer Father     Cancer Brother     Cancer Paternal Grandfather      I have reviewed and agree with the history as documented      Social History     Tobacco Use    Smoking status: Former Smoker     Types: Cigarettes     Last attempt to quit: 1981     Years since quittin 0    Smokeless tobacco: Never Used   Substance Use Topics    Alcohol use: Never     Frequency: Never    Drug use: Never        Review of Systems   Constitutional: Negative for activity change, appetite change, chills and fever  Jaundice   HENT: Negative for congestion, postnasal drip, rhinorrhea, sinus pressure, sinus pain, sore throat and tinnitus  Eyes: Negative for photophobia and visual disturbance  Respiratory: Negative for cough, chest tightness and shortness of breath  Cardiovascular: Negative for chest pain and palpitations  Gastrointestinal: Negative for abdominal pain, constipation, diarrhea, nausea and vomiting  Genitourinary: Negative for difficulty urinating, dysuria, flank pain, frequency and urgency  Musculoskeletal: Negative for back pain, gait problem, neck pain and neck stiffness  Skin: Negative for pallor and rash  Allergic/Immunologic: Negative for environmental allergies and food allergies  Neurological: Negative for dizziness, weakness, numbness and headaches  Psychiatric/Behavioral: Negative for confusion  All other systems reviewed and are negative  Physical Exam  Physical Exam   Constitutional: He is oriented to person, place, and time  Vital signs are normal  He appears well-developed and well-nourished  He is active and cooperative  Non-toxic appearance  He does not have a sickly appearance  He does not appear ill  Generalized jaundice   HENT:   Head: Normocephalic and atraumatic  Right Ear: Hearing, tympanic membrane, external ear and ear canal normal  No drainage, swelling or tenderness  No mastoid tenderness  No decreased hearing is noted  Left Ear: Hearing, tympanic membrane, external ear and ear canal normal  No drainage, swelling or tenderness  No mastoid tenderness  No decreased hearing is noted  Nose: Nose normal    Mouth/Throat: Uvula is midline, oropharynx is clear and moist and mucous membranes are normal    Eyes: Pupils are equal, round, and reactive to light  Conjunctivae, EOM and lids are normal  Right eye exhibits no discharge  Left eye exhibits no discharge  Scleral icterus is present     Neck: Trachea normal, normal range of motion, full passive range of motion without pain and phonation normal  Neck supple  No JVD present  No tracheal tenderness, no spinous process tenderness and no muscular tenderness present  No neck rigidity  No tracheal deviation and normal range of motion present  Cardiovascular: Normal rate, regular rhythm, normal heart sounds, intact distal pulses and normal pulses  Pulses:       Radial pulses are 2+ on the right side, and 2+ on the left side  Posterior tibial pulses are 2+ on the right side, and 2+ on the left side  Pulmonary/Chest: Effort normal and breath sounds normal  No stridor  He has no decreased breath sounds  He has no wheezes  He has no rhonchi  He has no rales  He exhibits no tenderness, no bony tenderness and no crepitus  Abdominal: Soft  Bowel sounds are normal  He exhibits no distension  There is no tenderness  There is no rigidity, no rebound, no guarding and no CVA tenderness  Musculoskeletal: Normal range of motion  Lymphadenopathy:        Head (right side): No submental, no submandibular, no tonsillar, no preauricular, no posterior auricular and no occipital adenopathy present  Head (left side): No submental, no submandibular, no tonsillar, no preauricular, no posterior auricular and no occipital adenopathy present  He has no cervical adenopathy  Right cervical: No superficial cervical, no deep cervical and no posterior cervical adenopathy present  Left cervical: No superficial cervical, no deep cervical and no posterior cervical adenopathy present  Neurological: He is alert and oriented to person, place, and time  He has normal strength and normal reflexes  No sensory deficit  GCS eye subscore is 4  GCS verbal subscore is 5  GCS motor subscore is 6  Reflex Scores:       Patellar reflexes are 2+ on the right side and 2+ on the left side  Skin: Skin is warm and intact  Capillary refill takes less than 2 seconds   He is not diaphoretic  Psychiatric: He has a normal mood and affect  His speech is normal and behavior is normal  Judgment and thought content normal  Cognition and memory are normal    Nursing note and vitals reviewed  Vital Signs  ED Triage Vitals   Temperature Pulse Respirations Blood Pressure SpO2   01/07/20 1834 01/07/20 1834 01/07/20 1834 01/07/20 1834 01/07/20 1834   98 5 °F (36 9 °C) (!) 107 14 118/69 99 %      Temp Source Heart Rate Source Patient Position - Orthostatic VS BP Location FiO2 (%)   01/07/20 1834 01/07/20 2047 01/07/20 2047 01/07/20 2047 --   Oral Monitor Lying Right arm       Pain Score       01/07/20 1834       5           Vitals:    01/07/20 1834 01/07/20 2030 01/07/20 2047 01/07/20 2244   BP: 118/69  123/79 125/81   Pulse: (!) 107 96 80 78   Patient Position - Orthostatic VS:   Lying Lying         Visual Acuity      ED Medications  Medications   metoprolol succinate (TOPROL-XL) 24 hr tablet 50 mg (has no administration in time range)   tamsulosin (FLOMAX) capsule 0 4 mg (has no administration in time range)   oxyCODONE (ROXICODONE) IR tablet 5 mg (has no administration in time range)   polyethylene glycol (MIRALAX) packet 17 g (has no administration in time range)       Diagnostic Studies  Results Reviewed     Procedure Component Value Units Date/Time    High sensitivity CRP [[id number]] Collected:  01/07/20 1942    Lab Status:  Final result Specimen:  Blood from Line Updated:  01/07/20 2042     CRP, High Sensitivity 73 67 mg/L     Narrative:             HsCRP Level       Relative Risk           <1 0 mg/L          Low           1 0 to 3 0 mg/L    Average           >3 0 mg/L          High        Lactic acid, plasma [[id number]]  (Normal) Collected:  01/07/20 1942    Lab Status:  Final result Specimen:  Blood from Line Updated:  01/07/20 2028     LACTIC ACID 0 5 mmol/L     Narrative:       Result may be elevated if tourniquet was used during collection      Comprehensive metabolic panel [[id number]]  (Abnormal) Collected:  01/07/20 1942    Lab Status:  Final result Specimen:  Blood from Line Updated:  01/07/20 2015     Sodium 127 mmol/L      Potassium 3 9 mmol/L      Chloride 96 mmol/L      CO2 21 mmol/L      ANION GAP 10 mmol/L      BUN 12 mg/dL      Creatinine 0 68 mg/dL      Glucose 92 mg/dL      Calcium 8 1 mg/dL       U/L       U/L      Alkaline Phosphatase 967 U/L      Total Protein 5 6 g/dL      Albumin 2 0 g/dL      Total Bilirubin 24 88 mg/dL      eGFR 93 ml/min/1 73sq m     Narrative:       [facility] guidelines for Chronic Kidney Disease (CKD):     Stage 1 with normal or high GFR (GFR > 90 mL/min/1 73 square meters)    Stage 2 Mild CKD (GFR = 60-89 mL/min/1 73 square meters)    Stage 3A Moderate CKD (GFR = 45-59 mL/min/1 73 square meters)    Stage 3B Moderate CKD (GFR = 30-44 mL/min/1 73 square meters)    Stage 4 Severe CKD (GFR = 15-29 mL/min/1 73 square meters)    Stage 5 End Stage CKD (GFR <15 mL/min/1 73 square meters)  Note: GFR calculation is accurate only with a steady state creatinine    Troponin I [[id number]]  (Normal) Collected:  01/07/20 1942    Lab Status:  Final result Specimen:  Blood from Arm, Right Updated:  01/07/20 2013     Troponin I <0 02 ng/mL     Ammonia [[id number]]  (Normal) Collected:  01/07/20 1942    Lab Status:  Final result Specimen:  Blood from Line Updated:  01/07/20 2011     Ammonia 13 umol/L     Lipase [[id number]]  (Abnormal) Collected:  01/07/20 1942    Lab Status:  Final result Specimen:  Blood from Arm, Right Updated:  01/07/20 2009     Lipase 60 u/L     APTT [[id number]]  (Abnormal) Collected:  01/07/20 1942    Lab Status:  Final result Specimen:  Blood from Arm, Right Updated:  01/07/20 2002     PTT 53 seconds     Protime-INR [[id number]]  (Abnormal) Collected:  01/07/20 1942    Lab Status:  Final result Specimen:  Blood from Arm, Right Updated:  01/07/20 2002     Protime 15 7 seconds      INR 1 32    CBC and differential [[id number]]  (Abnormal) Collected:  01/07/20 1942    Lab Status:  Final result Specimen:  Blood from Line Updated:  01/07/20 1957     WBC 7 19 Thousand/uL      RBC 3 23 Million/uL      Hemoglobin 11 6 g/dL      Hematocrit 34 5 %       fL      MCH 35 9 pg      MCHC 33 6 g/dL      RDW 14 4 %      MPV 10 6 fL      Platelets 949 Thousands/uL      nRBC 0 /100 WBCs      Neutrophils Relative 77 %      Immat GRANS % 1 %      Lymphocytes Relative 6 %      Monocytes Relative 16 %      Eosinophils Relative 0 %      Basophils Relative 0 %      Neutrophils Absolute 5 48 Thousands/µL      Immature Grans Absolute 0 07 Thousand/uL      Lymphocytes Absolute 0 45 Thousands/µL      Monocytes Absolute 1 14 Thousand/µL      Eosinophils Absolute 0 02 Thousand/µL      Basophils Absolute 0 03 Thousands/µL                  No orders to display              Procedures  Procedures         ED Course  ED Course as of Jan 08 0242   [place] 103 Jan 07, 2020   4142 Patient stated that Dr Bret Elliott, had instructed patient to report to the emergency department for admission and to be assessed by GI tomorrow  Patient with recent MRI Abdomen w/wo contrast and MRCP - impression- "1  Metastatic colon carcinoma with stable metastases at the lung bases, right anterolateral abdominal wall, and liver as described above in detail  The infiltrating lesion in the right lobe adjacent to the gallbladder fossa causes invasion of the   gallbladder wall with secondary intrahepatic biliary ductal dilatation of both the right and left-sided ducts  2   Indwelling biliary stent extending from the common hepatic duct to the right-sided bile ducts  No intraluminal enhancement to suggest tumor infiltration "      2020 Sodium(!): 127   2020 Chloride(!): 96   2020 Patient total bilirubin 24 88 from 21 97, for days prior to current ED presentation      2023 Downward trend of liver enzymes with upper trend of alk-phos        2023 Normal ammonia level      2024 Hyponatremia with sodium 127 4 days prior to current ED presentation sodium 129      2033 Call Dr Joleen Guerrero, GI      2054 Spoke with Dr Delaney Nicole, [address] with indication to admit patient with follow-up LFTs, NPO midnight                                  MDM  Number of Diagnoses or Management Options  Elevated LFTs: established and worsening  Hyperbilirubinemia: established and worsening  Hyponatremia: established and worsening  Tumor, metastatic ([address] ): established and worsening     Amount and/or Complexity of Data Reviewed  Clinical lab tests: ordered and reviewed  Tests in the radiology section of CPT®: ordered and reviewed  Review and summarize past medical records: yes    Risk of Complications, Morbidity, and/or Mortality  Presenting problems: moderate  Diagnostic procedures: moderate  Management options: moderate    Patient Progress  Patient progress: stable    Patient is a 70-year-old male history of atrial fibrillation, colon cancer with mets to liver and lungs, hypertension, stroke, and metastatic tumor and left hemicolectomy the presents emergency department with painless jaundice and indication to comes emergency department for admission for hepatobiliary stent procedure  Patient normotensive; afebrile  Patient is currently a patient at [facility], and not undergoing chemotherapy at this time  Patient is currently followed by Dr Lucia Pablo with recent office visit 01/03/2020; patient recent MRI abdomen with and without contrast in MRCP on 01/06/2020 with impression-Metastatic colon carcinoma with stable metastases at the lung bases, right anterolateral abdominal wall, and liver as described above in detail  The infiltrating lesion in the right lobe adjacent to the gallbladder fossa causes invasion of the   gallbladder wall with secondary intrahepatic biliary ductal dilatation of both the right and left-sided ducts    2   Indwelling biliary stent extending from the common hepatic duct to the right-sided bile ducts  No intraluminal enhancement to suggest tumor infiltration    Patient LFTs at baseline; worsening total bilirubin with increased from 4 days prior to current ED presentation at 24 88 from 21 97; elevated alk phos  No lactic acidosis and no leukocytosis with patient being afebrile; likelihood of systemic infection unlikely  Normal ammonia; patient PT 15 7, INR 1 32 patient currently on Lovenox; H&H at baseline    Spoke with Dr Cárdenas, GI with indication to admit patient, follow-up LFTs, patient and PO, and hold Lovenox; with GI seeing patient tomorrow  Patient with verbal understanding of all clinical laboratory findings, admission instructions and verbalize agreement current treatment plan  Patient verbalized no current pain level this time; patient denied offered analgesics  Patient hemodynamically stable and afebrile at time of final evaluation and admission      Disposition  Final diagnoses:   Hyperbilirubinemia   Hyponatremia   Elevated LFTs   Tumor, metastatic ([facility] )     Time reflects when diagnosis was documented in both MDM as applicable and the Disposition within this note     Time User Action Codes Description Comment    1/7/2020  8:57 PM Rheta Curd Add [E80 6] Hyperbilirubinemia     1/7/2020  8:58 PM [place] Prem [E87 1] Hyponatremia     1/7/2020  8:58 PM Rheta Curd Add [R94 5] Elevated LFTs     1/7/2020  8:58 PM Rheta Curd Add [C79 9] Tumor, metastatic ([facility] )     1/8/2020 12:16 AM [place] Add [R17] Painless jaundice secondary to parenchymal infiltration of tumor along with biliary compression      1/8/2020 12:16 AM [place] Remove [R17] Painless jaundice secondary to parenchymal infiltration of tumor along with biliary compression      1/8/2020 12:16 AM [place] Add [R17] Painless jaundice secondary to parenchymal infiltration of tumor along with biliary compression        ED Disposition     ED Disposition Condition Date/Time Comment    Admit Stable Tue Jan 7, 2020 9:19 PM Case was discussed with Cindy Peterson and the patient's admission status was agreed to be Admission Status: inpatient status to the service of Dr Blaze Wilcox, Internal Medicine   Follow-up Information    None         Current Discharge Medication List      CONTINUE these medications which have NOT CHANGED    Details   carBAMazepine (CARBATROL) 300 MG 12 hr capsule Take 300 mg by mouth every morning      carBAMazepine (TEGretol XR) 400 mg 12 hr tablet Take 400 mg by mouth every evening      enoxaparin (LOVENOX) 100 mg/mL Inject 100 mg under the skin every 12 (twelve) hours       metoprolol succinate (TOPROL-XL) 50 mg 24 hr tablet Take 1 tablet (50 mg total) by mouth daily  Qty: 20 tablet, Refills: 0    Associated Diagnoses: Hypertension, unspecified type      oxyCODONE (OXY-IR) 5 MG capsule Take 5 mg by mouth every 6 (six) hours as needed       tamsulosin (FLOMAX) 0 4 mg Take 0 4 mg by mouth daily with dinner           No discharge procedures on file      ED Provider  Electronically Signed by           Eve Bailey PA-C  01/08/20 [address] [place], PA-C  01/08/20 8703

## 2020-01-09 NOTE — ASSESSMENT & PLAN NOTE
· Diagnosed greater than 10 years ago  · Home regimen:  Tegretol --- holding due to adverse effects of worsening liver function and hepatic failure

## 2020-01-09 NOTE — ASSESSMENT & PLAN NOTE
· Diagnosed greater than 10 years ago  · Home regimen:  Tegretol --- holding due to adverse effects of worsening liver function and hepatic failure     · Switched to gabapentin 100 mg bid

## 2020-01-09 NOTE — PLAN OF CARE
Problem: Potential for Falls  Goal: Patient will remain free of falls  Description  INTERVENTIONS:  - Assess patient frequently for physical needs  -  Identify cognitive and physical deficits and behaviors that affect risk of falls  -  [place] fall precautions as indicated by assessment   - Educate patient/family on patient safety including physical limitations  - Instruct patient to call for assistance with activity based on assessment  - Modify environment to reduce risk of injury  - Consider OT/PT consult to assist with strengthening/mobility  Outcome: Progressing     Problem: Nutrition/Hydration-ADULT  Goal: Nutrient/Hydration intake appropriate for improving, restoring or maintaining nutritional needs  Description  Monitor and assess patient's nutrition/hydration status for malnutrition  Collaborate with interdisciplinary team and initiate plan and interventions as ordered  Monitor patient's weight and dietary intake as ordered or per policy  Utilize nutrition screening tool and intervene as necessary  Determine patient's food preferences and provide high-protein, high-caloric foods as appropriate       INTERVENTIONS:  - Monitor oral intake, urinary output, labs, and treatment plans  - Assess nutrition and hydration status and recommend course of action  - Evaluate amount of meals eaten  - Assist patient with eating if necessary   - Allow adequate time for meals  - Recommend/ encourage appropriate diets, oral nutritional supplements, and vitamin/mineral supplements  - Order, calculate, and assess calorie counts as needed  - Recommend, monitor, and adjust tube feedings and TPN/PPN based on assessed needs  - Assess need for intravenous fluids  - Provide specific nutrition/hydration education as appropriate  - Include patient/family/caregiver in decisions related to nutrition  Outcome: Progressing     Problem: Knowledge Deficit  Goal: Patient/family/caregiver demonstrates understanding of disease process, treatment plan, medications, and discharge instructions  Description  Complete learning assessment and assess knowledge base    Interventions:  - Provide teaching at level of understanding  - Provide teaching via preferred learning methods  Outcome: Progressing

## 2020-01-09 NOTE — PROGRESS NOTES
Progress Note - Andi Linder 66 y o  male MRN: [id number]    Unit/Bed#: S -01 Encounter: [id number]    Assessment and Plan:   Principal Problem:    Painless jaundice secondary to parenchymal infiltration of tumor along with biliary compression   Active Problems:    Atrial fibrillation ([address] )    Colon cancer metastasized to multiple sites [facility])    Hypertension    History of Trigeminal neuralgia    #1  Elevated LFTs secondary to biliary obstruction and liver mass secondary to metastatic colon cancer: MRI on 1/6 showing a 2 6 x 2 6 cm right lobe lesion, an infiltrating mass in the anterior segment right lobe adjacent to the gallbladder fossa measures 5 5 x 3 7 x 4 7 cm causing invasion of the posterior gallbladder wall and extending to the bifurcation of the intrahepatic bile ducts evoking both   intrahepatic biliary ductal dilatation of both the right side and left-sided ducts  There are surgical changes of the inferior subcapsular right hepatic lobe from tumor resection  Also a biliary stent is located in the common hepatic duct extending to the level of the right-sided intrahepatic ducts  Gina Caulk is persistent intrahepatic biliary ductal dilatation of both the right and left-sided ducts despite stent placement  T bili is elevated at 25    -after much discussion between the interventional radiology and GI teams, it was decided that we would proceed with ERCP to possibly place stents to relieve biliary obstruction  Patient will be transferred over to [place] for this as we would like to have all the tools available to proceed with this complicated case   -patient can have regular diet today, NPO after midnight  -appreciate Surgical Oncology input  -continue to monitor patient's LFTs closely  -no indication for antibiotics at this time  -discussed with patient and family at bedside    They are in agreement with plan     ----------------------------------------------------------------------------------------------------------------    Subjective:     Patient has no complaints at this time  Denies any abdominal pain currently  Denies any nausea or vomiting  Objective:     Vitals: Blood pressure 125/77, pulse 88, temperature 97 6 °F (36 4 °C), temperature source Oral, resp  rate 18, height 5' 10" (1 778 m), weight 95 3 kg (210 lb 3 2 oz), SpO2 99 %  ,Body mass index is 30 16 kg/m²        Intake/Output Summary (Last 24 hours) at 1/9/2020 1207  Last data filed at 1/9/2020 0800  Gross per 24 hour   Intake 0 ml   Output    Net 0 ml       Physical Exam:     General Appearance: Alert, appears stated age and cooperative; scleral icterus and jaundice present  Lungs: Clear to auscultation bilaterally, no rales or rhonchi, no labored breathing/accessory muscle use  Heart: Regular rate and rhythm, S1, S2 normal, no murmur, click, rub or gallop  Abdomen: Soft, non-tender, non-distended; bowel sounds normal; no masses or no organomegaly; right-sided abdominal wall lesion  Extremities: No cyanosis, clubbing, or edema    Invasive Devices     Central Venous Catheter Line            Port A Cath 01/07/20 Right Chest 1 day                Lab Results:  Results from last 7 days   Lab Units 01/09/20  0603 01/08/20  0500   WBC Thousand/uL 6 93 6 03   HEMOGLOBIN g/dL 11 6* 10 8*   HEMATOCRIT % 34 1* 31 6*   PLATELETS Thousands/uL 156 133*   NEUTROS PCT %  --  72   LYMPHS PCT %  --  7*   MONOS PCT %  --  18*   EOS PCT %  --  1     Results from last 7 days   Lab Units 01/09/20  0603   POTASSIUM mmol/L 3 9   CHLORIDE mmol/L 98*   CO2 mmol/L 24   BUN mg/dL 9   CREATININE mg/dL 0 72   CALCIUM mg/dL 8 1*   ALK PHOS U/L 1,016*   ALT U/L 97*   AST U/L 121*     Results from last 7 days   Lab Units 01/08/20  0500   INR  1 25*     Results from last 7 days   Lab Units 01/07/20  1942   LIPASE u/L 60*       Imaging Studies: I have personally reviewed pertinent imaging studies  No results found

## 2020-01-09 NOTE — DISCHARGE SUMMARY
Discharge- Toña Napier 1945, 66 y o  male MRN: [id number]    Unit/Bed#: S -01 Encounter: [id number]    Primary Care Provider: No primary care provider on file  Date and time admitted to hospital: 1/7/2020  6:56 PM        * Painless jaundice secondary to parenchymal infiltration of tumor along with biliary compression   Assessment & Plan  · History of colon cancer with metastases to lungs and liver, status post recent biliary stent placement (11/2019)  · Patient presents with worsening asymptomatic jaundice  Denies any abdominal pain, nausea, vomiting, fevers or chills  · Outpatient GI Dr Doug Real   · Status post biliary stent, which appears to be draining the right hepatic system, however does have worsening jaundice  Given worsening of intrahepatic dilatation, hyperbilirubinemia, suspect there is tumor ingrowth were sent may be partially occluded  · Stent was [facility] November 27th at [facility]  · 1/6/20: MRCP:  Metastatic colon carcinoma with stable meds to lung bases, right anterolateral abdominal wall, liver  Infiltrating lesion in the right lobe adjacent to the gallbladder causes invasion of the gallbladder wall secondary intrahepatic biliary duct dual dilatation of both the right and left-sided ducts  Indwelling biliary stent extending from the common hepatic duct to the right sided bile ducts  · GI, Hematology-Oncology and IR specialties are involved in this case given the highly complicated presentation  As per IR: given that the patient is asymptomatic (no intractable pruritus, encephalopathy, etc ) and the unlikely expectation that we will ever get his bilirubin down to a level suitable to initiate chemotherapy, it seems like putting two catheters into his liver will likely be of minimal benefit      · Dr Alma Stack agreed, and stated that they will likely proceed with ERCP      Colon cancer metastasized to multiple sites [facility])  Assessment & Plan  · With mets to liver, lungs, and percutaneous abdominal lesion   · Status post multiple trials of chemotherapy and radiation therapy  · Recently establishing new oncologist with [facility]   Has an appointment scheduled with Dr Luis Alberto Hendrix  · Seen by Oncology on January 8th  As per note:  · 65 YO M with history of HTN, CVA, atrial fibrillation, colorectal cancer with liver/lung mets s/p resection, radiation and percutaneous placement of biliary stent at [facility], 11/19 presents with fatigue and worsening jaundice  He is scheduled for percutaneous drainage with IR tomorrow  Discussed with Dr Nehemiah Osman  No surgical intervention will be offered at this time  · However, patient was assessed by IR (01/09/20)  (Refer to IR notes as above)  IR does not feel patient is a candidate for IR guided drainage and discussed same with Dr Santana Favor  PLAN IS FOR ERCP      Atrial fibrillation (HCC)  Assessment & Plan  · Home regimen:  Lovenox 100 mg every 12 hours, as recommended by his oncologist, metoprolol succinate 50 mg daily  · Holding Lovenox  · Currently rate controlled  History of Trigeminal neuralgia  Assessment & Plan  · Diagnosed greater than 10 years ago  · Home regimen:  Tegretol --- holding due to adverse effects of worsening liver function and hepatic failure    Hypertension  Assessment & Plan  · Home regimen:  Metoprolol succinate 50 mg daily  · Hypertension controlled at this time  Discharging Resident: Tip Stanton MD  Attending: Juanita Jim MD  PCP: No primary care provider on file  Admission Date: 1/7/2020  Discharge Date: 01/09/20    Disposition:      Inpatient [address] at [facility] SN[facility]:   · Not Applicable to this Patient - Not Applicable to this Patient    Reason for Admission:  Painless jaundice in the setting of metastatic colon cancer  Consultations During Hospital Stay:  · Oncology, IR, GI      Procedures Performed: · None    Medication Adjustments and Discharge Medications:  · Summary of Medication Adjustments made as a result of this hospitalization:  Holding Tegretol and Lovenox for the time being  · Medication Dosing Tapers - Please refer to Discharge Medication List for details on any medication dosing tapers (if applicable to patient)  · Medications being temporarily held (include recommended restart time):  Tegretol and Lovenox  Total being held for questionable evidence of liver damage and on ox being held for impending ERCP/IR guided procedure  · Discharge Medication List: See after visit summary for reconciled discharge medications  Wound Care Recommendations:  When applicable, please see wound care section of After Visit Summary  Diet Recommendations at Discharge:  Diet -        Diet Orders   (From admission, onward)             Start     Ordered    01/10/20 0001  Diet NPO  Diet effective midnight     Question Answer Comment   Diet Type NPO    RD to adjust diet per protocol? Yes        01/09/20 1044    01/09/20 1049  Diet Regular; Regular House  Diet effective now     Question Answer Comment   Diet Type Regular    Regular Regular House    RD to adjust diet per protocol? Yes        01/09/20 1049    01/07/20 2304  Room Service  Once     Question:  Type of Service  Answer:  Room Service - Appropriate with Assistance    01/07/20 2304              Fluid Restriction - No Fluid Restriction at Discharge  Instructions for any Catheters / Lines Present at Discharge (including removal date, if applicable):  Not applicable    Significant Findings / Test Results:     · MRCP shows metastatic colon cancer was stable meds to lung bases right abdominal wall and liver; infiltrative invasion of mets to gallbladder wall with intrahepatic biliary wall dilation; biliary stent visualized    Incidental Findings:   · None     Test Results Pending at Discharge (will require follow up):    · None applicable     Outpatient Tests Requested:  · Being transferred to [facility]    Complications:  None    Hospital Course: Karie Ribera is a 66 y o  male patient past medical history of hypertension, atrial fibrillation on Lovenox, colorectal cancer with metastasis to liver, lung, and abdominal wall, currently status post resection, chemotherapy and radiation therapy with recent percutaneous placement of biliary stent on November 2019, who originally presented to the hospital on 1/7/2020 due to worsening pain less jaundice  MRCP done upon admission shows evidence of metastatic colon carcinoma with stable metastases at lung bases, right anterior abdominal wall, and liver with infiltrating lesion in the right lobe adjacent to the gallbladder fossa causing invasion of the gallbladder wall, and secondary intrahepatic biliary ductal dilation of right and left ducts  In light of patient's very complicated case, Oncology, GI, and IR were all involved in his care  It was agreed amongst all consultant services that patient will undergo ERCP +/-IR guided percutaneous stent if needed be  Lovenox and Tegretol are currently being held  In accordance to all services involved, patient will be transferred to [facility] for further management and care  Condition at Discharge: stable     Discharge Day Visit / Exam:     Subjective:  No overnight events  Vitals: Blood Pressure: 129/83 (01/09/20 0727)  Pulse: 95 (01/09/20 0727)  Temperature: 97 6 °F (36 4 °C) (01/09/20 0727)  Temp Source: Oral (01/09/20 0727)  Respirations: 18 (01/09/20 0727)  Height: 5' 10" (177 8 cm) (01/07/20 2244)  Weight - Scale: 95 3 kg (210 lb 3 2 oz) (01/09/20 0600)  SpO2: 98 % (01/09/20 0727)  Exam:   Physical Exam   Constitutional: He is oriented to person, place, and time  He appears well-developed  Significant jaundice   HENT:   Head: Normocephalic and atraumatic  Eyes: Pupils are equal, round, and reactive to light   EOM are normal    Neck: Normal range of motion  Neck supple  Cardiovascular: Normal rate, regular rhythm and normal heart sounds  No murmur heard  Pulmonary/Chest: Effort normal and breath sounds normal  No stridor  No respiratory distress  He has no wheezes  He has no rales  Abdominal: Soft  Bowel sounds are normal  He exhibits mass  He exhibits no distension  There is tenderness  There is no guarding  Right protruding mass of about 10 cm in diameter, not smooth, irregular, seemingly friable, with secretion of mild yellow greenish malodorous fluid   Musculoskeletal: Normal range of motion  He exhibits no edema, tenderness or deformity  Neurological: He is alert and oriented to person, place, and time  He displays normal reflexes  No cranial nerve deficit or sensory deficit  He exhibits normal muscle tone  Coordination normal    Skin: Skin is warm and dry  Capillary refill takes less than 2 seconds  Psychiatric: He has a normal mood and affect  His behavior is normal  Judgment and thought content normal    Nursing note and vitals reviewed  Discussion with Family:  Discussed at length with patient and family members at bedside including daughter, wife, and son-in-law  Answered all questions  Goals of Care Discussions:  · Code Status at Discharge: Level 1 - Full Code  · Were there any Goals of Care Discussions during Hospitalization?: No  · Results of any General Goals of Care Discussions:  Not applicable   · POLST Completed: No   · If POLST Completed, Summary of POLST Agreement Provided Here:  Not applicable   · OK to Rehospitalize if Needed? Yes    Discharge instructions/Information to patient and family:   See after visit summary section titled Discharge Instructions for information provided to patient and family  Planned Readmission:  Likely but not planned        ** Please Note: This note has been constructed using a voice recognition system **

## 2020-01-09 NOTE — ASSESSMENT & PLAN NOTE
· With mets to liver, lungs, and percutaneous abdominal lesion   · Status post multiple trials of chemotherapy and radiation therapy  · Recently establishing new oncologist with [facility]   Has an appointment scheduled with Dr Fatoumata Wisdom  · Seen by Oncology on January 8th  As per note:  · 65 YO M with history of HTN, CVA, atrial fibrillation, colorectal cancer with liver/lung mets s/p resection, radiation and percutaneous placement of biliary stent at [facility], 11/19 presents with fatigue and worsening jaundice  He is scheduled for percutaneous drainage with IR tomorrow  Discussed with Dr Robert Perez  No surgical intervention will be offered at this time  · However, patient was assessed by IR (01/09/20)  (Refer to IR notes as above)  IR does not feel patient is a candidate for IR guided drainage and discussed same with Dr Flaca Sena      PLAN IS FOR ERCP

## 2020-01-09 NOTE — PLAN OF CARE
Problem: Nutrition/Hydration-ADULT  Goal: Nutrient/Hydration intake appropriate for improving, restoring or maintaining nutritional needs  Description  Monitor and assess patient's nutrition/hydration status for malnutrition  Collaborate with interdisciplinary team and initiate plan and interventions as ordered  Monitor patient's weight and dietary intake as ordered or per policy  Utilize nutrition screening tool and intervene as necessary  Determine patient's food preferences and provide high-protein, high-caloric foods as appropriate  INTERVENTIONS:  - Monitor oral intake, urinary output, labs, and treatment plans  - Assess nutrition and hydration status and recommend course of action  - Evaluate amount of meals eaten  - Assist patient with eating if necessary   - Allow adequate time for meals  - Recommend/ encourage appropriate diets, oral nutritional supplements, and vitamin/mineral supplements  - Order, calculate, and assess calorie counts as needed  - Recommend, monitor, and adjust tube feedings and TPN/PPN based on assessed needs  - Assess need for intravenous fluids  - Provide specific nutrition/hydration education as appropriate  - Include patient/family/caregiver in decisions related to nutrition  Outcome: Progressing     Problem: Potential for Falls  Goal: Patient will remain free of falls  Description  INTERVENTIONS:  - Assess patient frequently for physical needs  -  Identify cognitive and physical deficits and behaviors that affect risk of falls    -  [place] fall precautions as indicated by assessment   - Educate patient/family on patient safety including physical limitations  - Instruct patient to call for assistance with activity based on assessment  - Modify environment to reduce risk of injury  - Consider OT/PT consult to assist with strengthening/mobility  Outcome: Progressing     Problem: GASTROINTESTINAL - ADULT  Goal: Minimal or absence of nausea and/or vomiting  Description  INTERVENTIONS:  - Administer IV fluids if ordered to ensure adequate hydration  - Maintain NPO status until nausea and vomiting are resolved  - Nasogastric tube if ordered  - Administer ordered antiemetic medications as needed  - Provide nonpharmacologic comfort measures as appropriate  - Advance diet as tolerated, if ordered  - Consider nutrition services referral to assist patient with adequate nutrition and appropriate food choices  Outcome: Progressing  Goal: Maintains or returns to baseline bowel function  Description  INTERVENTIONS:  - Assess bowel function  - Encourage oral fluids to ensure adequate hydration  - Administer IV fluids if ordered to ensure adequate hydration  - Administer ordered medications as needed  - Encourage mobilization and activity  - Consider nutritional services referral to assist patient with adequate nutrition and appropriate food choices  Outcome: Progressing  Goal: Maintains adequate nutritional intake  Description  INTERVENTIONS:  - Monitor percentage of each meal consumed  - Identify factors contributing to decreased intake, treat as appropriate  - Assist with meals as needed  - Monitor I&O, weight, and lab values if indicated  - Obtain nutrition services referral as needed  Outcome: Progressing     Problem: METABOLIC, FLUID AND ELECTROLYTES - ADULT  Goal: Electrolytes maintained within normal limits  Description  INTERVENTIONS:  - Monitor labs and assess patient for signs and symptoms of electrolyte imbalances  - Administer electrolyte replacement as ordered  - Monitor response to electrolyte replacements, including repeat lab results as appropriate  - Instruct patient on fluid and nutrition as appropriate  Outcome: Progressing  Goal: Fluid balance maintained  Description  INTERVENTIONS:  - Monitor labs   - Monitor I/O and WT  - Instruct patient on fluid and nutrition as appropriate  - Assess for signs & symptoms of volume excess or deficit  Outcome: Progressing

## 2020-01-09 NOTE — ASSESSMENT & PLAN NOTE
· Home regimen:  Lovenox 100 mg every 12 hours, as recommended by his oncologist, metoprolol succinate 50 mg daily  · Holding Lovenox  · Currently rate controlled

## 2020-01-09 NOTE — ASSESSMENT & PLAN NOTE
· Pt is transfer from [address] [place] [address] d/t possible ERCP and IR guided percutaneous stent exchange/drain  · History of colon cancer with metastases to lungs and liver, s/p recent biliary stent placement (11/2019)  · Patient presents with worsening asymptomatic jaundice  No pain, N/V, fever, chills  ? 1/6/20: MRCP:  Metastatic colon carcinoma with stable meds to lung bases, right anterolateral abdominal wall, liver  Infiltrating lesion in the right lobe adjacent to the gallbladder causes invasion of the gallbladder wall secondary intrahepatic biliary duct dual dilatation of both the right and left-sided ducts  Indwelling biliary stent extending from the common hepatic duct to the right sided bile ducts  Plan-  · GI, Hematology-Oncology, palliative  and IR consulted     · NPO from midnight   · Possible ERCP/IR guided stent tomorrow

## 2020-01-09 NOTE — NUTRITION
01/09/20 1444   Assessment   Timepoint Initial  (Trigger weight loss, decreased PO PTA)   Labs   List Completed Labs Other (Comment)  (1/9/20 Na 130, Ca 8 1, , ALT 97, Alk Phos 1016,Alb 1 8   1/7/20 Ammonia 13   Meds reviewed)   Feeding Route   PO Independent   Adequacy of Intake   Nutrition Modality PO   Intake Meals %  (Lunch 1/9/20 RD Observed)   Nutrition Prognosis   Nutrition Concerns Other (Comment); Skin breakdown;Elderly  (Per RN flowsheets :Right Abdominal wound, Edema +2 RLE,LLE, weight loss, Jaundice/elevated liver enzymes)   Comorbid Concerns Other (Comment)  (Metastatic Cancer,HTN)   Nutrition Considerations   (Spoke with patient and family regarding nutrition supplementation for home and [facility] Oncology Outpatient RD services)   PES Statement   Problem Clinical   Weight (3) Unintended weight loss NC-3 2   Related to Increased Energy Needs; Increased Protein needs   As evidenced by: Weight Loss   Patient Nutrition Goals   Goal increase Kcal/Pro   Goal Status initiated   Timeframe to complete goal by next f/u   Recommendations/Interventions   Summary Patient establishing care with [facility] s/p treatment [facility]  To be transferred to [facility] for ERCP (placing 2 catheters in liver)  Per data 8 7% weight loss in 2 months most likely related to interruptions in PO opportunities due to being NPO for procedures past few weeks(stated per family) and hypermetabolic diagnoses/treatments  Patient agreed to Ensure enlive BID and RD promoted [employer] products for home and OP Oncology services s/p hospital admission  Malnutrition/BMI Present No  (Weight Loss identified but does not have 2 criteria at this review  WIll need to continue to monitor )   Interventions Diet: continued as ordered; Supplement initiate;MNT via Outpatient   Nutrition Recommendations Continue diet as ordered; Other (specify); Coordination of care  (Provide Ensure Enlive BID  Patient candidate for OP Oncology RD services  ) Nutrition Complexity Risk   Nutrition complexity level Moderate risk   Follow up date 01/15/20  (chk PO/weight/malnutrition criteria)

## 2020-01-09 NOTE — PROGRESS NOTES
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  He underwent stenting at [facility] several weeks ago, and his bilirubin has continued to elevate  His anterior division of his R biliary system appears to be jailed off, and his posterior R system appears to be inadequately draining  At this point, I explained to Dr Jazmyn Taveras that he would likely require two tubes for life in his liver to adequately drain both systems  I also recommended getting a direct bilirubin to try and figure out how much of this is an obstructive hyperbilirubinemia versus bilirubinemia related to his intrinsic liver disease  In my opinion, given that the patient is asymptomatic (no intractable pruritus, encephalopathy, etc ) and the unlikely expectation that we will ever get his bilirubin down to a level suitable to initiate chemotherapy, it seems like putting two catheters into his liver will likely be of minimal benefit  Dr Jazmyn Taveras agreed, and stated that they will likely proceed with ERCP  I spent 15 minutes in coordinating this patient's clinical care, reviewing old imaging, notes, and laboratory values, coordinating with our schedulers, and conveying information back to the primary team     We will sign off  Please re-consult IR as neccessary  Thank you for allowing me to participate in the care of [facility]  Please don't hesitate to call, text, email, or TigerText with any questions  Leellen Mcburney, MD  Interventional Radiologist  [facility]  Personal Cell: [phone]  [email or web address]

## 2020-01-09 NOTE — PROGRESS NOTES
Progress Note - General Surgery   Regina Santana 66 y o  male MRN: [id number]  Unit/Bed#: S -01 Encounter: [id number]    Assessment:  74M with metastatic Colon Ca presented with painless jaundice    Plan:  Plans per medicine to transfer to [facility] for ERCP, possible PTC if unable to decompress biliary system  Family concerned about painful fungating mass in RUQ  Has received radiation x2  Discussed poor subcostal location as well as possible extensive resection including bowel and significant fascial defect with family- Poor surgical candidate and likely poor outcomes if surgery attempted  Family understood and may wish to pursue nerve blocks/ anesthesia for mass  No acute surgical interventions  Attending to see at [address]:  Patient feels well  He and family had several questions which were all addressed  Afebrile    Objective:   Blood pressure 129/83, pulse 95, temperature 97 6 °F (36 4 °C), temperature source Oral, resp  rate 18, height 5' 10" (1 778 m), weight 95 3 kg (210 lb 3 2 oz), SpO2 98 %  ,Body mass index is 30 16 kg/m²  I/O last 3 completed shifts: In: 950 [I V :950]  Out: -     Invasive Devices     Central Venous Catheter Line            Port A Cath 01/07/20 Right Chest 1 day                Physical Exam:  NAD, Aox3  MMM, NCAT  EOMI, PERRLA  Regular rate and rhythm  Equal chest expansion, unlabored respirations  Abd soft, NTND  Fungating RUQ mass  Ext: No deficits, warm and well perfused  Skin: Jaundiced  Normal mood and affect       Lab, Imaging and other Studies:    Recent Results (from the past 36 hour(s))   Comprehensive metabolic panel    Collection Time: 01/08/20  5:00 AM   Result Value Ref Range    Sodium 128 (L) 136 - 145 mmol/L    Potassium 3 6 3 5 - 5 3 mmol/L    Chloride 96 (L) 100 - 108 mmol/L    CO2 25 21 - 32 mmol/L    ANION GAP 7 4 - 13 mmol/L    BUN 9 5 - 25 mg/dL    Creatinine 0 68 0 60 - 1 30 mg/dL    Glucose 89 65 - 140 mg/dL    Calcium 8 5 8 3 - 10 1 mg/dL     (H) 5 - 45 U/L     (H) 12 - 78 U/L    Alkaline Phosphatase 937 (H) 46 - 116 U/L    Total Protein 5 1 (L) 6 4 - 8 2 g/dL    Albumin 1 8 (L) 3 5 - 5 0 g/dL    Total Bilirubin 23 81 (H) 0 20 - 1 00 mg/dL    eGFR 93 ml/min/1 73sq m   Magnesium    Collection Time: 01/08/20  5:00 AM   Result Value Ref Range    Magnesium 1 9 1 6 - 2 6 mg/dL   CBC and differential    Collection Time: 01/08/20  5:00 AM   Result Value Ref Range    WBC 6 03 4 31 - 10 16 Thousand/uL    RBC 3 02 (L) 3 88 - 5 62 Million/uL    Hemoglobin 10 8 (L) 12 0 - 17 0 g/dL    Hematocrit 31 6 (L) 36 5 - 49 3 %     (H) 82 - 98 fL    MCH 35 8 (H) 26 8 - 34 3 pg    MCHC 34 2 31 4 - 37 4 g/dL    RDW 14 5 11 6 - 15 1 %    MPV 10 6 8 9 - 12 7 fL    Platelets 716 (L) 371 - 390 Thousands/uL    nRBC 0 /100 WBCs    Neutrophils Relative 72 43 - 75 %    Immat GRANS % 1 0 - 2 %    Lymphocytes Relative 7 (L) 14 - 44 %    Monocytes Relative 18 (H) 4 - 12 %    Eosinophils Relative 1 0 - 6 %    Basophils Relative 1 0 - 1 %    Neutrophils Absolute 4 41 1 85 - 7 62 Thousands/µL    Immature Grans Absolute 0 06 0 00 - 0 20 Thousand/uL    Lymphocytes Absolute 0 42 (L) 0 60 - 4 47 Thousands/µL    Monocytes Absolute 1 07 0 17 - 1 22 Thousand/µL    Eosinophils Absolute 0 04 0 00 - 0 61 Thousand/µL    Basophils Absolute 0 03 0 00 - 0 10 Thousands/µL   Protime-INR    Collection Time: 01/08/20  5:00 AM   Result Value Ref Range    Protime 15 0 (H) 11 6 - 14 5 seconds    INR 1 25 (H) 0 84 - 1 19   CBC    Collection Time: 01/09/20  6:03 AM   Result Value Ref Range    WBC 6 93 4 31 - 10 16 Thousand/uL    RBC 3 26 (L) 3 88 - 5 62 Million/uL    Hemoglobin 11 6 (L) 12 0 - 17 0 g/dL    Hematocrit 34 1 (L) 36 5 - 49 3 %     (H) 82 - 98 fL    MCH 35 6 (H) 26 8 - 34 3 pg    MCHC 34 0 31 4 - 37 4 g/dL    RDW 14 6 11 6 - 15 1 %    Platelets 630 238 - 277 Thousands/uL    MPV 10 5 8 9 - 12 7 fL   Comprehensive metabolic panel    Collection Time: 01/09/20  6:03 AM Result Value Ref Range    Sodium 130 (L) 136 - 145 mmol/L    Potassium 3 9 3 5 - 5 3 mmol/L    Chloride 98 (L) 100 - 108 mmol/L    CO2 24 21 - 32 mmol/L    ANION GAP 8 4 - 13 mmol/L    BUN 9 5 - 25 mg/dL    Creatinine 0 72 0 60 - 1 30 mg/dL    Glucose 103 65 - 140 mg/dL    Calcium 8 1 (L) 8 3 - 10 1 mg/dL     (H) 5 - 45 U/L    ALT 97 (H) 12 - 78 U/L    Alkaline Phosphatase 1,016 (H) 46 - 116 U/L    Total Protein 5 5 (L) 6 4 - 8 2 g/dL    Albumin 1 8 (L) 3 5 - 5 0 g/dL    Total Bilirubin 25 73 (H) 0 20 - 1 00 mg/dL    eGFR 91 ml/min/1 73sq m       Active Medications  No recently discontinued medications to reconcile

## 2020-01-09 NOTE — H&P
Progress Note Sandra Juares 1945, 66 y o  male MRN: [id number]    Unit/Bed#: [facility] 909-01 Encounter: [id number]    Primary Care Provider: No primary care provider on file  Date and time admitted to hospital: 1/9/2020  4:57 PM        * Painless jaundice secondary to parenchymal infiltration of tumor along with biliary compression   Assessment & Plan  · Pt is transfered from [address] [place] [address] d/t possible ERCP and IR guided percutaneous stent exchange/drain  · History of colon cancer with metastases to lungs and liver, s/p recent biliary stent placement (11/2019)  · Patient presents with worsening asymptomatic jaundice  No pain, N/V, fever, chills  ? 1/6/20: MRCP:  Metastatic colon carcinoma with stable meds to lung bases, right anterolateral abdominal wall, liver  Infiltrating lesion in the right lobe adjacent to the gallbladder causes invasion of the gallbladder wall secondary intrahepatic biliary duct dual dilatation of both the right and left-sided ducts  Indwelling biliary stent extending from the common hepatic duct to the right sided bile ducts  Plan-  · GI, Hematology-Oncology, palliative  and IR consulted  · NPO from midnight   · Possible ERCP/IR guided stent tomorrow         History of Trigeminal neuralgia  Assessment & Plan  · Diagnosed more than 10 years ago  · Home regimen:  Tegretol --- holding due to adverse effects of worsening liver function and hepatic failure  · Switched to gabapentin 100 mg bid     Hypertension  Assessment & Plan  · Home regimen:  Metoprolol succinate 25 mg daily  · Hypertension controlled at this time  Colon cancer metastasized to multiple sites [facility])  Assessment & Plan  · With mets to liver, lungs, and percutaneous abdominal lesion   · Status post multiple trials of chemotherapy and radiation therapy  Most recent radiation 11/19  · Recently establishing new oncologist with [facility]   Has an appointment scheduled with Dr Tony Clark    Plan- Possible ERCP/IR guided tomorrow  Atrial fibrillation (HCC)  Assessment & Plan  · Home regimen:  Lovenox 100 mg every 12 hours, as recommended by his oncologist, metoprolol succinate 50 mg daily  · Holding Lovenox  · Currently rate controlled  VTE Prophylaxis: Enoxaparin (Lovenox)  / sequential compression device   Code Status: Level-1  POLST: POLST form is not discussed and not completed at this time  Anticipated Length of Stay:  Patient will be admitted on an Inpatient basis with an anticipated length of stay of   2 midnights  Justification for Hospital Stay: ERCP/possible stent placement     Chief Complaint:   Painless jaundice     History of Present Illness: Yadiel Cervantes is a 66 y o  male with past medical history of hypertension, atrial fibrillation on Lovenox, trigeminal neuralgia, colorectal cancer with metastasis to liver, lung, and abdominal wall, currently status post resection, chemotherapy and radiation therapy with recent percutaneous placement of biliary stent on November 2019, who is a transfer from [address] [place] [address] d/t possible ERCP and IR guided percutaneous stent exchange/drain  Pt was originally admitted in [facility] d/t worsening painless jaundice, he has undergone multiple trials of chemotherapy and currently receiving radiation at [facility]  Last radiation was around 11/19  MRCP done 1/6 shows evidence of metastatic colon carcinoma with stable metastases at lung bases, right anterior abdominal wall, and liver with infiltrating lesion in the right lobe adjacent to the gallbladder fossa causing invasion of the gallbladder wall, and secondary intrahepatic biliary ductal dilation of right and left ducts  Currently he has rate controlled Atrial Fibrillation-levonox on hold d/t possible procedure tomorrow  His Trigeminal neuralgia under control (Tegretol/carbamazepine) on hold d/t adverse effects on liver function   He also open protruding mass on his abdomen which drains yellowish/green malodorous fluid  He denies any other complaints at this time  Denies pain, SOB, palpitations, nausea, vomiting, fever, chills, bowel/bladder dysfunction  Hemodynamically stable  Review of Systems:    Review of Systems   Constitutional: Positive for fatigue  HENT: Negative for congestion, sneezing, sore throat, trouble swallowing and voice change  Respiratory: Positive for shortness of breath (on and off )  Negative for chest tightness  Cardiovascular: Negative for chest pain, palpitations and leg swelling  Gastrointestinal: Negative for abdominal distention, abdominal pain, constipation, diarrhea, nausea and vomiting  Genitourinary: Negative for dysuria  Musculoskeletal: Negative for back pain and gait problem  Skin: Positive for wound  Neurological: Negative for dizziness, weakness, light-headedness, numbness and headaches  Psychiatric/Behavioral: Negative for agitation  Past Medical and Surgical History:     Past Medical History:   Diagnosis Date    Atrial fibrillation ([facility] )     BPH (benign prostatic hyperplasia)     Cancer ([place] [address] )     colon- johnny liver & lungs    Hypertension     Stroke ([place] [address] )     2008- mild weakness left hand/arm    Tumor, metastatic ([facility] )     Tumor, metastatic ([facility] )        Past Surgical History:   Procedure Laterality Date    COLON SURGERY      2013    EYE SURGERY      LIVER SURGERY      TONSILLECTOMY         Meds/Allergies:    Prior to Admission medications    Medication Sig Start Date End Date Taking?  Authorizing Provider   carBAMazepine (CARBATROL) 300 MG 12 hr capsule Take 300 mg by mouth every morning    Historical Provider, MD   carBAMazepine (TEGretol XR) 400 mg 12 hr tablet Take 400 mg by mouth every evening    Historical Provider, MD   enoxaparin (LOVENOX) 100 mg/mL Inject 100 mg under the skin every 12 (twelve) hours     Historical Provider, MD   metoprolol succinate (TOPROL-XL) 50 mg 24 hr tablet Take 1 tablet (50 mg total) by mouth daily 1/3/20   Jean Paul Reinoso MD   oxyCODONE (OXY-IR) 5 MG capsule Take 5 mg by mouth every 6 (six) hours as needed     Historical Provider, MD   tamsulosin (FLOMAX) 0 4 mg Take 0 4 mg by mouth daily with dinner    Historical Provider, MD     I have reviewed home medications with patient personally  Allergies: Allergies   Allergen Reactions    Ib Pro [Ibuprofen] Hives and Itching    Adhesive [Medical Tape] Rash     Use only paper tape       Social History     Substance and Sexual Activity   Alcohol Use Never    Frequency: Never     Social History     Tobacco Use   Smoking Status Former Smoker    Types: Cigarettes    Last attempt to quit: Jaxson Godwin Years since quittin 0   Smokeless Tobacco Never Used     Social History     Substance and Sexual Activity   Drug Use Never       Family History:    Family History   Problem Relation Age of Onset    Cancer Father     Colon cancer Father     Cancer Brother     Cancer Paternal Grandfather        Physical Exam:     Vitals:        Physical Exam   Constitutional: He is oriented to person, place, and time  He appears well-developed and well-nourished  No distress  HENT:   Head: Normocephalic and atraumatic  Eyes: Pupils are equal, round, and reactive to light  EOM are normal  Scleral icterus is present  Neck: Normal range of motion  No JVD present  No tracheal deviation present  Cardiovascular: Normal heart sounds and normal pulses  An irregularly irregular rhythm present  No murmur heard  Pulmonary/Chest: Effort normal and breath sounds normal  No stridor  No respiratory distress  He has no wheezes  He has no rales  Abdominal: Soft  Bowel sounds are normal  He exhibits no distension  There is no tenderness  There is protruding mass present on right side of abdomen, about 10 cm in diameter, irregular, friable, with secretion of mild yellow greenish malodorous fluid   See pic in media    Musculoskeletal: Normal range of motion   He exhibits no edema, tenderness or deformity  Neurological: He is alert and oriented to person, place, and time  No cranial nerve deficit  Coordination normal    Skin: Skin is warm  Capillary refill takes less than 2 seconds  He is not diaphoretic  Nursing note and vitals reviewed  Additional Data:     Lab Results: I have personally reviewed pertinent reports  Results from last 7 days   Lab Units 01/09/20  0603 01/08/20  0500   WBC Thousand/uL 6 93 6 03   HEMOGLOBIN g/dL 11 6* 10 8*   HEMATOCRIT % 34 1* 31 6*   PLATELETS Thousands/uL 156 133*   NEUTROS PCT %  --  72   LYMPHS PCT %  --  7*   MONOS PCT %  --  18*   EOS PCT %  --  1     Results from last 7 days   Lab Units 01/09/20  0603   POTASSIUM mmol/L 3 9   CHLORIDE mmol/L 98*   CO2 mmol/L 24   BUN mg/dL 9   CREATININE mg/dL 0 72   CALCIUM mg/dL 8 1*   ALK PHOS U/L 1,016*   ALT U/L 97*   AST U/L 121*     Results from last 7 days   Lab Units 01/08/20  0500   INR  1 25*       Imaging: I have personally reviewed pertinent reports  Mri Abdomen W Wo Contrast And Mrcp    Result Date: 1/7/2020  Narrative: MRI OF THE ABDOMEN WITH AND WITHOUT CONTRAST WITH MRCP INDICATION:  Metastatic colon carcinoma with worsening jaundice  COMPARISON: CT abdomen pelvis 12/27/2019, 11/8/2019  TECHNIQUE:  The following pulse sequences were obtained:  Coronal and axial T2 with TE of 90 and 180 respectively, axial T2 with fat saturation, axial FIESTA fat-sat, axial T1-weighted in-and-out-of phase, axial DWI/ADC, pre-contrast axial T1 with fat saturation, post-contrast dynamic axial T1 with fat saturation at 20, 70, and 180 seconds, followed by coronal and 7 minute delayed axial T1 with fat saturation  3D MRCP images were obtained with radial thick slabs and projections  3D rendering was performed from the acquisition scanner   IV Contrast:  9 mL of gadobutrol injection (MULTI-DOSE) FINDINGS: LOWER CHEST:   Enhancing solid metastatic mass lesions at the bilateral lung bases again noted, the largest in the lingula measuring 5 9 x 5 5 cm  In the left lower lobe, a pleural-based mass measures 4 1 x 2 7 cm  Finally, in the right lower lobe, a paramediastinal mass measures 3 1 x 2 0 cm  A 4th lesion in the anterior segment right upper lobe measures 2 0 x 1 7 cm, only seen on the  images  The lesions are also stable since the most recent CT of 12/27/2019  No pleural effusions  LIVER:   A nonenhancing, hemorrhagic subcapsular lesion in the posterior right hepatic lobe (14/287) measures 2 6 x 2 6 cm with overlying retraction of the capsule, suspected treated metastasis  An infiltrating mass in the anterior segment right lobe adjacent to the gallbladder fossa measures at least 5 5 x 3 7 x 4 7 cm causing invasion of the posterior gallbladder wall as suspected on the recent CT  The mass appears to extend to the bifurcation of the intrahepatic bile ducts evoking both intrahepatic biliary ductal dilatation of both the right side and left-sided ducts  Finally, there are surgical changes of the inferior subcapsular right hepatic lobe possible side of tumor resection  The hepatic veins and portal veins are patent  BILE DUCTS:  As described on the recent CT, a biliary stent is located in the common hepatic duct extending to the level of the right-sided intrahepatic ducts  There is persistent intrahepatic biliary ductal dilatation of both the right and left-sided ducts despite stent placement unchanged since the abdominal CT  No definite enhancing tumor within the stent identified  GALLBLADDER:  As described above, there is tumor invasion of the posterior gallbladder wall best seen on series 14 image 191, with retained bile in the gallbladder fundus  PANCREAS:  Normal  No main pancreatic ductal dilation  ADRENAL GLANDS:  Normal  SPLEEN:  Normal  KIDNEYS/PROXIMAL URETERS:  No hydroureteronephrosis  No suspicious renal mass  Small right lower pole cyst noted   BOWEL:   No dilated loops of bowel  PERITONEUM/RETROPERITONEUM:  No ascites  LYMPH NODES:  No abdominal lymphadenopathy  VASCULAR STRUCTURES:  No aneurysm  ABDOMINAL WALL:  A heterogeneous enhancing right anterolateral abdominal wall mass (12/80) measures at least 4 9 x 4 5 cm extending to the overlying skin surface as described on recent CT  OSSEOUS STRUCTURES:  No suspicious osseous lesion  Impression: 1  Metastatic colon carcinoma with stable metastases at the lung bases, right anterolateral abdominal wall, and liver as described above in detail  The infiltrating lesion in the right lobe adjacent to the gallbladder fossa causes invasion of the gallbladder wall with secondary intrahepatic biliary ductal dilatation of both the right and left-sided ducts  2   Indwelling biliary stent extending from the common hepatic duct to the right-sided bile ducts  No intraluminal enhancement to suggest tumor infiltration  I personally discussed this study with Jonatan Garrison on 1/7/2020 at 9:07 AM  Workstation performed: [id number]     Ct Abdomen Pelvis With Contrast    Result Date: 12/27/2019  Narrative: CT ABDOMEN AND PELVIS WITH IV CONTRAST INDICATION:   Jaundice and weakness  Known metastatic colon cancer  COMPARISON:  November 8, 2019 TECHNIQUE:  CT examination of the abdomen and pelvis was performed  Axial, sagittal, and coronal 2D reformatted images were created from the source data and submitted for interpretation  Radiation dose length product (DLP) for this visit:  521 35 mGy-cm   This examination, like all CT scans performed in the [facility], was performed utilizing techniques to minimize radiation dose exposure, including the use of iterative  reconstruction and automated exposure control  IV Contrast:  100 mL of iohexol (OMNIPAQUE) Enteric Contrast:  Enteric contrast was not administered  FINDINGS: ABDOMEN LOWER CHEST:  There are lung masses in the lingula and the left lower lobe and the right lower lobe    These are presumably metastatic disease and seems similar to the prior study  LIVER/BILIARY TREE:  There is intrahepatic biliary dilatation which is slightly more prominent than on the prior study  There is a stent along the right biliary tree extending to the common hepatic duct area  A lateral right hepatic lobe lesion measures 2 9 cm and is essentially stable from prior  A lesion or lesions in the liver near the gallbladder appears stable as well measuring up to about 6 cm transverse diameter  The left hepatic lobe seems atrophic and has more significant biliary dilatation than the right lobe  GALLBLADDER:  There may be some thickening of the superior medial wall of the gallbladder as suggested on coronal image 57 series 601  The gallbladder wall appears to be up to about 7 mm thickness  This seems to be immediately adjacent to the area of liver tumor and direct tumor infiltration of the gallbladder would be a consideration  SPLEEN:  Unremarkable  PANCREAS:  Unremarkable  ADRENAL GLANDS:  Unremarkable  KIDNEYS/URETERS:  Round low-density lesion at the lower pole of the right kidney is stable, compatible with cyst   No acute renal pathology seen  STOMACH AND BOWEL:  There is evidence of prior sigmoid colon surgery  There is no recurrent mass or stricture seen in that location  APPENDIX:  No findings to suggest appendicitis  ABDOMINOPELVIC CAVITY:  No ascites or free intraperitoneal air  No lymphadenopathy  VESSELS:  Atherosclerotic changes are present  No evidence of aneurysm  PELVIS REPRODUCTIVE ORGANS:  Unremarkable for patient's age  URINARY BLADDER:  The bladder is only minimally distended which probably accounts for the diffuse thickened appearance of the wall  No stones  No discrete masses  ABDOMINAL WALL/INGUINAL REGIONS:  The inguinal area appears normal   There is a tumor or other masslike structure identified within the abdominal wall in the right upper quadrant near the lower edge of the rib cage  This seems to extend to the skin surface as a visible protuberance mass, similar to the prior exam  OSSEOUS STRUCTURES:  No acute fracture or destructive osseous lesion  Severe multilevel spinal degenerative changes are noted  No destructive bone lesions are appreciated  Impression: There is intrahepatic biliary dilatation despite the presence of the biliary stent  Compared with the prior study the biliary dilatation seems slightly more prominent  Therefore, the stent may not be functioning properly  Stable appearance of pre-existing liver lesions  Stable appearance of lung masses  Stable appearance of right upper abdominal wall tumor  Localized area of thickening of the wall of the gallbladder immediately adjacent to liver tumor which might indicate direct invasion of gallbladder wall  Workstation performed: [id number]       EKG, Pathology, and Other Studies Reviewed on Admission:   · EKG: Afibb     Epic / Care Everywhere Records Reviewed: Yes     ** Please Note: This note has been constructed using a voice recognition system   **

## 2020-01-09 NOTE — TRANSPORTATION MEDICAL NECESSITY
Section I - General Information    Name of Patient: Dede Luna                 : 1945    Medicare #: [id number]  Transport Date: 20 (PCS is valid for round trips on this date and for all repetitive trips in the 60-day range as noted below )  Origin: [address]: [place]  Is the pt's stay covered under Medicare Part A (PPS/DRG)   []     Closest appropriate facility? If no, why is transport to more distant facility required? Yes  If hospice pt, is this transport related to pt's terminal illness? NA       Section II - Medical Necessity Questionnaire  Ambulance transportation is medically necessary only if other means of transport are contraindicated or would be potentially harmful to the patient  To meet this requirement, the patient must either be "bed confined" or suffer from a condition such that transport by means other than ambulance is contraindicated by the patient's condition  The following questions must be answered by the medical professional signing below for this form to be valid:    1)  Describe the MEDICAL CONDITION (physical and/or mental) of this patient AT [address] [place] [address] that requires the patient to be transported in an ambulance and why transport by other means is contraindicated by the patient's condition: Hospital to hospital transfer for higher level of care being transferred for ERCP with bilateral stent placements tomorrow  If the ERCP is not successful we will plan for PTC drains by IR; Hyperbilirubinemia    2) Is the patient "bed confined" as defined below? No  To be "be confined" the patient must satisfy all three of the following conditions: (1) unable to get up from bed without Assistance; AND (2) unable to ambulate; AND (3) unable to sit in a chair or wheelchair      3) Can this patient safely be transported by car or wheelchair Ramez Espinoza (i e , seated during transport without a medical attendant or monitoring)? No    4) In addition to completing questions 1-3 above, please check any of the following conditions that apply*:   *Note: supporting documentation for any boxes checked must be maintained in the patient's medical records  If hosp-hosp transfer, describe services needed at 2nd facility not available at 1st facility? Medical attendant required   Other(specify) Hospital to hospital transfer to higher level of care      Section III - Signature of Physician or Healthcare Professional  I certify that the above information is true and correct based on my evaluation of this patient, and represent that the patient requires transport by ambulance and that other forms of transport are contraindicated  I understand that this information will be used by the Centers for Medicare and Medicaid Services (CMS) to support the determination of medical necessity for ambulance services, and I represent that I have personal knowledge of the patient's condition at time of transport  [x]  If this box is checked, I also certify that the patient is physically or mentally incapable of signing the ambulance service's claim and that the institution with which I am affiliated has furnished care, services, or assistance to the patient  My signature below is made on behalf of the patient pursuant to 42 CFR §424 36(b)(4)   In accordance with 42 CFR §424 37, the specific reason(s) that the patient is physically or mentally incapable of signing the claim form is as follows:        Signature of Physician* or Healthcare Professional______________________________________________________________  Signature Date 01/09/20 (For scheduled repetitive transports, this form is not valid for transports performed more than 60 days after this date)    Printed Name & Credentials of Physician or Healthcare Professional (MD, DO, RN, etc )________________________________  *Form must be signed by patient's attending physician for scheduled, repetitive transports   For non-repetitive, unscheduled ambulance transports, if unable to obtain the signature of the attending physician, any of the following may sign (choose appropriate option below)  [] Physician Assistant []  Clinical Nurse Specialist []  Registered Nurse  []  Nurse Practitioner  [x] Discharge Planner

## 2020-01-09 NOTE — PROGRESS NOTES
[facility] Senior Admission Note   Unit/Bed # @DBLINK (ALICIA,07573)@ Encounter: [id number]  SOD Team A           Schaumann 66 y o  male [id number]       Patient seen and examined  Reviewed H&P per Dr Michelle Ty  Agree with the assessment and plan except NA  Assessment/Plan: Principal Problem:    Painless jaundice secondary to parenchymal infiltration of tumor along with biliary compression   Active Problems:    BPH (benign prostatic hyperplasia)    Atrial fibrillation (HCC)    Colon cancer metastasized to multiple sites [facility])    Hypertension    History of Trigeminal neuralgia    Chronic hyponatremia      Patient is being transferred here for ERCP by Gastroenterology and possible IR guided percutaneous stent exchange/strain  Patient to have regular diet and made NPO at midnight for procedure tomorrow  Will consult oncology, palliative care  Surgical Oncology was consulted at [facility] in no surgical intervention planned at this time      Disposition:  INPATIENT     Expected LOS: Floyd Xiao MD

## 2020-01-09 NOTE — ASSESSMENT & PLAN NOTE
· With mets to liver, lungs, and percutaneous abdominal lesion   · Status post multiple trials of chemotherapy and radiation therapy  Most recent radiation 11/19  · Recently establishing new oncologist with [facility]   Has an appointment scheduled with Dr Cierra Merchant  Plan-   Possible ERCP/IR guided tomorrow

## 2020-01-09 NOTE — ASSESSMENT & PLAN NOTE
· History of colon cancer with metastases to lungs and liver, status post recent biliary stent placement (11/2019)  · Patient presents with worsening asymptomatic jaundice  Denies any abdominal pain, nausea, vomiting, fevers or chills  · Outpatient GI Dr Lelo Bonilla   · Status post biliary stent, which appears to be draining the right hepatic system, however does have worsening jaundice  Given worsening of intrahepatic dilatation, hyperbilirubinemia, suspect there is tumor ingrowth were sent may be partially occluded  · Stent was placed November 27th at [facility]  · 1/6/20: MRCP:  Metastatic colon carcinoma with stable meds to lung bases, right anterolateral abdominal wall, liver  Infiltrating lesion in the right lobe adjacent to the gallbladder causes invasion of the gallbladder wall secondary intrahepatic biliary duct dual dilatation of both the right and left-sided ducts  Indwelling biliary stent extending from the common hepatic duct to the right sided bile ducts  · GI, Hematology-Oncology and IR specialties are involved in this case given the highly complicated presentation  As per IR: given that the patient is asymptomatic (no intractable pruritus, encephalopathy, etc ) and the unlikely expectation that we will ever get his bilirubin down to a level suitable to initiate chemotherapy, it seems like putting two catheters into his liver will likely be of minimal benefit      · Dr Carlos Joshi agreed, and stated that they will likely proceed with ERCP

## 2020-01-09 NOTE — SOCIAL WORK
Patient is being transferred to [place] [address] for ERCP with bilateral stent placements tomorrow  If the ERCP is not successful we will plan for PTC drains by IR  CM completed Medical necessity form and provided to primary nurse  No further CM discharge needs discussed or identified

## 2020-01-09 NOTE — CONSULTS
Consultation -General Surgery  Rhonda Roper 66 y o  male MRN: [id number]  Unit/Bed#: S -01 Encounter: [id number]        Inpatient Consult to Surgical Oncology  Consult performed by: Rere Hays PA-C  Consult ordered by: Elvin Colbert MD          ASSESSMENT/PLAN:  Problem List     * (Principal) Painless jaundice secondary to parenchymal infiltration of tumor along with biliary compression     BPH (benign prostatic hyperplasia)    Atrial fibrillation ([address] )    Colon cancer metastasized to multiple sites [facility])    Hypertension    Hyponatremia   65 YO M with history of HTN, CVA, atrial fibrillation, colorectal cancer with liver/lung mets s/p resection, radiation and percutaneous placement of biliary stent at [facility], 11/19 presents with fatigue and worsening jaundice  He is scheduled for percutaneous drainage with IR tomorrow  Discussed with Dr Zacarias Blackwood  No surgical intervention will be offered at this time  · IVF  · NPO for IR tomorrow  · Trend labs  · Consider palliative care consult  · Daily dressing change abdominal wound  · Dr Zacarias Blackwood to see  Reason for Consult / Principal Problem: Jaundice    HPI: Rhonda Roper is a 66y o  year old male  with history of HTN, CVA, atrial fibrillation on therapeutic Lovenox, colorectal cancer with liver/lung/abdominal wall mets s/p left hemicolectomy, liver wedge resection, chemotherapy, radiation and percutaneous placement of biliary stent at [facility], 11/27/19 admitted at the suggestion of  GI for fatigue and worsening jaundice  GI notes indicate he may need stent change, revision vs ERCP  He has been evaluated by IR and is scheduled for percutaneous drainage tomorrow  Associated symptoms are loss of appetite, 40 lb weight loss over the past few months and constipation  Last colonoscopy was 2-3 years ago  He is not currently receiving any chemotherapy products  His medication was discontinued at the end of October    He did have XRT to the abdominal wall metastatic lesion in November with an appropriate response  Review of Systems   Constitutional: Positive for appetite change, fatigue and unexpected weight change  HENT: Negative  Eyes: Negative  Respiratory: Negative  Cardiovascular: Negative  Gastrointestinal: Positive for constipation  Endocrine: Negative  Skin: Positive for color change  Allergic/Immunologic: Negative  Neurological: Negative  Hematological: Negative  Psychiatric/Behavioral: Negative          Historical Information   Past Medical History:   Diagnosis Date    Atrial fibrillation ([place] [address] )     BPH (benign prostatic hyperplasia)     Cancer ([place] [address] )     colon- johnny liver & lungs    Hypertension     Stroke ([place] [address] )     2008- mild weakness left hand/arm    Tumor, metastatic ([place] [address] )     Tumor, metastatic ([place] [address] )      Past Surgical History:   Procedure Laterality Date    COLON SURGERY      2013    EYE SURGERY      LIVER SURGERY      TONSILLECTOMY       Social History   Social History     Substance and Sexual Activity   Alcohol Use Never    Frequency: Never     Social History     Substance and Sexual Activity   Drug Use Never     Social History     Tobacco Use   Smoking Status Former Smoker    Types: Cigarettes    Last attempt to quit: 1981    Years since quittin 0   Smokeless Tobacco Never Used     Family History   Problem Relation Age of Onset    Cancer Father     Colon cancer Father     Cancer Brother     Cancer Paternal Grandfather        Meds/Allergies     Medications Prior to Admission   Medication    carBAMazepine (CARBATROL) 300 MG 12 hr capsule    carBAMazepine (TEGretol XR) 400 mg 12 hr tablet    enoxaparin (LOVENOX) 100 mg/mL    metoprolol succinate (TOPROL-XL) 50 mg 24 hr tablet    oxyCODONE (OXY-IR) 5 MG capsule    tamsulosin (FLOMAX) 0 4 mg     Current Facility-Administered Medications   Medication Dose Route Frequency    [START ON 2020] metoprolol succinate (TOPROL-XL) 24 hr tablet 25 mg  25 mg Oral Daily    oxyCODONE (ROXICODONE) IR tablet 5 mg  5 mg Oral Q6H PRN    polyethylene glycol (MIRALAX) packet 17 g  17 g Oral Daily PRN    sodium chloride 0 9 % infusion  50 mL/hr Intravenous Continuous    tamsulosin (FLOMAX) capsule 0 4 mg  0 4 mg Oral Daily With Dinner       Allergies   Allergen Reactions    Ib Pro [Ibuprofen] Hives and Itching       Objective     Blood pressure 120/72, pulse 72, temperature 98 1 °F (36 7 °C), temperature source Oral, resp  rate 18, height 5' 10" (1 778 m), weight 96 2 kg (212 lb), SpO2 99 %      Intake/Output Summary (Last 24 hours) at 1/8/2020 1947  Last data filed at 1/8/2020 1035  Gross per 24 hour   Intake 950 ml   Output    Net 950 ml       PHYSICAL EXAM  General appearance: alert and oriented, in no acute distress  Skin: jaundice   Head: Normocephalic, without obvious abnormality  Heart: regular rate and rhythm, S1, S2 normal, no murmur, click, rub or gallop  Lungs: clear to auscultation bilaterally  Abdomen: soft, non-tender; bowel sounds normal; no masses,  no organomegaly and 4 cm round fungating mass RUQ, tender  Neurological: normal without focal findings    Lab Results:   Admission on 01/07/2020   Component Date Value    WBC 01/07/2020 7 19     RBC 01/07/2020 3 23*    Hemoglobin 01/07/2020 11 6*    Hematocrit 01/07/2020 34 5*    MCV 01/07/2020 107*    MCH 01/07/2020 35 9*    MCHC 01/07/2020 33 6     RDW 01/07/2020 14 4     MPV 01/07/2020 10 6     Platelets 68/62/0976 141*    nRBC 01/07/2020 0     Neutrophils Relative 01/07/2020 77*    Immat GRANS % 01/07/2020 1     Lymphocytes Relative 01/07/2020 6*    Monocytes Relative 01/07/2020 16*    Eosinophils Relative 01/07/2020 0     Basophils Relative 01/07/2020 0     Neutrophils Absolute 01/07/2020 5 48     Immature Grans Absolute 01/07/2020 0 07     Lymphocytes Absolute 01/07/2020 0 45*    Monocytes Absolute 01/07/2020 1 14     Eosinophils Absolute 01/07/2020 0 02     Basophils Absolute 01/07/2020 0 03     Sodium 01/07/2020 127*    Potassium 01/07/2020 3 9     Chloride 01/07/2020 96*    CO2 01/07/2020 21     ANION GAP 01/07/2020 10     BUN 01/07/2020 12     Creatinine 01/07/2020 0 68     Glucose 01/07/2020 92     Calcium 01/07/2020 8 1*    AST 01/07/2020 117*    ALT 01/07/2020 108*    Alkaline Phosphatase 01/07/2020 967*    Total Protein 01/07/2020 5 6*    Albumin 01/07/2020 2 0*    Total Bilirubin 01/07/2020 24 88*    eGFR 01/07/2020 93     Protime 01/07/2020 15 7*    INR 01/07/2020 1 32*    PTT 01/07/2020 53*    Ammonia 01/07/2020 13     Troponin I 01/07/2020 <0 02     CRP, High Sensitivity 01/07/2020 73 67     Lipase 01/07/2020 60*    LACTIC ACID 01/07/2020 0 5     Sodium 01/08/2020 128*    Potassium 01/08/2020 3 6     Chloride 01/08/2020 96*    CO2 01/08/2020 25     ANION GAP 01/08/2020 7     BUN 01/08/2020 9     Creatinine 01/08/2020 0 68     Glucose 01/08/2020 89     Calcium 01/08/2020 8 5     AST 01/08/2020 114*    ALT 01/08/2020 105*    Alkaline Phosphatase 01/08/2020 937*    Total Protein 01/08/2020 5 1*    Albumin 01/08/2020 1 8*    Total Bilirubin 01/08/2020 23 81*    eGFR 01/08/2020 93     Magnesium 01/08/2020 1 9     WBC 01/08/2020 6 03     RBC 01/08/2020 3 02*    Hemoglobin 01/08/2020 10 8*    Hematocrit 01/08/2020 31 6*    MCV 01/08/2020 105*    MCH 01/08/2020 35 8*    MCHC 01/08/2020 34 2     RDW 01/08/2020 14 5     MPV 01/08/2020 10 6     Platelets 51/41/4856 133*    nRBC 01/08/2020 0     Neutrophils Relative 01/08/2020 72     Immat GRANS % 01/08/2020 1     Lymphocytes Relative 01/08/2020 7*    Monocytes Relative 01/08/2020 18*    Eosinophils Relative 01/08/2020 1     Basophils Relative 01/08/2020 1     Neutrophils Absolute 01/08/2020 4 41     Immature Grans Absolute 01/08/2020 0 06     Lymphocytes Absolute 01/08/2020 0 42*    Monocytes Absolute 01/08/2020 1 07     Eosinophils Absolute 01/08/2020 0 04  Basophils Absolute 01/08/2020 0 03     Protime 01/08/2020 15 0*    INR 01/08/2020 1 25*    Ventricular Rate 01/07/2020 88     Atrial Rate 01/07/2020 91     QRSD Interval 01/07/2020 146     QT Interval 01/07/2020 392     QTC Interval 01/07/2020 475     P Axis 01/07/2020 203     QRS [place] 01/07/2020 -28     T Wave [place] 01/07/2020 13      Imaging Studies: I have personally reviewed pertinent reports  1/6/20-MRI-  1  Metastatic colon carcinoma with stable metastases at the lung bases, right anterolateral abdominal wall, and liver as described above in detail  The infiltrating lesion in the right lobe adjacent to the gallbladder fossa causes invasion of the gallbladder wall with secondary intrahepatic biliary ductal dilatation of both the right and left-sided ducts  2   Indwelling biliary stent extending from the common hepatic duct to the right-sided bile ducts  No intraluminal enhancement to suggest tumor infiltration    12/27/19 CT- There is intrahepatic biliary dilatation despite the presence of the biliary stent  Compared with the prior study the biliary dilatation seems slightly more prominent  Therefore, the stent may not be functioning properly  Stable appearance of pre-existing liver lesions  Stable appearance of lung masses  Stable appearance of right upper abdominal wall tumor  Localized area of thickening of the wall of the gallbladder immediately adjacent to liver tumor which might indicate direct invasion of gallbladder wall  Counseling / Coordination of Care  Total time spent today  30 minutes  Greater than 50% of total time was spent with the patient and / or family counseling and / or coordination of care

## 2020-01-10 NOTE — ASSESSMENT & PLAN NOTE
· Home regimen: Lovenox 100 mg every 12 hours, as recommended by his oncologist, metoprolol succinate 50 mg daily  · Lovenox was restarted  · Currently rate controlled

## 2020-01-10 NOTE — PLAN OF CARE
Problem: Nutrition/Hydration-ADULT  Goal: Nutrient/Hydration intake appropriate for improving, restoring or maintaining nutritional needs  Description  Monitor and assess patient's nutrition/hydration status for malnutrition  Collaborate with interdisciplinary team and initiate plan and interventions as ordered  Monitor patient's weight and dietary intake as ordered or per policy  Utilize nutrition screening tool and intervene as necessary  Determine patient's food preferences and provide high-protein, high-caloric foods as appropriate  INTERVENTIONS:  - Monitor oral intake, urinary output, labs, and treatment plans  - Assess nutrition and hydration status and recommend course of action  - Evaluate amount of meals eaten  - Assist patient with eating if necessary   - Allow adequate time for meals  - Recommend/ encourage appropriate diets, oral nutritional supplements, and vitamin/mineral supplements  - Order, calculate, and assess calorie counts as needed  - Recommend, monitor, and adjust tube feedings and TPN/PPN based on assessed needs  - Assess need for intravenous fluids  - Provide specific nutrition/hydration education as appropriate  - Include patient/family/caregiver in decisions related to nutrition  Outcome: Progressing     Problem: Potential for Falls  Goal: Patient will remain free of falls  Description  INTERVENTIONS:  - Assess patient frequently for physical needs  -  Identify cognitive and physical deficits and behaviors that affect risk of falls    -  [place] fall precautions as indicated by assessment   - Educate patient/family on patient safety including physical limitations  - Instruct patient to call for assistance with activity based on assessment  - Modify environment to reduce risk of injury  - Consider OT/PT consult to assist with strengthening/mobility  Outcome: Progressing     Problem: GASTROINTESTINAL - ADULT  Goal: Minimal or absence of nausea and/or vomiting  Description  INTERVENTIONS:  - Administer IV fluids if ordered to ensure adequate hydration  - Maintain NPO status until nausea and vomiting are resolved  - Nasogastric tube if ordered  - Administer ordered antiemetic medications as needed  - Provide nonpharmacologic comfort measures as appropriate  - Advance diet as tolerated, if ordered  - Consider nutrition services referral to assist patient with adequate nutrition and appropriate food choices  Outcome: Progressing  Goal: Maintains or returns to baseline bowel function  Description  INTERVENTIONS:  - Assess bowel function  - Encourage oral fluids to ensure adequate hydration  - Administer IV fluids if ordered to ensure adequate hydration  - Administer ordered medications as needed  - Encourage mobilization and activity  - Consider nutritional services referral to assist patient with adequate nutrition and appropriate food choices  Outcome: Progressing  Goal: Maintains adequate nutritional intake  Description  INTERVENTIONS:  - Monitor percentage of each meal consumed  - Identify factors contributing to decreased intake, treat as appropriate  - Assist with meals as needed  - Monitor I&O, weight, and lab values if indicated  - Obtain nutrition services referral as needed  Outcome: Progressing     Problem: METABOLIC, FLUID AND ELECTROLYTES - ADULT  Goal: Electrolytes maintained within normal limits  Description  INTERVENTIONS:  - Monitor labs and assess patient for signs and symptoms of electrolyte imbalances  - Administer electrolyte replacement as ordered  - Monitor response to electrolyte replacements, including repeat lab results as appropriate  - Instruct patient on fluid and nutrition as appropriate  Outcome: Progressing  Goal: Fluid balance maintained  Description  INTERVENTIONS:  - Monitor labs   - Monitor I/O and WT  - Instruct patient on fluid and nutrition as appropriate  - Assess for signs & symptoms of volume excess or deficit  Outcome: Progressing

## 2020-01-10 NOTE — ANESTHESIA POSTPROCEDURE EVALUATION
Post-Op Assessment Note    CV Status:  Stable  Pain Score: 0    Pain management: adequate     Mental Status:  Sleepy   Hydration Status:  Stable   PONV Controlled:  None   Airway Patency:  Patent   Post Op Vitals Reviewed: Yes      Staff: CRNA   Comments: Pt  to [facility]  Report given  Course uneventful  VSS  Airway patent            BP 93/69 (01/10/20 1826)    Temp (!) (P) 97 2 °F (36 2 °C) (01/10/20 1823)    Pulse 95 (01/10/20 1826)   Resp 18 (01/10/20 1826)    SpO2 100 % (01/10/20 1826)

## 2020-01-10 NOTE — CONSULTS
Please see consult from 1/8/20 by Chelsea KEENE    Brief Note  25-year-old male patient with metastatic colon cancer status post IR placed biliary stent at [facility]  ERCP will be performed today with attempt to place biliary stents  Currently bili 24  INR 1 5

## 2020-01-10 NOTE — PROGRESS NOTES
Pt triggered for nutrition evaluation due to reported decreased PO intake prior to admit and wt loss; full evaluation documented in flow sheet    Summary: pt NPO today for procedure; hx wt loss noted and likley multifacorial due to increased metabolic demands from cancer and treamtments and procedures over past few weeks that required NPO (per family)    Recommend: initiate oral diet when medically appropriate, previously rx for regular diet; recommend vanilla ensure enlive BID when on regular diet

## 2020-01-10 NOTE — PLAN OF CARE
Problem: Nutrition/Hydration-ADULT  Goal: Nutrient/Hydration intake appropriate for improving, restoring or maintaining nutritional needs  Description  Monitor and assess patient's nutrition/hydration status for malnutrition  Collaborate with interdisciplinary team and initiate plan and interventions as ordered  Monitor patient's weight and dietary intake as ordered or per policy  Utilize nutrition screening tool and intervene as necessary  Determine patient's food preferences and provide high-protein, high-caloric foods as appropriate  INTERVENTIONS:  - Monitor oral intake, urinary output, labs, and treatment plans  - Assess nutrition and hydration status and recommend course of action  - Evaluate amount of meals eaten  - Assist patient with eating if necessary   - Allow adequate time for meals  - Recommend/ encourage appropriate diets, oral nutritional supplements, and vitamin/mineral supplements  - Order, calculate, and assess calorie counts as needed  - Recommend, monitor, and adjust tube feedings and TPN/PPN based on assessed needs  - Assess need for intravenous fluids  - Provide specific nutrition/hydration education as appropriate  - Include patient/family/caregiver in decisions related to nutrition  Outcome: Progressing     Problem: Potential for Falls  Goal: Patient will remain free of falls  Description  INTERVENTIONS:  - Assess patient frequently for physical needs  -  Identify cognitive and physical deficits and behaviors that affect risk of falls    -  [place] fall precautions as indicated by assessment   - Educate patient/family on patient safety including physical limitations  - Instruct patient to call for assistance with activity based on assessment  - Modify environment to reduce risk of injury  - Consider OT/PT consult to assist with strengthening/mobility  Outcome: Progressing     Problem: GASTROINTESTINAL - ADULT  Goal: Minimal or absence of nausea and/or vomiting  Description  INTERVENTIONS:  - Administer IV fluids if ordered to ensure adequate hydration  - Maintain NPO status until nausea and vomiting are resolved  - Nasogastric tube if ordered  - Administer ordered antiemetic medications as needed  - Provide nonpharmacologic comfort measures as appropriate  - Advance diet as tolerated, if ordered  - Consider nutrition services referral to assist patient with adequate nutrition and appropriate food choices  Outcome: Progressing  Goal: Maintains or returns to baseline bowel function  Description  INTERVENTIONS:  - Assess bowel function  - Encourage oral fluids to ensure adequate hydration  - Administer IV fluids if ordered to ensure adequate hydration  - Administer ordered medications as needed  - Encourage mobilization and activity  - Consider nutritional services referral to assist patient with adequate nutrition and appropriate food choices  Outcome: Progressing  Goal: Maintains adequate nutritional intake  Description  INTERVENTIONS:  - Monitor percentage of each meal consumed  - Identify factors contributing to decreased intake, treat as appropriate  - Assist with meals as needed  - Monitor I&O, weight, and lab values if indicated  - Obtain nutrition services referral as needed  Outcome: Progressing     Problem: METABOLIC, FLUID AND ELECTROLYTES - ADULT  Goal: Electrolytes maintained within normal limits  Description  INTERVENTIONS:  - Monitor labs and assess patient for signs and symptoms of electrolyte imbalances  - Administer electrolyte replacement as ordered  - Monitor response to electrolyte replacements, including repeat lab results as appropriate  - Instruct patient on fluid and nutrition as appropriate  Outcome: Progressing  Goal: Fluid balance maintained  Description  INTERVENTIONS:  - Monitor labs   - Monitor I/O and WT  - Instruct patient on fluid and nutrition as appropriate  - Assess for signs & symptoms of volume excess or deficit  Outcome: Progressing

## 2020-01-10 NOTE — ANESTHESIA PREPROCEDURE EVALUATION
Review of Systems/Medical History  Patient summary reviewed    No history of anesthetic complications     Cardiovascular  Hypertension , Dysrhythmias , atrial fibrillation,    Pulmonary  No shortness of breath, No recent URI ,        GI/Hepatic    No GERD ,   Comment: Metastatic colon CA    Biliary obstruction          Endo/Other  Negative endo/other ROS      GYN       Hematology   Musculoskeletal  Negative musculoskeletal ROS        Neurology    CVA (left sided weakness, paresthesia) , residual symptoms,   Comment: Right sided trigeminal neuralgia Psychology           Physical Exam    Airway    Mallampati score: II         Dental   No notable dental hx     Cardiovascular  Rhythm: irregular,     Pulmonary      Other Findings        Anesthesia Plan  ASA Score- 3     Anesthesia Type- general with ASA Monitors  Additional Monitors:   Airway Plan: ETT  Plan Factors-    Induction- intravenous  Postoperative Plan-     Informed Consent- Anesthetic plan and risks discussed with patient  I personally reviewed this patient with the CRNA  Discussed and agreed on the Anesthesia Plan with the CRNA  Kenia Irving

## 2020-01-10 NOTE — PROGRESS NOTES
Pastoral Care Progress Note    1/10/2020  Patient: Scott Renee : 1945  Admission Date & Time: 2020 1657  MRN: [id number] [facility]: [id number]                     Chaplaincy Interventions Utilized:   Empowerment: Encouraged self-care            Relationship Building: Cultivated a relationship of care and support    Ritual: Provided prayer            Chaplaincy Outcomes Achieved:  Emotional resources utilized          Spiritual Coping Strategies Utilized:   No spiritual coping       01/10/20 [facility] Affiliation none   Current Bahai Involvement Patient not active with Catholic   Spiritual Beliefs/Perceptions   Concept of God Accepting   God's Role in Disease God's will   Relationship with God Distant   Stress Factors   Patient Stress Factors None identified   Family Stress Factors None identified   Coping Responses   Patient Coping Accepting   Family Coping Accepting

## 2020-01-10 NOTE — SOCIAL WORK
PT transfer from [facility] 68 with patient, wife and dghter  Explained CM program/CM role  Resides with wife in a house, 5 LEO, bed/bathroom main floor  Independent prior to admission for ADL's/ambulation  He drives  PCP Eusebio Adams PA  Would like info on PCP's at [facility]  as may be moving to this area permanently  Provided [employer] card  Has prescription plan, uses [facility] [place]  Denies utilization HHC/DME/IP Rehab  Denies mental health illness, IP or OP Psyche care, drug and/or alcohol abuse  Primary contact is wife Yazmin Pronto [phone] or beverley Gil Ser [phone]  No POA, has a living will which he provided on admission  Family will provide transport home    CM reviewed d/c planning process including the following: identifying help at home, patient preference for d/c planning needs, Discharge Lounge, [facility] Meds to Bed program, availability of treatment team to discuss questions or concerns patient and/or family may have regarding understanding medications and recognizing signs and symptoms once discharged  CM also encouraged patient to follow up with all recommended appointments after discharge  Patient advised of importance for patient and family to participate in managing patients medical well being  Patient/caregiver received discharge checklist  Content reviewed  Patient/caregiver encouraged to participate in discharge plan of care prior to discharge home

## 2020-01-10 NOTE — PROGRESS NOTES
Progress Note - Surgical Oncology   Regina Santana 66 y o  male MRN: [id number]  Unit/Bed#: [facility] 909-01 Encounter: [id number]  01/10/20  1:19 PM      Subjective/Objective   No chief complaint on file  Subjective:  No complaints except for discomfort at tumor site on the abdomen    Objective:      Blood pressure 115/74, pulse 89, temperature 98 2 °F (36 8 °C), resp  rate 20, height 5' 10" (1 778 m), weight 96 2 kg (212 lb 1 6 oz), SpO2 100 %  ,Body mass index is 30 43 kg/m²  Intake/Output Summary (Last 24 hours) at 1/10/2020 1319  Last data filed at 1/10/2020 0915  Gross per 24 hour   Intake 120 ml   Output 750 ml   Net -630 ml       Invasive Devices     Central Venous Catheter Line            Port A Cath 01/07/20 Right Chest 2 days                Physical Exam:   Head and neck: is normocephalic  Neck is supple without adenopathy  Sclerae are anicteric  Mucous membranes are moist  No JVD  Abdomen: Soft, nontender, nondistended  Has a tumor mass growing out through the abdominal wall  Extremities: Without cyanosis, clubbing, or edema, symmetric  Neuro: Grossly nonfocal  Skin is warm and anicteric  Psych: Patient is pleasant and talkative              Lab Results:  Lab Results   Component Value Date    WBC 6 66 01/10/2020    HGB 11 0 (L) 01/10/2020    HCT 32 3 (L) 01/10/2020     (H) 01/10/2020     (L) 01/10/2020     Lab Results   Component Value Date    CALCIUM 8 2 (L) 01/10/2020    K 3 8 01/10/2020    CO2 26 01/10/2020     01/10/2020    BUN 10 01/10/2020    CREATININE 0 67 01/10/2020     No results found for: AFP  Lab Results   Component Value Date    CALCIUM 8 2 (L) 01/10/2020     No results found for: CEA    Bilirubin is 24 09      Assessment:  33-year-old male with metastatic colon cancer and obstructive jaundice and abdominal wall mass    Plan:  Agree with attempted stenting the liver lesion by GI and or IR    Resection of the abdominal wall mass with likely require significant soft tissue resection, and possibly the ribs and diaphragm  I would recommend trying to normalized the bilirubin by stenting and then changing his systemic therapy with chemotherapy  Surgical intervention could be considered to the abdominal wall metastasis if chemotherapy is ineffective  I would recommend obtaining a medical oncology consult at this time  This was discussed with the primary service      Hallie Nuno MD  1:19 PM

## 2020-01-10 NOTE — ASSESSMENT & PLAN NOTE
Last 20 for blood pressures have been -120  - continue metoprolol succinate, hold parameters reviewed

## 2020-01-10 NOTE — ASSESSMENT & PLAN NOTE
Diagnosed more than 10 years ago  Currently denying any symptoms     Holding home dose Tegretol secondary to adverse effects of elevated LFTs  · Gabapentin 200 mg b i d

## 2020-01-10 NOTE — ASSESSMENT & PLAN NOTE
With mets to liver, lungs, and percutaneous abdominal lesion   Status post multiple trials of chemotherapy and radiation therapy  Most recent radiation 11/19  Recently establishing new oncologist with [facility]   Has an appointment scheduled with Dr Aidan Sullivan   - ERCP and stent placement unsuccessful on 01/10/2020  Further management as highlighted above  - palliative care following for pain management  Recs:  Gabapentin 200 mg b i d , baclofen 15 mg t i d , oxycodone 5 mg q 4 hours p r n   - Plan for palliative stent with IR on Wednesday  - GI, Oncology, palliative following, appreciate recommendations

## 2020-01-10 NOTE — ASSESSMENT & PLAN NOTE
-  ERCP and stent unsuccessful 01/10/2020  - Evaluated by IR appreciated, plan for palliative stent on Wednesday  - plan for percutaneous biliary drain placement by IR, procedure date unknown currently  IR has been consulted    - oncology, GI, palliative, IR following

## 2020-01-10 NOTE — PLAN OF CARE
Problem: Nutrition/Hydration-ADULT  Goal: Nutrient/Hydration intake appropriate for improving, restoring or maintaining nutritional needs  Description  Monitor and assess patient's nutrition/hydration status for malnutrition  Collaborate with interdisciplinary team and initiate plan and interventions as ordered  Monitor patient's weight and dietary intake as ordered or per policy  Utilize nutrition screening tool and intervene as necessary  Determine patient's food preferences and provide high-protein, high-caloric foods as appropriate  INTERVENTIONS:  - Monitor oral intake, urinary output, labs, and treatment plans  - Assess nutrition and hydration status and recommend course of action  - Evaluate amount of meals eaten  - Assist patient with eating if necessary   - Allow adequate time for meals  - Recommend/ encourage appropriate diets, oral nutritional supplements, and vitamin/mineral supplements  - Order, calculate, and assess calorie counts as needed  - Recommend, monitor, and adjust tube feedings and TPN/PPN based on assessed needs  - Assess need for intravenous fluids  - Provide specific nutrition/hydration education as appropriate  - Include patient/family/caregiver in decisions related to nutrition  1/10/2020 0746 by Cathie Roy RN  Outcome: Progressing  1/10/2020 0744 by Cathie Roy RN  Outcome: Progressing     Problem: Potential for Falls  Goal: Patient will remain free of falls  Description  INTERVENTIONS:  - Assess patient frequently for physical needs  -  Identify cognitive and physical deficits and behaviors that affect risk of falls    -  [place] fall precautions as indicated by assessment   - Educate patient/family on patient safety including physical limitations  - Instruct patient to call for assistance with activity based on assessment  - Modify environment to reduce risk of injury  - Consider OT/PT consult to assist with strengthening/mobility  1/10/2020 0746 by Oscar Avery RN  Outcome: Progressing  1/10/2020 0744 by Oscar Avery RN  Outcome: Progressing     Problem: GASTROINTESTINAL - ADULT  Goal: Minimal or absence of nausea and/or vomiting  Description  INTERVENTIONS:  - Administer IV fluids if ordered to ensure adequate hydration  - Maintain NPO status until nausea and vomiting are resolved  - Nasogastric tube if ordered  - Administer ordered antiemetic medications as needed  - Provide nonpharmacologic comfort measures as appropriate  - Advance diet as tolerated, if ordered  - Consider nutrition services referral to assist patient with adequate nutrition and appropriate food choices  1/10/2020 0746 by Oscar Avery RN  Outcome: Progressing  1/10/2020 0744 by Oscar Avery RN  Outcome: Progressing  Goal: Maintains or returns to baseline bowel function  Description  INTERVENTIONS:  - Assess bowel function  - Encourage oral fluids to ensure adequate hydration  - Administer IV fluids if ordered to ensure adequate hydration  - Administer ordered medications as needed  - Encourage mobilization and activity  - Consider nutritional services referral to assist patient with adequate nutrition and appropriate food choices  1/10/2020 0746 by Oscar Avery RN  Outcome: Progressing  1/10/2020 0744 by Oscar Avery RN  Outcome: Progressing  Goal: Maintains adequate nutritional intake  Description  INTERVENTIONS:  - Monitor percentage of each meal consumed  - Identify factors contributing to decreased intake, treat as appropriate  - Assist with meals as needed  - Monitor I&O, weight, and lab values if indicated  - Obtain nutrition services referral as needed  1/10/2020 0746 by Oscar Avery RN  Outcome: Progressing  1/10/2020 0744 by Oscar Avery RN  Outcome: Progressing     Problem: METABOLIC, FLUID AND ELECTROLYTES - ADULT  Goal: Electrolytes maintained within normal limits  Description  INTERVENTIONS:  - Monitor labs and assess patient for signs and symptoms of electrolyte imbalances  - Administer electrolyte replacement as ordered  - Monitor response to electrolyte replacements, including repeat lab results as appropriate  - Instruct patient on fluid and nutrition as appropriate  1/10/2020 0746 by Rosette Brantley RN  Outcome: Progressing  1/10/2020 0744 by Rosette Brantley RN  Outcome: Progressing  Goal: Fluid balance maintained  Description  INTERVENTIONS:  - Monitor labs   - Monitor I/O and WT  - Instruct patient on fluid and nutrition as appropriate  - Assess for signs & symptoms of volume excess or deficit  1/10/2020 0746 by Rosette Brantley RN  Outcome: Progressing  1/10/2020 0744 by Rosette Brantley RN  Outcome: Progressing

## 2020-01-10 NOTE — CONSULTS
Consultation - Palliative and [facility] 66 y o  male [id number]      IDENTIFICATION:  Inpatient consult to Palliative Care  Consult performed by: Zion Pierce MD  Consult ordered by: Jennifer Gabriel MD        Physician Requesting Consult: Heather Espitia MD  Reason for Consult / Principal Problem: assistance with goals setting; symptom management  Hx and PE limited by: none    HISTORY OF PRESENT ILLNESS:       Florin Gama is a 66 y o  male with PMHx significant for complex history of metastatic colon cancer (liver, lungs, abdominal wall, gallbladder wall) who is now at [facility] as a transfer from [address] [place] [address] for ERCP and IR drain of obstructive jaundice due to tumor invasion  He sees Dr Cristina Vines (GI) and VANNA (Oncology)  PCS for GOC and support  He was diagnosed with stage 3 colon cancer in May 2013  He underwent a L hemicolectomy and finished 6 cycles of FOLFOX  In 11/2014, he showed disease progression to the liver  He was started on 5 cycles of FOLFIRI/Avastin  On 4/2015, he underwent laparoscopic RFA of liver mets in segment 4A and 7; and robotic wedge resection of segment 6  On 1/2016, he was found to have lung mets  Follow up PET-CT on 4/2016, showed new hepatic and abdominal wall lesions  He was started on 4 cycles of FOLFIRI/Avastin  A PET-CT on 2/2017 showed increase in the size of the lung and abdominal wall lesions  On 3-4/2017, he underwent radiation of the abdominal wall lesion He was on regular follow up and treated with FOLFIRI plus avastin until 4/2019  He saw surgical oncology and plastic surgery for consideration of surgical excision of the abdominal wall implant  He was told that part of his rib may be removed as well  He was advised against that surgery by his oncologist and instead focus on chemotherapy  On 6/2019, he underwent a CT angio that no arterial supply to the abdominal wall mass to allow for embolization  He was placed on Xeloda after        In 11/2019, he moved closer to this area to be near his daughters who can provide support  He transferred all his cancer cares to [facility] as a result where he received another round of radiation to the abdomen from 11/20 to 11/26 2019  He presented to the ED for worsening jaundice  CT scan showed tumor infiltrating the neck of the gallbladder causing intrahepatic biliary obstruction  He was discharged home and then followed up at [facility] where he received a stent in the CBD on 11/27  Then in 12/2019, he again presented to the ED for painless jaundice  ED doc reached out to [facility] directly who advised them to transfer there  Initial plan for another stent placement/replacement was discussed but was never done  He was instead discharged home with follow up to GI  He saw them on 1/3 for consultation where labs were drawn and plan for MRCP was considered  He, however, presented again to the ED on 1/8 after experiencing worsening jaundice  He was admitted at [facility] where recommendation was to transfer patient to [facility] for ERCP  Hence, this admission  Tbili on admission is elevated at 25, Alk phos of 967, AST//108  Kidney function is normal      Met with Otis Oshea and his wife/Francisca and his daughter/Rupal at bedside  Introduced myself and our service  They are interested to learn more about what palliative services are  He was AAO x 3  He appears comfortable  He was noticeably jaundiced  He admits to having pain in his RUQ abd wall where the tumor implant is  He rates this a 5/10 on a bad day and a 1/10 on a good day  He has oxycodone 5mg at home which he only takes sporadically  They are interested to continuing cancer-directed therapies after his obstructive jaundiced is taken care of  They are open to placement at a STR for PT prior to coming home  They also need a visiting nurse and DMEs at home  Tamiafinesse Mansoor have 2 daughters together - Honor Merlin and ORLÉANS   They lived in [place] but recently moved to [facility] to be closer to Honor Merlin who only lives 10mins away  They expressed some disappointment with cares from [facility] (delayed cares, inability to reach doctors promptly)  They were very satisfied with cares they have received from [facility] thus far - especially GI and IR's prompt response to their needs    Review of Systems   Constitution: Positive for malaise/fatigue  Cardiovascular: Negative for chest pain  Respiratory: Negative for shortness of breath  Skin: Negative for itching  Gastrointestinal: Positive for jaundice  Abdominal wall pain   Neurological: Positive for weakness  All other systems reviewed and are negative        Past Medical History:   Diagnosis Date    [facility] ([facility] )     BPH (benign prostatic hyperplasia)     Cancer ([facility] )     colon- johnny liver & lungs    Hypertension     Stroke [facility]     2008- mild weakness left hand/arm    Tumor, metastatic [facility]     Tumor, metastatic [facility] )      Past Surgical History:   Procedure Laterality Date    COLON SURGERY      2013    EYE SURGERY      LIVER SURGERY      TONSILLECTOMY       Social History     Socioeconomic History    Marital status: /Civil Union     Spouse name: Not on file    Number of children: Not on file    Years of education: Not on file    Highest education level: Not on file   Occupational History    Not on file   Social Needs    Financial resource strain: Not on file    Food insecurity:     Worry: Not on file     Inability: Not on file    Transportation needs:     Medical: Not on file     Non-medical: Not on file   Tobacco Use    Smoking status: Former Smoker     Types: Cigarettes     Last attempt to quit: 1981     Years since quittin 0    Smokeless tobacco: Never Used   Substance and Sexual Activity    Alcohol use: Never     Frequency: Never    Drug use: Never    Sexual activity: Not on file   Lifestyle    Physical activity:     Days per week: Not on file     Minutes per session: Not on file    Stress: Not on file   Relationships    Social connections:     Talks on phone: Not on file     Gets together: Not on file     Attends Confucianism service: Not on file     Active member of club or organization: Not on file     Attends meetings of clubs or organizations: Not on file     Relationship status: Not on file    Intimate partner violence:     Fear of current or ex partner: Not on file     Emotionally abused: Not on file     Physically abused: Not on file     Forced sexual activity: Not on file   Other Topics Concern    Not on file   Social History Narrative    Not on file     Family History   Problem Relation Age of Onset    Cancer Father     Colon cancer Father     Cancer Brother     Cancer Paternal Grandfather        MEDICATIONS / ALLERGIES:    all current active meds have been reviewed    Allergies   Allergen Reactions    Ib Pro [Ibuprofen] Hives and Itching    Adhesive [Medical Tape] Rash     Use only paper tape       OBJECTIVE:    Physical Exam  Physical Exam   Constitutional: He is oriented to person, place, and time  He appears well-developed and well-nourished  No distress  HENT:   Head: Normocephalic and atraumatic  Right Ear: External ear normal    Left Ear: External ear normal    Mouth/Throat: Oropharynx is clear and moist    Eyes: Pupils are equal, round, and reactive to light  EOM are normal    Icteric sclera   Pulmonary/Chest: No respiratory distress  Abdominal: Soft  He exhibits mass  Neurological: He is alert and oriented to person, place, and time  Skin: Skin is warm and dry  He is not diaphoretic    jaundice   Psychiatric: He has a normal mood and affect  His behavior is normal  Judgment and thought content normal        Lab Results: I have personally reviewed pertinent labs  as noted in the HPI  Imaging Studies: I have personally reviewed pertinent reports  as noted in the HPI  EKG, Pathology, and Other Studies: I have personally reviewed pertinent reports     as noted in the HPI      Assessment:  Metastatic colon cancer (to liver, lungs, abdominal wall, LNs), s/p surgery, chemo, RT  Obstructive jaundice from tumor invasion of gallbladder wall s/p stent in the CBD  Abdominal wall pain/Cancer-related pain  Fatigue  Weakness  Lack of energy  Goals of care    Plan:  1  Symptom management    - increase frequency of prn oxycodone 5mg while inpatient to have liberal access to pain meds as needed - increased from q6H to q4H prn   - bowel regimen   - spoke about possible use of steroids for fatigue/energy  But will hold off until further plans are better defined by oncology, GI, IR    2  Goals    - Patient demonstrates good insight and judgement into his medical condition  He seems competent on my exam today  - Power of :  presumed to be wife by [place] [address]  But he also wants his 2 daughters to be part of the decision making     - Full cares, no limits   - Code Status: Full - Level 1   - he will benefit to OP PCS for continued support and discussion of GOC   - CM to help with placement and DMEs    3  Psychosocial support   - wife, Pablo Cagle and daughters, Alin Grijalva and Nancy Farris  They moved to [place] to be closer to daughter  Alin Grijalva lives 10mins from them  We appreciate the invitation to be involved in this patient's care  We will continue to follow  Please do not hesitate to reach our on call provider through our clinic answering service at  should you have acute symptom control concerns  Counseling / Coordination of Care  Total floor / unit time spent today 60 minutes  Greater than 50% of total time was spent with the patient and / or family counseling and / or coordination of care   A description of the counseling / coordination of care: provided medical updates, discussed palliative care, determined capacity/competency, determined goals of care, determined POA, determined social/family support, discussed plans of care, discussed symptom management, provided psychosocial support            Colette Bal MD  Palliative Medicine & Supportive Care  Internal Medicine  Available via [facility] Text  Office: [phone]  Fax: [phone]

## 2020-01-10 NOTE — PROGRESS NOTES
INTERNAL MEDICINE RESIDENCY PROGRESS NOTE     Name: Marilee Alcala   Age & Sex: 66 y o  male   MRN: [id number]  Unit/Bed#: [facility] 909-01   Encounter: [id number]  Team: SOD Team A    PATIENT INFORMATION     Name: Marilee Alcala   Age & Sex: 66 y o  male   MRN: [id number]  Hospital Stay Days: 1    ASSESSMENT/PLAN     Principal Problem:    Painless jaundice secondary to parenchymal infiltration of tumor along with biliary compression   Active Problems:    BPH (benign prostatic hyperplasia)    Atrial fibrillation ([address] )    Colon cancer metastasized to multiple sites [facility])    Hypertension    History of Trigeminal neuralgia    Chronic hyponatremia      Chronic hyponatremia  Assessment & Plan  POA Na-130, sodium 134 today  - continue monitor with a m  BMP    History of Trigeminal neuralgia  Assessment & Plan  Diagnosed more than 10 years ago  Currently denying any symptoms  Holding home dose Tegretol secondary to adverse effects of elevated LFTs  · Switched to gabapentin 100 mg bid     Hypertension  Assessment & Plan  Most recent blood pressure 115/74 stable  - continue metoprolol succinate    Colon cancer metastasized to multiple sites [facility])  Assessment & Plan  With mets to liver, lungs, and percutaneous abdominal lesion   Status post multiple trials of chemotherapy and radiation therapy  Most recent radiation 11/19  Recently establishing new oncologist with [facility]   Has an appointment scheduled with Dr Reema aMlone   - GI following, scheduled for ERCP today  IR will be present and case IR guided external/inxternal drain needed  -     Atrial fibrillation (HCC)  Assessment & Plan  · Home regimen: Lovenox 100 mg every 12 hours, as recommended by his oncologist, metoprolol succinate 50 mg daily  · Holding Lovenox  today for ERCP  Restart tomorrow  · Currently rate controlled      * Painless jaundice secondary to parenchymal infiltration of tumor along with biliary compression   Assessment & Plan  Transfer from [address] [facility] for ERCP and IR guided percutaneous stent exchange/drain  History of colon cancer with metastases to lungs and liver, s/p recent biliary stent placement (11/2019)  Patient presents with worsening asymptomatic jaundice  No pain, N/V, fever, chills  - 1/6/20: MRCP:  Metastatic colon carcinoma with stable meds to lung bases, right anterolateral abdominal wall, liver  Infiltrating lesion in the right lobe adjacent to the gallbladder causes invasion of the gallbladder wall secondary intrahepatic biliary duct dual dilatation of both the right and left-sided ducts  Indwelling biliary stent extending from the common hepatic duct to the right sided bile ducts  -  ERCP/IR guided stent today  - Seen by Dr Fabiana Goff from Surgical Oncology today  Recommended primary treating with stenting liver lesion by GI/IR, trend bilirubin until normalized and start treatment with chemotherapy  Per notes, resection of the abdominal wall mas likely require signifcant soft tissu resection, and possibly ribs and diaphragm    - GI, Hematology-Oncology, palliative and IR consulted  and following          Disposition:  ERCP scheduled for today  Oncology, palliative Care, GI, Surgical Oncology following  Continue inpatient management as highlighted above  SUBJECTIVE     Patient seen and examined  No acute events overnight  Denies any pain except at the site of his abdominal mass  Achy/sharp pain worsened with any ambulation  Patient is aware he is going ERCP today  Would like to see Dr Fabiana Goff for further information regarding abdominal mass  Daughter and wife at bedside  Discussed in detail the plan so far  All questions and concerns answered at that time  Patient denies nausea, vomiting, diarrhea, chest pain, palpitation, fevers or chills  All other review of systems negative at this time as well        OBJECTIVE     Vitals:    01/10/20 0600 01/10/20 0652 01/10/20 0811 01/10/20 1355   BP:  115/74  104/68   Pulse:   89 92   Resp:  20  18   Temp:  98 2 °F (36 8 °C)  97 6 °F (36 4 °C)   TempSrc:    Tympanic   SpO2:    98%   Weight: 96 2 kg (212 lb 1 6 oz)   95 3 kg (210 lb)   Height:    5' 10" (1 778 m)      Temperature:   Temp (24hrs), Av 1 °F (36 7 °C), Min:97 6 °F (36 4 °C), Max:98 3 °F (36 8 °C)    Temperature: 97 6 °F (36 4 °C)  Intake & Output:  I/O        07 -  0700 01/09 0701 - 01/10 0700 01/10 0701 - 01/11 0700    P  O   120 0    Total Intake(mL/kg)  120 (1 2) 0 (0)    Urine (mL/kg/hr)  750 500 (0 6)    Total Output  750 500    Net  -630 -500               Weights:   IBW: 73 kg    Body mass index is 30 13 kg/m²  Weight (last 2 days)     Date/Time   Weight    01/10/20 1355   95 3 (210)    01/10/20 0600   96 2 (212 1)    20 1745   95 3 (210)            Physical Exam   Constitutional: He is oriented to person, place, and time  No distress  Eyes: Pupils are equal, round, and reactive to light  Scleral icterus is present  Bilateral scleral icterus noted  Neck: No JVD present  Cardiovascular: Normal rate and regular rhythm  Exam reveals no gallop and no friction rub  No murmur heard  Pulmonary/Chest: Effort normal  He has no wheezes  He exhibits no tenderness  Abdominal: There is no tenderness  For treating mass located right upper abdominal region  Covered with gauze, dry, clean, no discharge noted at the time  Musculoskeletal: Normal range of motion  He exhibits no edema  Neurological: He is alert and oriented to person, place, and time  No sensory deficit  Skin:   Overall jaundiced   Psychiatric: His behavior is normal    Vitals reviewed  LABORATORY DATA     Labs: I have personally reviewed pertinent reports    Results from last 7 days   Lab Units 01/10/20  0528 20  0603 20  0500 20  1942   WBC Thousand/uL 6 66 6 93 6 03 7 19   HEMOGLOBIN g/dL 11 0* 11 6* 10 8* 11 6*   HEMATOCRIT % 32 3* 34 1* 31 6* 34 5*   PLATELETS Thousands/uL 145* 156 133* 141*   NEUTROS PCT % 72  --  72 77*   MONOS PCT % 17*  --  18* 16*      Results from last 7 days   Lab Units 01/10/20  0528 01/10/20  0527 01/09/20  0603 01/08/20  0500   POTASSIUM mmol/L 3 8  --  3 9 3 6   CHLORIDE mmol/L 101  --  98* 96*   CO2 mmol/L 26  --  24 25   BUN mg/dL 10  --  9 9   CREATININE mg/dL 0 67  --  0 72 0 68   CALCIUM mg/dL 8 2*  --  8 1* 8 5   ALK PHOS U/L  --  981* 1,016* 937*   ALT U/L  --  107* 97* 105*   AST U/L  --  128* 121* 114*     Results from last 7 days   Lab Units 01/08/20  0500   MAGNESIUM mg/dL 1 9          Results from last 7 days   Lab Units 01/09/20  2105 01/08/20  0500 01/07/20  1942   INR  1 50* 1 25* 1 32*   PTT seconds  --   --  53*     Results from last 7 days   Lab Units 01/07/20  1942   LACTIC ACID mmol/L 0 5     Results from last 7 days   Lab Units 01/07/20  1942   TROPONIN I ng/mL <0 02       IMAGING & DIAGNOSTIC TESTING     Radiology Results: I have personally reviewed pertinent reports  No results found  Other Diagnostic Testing: I have personally reviewed pertinent reports      ACTIVE MEDICATIONS     Current Facility-Administered Medications   Medication Dose Route Frequency    bisacodyl (DULCOLAX) EC tablet 10 mg  10 mg Oral Daily PRN    gabapentin (NEURONTIN) capsule 100 mg  100 mg Oral BID    indomethacin (INDOCIN) rectal suppository   Rectal Code/Trauma/Sedation Med    metoprolol succinate (TOPROL-XL) 24 hr tablet 25 mg  25 mg Oral Daily    oxyCODONE (ROXICODONE) IR tablet 5 mg  5 mg Oral Q4H PRN    polyethylene glycol (MIRALAX) packet 17 g  17 g Oral Daily PRN    tamsulosin (FLOMAX) capsule 0 4 mg  0 4 mg Oral Daily With Dinner     Facility-Administered Medications Ordered in Other Encounters   Medication Dose Route Frequency    ePHEDrine injection    PRN    lactated ringers infusion    Continuous PRN    phenylephrine (TAVO-SYNEPHRINE) 50 mg (STANDARD CONCENTRATION) in sodium chloride 0 9% 250 mL    Continuous PRN    phenylephrine bolus from bag    PRN    propofol (DIPRIVAN) 200 MG/20ML bolus injection   Intravenous PRN    Succinylcholine Chloride 100 mg/5 mL syringe   Intravenous PRN       VTE Pharmacologic Prophylaxis: Reason for no pharmacologic prophylaxis Lovenox held today secondary to ERCP procedure  VTE Mechanical Prophylaxis: sequential compression device    Portions of the record may have been created with voice recognition software  Occasional wrong word or "sound a like" substitutions may have occurred due to the inherent limitations of voice recognition software    Read the chart carefully and recognize, using context, where substitutions have occurred   ==  Richard Nice, [facility]  Internal Medicine Residency PGY-1

## 2020-01-11 NOTE — PLAN OF CARE
Problem: Nutrition/Hydration-ADULT  Goal: Nutrient/Hydration intake appropriate for improving, restoring or maintaining nutritional needs  Description  Monitor and assess patient's nutrition/hydration status for malnutrition  Collaborate with interdisciplinary team and initiate plan and interventions as ordered  Monitor patient's weight and dietary intake as ordered or per policy  Utilize nutrition screening tool and intervene as necessary  Determine patient's food preferences and provide high-protein, high-caloric foods as appropriate  INTERVENTIONS:  - Monitor oral intake, urinary output, labs, and treatment plans  - Assess nutrition and hydration status and recommend course of action  - Evaluate amount of meals eaten  - Assist patient with eating if necessary   - Allow adequate time for meals  - Recommend/ encourage appropriate diets, oral nutritional supplements, and vitamin/mineral supplements  - Order, calculate, and assess calorie counts as needed  - Recommend, monitor, and adjust tube feedings and TPN/PPN based on assessed needs  - Assess need for intravenous fluids  - Provide specific nutrition/hydration education as appropriate  - Include patient/family/caregiver in decisions related to nutrition  Outcome: Progressing     Problem: Potential for Falls  Goal: Patient will remain free of falls  Description  INTERVENTIONS:  - Assess patient frequently for physical needs  -  Identify cognitive and physical deficits and behaviors that affect risk of falls    -  [place] fall precautions as indicated by assessment   - Educate patient/family on patient safety including physical limitations  - Instruct patient to call for assistance with activity based on assessment  - Modify environment to reduce risk of injury  - Consider OT/PT consult to assist with strengthening/mobility  Outcome: Progressing     Problem: GASTROINTESTINAL - ADULT  Goal: Minimal or absence of nausea and/or vomiting  Description  INTERVENTIONS:  - Administer IV fluids if ordered to ensure adequate hydration  - Maintain NPO status until nausea and vomiting are resolved  - Nasogastric tube if ordered  - Administer ordered antiemetic medications as needed  - Provide nonpharmacologic comfort measures as appropriate  - Advance diet as tolerated, if ordered  - Consider nutrition services referral to assist patient with adequate nutrition and appropriate food choices  Outcome: Progressing  Goal: Maintains or returns to baseline bowel function  Description  INTERVENTIONS:  - Assess bowel function  - Encourage oral fluids to ensure adequate hydration  - Administer IV fluids if ordered to ensure adequate hydration  - Administer ordered medications as needed  - Encourage mobilization and activity  - Consider nutritional services referral to assist patient with adequate nutrition and appropriate food choices  Outcome: Progressing  Goal: Maintains adequate nutritional intake  Description  INTERVENTIONS:  - Monitor percentage of each meal consumed  - Identify factors contributing to decreased intake, treat as appropriate  - Assist with meals as needed  - Monitor I&O, weight, and lab values if indicated  - Obtain nutrition services referral as needed  Outcome: Progressing     Problem: METABOLIC, FLUID AND ELECTROLYTES - ADULT  Goal: Electrolytes maintained within normal limits  Description  INTERVENTIONS:  - Monitor labs and assess patient for signs and symptoms of electrolyte imbalances  - Administer electrolyte replacement as ordered  - Monitor response to electrolyte replacements, including repeat lab results as appropriate  - Instruct patient on fluid and nutrition as appropriate  Outcome: Progressing  Goal: Fluid balance maintained  Description  INTERVENTIONS:  - Monitor labs   - Monitor I/O and WT  - Instruct patient on fluid and nutrition as appropriate  - Assess for signs & symptoms of volume excess or deficit  Outcome: Progressing

## 2020-01-11 NOTE — PROGRESS NOTES
Progress Note - Surgical Oncology   Tami Lopez 66 y o  male MRN: [id number]  [facility] Encounter: [id number]  01/11/20  9:37 AM      Subjective/Objective   No chief complaint on file  Subjective:  No complaints    Objective:  Unable to place stent yesterday    Blood pressure 90/60, pulse 98, temperature 98 2 °F (36 8 °C), resp  rate 16, height 5' 10" (1 778 m), weight 95 9 kg (211 lb 6 4 oz), SpO2 99 %  ,Body mass index is 30 33 kg/m²  Intake/Output Summary (Last 24 hours) at 1/11/2020 1313  Last data filed at 1/10/2020 2242  Gross per 24 hour   Intake 1400 ml   Output 500 ml   Net 900 ml       Invasive Devices     Central Venous Catheter Line            Port A Cath 01/07/20 Right Chest 3 days                Physical Exam:   Head and neck: is normocephalic  Neck is supple without adenopathy  Sclerae are anicteric   Mucous membranes are moist    Abdomen: Soft, nontender, nondistended, still with right upper quadrant fungating mass  Extremities: Without cyanosis, clubbing, or edema, symmetric  Neuro: Grossly nonfocal  Skin is warm and anicteric  Psych: Patient is pleasant and talkative              Lab Results:  Lab Results   Component Value Date    WBC 9 08 01/11/2020    HGB 11 0 (L) 01/11/2020    HCT 33 2 (L) 01/11/2020     (H) 01/11/2020     01/11/2020     Lab Results   Component Value Date    CALCIUM 7 7 (L) 01/11/2020    K 4 5 01/11/2020    CO2 25 01/11/2020     01/11/2020    BUN 13 01/11/2020    CREATININE 0 68 01/11/2020     No results found for: AFP  Lab Results   Component Value Date    CALCIUM 7 7 (L) 01/11/2020     No results found for: CEA  Bilirubin is 18 17          Assessment:  70-year-old male with metastatic colon cancer  Unsuccessful ERCP and stent placement    Plan:  Trend bilirubin  Would plan for IR for PTC drainage  Would consult Medical Oncology to discuss chemotherapy options  Surgical palliation for fungating wound may be possible, but would likely require extensive surgery  Patient's daughter was discussing pain management seeing him regarding nerve block  We will look into this  This was all discussed with the primary medical team who was present at the time of our discussion      Francois Landeros MD  9:37 AM

## 2020-01-11 NOTE — CONSULTS
Consultation - [address] [address] [address] 66 y o  male MRN: [id number]  Unit/Bed#: [facility] 909-01 Encounter: [id number]    History of Present Illness   Physician Requesting Consult: Jamar Villarreal MD  Reason for Consult / Principal Problem:  Advanced colon cancer with lung, liver and subcutaneous/soft tissue metastasis    HPI: Mango Ttoh is a 66y o  year old male who presents with obstructive jaundice  ERCP and stent placement was unsuccessful on January 10, 2020  Percutaneous cholangiography and biliary drainage is planned for January 13, 2020  Hematology/oncology history:    Stage III colon cancer, metastasis to 1 of 13 lymph nodes, status post left hemicolectomy May 2013 and adjuvant systemic therapy with FOLFOX  4 liver lesions on CT scan November 2014  Neoadjuvant FOLFIRI with bevacizumab for 5 cycles, last given February 2015  Laparoscopic radiofrequency ablation of liver metastasis in segment 4A, RFA of metastasis in segment 7 and robotic wedge resection of segment 6 containing 2 liver lesions  Then 6 further cycles of FOLFIRI completed September 2015  Palliative radiation therapy to painful abdominal wall metastasis March to April 2017 30 Gy in 10 fractions  June 13, 2019 CT scan revealed lungs, hilar, liver, subcutaneous and muscular metastatic lesion involving the right upper abdomen  Capecitabine until October 2010    Further radiation therapy to the right upper quadrant abdominal wall 30 Gy in 5 fractions November 20-26, 2019 at [facility]    Consults    ROS:   General:  He notes easy fatigue, no chills or swaets although he feels cold often especially in the evening  He has lost 30 lb over the past 3 months  Head and Neck: No nosebleeds, no oral cavity or throat soreness  Cardiovascular: No chest pain, no lower extremity edema  Respriatory:  He has mild dyspnea on exertion  He denies cough    GI: Appetite is fair at best   He has been a right upper quadrant discomfort  He has been frequently constipated  Stool has been shae-colored of late  :  Urine has been tea-colored of late  He notes nocturia x1  Musculoskeletal: No back pains or joint pain  Skin: No skin rash  Neurological: No headache, no numbness, no weakness    He uses a cane when walking longer distances or on uneven surfaces  Hematologic: No easy bruising  Psychiatric: No emotional problems    Historical Information   Past Medical History:   Diagnosis Date    Atrial fibrillation ([facility] )     BPH (benign prostatic hyperplasia)     Cancer (HCC)     colon- johnny liver & lungs    Hypertension     Stroke ([facility] )     2008- mild weakness left hand/arm    Trigeminal neuralgia     Tumor, metastatic ([facility] )     Tumor, metastatic ([facility] )      Past Surgical History:   Procedure Laterality Date    COLON SURGERY      2013    EYE SURGERY      LIVER SURGERY      TONSILLECTOMY       Social History   Social History     Substance and Sexual Activity   Alcohol Use Never    Frequency: Never     Social History     Substance and Sexual Activity   Drug Use Never     Social History     Tobacco Use   Smoking Status Former Smoker    Types: Cigarettes    Last attempt to quit:     Years since quittin 0   Smokeless Tobacco Never Used     Family History:   Family History   Problem Relation Age of Onset    Cancer Father     Colon cancer Father     Cancer Brother     Cancer Paternal Grandfather        Meds/Allergies   current meds:   Current Facility-Administered Medications   Medication Dose Route Frequency    baclofen tablet 15 mg  15 mg Oral TID    bisacodyl (DULCOLAX) EC tablet 10 mg  10 mg Oral Daily PRN    enoxaparin (LOVENOX) subcutaneous injection 40 mg  40 mg Subcutaneous Q12H [address]    gabapentin (NEURONTIN) capsule 200 mg  200 mg Oral BID    metoprolol succinate (TOPROL-XL) 24 hr tablet 25 mg  25 mg Oral Daily    oxyCODONE (ROXICODONE) IR tablet 5 mg  5 mg Oral Q4H PRN    polyethylene glycol (MIRALAX) packet 17 g  17 g Oral Daily PRN    tamsulosin (FLOMAX) capsule 0 4 mg  0 4 mg Oral Daily With Dinner    and PTA meds:    Medications Prior to Admission   Medication    carBAMazepine (CARBATROL) 300 MG 12 hr capsule    carBAMazepine (TEGretol XR) 400 mg 12 hr tablet    enoxaparin (LOVENOX) 100 mg/mL    metoprolol succinate (TOPROL-XL) 50 mg 24 hr tablet    oxyCODONE (OXY-IR) 5 MG capsule    tamsulosin (FLOMAX) 0 4 mg     Allergies   Allergen Reactions    Ib Pro [Ibuprofen] Hives and Itching    Adhesive [Medical Tape] Rash     Use only paper tape       Objective   Vitals: Blood pressure 96/62, pulse 98, temperature 98 2 °F (36 8 °C), resp  rate 16, height 5' 10" (1 778 m), weight 95 9 kg (211 lb 6 4 oz), SpO2 99 %  Intake/Output Summary (Last 24 hours) at 1/11/2020 1440  Last data filed at 1/11/2020 1336  Gross per 24 hour   Intake 1880 ml   Output 0 ml   Net 1880 ml     Invasive Devices     Central Venous Catheter Line            Port A Cath 01/07/20 Right Chest 3 days                Physical Exam: General appearance: Appears well  Head: Normocephalic  Eyes: Extraocular movements intact  There is scleral icterus  Ears: No gross hearing deficit  Oropharynx: Clear  Neck: Supple, No lymphadenopathy  Chest: No axillary adenopathy  Lungs: Clear to auscultation bilaterally  Heart: Regular rate and rhythm  Abdomen: There is a 6 cm right upper quadrant abdominal wall mass; No inguinal  lymphadenopathy  Extremities: No lower extremity edema bilaterally  Skin:  He is jaundiced No rashes or ecchymoses  Neurologic: Grossly intact, no focal neurological deficit  Psychiatric: Oriented to person, place and time, normal mood and affect    Lab Results:     From January 9, 2020:  PT INR is 1 50    From January 10, 2020: Folate 9 3, vitamin B12 is 815    From January 11, 2020:  Creatinine 0 68, calcium 7 7, AST 97, ALT 90, alk-phos 983, bili 18   One 7, WBC 9 08, hemoglobin 11 0, platelets 194    MRI of the abdomen with and without contrast from January 7, 2020: There is metastatic colon cancer with stable metastasis in the lung bases 5 9 x 5 5 cm in the lingula, 4 1 x 2 7 cm in the LLL and 3 1 x 2 0 cm of the RLL, right anterior lateral abdominal wall, 4 9 x 4 5 cm, and liver, the infiltrating lesion in the right lobe adjacent to the gallbladder fossa, 5 5 x 3 7 x 4 7 cm, cauing invasion of the gallbladder wall with secondary intrahepatic biliary ductal dilatation of both the right and left sided ducts, there is an indwelling biliary stent extending from the hepatic duct to the right-sided bile ducts, no intraluminal enhancement is suggest tumor infiltration  Assessment/Plan     Assessment:    Obstructive jaundice, there is an infiltrating lesion of the right hepatic lobe adjacent to the gallbladder fossa 5 5 x 3 7 x 4 7 cm causing invasion of the gallbladder with secondary intrahepatic ductal dilatation of both right and left sided ducts, there is an indwelling biliary stent extending from the hepatic duct to the right-sided bile ducts  Hyperbilirubinemia has improved during this hospital stay from 25 73 on January 9, 2020 to 18 17 today  As noted above percutaneous cholangiography and biliary drainage is planned for January 13, 2020  Plan:    Ideally, bilirubin is down to 3 or less prior to start of further systemic therapy  Options include cetuximab or panitumumab with or without FOLFOX provided that there is lack of KRAS or NRAS mutation, or Lonsurf or regorafenib  The patient is to follow up with Dr Cynthia Yee at the [place], [place] office upon hospital discharge as to further systemic therapy  Counseling / Coordination of Care  Total floor / unit time spent today 45 minutes  Greater than 50% of total time was spent with the patient and / or family counseling and / or coordination of care   A description of the counseling / coordination of care:  Diagnostic tests, impressions and recommendations reviewed with the patient

## 2020-01-11 NOTE — PROGRESS NOTES
INTERNAL MEDICINE RESIDENCY PROGRESS NOTE     Name: Richard Curling   Age & Sex: 66 y o  male   MRN: [id number]  Unit/Bed#: [facility] 909-01   Encounter: [id number]  Team: SOD Team A    PATIENT INFORMATION     Name: Richard Curling   Age & Sex: 66 y o  male   MRN: [id number]  Hospital Stay Days: 2    ASSESSMENT/PLAN     Principal Problem:    Painless jaundice secondary to parenchymal infiltration of tumor along with biliary compression   Active Problems:    BPH (benign prostatic hyperplasia)    Atrial fibrillation ([facility] )    Colon cancer metastasized to multiple sites [facility])    Hypertension    History of Trigeminal neuralgia    Chronic hyponatremia    Chronic hyponatremia  Assessment & Plan  POA Na-130, sodium 135 today (from 01/30 4)  - continue monitor with a m  BMP     History of Trigeminal neuralgia  Assessment & Plan  Diagnosed more than 10 years ago  Currently denying any symptoms  Home dose Tegretol held secondary to AST/ALT elevation  · Switched to gabapentin 100 mg bid   · Baclofen 50 mg p o  T i d  added; patient reports significant symptomatic relief with this adjustment     Hypertension  Assessment & Plan  Most recent blood pressure 96/62 (SBP 90s-110s over last 24 hours)  -  Toprol XL 25 mg p o  Daily     Colon cancer metastasized to multiple sites [facility])  Assessment & Plan  With mets to liver, lungs, and percutaneous abdominal lesion   Status post multiple trials of chemotherapy and radiation therapy  Most recent radiation 11/19   Recently establishing new oncologist [address] [place] [address] an appointment scheduled with Dr Johnathan Love   - PPD 1 status-post ERCP; unsuccessful attempts at passing    uncovered metal stent beyond malignant strictures; interventional radiology consulted for potential external drain placement next week     Atrial fibrillation ([facility] )  Assessment & Plan  · Home regimen: Lovenox 100 mg every 12 hours, as recommended by his oncologist, metoprolol succinate 50 mg daily  · Restarted Lovenox 40 mg IV b i d   · Currently rate controlled (heart rate 80s-90s over last 24 hours)     * Painless jaundice secondary to parenchymal infiltration of tumor along with biliary compression   Assessment & Plan  Transfer from [address] [place] [address] for ERCP and IR guided percutaneous stent exchange/drain  History of colon cancer with metastases to lungs and liver, s/p recent biliary stent placement (11/2019)  Patient presents with worsening asymptomatic jaundice  No pain, N/V, fever, chills  - 1/6/20: MRCP:  Metastatic colon carcinoma with stable meds to lung bases, right anterolateral abdominal wall, liver   Infiltrating lesion in the right lobe adjacent to the gallbladder causes invasion of the gallbladder wall secondary intrahepatic biliary duct dual dilatation of both the right and left-sided ducts   Indwelling biliary stent extending from the common hepatic duct to the right sided bile ducts  - PPD 1 status-post ERCP; unsuccessful attempts at passing    uncovered metal stent beyond malignant strictures; interventional radiology consulted for potential external drain placement next week  - Patient remains profoundly jaundiced; repeat CMP revealed AST 97 (from 128), ALT 90 (from 107), and total bilirubin 18 17 (from 24 09)  - GI, Hematology-Oncology, palliative and IR consulted and following; will appreciate continued recommendations          Disposition:  Continue monitoring on the General Medicine service prior to IR PTC drainage         SUBJECTIVE     PPD 1 status-post ERCP; patient tolerated procedure well without complications  No acute events overnight  Patient lying supine in bed upon entry; daughter at the bedside  No complaints at time of examination  Patient and daughter counseled on the results of the ERCP and inpatient management moving forward  Patient expressed understanding of both medical and surgical plans and had no questions at conclusion of encounter      OBJECTIVE     Vitals: 01/10/20 2242 20 0600 20 0810 20 1149   BP: 112/72  90/60 96/62   Pulse: 80  98    Resp: 14  16    Temp: (!) 97 4 °F (36 3 °C)  98 2 °F (36 8 °C)    TempSrc: Axillary      SpO2: 99%      Weight:  95 9 kg (211 lb 6 4 oz)     Height:          Temperature:   Temp (24hrs), Av 7 °F (36 5 °C), Min:97 2 °F (36 2 °C), Max:98 2 °F (36 8 °C)    Temperature: 98 2 °F (36 8 °C)  Intake & Output:  I/O       701 - 01/10 0700 01/10 0701 -  07 -  0700    P  O  120 0 480    I V  (mL/kg)  1400 (14 6)     Total Intake(mL/kg) 120 (1 2) 1400 (14 6) 480 (5)    Urine (mL/kg/hr) 750 500 (0 2)     Blood  0     Total Output 750 500     Net -630 +900 +480           Unmeasured Urine Occurrence  1 x         Weights:   IBW: 73 kg    Body mass index is 30 33 kg/m²  Weight (last 2 days)     Date/Time   Weight    20 0600   95 9 (211 4)    01/10/20 1355   95 3 (210)    01/10/20 0600   96 2 (212 1)    20 1745   95 3 (210)            Physical Exam   Constitutional: He is oriented to person, place, and time  No distress  Profoundly jaundiced   HENT:   Head: Normocephalic and atraumatic  Mouth/Throat: Oropharynx is clear and moist  No oropharyngeal exudate  Eyes: Pupils are equal, round, and reactive to light  Conjunctivae and EOM are normal  Scleral icterus is present  Cardiovascular: Normal rate, regular rhythm, normal heart sounds and intact distal pulses  Exam reveals no gallop and no friction rub  No murmur heard  Pulmonary/Chest: Effort normal and breath sounds normal  No respiratory distress  Abdominal: Soft  Bowel sounds are normal  He exhibits mass  He exhibits no distension  There is no tenderness  Right-sided anterior abdominal mass covered with sterile gauze   Musculoskeletal: He exhibits no edema, tenderness or deformity  Neurological: He is alert and oriented to person, place, and time  No cranial nerve deficit   Coordination normal    Skin: Skin is warm and dry  Capillary refill takes 2 to 3 seconds  No rash noted  He is not diaphoretic  No erythema  No pallor  Diffusely jaundiced   Psychiatric: He has a normal mood and affect  His behavior is normal    Vitals reviewed  LABORATORY DATA     Labs: I have personally reviewed pertinent reports  Results from last 7 days   Lab Units 01/11/20  0601 01/10/20  0528 01/09/20  0603 01/08/20  0500 01/07/20  1942   WBC Thousand/uL 9 08 6 66 6 93 6 03 7 19   HEMOGLOBIN g/dL 11 0* 11 0* 11 6* 10 8* 11 6*   HEMATOCRIT % 33 2* 32 3* 34 1* 31 6* 34 5*   PLATELETS Thousands/uL 150 145* 156 133* 141*   NEUTROS PCT %  --  72  --  72 77*   MONOS PCT %  --  17*  --  18* 16*      Results from last 7 days   Lab Units 01/11/20  0601 01/10/20  0528 01/10/20  0527 01/09/20  0603   POTASSIUM mmol/L 4 5 3 8  --  3 9   CHLORIDE mmol/L 103 101  --  98*   CO2 mmol/L 25 26  --  24   BUN mg/dL 13 10  --  9   CREATININE mg/dL 0 68 0 67  --  0 72   CALCIUM mg/dL 7 7* 8 2*  --  8 1*   ALK PHOS U/L 983*  --  981* 1,016*   ALT U/L 90*  --  107* 97*   AST U/L 97*  --  128* 121*     Results from last 7 days   Lab Units 01/08/20  0500   MAGNESIUM mg/dL 1 9          Results from last 7 days   Lab Units 01/09/20 2105 01/08/20  0500 01/07/20  1942   INR  1 50* 1 25* 1 32*   PTT seconds  --   --  53*     Results from last 7 days   Lab Units 01/07/20  1942   LACTIC ACID mmol/L 0 5     Results from last 7 days   Lab Units 01/07/20 1942   TROPONIN I ng/mL <0 02       IMAGING & DIAGNOSTIC TESTING     Radiology Results: I have personally reviewed pertinent reports  Fl Ercp Biliary Only    Result Date: 1/11/2020  Impression: Fluoroscopic guidance for ERCP  Please see procedure report for full details  Workstation performed: [id number]     Other Diagnostic Testing: I have personally reviewed pertinent reports      ACTIVE MEDICATIONS     Current Facility-Administered Medications   Medication Dose Route Frequency    baclofen tablet 15 mg  15 mg Oral TID    bisacodyl (DULCOLAX) EC tablet 10 mg  10 mg Oral Daily PRN    gabapentin (NEURONTIN) capsule 200 mg  200 mg Oral BID    metoprolol succinate (TOPROL-XL) 24 hr tablet 25 mg  25 mg Oral Daily    oxyCODONE (ROXICODONE) IR tablet 5 mg  5 mg Oral Q4H PRN    polyethylene glycol (MIRALAX) packet 17 g  17 g Oral Daily PRN    tamsulosin (FLOMAX) capsule 0 4 mg  0 4 mg Oral Daily With Dinner       VTE Pharmacologic Prophylaxis: Enoxaparin (Lovenox)  VTE Mechanical Prophylaxis: sequential compression device    Portions of the record may have been created with voice recognition software  Occasional wrong word or "sound a like" substitutions may have occurred due to the inherent limitations of voice recognition software    Read the chart carefully and recognize, using context, where substitutions have occurred   ==  Jared Canales MD  [facility]  Internal Medicine Residency PGY-1

## 2020-01-11 NOTE — PROGRESS NOTES
Progress note - Palliative and Supportive Care   Karly Liu 66 y o  male [id number]    Assessment:   - Karly Liu is a 66 y o  male with metastatic colon cancer who was transferred to [facility] for ERCP and iron drainage of obstructive jaundice secondary to tumor invasion  PSC has been consulted for symptom management, goals of care discussion and pain management  Patient Active Problem List    Diagnosis Date Noted    History of Trigeminal neuralgia 01/08/2020    Chronic hyponatremia 01/08/2020    Low hemoglobin 01/08/2020    Colon cancer metastasized to multiple sites ([facility] ) 01/07/2020    Hypertension 01/07/2020    Painless jaundice secondary to parenchymal infiltration of tumor along with biliary compression  01/07/2020    BPH (benign prostatic hyperplasia)     Atrial fibrillation ([facility] )        Plan:  1  Symptom management -    Pain management regimen:  o Baclofen 15mg TID  o Increase Gabapentin to 200mg BID  o Oxycodone IR 5mg m0nwaar PRN   - Required 3 doses in the last 24 hours  o Total OMEs in last 24 hours: 22 5mg   Bowel Regimen:  o Dulcolax and Miralax    2  Goals -    - Treatment focused cares    I can be contacted through Doctor Mitchell Avendano for any questions regarding Valdezan Ek case  If there are any questions after business hours, you may reach out to the on call provider  Code Status: FULL CODE - Level 1   Decisional apparatus:  Patient is competent on my exam today  If competence is lost, patient's substitute decision maker would default to wife by [place]  Advance Directive / Living Will / POLST:  No active/current documentation    Interval history:       Karly Liu was seen and examined in bed this morning  Patient's family was not present at time of examination  Discussed overnight events with nursing staff  Patient denied any complaints this morning  He stated that he has seen benefit from pain control with current regimen   Denied nausea, vomiting, chest pain, abd pain, SOB  MEDICATIONS / ALLERGIES:     all current active meds have been reviewed and current meds:   Current Facility-Administered Medications   Medication Dose Route Frequency    baclofen tablet 15 mg  15 mg Oral TID    bisacodyl (DULCOLAX) EC tablet 10 mg  10 mg Oral Daily PRN    gabapentin (NEURONTIN) capsule 200 mg  200 mg Oral BID    metoprolol succinate (TOPROL-XL) 24 hr tablet 25 mg  25 mg Oral Daily    oxyCODONE (ROXICODONE) IR tablet 5 mg  5 mg Oral Q4H PRN    polyethylene glycol (MIRALAX) packet 17 g  17 g Oral Daily PRN    tamsulosin (FLOMAX) capsule 0 4 mg  0 4 mg Oral Daily With Dinner       Allergies   Allergen Reactions    Ib Pro [Ibuprofen] Hives and Itching    Adhesive [Medical Tape] Rash     Use only paper tape       OBJECTIVE:    Physical Exam  Physical Exam   Constitutional: He appears well-developed and well-nourished  He is active and cooperative  He is easily aroused  Non-toxic appearance  He does not have a sickly appearance  He does not appear ill  No distress  HENT:   Head: Normocephalic and atraumatic  Eyes: Conjunctivae are normal  Right eye exhibits no discharge  Left eye exhibits no discharge  Neck: Normal range of motion  Neck supple  No thyromegaly present  Cardiovascular: Normal rate and regular rhythm  Exam reveals no gallop and no friction rub  No murmur heard  Pulmonary/Chest: Effort normal and breath sounds normal  No stridor  No respiratory distress  He has no wheezes  Abdominal: Soft  Bowel sounds are normal  He exhibits distension  There is no tenderness  Musculoskeletal: Normal range of motion  He exhibits no edema, tenderness or deformity  Neurological: He is alert and easily aroused  Skin: Skin is warm and dry  No rash noted  No erythema  No pallor  Generalized jaundice   Psychiatric: He has a normal mood and affect  His behavior is normal  Judgment and thought content normal    Vitals reviewed        Lab Results:   I have personally reviewed pertinent labs  , CBC:   Lab Results   Component Value Date    WBC 9 08 01/11/2020    HGB 11 0 (L) 01/11/2020    HCT 33 2 (L) 01/11/2020     (H) 01/11/2020     01/11/2020    MCH 35 4 (H) 01/11/2020    MCHC 33 1 01/11/2020    RDW 14 6 01/11/2020    MPV 10 7 01/11/2020   , CMP:   Lab Results   Component Value Date    SODIUM 135 (L) 01/11/2020    K 4 5 01/11/2020     01/11/2020    CO2 25 01/11/2020    BUN 13 01/11/2020    CREATININE 0 68 01/11/2020    CALCIUM 7 7 (L) 01/11/2020    AST 97 (H) 01/11/2020    ALT 90 (H) 01/11/2020    ALKPHOS 983 (H) 01/11/2020    EGFR 93 01/11/2020     Imaging Studies: Fl Ercp Biliary Only    Result Date: 1/11/2020  Impression: Fluoroscopic guidance for ERCP  Please see procedure report for full details   Workstation performed: [id number]      I have personally reviewed imaging reports    MD Osmar Dixon 33 and Supportive Care Fellow  [phone]

## 2020-01-11 NOTE — PLAN OF CARE
Problem: Nutrition/Hydration-ADULT  Goal: Nutrient/Hydration intake appropriate for improving, restoring or maintaining nutritional needs  Description  Monitor and assess patient's nutrition/hydration status for malnutrition  Collaborate with interdisciplinary team and initiate plan and interventions as ordered  Monitor patient's weight and dietary intake as ordered or per policy  Utilize nutrition screening tool and intervene as necessary  Determine patient's food preferences and provide high-protein, high-caloric foods as appropriate  INTERVENTIONS:  - Monitor oral intake, urinary output, labs, and treatment plans  - Assess nutrition and hydration status and recommend course of action  - Evaluate amount of meals eaten  - Assist patient with eating if necessary   - Allow adequate time for meals  - Recommend/ encourage appropriate diets, oral nutritional supplements, and vitamin/mineral supplements  - Order, calculate, and assess calorie counts as needed  - Recommend, monitor, and adjust tube feedings and TPN/PPN based on assessed needs  - Assess need for intravenous fluids  - Provide specific nutrition/hydration education as appropriate  - Include patient/family/caregiver in decisions related to nutrition  Outcome: Progressing     Problem: Potential for Falls  Goal: Patient will remain free of falls  Description  INTERVENTIONS:  - Assess patient frequently for physical needs  -  Identify cognitive and physical deficits and behaviors that affect risk of falls    -  [place] fall precautions as indicated by assessment   - Educate patient/family on patient safety including physical limitations  - Instruct patient to call for assistance with activity based on assessment  - Modify environment to reduce risk of injury  - Consider OT/PT consult to assist with strengthening/mobility  Outcome: Progressing     Problem: GASTROINTESTINAL - ADULT  Goal: Minimal or absence of nausea and/or vomiting  Description  INTERVENTIONS:  - Administer IV fluids if ordered to ensure adequate hydration  - Maintain NPO status until nausea and vomiting are resolved  - Nasogastric tube if ordered  - Administer ordered antiemetic medications as needed  - Provide nonpharmacologic comfort measures as appropriate  - Advance diet as tolerated, if ordered  - Consider nutrition services referral to assist patient with adequate nutrition and appropriate food choices  Outcome: Progressing  Goal: Maintains or returns to baseline bowel function  Description  INTERVENTIONS:  - Assess bowel function  - Encourage oral fluids to ensure adequate hydration  - Administer IV fluids if ordered to ensure adequate hydration  - Administer ordered medications as needed  - Encourage mobilization and activity  - Consider nutritional services referral to assist patient with adequate nutrition and appropriate food choices  Outcome: Progressing  Goal: Maintains adequate nutritional intake  Description  INTERVENTIONS:  - Monitor percentage of each meal consumed  - Identify factors contributing to decreased intake, treat as appropriate  - Assist with meals as needed  - Monitor I&O, weight, and lab values if indicated  - Obtain nutrition services referral as needed  Outcome: Progressing     Problem: METABOLIC, FLUID AND ELECTROLYTES - ADULT  Goal: Electrolytes maintained within normal limits  Description  INTERVENTIONS:  - Monitor labs and assess patient for signs and symptoms of electrolyte imbalances  - Administer electrolyte replacement as ordered  - Monitor response to electrolyte replacements, including repeat lab results as appropriate  - Instruct patient on fluid and nutrition as appropriate  Outcome: Progressing  Goal: Fluid balance maintained  Description  INTERVENTIONS:  - Monitor labs   - Monitor I/O and WT  - Instruct patient on fluid and nutrition as appropriate  - Assess for signs & symptoms of volume excess or deficit  Outcome: Progressing

## 2020-01-12 NOTE — PLAN OF CARE
Problem: Nutrition/Hydration-ADULT  Goal: Nutrient/Hydration intake appropriate for improving, restoring or maintaining nutritional needs  Description  Monitor and assess patient's nutrition/hydration status for malnutrition  Collaborate with interdisciplinary team and initiate plan and interventions as ordered  Monitor patient's weight and dietary intake as ordered or per policy  Utilize nutrition screening tool and intervene as necessary  Determine patient's food preferences and provide high-protein, high-caloric foods as appropriate  INTERVENTIONS:  - Monitor oral intake, urinary output, labs, and treatment plans  - Assess nutrition and hydration status and recommend course of action  - Evaluate amount of meals eaten  - Assist patient with eating if necessary   - Allow adequate time for meals  - Recommend/ encourage appropriate diets, oral nutritional supplements, and vitamin/mineral supplements  - Order, calculate, and assess calorie counts as needed  - Recommend, monitor, and adjust tube feedings and TPN/PPN based on assessed needs  - Assess need for intravenous fluids  - Provide specific nutrition/hydration education as appropriate  - Include patient/family/caregiver in decisions related to nutrition  Outcome: Progressing     Problem: Potential for Falls  Goal: Patient will remain free of falls  Description  INTERVENTIONS:  - Assess patient frequently for physical needs  -  Identify cognitive and physical deficits and behaviors that affect risk of falls    -  [place] fall precautions as indicated by assessment   - Educate patient/family on patient safety including physical limitations  - Instruct patient to call for assistance with activity based on assessment  - Modify environment to reduce risk of injury  - Consider OT/PT consult to assist with strengthening/mobility  Outcome: Progressing     Problem: GASTROINTESTINAL - ADULT  Goal: Minimal or absence of nausea and/or vomiting  Description  INTERVENTIONS:  - Administer IV fluids if ordered to ensure adequate hydration  - Maintain NPO status until nausea and vomiting are resolved  - Nasogastric tube if ordered  - Administer ordered antiemetic medications as needed  - Provide nonpharmacologic comfort measures as appropriate  - Advance diet as tolerated, if ordered  - Consider nutrition services referral to assist patient with adequate nutrition and appropriate food choices  Outcome: Progressing  Goal: Maintains or returns to baseline bowel function  Description  INTERVENTIONS:  - Assess bowel function  - Encourage oral fluids to ensure adequate hydration  - Administer IV fluids if ordered to ensure adequate hydration  - Administer ordered medications as needed  - Encourage mobilization and activity  - Consider nutritional services referral to assist patient with adequate nutrition and appropriate food choices  Outcome: Progressing  Goal: Maintains adequate nutritional intake  Description  INTERVENTIONS:  - Monitor percentage of each meal consumed  - Identify factors contributing to decreased intake, treat as appropriate  - Assist with meals as needed  - Monitor I&O, weight, and lab values if indicated  - Obtain nutrition services referral as needed  Outcome: Progressing     Problem: METABOLIC, FLUID AND ELECTROLYTES - ADULT  Goal: Electrolytes maintained within normal limits  Description  INTERVENTIONS:  - Monitor labs and assess patient for signs and symptoms of electrolyte imbalances  - Administer electrolyte replacement as ordered  - Monitor response to electrolyte replacements, including repeat lab results as appropriate  - Instruct patient on fluid and nutrition as appropriate  Outcome: Progressing  Goal: Fluid balance maintained  Description  INTERVENTIONS:  - Monitor labs   - Monitor I/O and WT  - Instruct patient on fluid and nutrition as appropriate  - Assess for signs & symptoms of volume excess or deficit  Outcome: Progressing

## 2020-01-12 NOTE — PLAN OF CARE
Problem: Nutrition/Hydration-ADULT  Goal: Nutrient/Hydration intake appropriate for improving, restoring or maintaining nutritional needs  Description  Monitor and assess patient's nutrition/hydration status for malnutrition  Collaborate with interdisciplinary team and initiate plan and interventions as ordered  Monitor patient's weight and dietary intake as ordered or per policy  Utilize nutrition screening tool and intervene as necessary  Determine patient's food preferences and provide high-protein, high-caloric foods as appropriate  INTERVENTIONS:  - Monitor oral intake, urinary output, labs, and treatment plans  - Assess nutrition and hydration status and recommend course of action  - Evaluate amount of meals eaten  - Assist patient with eating if necessary   - Allow adequate time for meals  - Recommend/ encourage appropriate diets, oral nutritional supplements, and vitamin/mineral supplements  - Order, calculate, and assess calorie counts as needed  - Recommend, monitor, and adjust tube feedings and TPN/PPN based on assessed needs  - Assess need for intravenous fluids  - Provide specific nutrition/hydration education as appropriate  - Include patient/family/caregiver in decisions related to nutrition  Outcome: Progressing     Problem: Potential for Falls  Goal: Patient will remain free of falls  Description  INTERVENTIONS:  - Assess patient frequently for physical needs  -  Identify cognitive and physical deficits and behaviors that affect risk of falls    -  [place] fall precautions as indicated by assessment   - Educate patient/family on patient safety including physical limitations  - Instruct patient to call for assistance with activity based on assessment  - Modify environment to reduce risk of injury  - Consider OT/PT consult to assist with strengthening/mobility  Outcome: Progressing     Problem: GASTROINTESTINAL - ADULT  Goal: Minimal or absence of nausea and/or vomiting  Description  INTERVENTIONS:  - Administer IV fluids if ordered to ensure adequate hydration  - Maintain NPO status until nausea and vomiting are resolved  - Nasogastric tube if ordered  - Administer ordered antiemetic medications as needed  - Provide nonpharmacologic comfort measures as appropriate  - Advance diet as tolerated, if ordered  - Consider nutrition services referral to assist patient with adequate nutrition and appropriate food choices  Outcome: Progressing  Goal: Maintains or returns to baseline bowel function  Description  INTERVENTIONS:  - Assess bowel function  - Encourage oral fluids to ensure adequate hydration  - Administer IV fluids if ordered to ensure adequate hydration  - Administer ordered medications as needed  - Encourage mobilization and activity  - Consider nutritional services referral to assist patient with adequate nutrition and appropriate food choices  Outcome: Progressing  Goal: Maintains adequate nutritional intake  Description  INTERVENTIONS:  - Monitor percentage of each meal consumed  - Identify factors contributing to decreased intake, treat as appropriate  - Assist with meals as needed  - Monitor I&O, weight, and lab values if indicated  - Obtain nutrition services referral as needed  Outcome: Progressing     Problem: METABOLIC, FLUID AND ELECTROLYTES - ADULT  Goal: Electrolytes maintained within normal limits  Description  INTERVENTIONS:  - Monitor labs and assess patient for signs and symptoms of electrolyte imbalances  - Administer electrolyte replacement as ordered  - Monitor response to electrolyte replacements, including repeat lab results as appropriate  - Instruct patient on fluid and nutrition as appropriate  Outcome: Progressing  Goal: Fluid balance maintained  Description  INTERVENTIONS:  - Monitor labs   - Monitor I/O and WT  - Instruct patient on fluid and nutrition as appropriate  - Assess for signs & symptoms of volume excess or deficit  Outcome: Progressing

## 2020-01-12 NOTE — PROGRESS NOTES
Ongoing conversations have been had between interventional radiology and GI regarding inadequate biliary drainage status post percutaneous biliary stent placement placed at [facility]  He underwent ERCP 2 days ago with seemingly prolonged attempt to place anterior and posterior right biliary stents which was unsuccessful  Initially, since it was not felt that he was symptomatic from his biliary obstruction and percutaneous biliary drainage was not felt to be beneficial as it was unlikely that his bilirubin could be reduced to the level that he could be offered chemotherapy and therefore would simply leave him with 2 external drains with gravity bag for life  However, his bilirubin has dropped to 13 from 25 possibly related to some manipulation from the ERCP the other day and therefore reducing bilirubin to below 3 with percutaneous drainage now seems feasible  Will attempt to proceed with the procedure tomorrow though this is dependent on anesthesia availability  Will place him NPO after midnight and update team tomorrow morning with timing of procedure  Additionally, options regarding his ongoing pain due to fungating mass involving lower right ribs was discussed with Dr Melinda Gupta  We could offer percutaneous cryoablation for pain palliation which has been successful in small studies for metastatic musculoskeletal and soft tissue metastases  If he is agreeable, we would plan to perform this procedure on a separate day after biliary drainage is completed  This procedure would require general anesthesia as well      Alcon Peace MD

## 2020-01-12 NOTE — PROGRESS NOTES
INTERNAL MEDICINE RESIDENCY PROGRESS NOTE     Name: Dede Luna   Age & Sex: 66 y o  male   MRN: [id number]  Unit/Bed#: [facility] 909-01   Encounter: [id number]  Team: SOD Team A    PATIENT INFORMATION     Name: Dede Luna   Age & Sex: 66 y o  male   MRN: [id number]  Hospital Stay Days: 3    ASSESSMENT/PLAN     Principal Problem:    Painless jaundice secondary to parenchymal infiltration of tumor along with biliary compression   Active Problems:    BPH (benign prostatic hyperplasia)    Atrial fibrillation ([facility] [address] )    Colon cancer metastasized to multiple sites [facility])    Hypertension    History of Trigeminal neuralgia    Chronic hyponatremia      * Painless jaundice secondary to parenchymal infiltration of tumor along with biliary compression   Assessment & Plan  Transfer from [address] [place] [address] for ERCP and IR guided percutaneous stent exchange/drain  History of colon cancer with metastases to lungs and liver, s/p recent biliary stent placement (11/2019)  Patient presents with worsening asymptomatic jaundice  No pain, N/V, fever, chills  - 1/6/20: MRCP:  Metastatic colon carcinoma with stable meds to lung bases, right anterolateral abdominal wall, liver  Infiltrating lesion in the right lobe adjacent to the gallbladder causes invasion of the gallbladder wall secondary intrahepatic biliary duct dual dilatation of both the right and left-sided ducts  Indwelling biliary stent extending from the common hepatic duct to the right sided bile ducts  -  ERCP and stent unsuccessful 01/10/2020  - Seen by Dr Claude Berumen from Surgical Oncology  Recommended primary treating with stenting liver lesion by GI/IR, trend bilirubin until normalized and start treatment with chemotherapy  Per notes, resection of the abdominal wall mas likely require signifcant soft tissu resection, and possibly ribs and diaphragm    - LFTs continue with downward trend, T bili 13 49 today, down from 18 yesterday    - plan for percutaneous biliary drain placement by IR, procedure date unknown currently  IR has been consulted  - oncology, GI, palliative, IR following        Chronic hyponatremia  Assessment & Plan  POA Na-130, sodium 132 today  - continue monitor with a m  CMP    History of Trigeminal neuralgia  Assessment & Plan  Diagnosed more than 10 years ago  Currently denying any symptoms  Holding home dose Tegretol secondary to adverse effects of elevated LFTs  · Gabapentin 200 mg b i d    Hypertension  Assessment & Plan  Last 20 for blood pressures have been SBP 90s-105  - continue metoprolol succinate, hold parameters reviewed  Colon cancer metastasized to multiple sites [facility])  Assessment & Plan  With mets to liver, lungs, and percutaneous abdominal lesion   Status post multiple trials of chemotherapy and radiation therapy  Most recent radiation 11/19  Recently establishing new oncologist with [facility]   Has an appointment scheduled with Dr Albert Taylor   - ERCP and stent placement unsuccessful on 01/10/2020  Further management as highlighted above  - palliative care following for pain management  Recs:  Gabapentin 200 mg b i d , baclofen 15 mg t i d , oxycodone 5 mg q 4 hours p r n   - GI, Oncology, palliative following, appreciate recommendations  Atrial fibrillation (HCC)  Assessment & Plan  · Home regimen: Lovenox 100 mg every 12 hours, as recommended by his oncologist, metoprolol succinate 50 mg daily  · Lovenox was restarted yesterday  If IR procedure scheduled for tomorrow, will hold  · Currently rate controlled  Disposition:  ERCP and stent placement on successful on 01/10/2020  Interventional Radiology consulted, plan for percutaneous cholangiogram and biliary drainage, procedure date pending  Further management as highlighted above  SUBJECTIVE     Patient seen and examined  No acute events overnight  States he is feeling well overall    Admits to some dizziness in the morning, which she contributes to getting up too quickly to go to the bathroom  Patient states he had a good bowel movement this morning  Pain is well controlled with oxycodone  Denies fevers or chills, chest pain, shortness of breath, nausea, vomiting, diarrhea, constipation  All other review of systems negative at this time  OBJECTIVE     Vitals:    20 1517 20 2227 20 0600 20 0717   BP: 98/63 102/63  103/69   Pulse:    101   Resp:    Temp: 98 °F (36 7 °C) 98 7 °F (37 1 °C)  98 6 °F (37 °C)   TempSrc:       SpO2:    94%   Weight:   96 4 kg (212 lb 8 4 oz)    Height:          Temperature:   Temp (24hrs), Av 4 °F (36 9 °C), Min:98 °F (36 7 °C), Max:98 7 °F (37 1 °C)    Temperature: 98 6 °F (37 °C)  Intake & Output:  I/O       01/10 0701 -  07 07 07    P  O  0 720     I V  (mL/kg) 1400 (14 6)      Total Intake(mL/kg) 1400 (14 6) 720 (7 5)     Urine (mL/kg/hr) 500 (0 2) 500 (0 2)     Blood 0      Total Output 500 500     Net +900 +220            Unmeasured Urine Occurrence 1 x 2 x     Unmeasured Stool Occurrence  1 x         Weights:   IBW: 73 kg    Body mass index is 30 49 kg/m²  Weight (last 2 days)     Date/Time   Weight    20 0600   96 4 (212 52)    20 0600   95 9 (211 4)    01/10/20 1355   95 3 (210)    01/10/20 0600   96 2 (212 1)            Physical Exam   Constitutional: He is oriented to person, place, and time  No distress  Generalized jaundice   HENT:   Mouth/Throat: No oropharyngeal exudate  Eyes: Scleral icterus is present  Neck: No JVD present  Cardiovascular: Normal rate and regular rhythm  Exam reveals no gallop and no friction rub  No murmur heard  Pulmonary/Chest: Effort normal  He has no wheezes  He exhibits no tenderness  Abdominal: Soft  He exhibits mass  Tender and location of protruding abdominal mass  Mass sleeping this morning, yellow discharge noted on gauze  Musculoskeletal: Normal range of motion   He exhibits no edema or tenderness  Neurological: He is alert and oriented to person, place, and time  Skin: No rash noted  No erythema  Diffuse jaundice throughout entire body   Psychiatric: His behavior is normal    Vitals reviewed  LABORATORY DATA     Labs: I have personally reviewed pertinent reports  Results from last 7 days   Lab Units 01/12/20  0639 01/11/20  0601 01/10/20  0528  01/08/20  0500   WBC Thousand/uL 6 09 9 08 6 66   < > 6 03   HEMOGLOBIN g/dL 10 5* 11 0* 11 0*   < > 10 8*   HEMATOCRIT % 31 7* 33 2* 32 3*   < > 31 6*   PLATELETS Thousands/uL 148* 150 145*   < > 133*   NEUTROS PCT % 70  --  72  --  72   MONOS PCT % 15*  --  17*  --  18*    < > = values in this interval not displayed  Results from last 7 days   Lab Units 01/12/20  0639 01/11/20  0601 01/10/20  0528 01/10/20  0527   POTASSIUM mmol/L 3 6 4 5 3 8  --    CHLORIDE mmol/L 101 103 101  --    CO2 mmol/L 24 25 26  --    BUN mg/dL 10 13 10  --    CREATININE mg/dL 0 70 0 68 0 67  --    CALCIUM mg/dL 8 3 7 7* 8 2*  --    ALK PHOS U/L 865* 983*  --  981*   ALT U/L 83* 90*  --  107*   AST U/L 88* 97*  --  128*     Results from last 7 days   Lab Units 01/08/20  0500   MAGNESIUM mg/dL 1 9          Results from last 7 days   Lab Units 01/09/20  2105 01/08/20  0500 01/07/20  1942   INR  1 50* 1 25* 1 32*   PTT seconds  --   --  53*     Results from last 7 days   Lab Units 01/07/20  1942   LACTIC ACID mmol/L 0 5     Results from last 7 days   Lab Units 01/07/20  1942   TROPONIN I ng/mL <0 02       IMAGING & DIAGNOSTIC TESTING     Radiology Results: I have personally reviewed pertinent reports  Fl Ercp Biliary Only    Result Date: 1/11/2020  Impression: Fluoroscopic guidance for ERCP  Please see procedure report for full details  Workstation performed: [id number]     Other Diagnostic Testing: I have personally reviewed pertinent reports      ACTIVE MEDICATIONS     Current Facility-Administered Medications   Medication Dose Route Frequency    baclofen tablet 15 mg  15 mg Oral TID    bisacodyl (DULCOLAX) EC tablet 10 mg  10 mg Oral Daily PRN    enoxaparin (LOVENOX) subcutaneous injection 40 mg  40 mg Subcutaneous Q12H [address]    gabapentin (NEURONTIN) capsule 200 mg  200 mg Oral BID    metoprolol succinate (TOPROL-XL) 24 hr tablet 25 mg  25 mg Oral Daily    oxyCODONE (ROXICODONE) IR tablet 5 mg  5 mg Oral Q4H PRN    polyethylene glycol (MIRALAX) packet 17 g  17 g Oral Daily PRN    tamsulosin (FLOMAX) capsule 0 4 mg  0 4 mg Oral Daily With Dinner       VTE Pharmacologic Prophylaxis: Enoxaparin (Lovenox)  VTE Mechanical Prophylaxis: sequential compression device    Portions of the record may have been created with voice recognition software  Occasional wrong word or "sound a like" substitutions may have occurred due to the inherent limitations of voice recognition software    Read the chart carefully and recognize, using context, where substitutions have occurred   ==  Adeola Reno, [facility]  Internal Medicine Residency PGY-1

## 2020-01-13 NOTE — TELEPHONE ENCOUNTER
Pt has medicare part A & B  Effective 11/01/08  Called [facility] & spoke to Maria Antonia call ref# Satishchristy Abena  Pt has an active supplemental plan tht follows medicare guidelines so as long as medicare pays they will pay  Effective 01/01/11   No need for me to call the pt

## 2020-01-13 NOTE — QUICK NOTE
Lengthy discussion was had between Dr Christie Middleton, patient, wife, daughter, and son-in-law  ERCP was performed 01/08/2020 with Dr Amparo Chahal with GI  Dr Lucio Peterson was able to balloon dilate the proximal most extent of patient's current stent but was unable to dilate the stricture  Since ERCP, patient's bilirubin has decreased to 10 55 from 24  Currently, patient has not experienced any pruritis or pain from elevation of bilirubin  He has noticed fatigue and weakness with increase in bilirubin  Patient has been told that he will not receive chemotherapy until his bilirubin is less than 3  At this point, it is very unlikely patient's total bilirubin will be less than three even if biliary tube drainage is achieved due to his unconjugated bilirubin being elevated on 1/10  This was expressed to patient multiple times  He mentioned at an outside facility that there was a possibility of radiation  We were unaware of this and unsure if it is even a possibility  We have asked primary service to place a radiation oncology consult  We gave the patient and family three options:   1 -  placing biliary tubes for drainage which will most likely be permanent and for life  It is very unlikely patient's total bilirubin will decrease below 3 which is needed to start chemotherapy  2 - waiting to see if bilirubin will continue to decrease after ERCP  Patient's bilirubin has decreased from 24 to 10 55 today after ERCP  3 - do not place biliary tubes    Would appreciate palliative care to rediscuss realistic expectations for patient and his disease course  At this point, patient would like to discuss options with his family  We will reevaluate patient this afternoon

## 2020-01-13 NOTE — PROGRESS NOTES
Progress Note -Interventional Radiology JASSI Willingham 66 y o  male MRN: [id number]  Unit/Bed#: [facility] 909-01 Encounter: [id number]    Assessment:    66year old male presenting with obstructive painless jaundice, metastatic colon cancer    Plan:  - extensive amount of time was spent discussing PTC tube  Please see my prior note today for previous discussion  Ultimately, patient and his family have decided they would like to proceed with PTC placement  This will be scheduled for Wednesday 1/15/20 with anesthesia  Please keep patient NPO after midnight Tuesday  - We will continue to trend patient's bilirubin  If bilirubin has decreased tomorrow, will plan for CT abdomen tomorrow to determine if ducts continue to be dilated  - appreciate multiple specialties inputs on patient  - patient will need education and VNA after tube placement for tube care  Wife is willing to learn  Patient Active Problem List   Diagnosis    BPH (benign prostatic hyperplasia)    [facility] fibrillation ([facility] [address] [facility]    Colon cancer metastasized to multiple sites ([facility] [address] [facility]    Hypertension    Painless jaundice secondary to parenchymal infiltration of tumor along with biliary compression     History of Trigeminal neuralgia    Chronic hyponatremia    Low hemoglobin          Subjective: Greater than 90 minute conversation had with patient and family members today regarding PTC tube placement  Patient states he feels less fatigued since his ERCP on Friday  During discussion, patient did fall asleep but was able to be aroused  Bili has been trending downwards since ERCP, from 24 to 10 5  States he would like to walk but had severe facial pain from trigeminal neuralgia today  Objective:    Vitals:  /79   Pulse 86   Temp 98 °F (36 7 °C)   Resp 20   Ht 5' 10" (1 778 m)   Wt 97 kg (213 lb 13 5 oz)   SpO2 96%   BMI 30 68 kg/m²   Body mass index is 30 68 kg/m²    Weight (last 2 days)     Date/Time   Weight    01/13/20 0600   97 (213 85)    01/12/20 0600   96 4 (212 52)    01/11/20 0600   95 9 (211 4)              I/Os:    Intake/Output Summary (Last 24 hours) at 1/13/2020 1605  Last data filed at 1/13/2020 1300  Gross per 24 hour   Intake 240 ml   Output 300 ml   Net -60 ml       Invasive Devices     Central Venous Catheter Line            Port A Cath 01/07/20 Right Chest 5 days                Physical Exam:  General appearance: alert and fatigued  Lungs: clear to auscultation bilaterally  Heart: regular rate and rhythm, S1, S2 normal, no murmur, click, rub or gallop  Abdomen: soft, non tender, ruq mass, bowel sounds +   Skin: jaundiced              Lab Results and Cultures:   CBC with diff:   Lab Results   Component Value Date    WBC 8 06 01/13/2020    HGB 8 1 (L) 01/13/2020    HCT 25 3 (L) 01/13/2020     (H) 01/13/2020     01/13/2020    MCH 35 1 (H) 01/13/2020    MCHC 32 0 01/13/2020    RDW 14 3 01/13/2020    MPV 10 8 01/13/2020    NRBC 0 01/12/2020      BMP/CMP:  Lab Results   Component Value Date    K 3 6 01/13/2020     01/13/2020    CO2 26 01/13/2020    BUN 10 01/13/2020    CREATININE 0 63 01/13/2020    CALCIUM 8 3 01/13/2020    AST 88 (H) 01/13/2020    ALT 79 (H) 01/13/2020    ALKPHOS 887 (H) 01/13/2020    EGFR 96 01/13/2020   ,     Coags:   Lab Results   Component Value Date    PTT 53 (H) 01/07/2020    INR 1 50 (H) 01/09/2020   ,   Results from last 7 days   Lab Units 01/09/20  2105 01/08/20  0500 01/07/20  1942   PTT seconds  --   --  53*   INR  1 50* 1 25* 1 32*        Lipid Panel: No results found for: CHOL  No results found for: HDL  No results found for: HDL  No results found for: LDLCALC  No results found for: TRIG    HgbA1c: No results found for: HGBA1C    Blood Culture: No results found for: BLOODCX,   Urinalysis:   Lab Results   Component Value Date    COLORU Brown 12/27/2019    CLARITYU Slightly Cloudy 12/27/2019    SPECGRAV 1 020 12/27/2019    PHUR 6 5 12/27/2019    LEUKOCYTESUR Negative 12/27/2019 NITRITE Positive (A) 12/27/2019    GLUCOSEU 100 (1/10%) (A) 12/27/2019    KETONESU Trace (A) 12/27/2019    BILIRUBINUR Interference- unable to analyze (A) 12/27/2019    BLOODU Negative 12/27/2019   ,   Urine Culture: No results found for: URINECX,   Wound Culure:  No results found for: Garry Mcguire      Thank you for allowing me to participate in the care of [employer]  Please don't hesitate to call, text, email, or TigerText with any questions  This text is generated with voice recognition software  There may be translation, syntax,  or grammatical errors  If you have any questions, please contact the dictating provider      Susanne Mujica PA-C

## 2020-01-13 NOTE — PROGRESS NOTES
Progress Note - Surgical Oncology   Rhonda Roper 66 y o  male MRN: [id number]  Unit/Bed#: [facility] 909-01 Encounter: [id number]  01/13/20  8:41 AM      Subjective/Objective   No chief complaint on file  Subjective:  No complaints except for trigeminal neuralgia    Objective:      Blood pressure 133/85, pulse 90, temperature 98 °F (36 7 °C), resp  rate 20, height 5' 10" (1 778 m), weight 97 kg (213 lb 13 5 oz), SpO2 100 %  ,Body mass index is 30 68 kg/m²  Intake/Output Summary (Last 24 hours) at 1/13/2020 0841  Last data filed at 1/12/2020 2300  Gross per 24 hour   Intake 720 ml   Output 350 ml   Net 370 ml       Invasive Devices     Central Venous Catheter Line            Port A Cath 01/07/20 Right Chest 5 days                Physical Exam:   Head and neck: is normocephalic  Neck is supple without adenopathy  Sclerae are anicteric  Mucous membranes are moist  No JVD  Abdomen: Soft, nontender, nondistended, and with right upper quadrant fungating mass  Extremities: Without cyanosis, clubbing, or edema, symmetric  Neuro: Grossly nonfocal  Skin is warm and anicteric  Psych: Patient is pleasant and talkative              Lab Results:  Lab Results   Component Value Date    WBC 8 06 01/13/2020    HGB 8 1 (L) 01/13/2020    HCT 25 3 (L) 01/13/2020     (H) 01/13/2020     01/13/2020     Lab Results   Component Value Date    CALCIUM 8 3 01/13/2020    K 3 6 01/13/2020    CO2 26 01/13/2020     01/13/2020    BUN 10 01/13/2020    CREATININE 0 63 01/13/2020     No results found for: AFP  Lab Results   Component Value Date    CALCIUM 8 3 01/13/2020     No results found for: CEA          Assessment:  57-year-old male with metastatic colon cancer  Fungating abdominal wall mass    Plan:  IR today for biliary stent placement  I discussed with IR potential cryoablation of the rib lesion  It is unclear if that is really what is causing his pain    Ideally would recommend biliary stenting to get the bilirubin under 3 and consider further systemic chemotherapy  Cryoablation cyst of the rib lesion can be performed by IR  I would hold on surgical intervention less none of these other palliative treatments are effective  Addendum discussion discussed with Dr Franki Felty from IR and Dr Shelbi Marques from GI  The bilirubin would likely not go down to less than 3 with just the endoscopic procedures, and PTC may help improve this, although there is a significant concern that the bilirubin will never go under 3 even with PTC  This would not allow for any meaningful chemotherapy  IR is going to discuss with palliative care regarding goals of care, as well as the patient  Currently since the bilirubin continues to decrease we will observe this and then determine the need for PTC in the next 24 hours      Caesar Mueller MD  8:41 AM

## 2020-01-13 NOTE — PROGRESS NOTES
Gastroenterology Progress Note   - Kirk Jiang 66 y o  male MRN: [id number]    Unit/Bed#: [facility] 909-01 Encounter: [id number]      Assessment and Plan:   Obstructive jaundice  52-year-old male patient with history of colon cancer metastatic to liver status post biliary stent placed by interventional radiology at an outside hospital  ERCP was attempted but was not successful in placing biliary stents  His bilirubin has been decreasing after his ERCP, currently 10 5 from 25 7  The end goal is to decrease bilirubin is less than 3 to consider palliative chemotherapy  IR consult was placed to consider biliary drains  Currently IR discussing goals of care with family and palliative care team  From the GI standpoint we will sign off, please call with questions    Subjective:   Patient seen and examined at the bed, denies any events overnight, currently is NPO, denies nausea or vomiting, is passing flatus, denies chest pain or shortness of breath, no abdominal pain, patient is ambulating     Objective:     Vitals: Blood pressure 124/79, pulse 86, temperature 98 °F (36 7 °C), resp  rate 20, height 5' 10" (1 778 m), weight 97 kg (213 lb 13 5 oz), SpO2 96 %  ,Body mass index is 30 68 kg/m²  Intake/Output Summary (Last 24 hours) at 1/13/2020 1254  Last data filed at 1/12/2020 2300  Gross per 24 hour   Intake 480 ml   Output 350 ml   Net 130 ml       Physical Exam:   Physical Exam   Constitutional: He is oriented to person, place, and time  He appears well-developed and well-nourished  No distress  HENT:   Head: Normocephalic and atraumatic  Mouth/Throat: Oropharynx is clear and moist    Eyes: EOM are normal  Scleral icterus is present  Neck: Normal range of motion  Neck supple  Cardiovascular: Normal rate  Pulmonary/Chest: Effort normal and breath sounds normal    Abdominal: Soft  Bowel sounds are normal    Neurological: He is alert and oriented to person, place, and time  Skin: Skin is warm and dry     Nursing note and vitals reviewed  Invasive Devices     Central Venous Catheter Line            Port A Cath 01/07/20 Right Chest 5 days                Lab Results:  Results from last 7 days   Lab Units 01/13/20  0613 01/12/20  0639   WBC Thousand/uL 8 06 6 09   HEMOGLOBIN g/dL 8 1* 10 5*   HEMATOCRIT % 25 3* 31 7*   PLATELETS Thousands/uL 183 148*   NEUTROS PCT %  --  70   LYMPHS PCT %  --  10*   MONOS PCT %  --  15*   EOS PCT %  --  2     Results from last 7 days   Lab Units 01/13/20  0613   POTASSIUM mmol/L 3 6   CHLORIDE mmol/L 103   CO2 mmol/L 26   BUN mg/dL 10   CREATININE mg/dL 0 63   CALCIUM mg/dL 8 3   ALK PHOS U/L 887*   ALT U/L 79*   AST U/L 88*     Results from last 7 days   Lab Units 01/09/20  2105   INR  1 50*     Results from last 7 days   Lab Units 01/07/20  1942   LIPASE u/L 60*       Imaging Studies: I have personally reviewed pertinent imaging studies  Fl Ercp Biliary Only    Result Date: 1/11/2020  Impression: Fluoroscopic guidance for ERCP  Please see procedure report for full details   Workstation performed: [id number]

## 2020-01-13 NOTE — PLAN OF CARE
Problem: Nutrition/Hydration-ADULT  Goal: Nutrient/Hydration intake appropriate for improving, restoring or maintaining nutritional needs  Description  Monitor and assess patient's nutrition/hydration status for malnutrition  Collaborate with interdisciplinary team and initiate plan and interventions as ordered  Monitor patient's weight and dietary intake as ordered or per policy  Utilize nutrition screening tool and intervene as necessary  Determine patient's food preferences and provide high-protein, high-caloric foods as appropriate  INTERVENTIONS:  - Monitor oral intake, urinary output, labs, and treatment plans  - Assess nutrition and hydration status and recommend course of action  - Evaluate amount of meals eaten  - Assist patient with eating if necessary   - Allow adequate time for meals  - Recommend/ encourage appropriate diets, oral nutritional supplements, and vitamin/mineral supplements  - Order, calculate, and assess calorie counts as needed  - Recommend, monitor, and adjust tube feedings and TPN/PPN based on assessed needs  - Assess need for intravenous fluids  - Provide specific nutrition/hydration education as appropriate  - Include patient/family/caregiver in decisions related to nutrition  Outcome: Progressing     Problem: Potential for Falls  Goal: Patient will remain free of falls  Description  INTERVENTIONS:  - Assess patient frequently for physical needs  -  Identify cognitive and physical deficits and behaviors that affect risk of falls    -  [place] fall precautions as indicated by assessment   - Educate patient/family on patient safety including physical limitations  - Instruct patient to call for assistance with activity based on assessment  - Modify environment to reduce risk of injury  - Consider OT/PT consult to assist with strengthening/mobility  Outcome: Progressing     Problem: GASTROINTESTINAL - ADULT  Goal: Minimal or absence of nausea and/or vomiting  Description  INTERVENTIONS:  - Administer IV fluids if ordered to ensure adequate hydration  - Maintain NPO status until nausea and vomiting are resolved  - Nasogastric tube if ordered  - Administer ordered antiemetic medications as needed  - Provide nonpharmacologic comfort measures as appropriate  - Advance diet as tolerated, if ordered  - Consider nutrition services referral to assist patient with adequate nutrition and appropriate food choices  Outcome: Progressing  Goal: Maintains or returns to baseline bowel function  Description  INTERVENTIONS:  - Assess bowel function  - Encourage oral fluids to ensure adequate hydration  - Administer IV fluids if ordered to ensure adequate hydration  - Administer ordered medications as needed  - Encourage mobilization and activity  - Consider nutritional services referral to assist patient with adequate nutrition and appropriate food choices  Outcome: Progressing  Goal: Maintains adequate nutritional intake  Description  INTERVENTIONS:  - Monitor percentage of each meal consumed  - Identify factors contributing to decreased intake, treat as appropriate  - Assist with meals as needed  - Monitor I&O, weight, and lab values if indicated  - Obtain nutrition services referral as needed  Outcome: Progressing     Problem: METABOLIC, FLUID AND ELECTROLYTES - ADULT  Goal: Electrolytes maintained within normal limits  Description  INTERVENTIONS:  - Monitor labs and assess patient for signs and symptoms of electrolyte imbalances  - Administer electrolyte replacement as ordered  - Monitor response to electrolyte replacements, including repeat lab results as appropriate  - Instruct patient on fluid and nutrition as appropriate  Outcome: Progressing  Goal: Fluid balance maintained  Description  INTERVENTIONS:  - Monitor labs   - Monitor I/O and WT  - Instruct patient on fluid and nutrition as appropriate  - Assess for signs & symptoms of volume excess or deficit  Outcome: Progressing

## 2020-01-13 NOTE — PROGRESS NOTES
INTERNAL MEDICINE RESIDENCY PROGRESS NOTE     Name: Nina Sung   Age & Sex: 66 y o  male   MRN: [id number]  Unit/Bed#: [facility] 909-01   Encounter: [id number]  Team: SOD Team A    PATIENT INFORMATION     Name: Nina Sung   Age & Sex: 66 y o  male   MRN: [id number]  Hospital Stay Days: 4    ASSESSMENT/PLAN     Principal Problem:    Painless jaundice secondary to parenchymal infiltration of tumor along with biliary compression   Active Problems:    BPH (benign prostatic hyperplasia)    Atrial fibrillation ([address] )    Colon cancer metastasized to multiple sites [facility])    Hypertension    History of Trigeminal neuralgia    Chronic hyponatremia      Chronic hyponatremia  Assessment & Plan  POA Na-130; sodium 134 1/13  - continue monitor with a m  CMP    History of Trigeminal neuralgia  Assessment & Plan  Diagnosed more than 10 years ago  Currently complaining of pain on the right side of his face as well as tearing and rhinorrhea  Describes pain as stabbing and it has been present since 6 am  Possibly cluster headache vs trigeminal neuralgia  Will put on O2 nasal cannula for the pain  Patient is unable to get carbamazepine and sumatriptan due to elevated LFTs  Holding home dose Tegretol secondary to adverse effects of elevated LFTs  · Gabapentin 200 mg b i d    Hypertension  Assessment & Plan  Last 20 for blood pressures have been SBP 90s-105  - continue metoprolol succinate, hold parameters reviewed  Colon cancer metastasized to multiple sites [facility])  Assessment & Plan  With mets to liver, lungs, and percutaneous abdominal lesion   Status post multiple trials of chemotherapy and radiation therapy  Most recent radiation 11/19  Recently establishing new oncologist with Mary Pugh   Has an appointment scheduled with Dr Renetta Davis   - ERCP and stent placement unsuccessful on 01/10/2020  Further management as highlighted above  - palliative care following for pain management   Recs:  Gabapentin 200 mg b i d , baclofen 15 mg t i d , oxycodone 5 mg q 4 hours p r n   - GI, Oncology, palliative following, appreciate recommendations  Atrial fibrillation ([address] )  Assessment & Plan  · Home regimen: Lovenox 100 mg every 12 hours, as recommended by his oncologist, metoprolol succinate 50 mg daily  · Lovenox currently held; will resume post-IR procedure (currently scheduled for 12:30pm)  · Currently rate controlled  * Painless jaundice secondary to parenchymal infiltration of tumor along with biliary compression   Assessment & Plan  Transfer from [address] [place] [address] for ERCP and IR guided percutaneous stent exchange/drain  History of colon cancer with metastases to lungs and liver, s/p recent biliary stent placement (11/2019)  Patient presents with worsening asymptomatic jaundice  No pain, N/V, fever, chills  - 1/6/20: MRCP:  Metastatic colon carcinoma with stable meds to lung bases, right anterolateral abdominal wall, liver  Infiltrating lesion in the right lobe adjacent to the gallbladder causes invasion of the gallbladder wall secondary intrahepatic biliary duct dual dilatation of both the right and left-sided ducts  Indwelling biliary stent extending from the common hepatic duct to the right sided bile ducts  -  ERCP and stent unsuccessful 01/10/2020  - Seen by Dr Candida Millrad from Surgical Oncology  Recommended primary treating with stenting liver lesion by GI/IR, trend bilirubin until normalized and start treatment with chemotherapy  Per notes, resection of the abdominal wall mas likely require signifcant soft tissue resection, and possibly ribs and diaphragm   - T bili 10 55 1/13, down from 13 49 1/12  - I spoke with Omid Orellana from IR today  Due to the elevated indirect bilirubin, he does not believe that putting in a biliary stent would bring the total bilirubin below 3 in order to receive chemotherapy   However, family would still like to think about placing the drain    - oncology, GI, palliative, IR, and palliative following   - Radiation oncology consult placed  Disposition: Inpatient pending decision on placing stent  Plan as stated above  SUBJECTIVE     Patient seen and examined  No acute events overnight  Patient is currently complaining pain on the right side of his face as well as unilateral lacrimation and rhinorrhea  He said this episode started at 6 am today and described it as stabbing in nature  Review of Systems   Constitutional: Negative  HENT: Positive for rhinorrhea  Eyes: Negative for itching  Unilateral lacrimation from right eye   Respiratory: Negative  Cardiovascular: Positive for leg swelling  Negative for chest pain and palpitations  Bilateral leg edema   Gastrointestinal: Negative  Endocrine: Negative  Genitourinary: Negative  Musculoskeletal: Negative  Skin:        Jaundice   Allergic/Immunologic: Negative  Neurological: Negative for dizziness, facial asymmetry and light-headedness  Pain on the right side of his face  Hematological: Negative  Psychiatric/Behavioral: Negative  OBJECTIVE     Vitals:    20 2248 20 0105 20 0600 20 0829   BP: 133/85      Pulse:       Resp: 18 20  20   Temp: 99 °F (37 2 °C) 97 5 °F (36 4 °C)  98 °F (36 7 °C)   TempSrc:       SpO2:       Weight:   97 kg (213 lb 13 5 oz)    Height:          Temperature:   Temp (24hrs), Av 2 °F (36 8 °C), Min:97 5 °F (36 4 °C), Max:99 °F (37 2 °C)    Temperature: 98 °F (36 7 °C)  Intake & Output:  I/O        0700  0700  0700    P  O  720 720     I V  (mL/kg)       Total Intake(mL/kg) 720 (7 5) 720 (7 4)     Urine (mL/kg/hr) 500 (0 2) 350 (0 2)     Stool  0     Blood       Total Output 500 350     Net +220 +370            Unmeasured Urine Occurrence 2 x 6 x     Unmeasured Stool Occurrence 1 x 1 x         Weights:   IBW: 73 kg    Body mass index is 30 68 kg/m²    Weight (last 2 days)     Date/Time Weight    01/13/20 0600   97 (213 85)    01/12/20 0600   96 4 (212 52)    01/11/20 0600   95 9 (211 4)            Physical Exam   Constitutional: He is oriented to person, place, and time  He appears well-developed and well-nourished  No distress  HENT:   Head: Normocephalic and atraumatic  Eyes: Pupils are equal, round, and reactive to light  EOM are normal    Icteric conjunctiva   Neck: Normal range of motion  Neck supple  Cardiovascular: Exam reveals no gallop and no friction rub  No murmur heard  Irregularly irregular   Pulmonary/Chest: Effort normal and breath sounds normal  No stridor  No respiratory distress  He has no wheezes  He has no rales  Abdominal: Soft  Bowel sounds are normal  He exhibits no distension and no mass  There is no tenderness  There is no rebound and no guarding  Neurological: He is alert and oriented to person, place, and time  Skin: Skin is warm and dry  He is not diaphoretic  Icteric   Psychiatric: He has a normal mood and affect  His behavior is normal      LABORATORY DATA     Labs: I have personally reviewed pertinent reports  Results from last 7 days   Lab Units 01/13/20  0613 01/12/20  0639 01/11/20  0601 01/10/20  0528  01/08/20  0500   WBC Thousand/uL 8 06 6 09 9 08 6 66   < > 6 03   HEMOGLOBIN g/dL 8 1* 10 5* 11 0* 11 0*   < > 10 8*   HEMATOCRIT % 25 3* 31 7* 33 2* 32 3*   < > 31 6*   PLATELETS Thousands/uL 183 148* 150 145*   < > 133*   NEUTROS PCT %  --  70  --  72  --  72   MONOS PCT %  --  15*  --  17*  --  18*    < > = values in this interval not displayed        Results from last 7 days   Lab Units 01/13/20  0613 01/12/20  0639 01/11/20  0601   POTASSIUM mmol/L 3 6 3 6 4 5   CHLORIDE mmol/L 103 101 103   CO2 mmol/L 26 24 25   BUN mg/dL 10 10 13   CREATININE mg/dL 0 63 0 70 0 68   CALCIUM mg/dL 8 3 8 3 7 7*   ALK PHOS U/L 887* 865* 983*   ALT U/L 79* 83* 90*   AST U/L 88* 88* 97*     Results from last 7 days   Lab Units 01/08/20  0500   MAGNESIUM mg/dL 1 9 Results from last 7 days   Lab Units 01/09/20  2105 01/08/20  0500 01/07/20 1942   INR  1 50* 1 25* 1 32*   PTT seconds  --   --  53*     Results from last 7 days   Lab Units 01/07/20 1942   LACTIC ACID mmol/L 0 5     Results from last 7 days   Lab Units 01/07/20 1942   TROPONIN I ng/mL <0 02       IMAGING & DIAGNOSTIC TESTING     Radiology Results: I have personally reviewed pertinent reports  Fl Ercp Biliary Only    Result Date: 1/11/2020  Impression: Fluoroscopic guidance for ERCP  Please see procedure report for full details  Workstation performed: [id number]     Other Diagnostic Testing: I have personally reviewed pertinent reports  ACTIVE MEDICATIONS     Current Facility-Administered Medications   Medication Dose Route Frequency    baclofen tablet 15 mg  15 mg Oral TID    bisacodyl (DULCOLAX) EC tablet 10 mg  10 mg Oral Daily PRN    gabapentin (NEURONTIN) capsule 200 mg  200 mg Oral BID    metoprolol succinate (TOPROL-XL) 24 hr tablet 25 mg  25 mg Oral Daily    oxyCODONE (ROXICODONE) IR tablet 5 mg  5 mg Oral Q4H PRN    polyethylene glycol (MIRALAX) packet 17 g  17 g Oral Daily PRN    tamsulosin (FLOMAX) capsule 0 4 mg  0 4 mg Oral Daily With Dinner       VTE Pharmacologic Prophylaxis: On Lovenox  VTE Mechanical Prophylaxis: sequential compression device    Portions of the record may have been created with voice recognition software  Occasional wrong word or "sound a like" substitutions may have occurred due to the inherent limitations of voice recognition software    Read the chart carefully and recognize, using context, where substitutions have occurred   ==  Parag Kenny MD  [address] [facility]  Internal Medicine Residency PGY-1

## 2020-01-13 NOTE — PROGRESS NOTES
Progress note - Palliative and Supportive Care   Tyler Rashid 66 y o  male [id number]    Assessment:              - Tyler Rashid is a 66 y o  male with metastatic colon cancer who was transferred to [facility] for ERCP and iron drainage of obstructive jaundice secondary to tumor invasion  PSC has been consulted for symptom management, goals of care discussion and pain management  Patient Active Problem List    Diagnosis Date Noted    History of Trigeminal neuralgia 01/08/2020    Chronic hyponatremia 01/08/2020    Low hemoglobin 01/08/2020    [facility] 01/07/2020    Hypertension 01/07/2020    Painless jaundice secondary to parenchymal infiltration of tumor along with biliary compression  01/07/2020    BPH (benign prostatic hyperplasia)     Atrial fibrillation ([facility] )        Plan:  1  Symptom management -   · Pain management regimen:  ? Baclofen 15mg TID  ? Increase Gabapentin to 200mg BID  ? Oxycodone IR 5mg [id number] PRN   ? Required 5 doses in the last 24 hours  ? Total OMEs in last 24 hours: 30mg  · Bowel Regimen:  ? Dulcolax and Miralax    2  Goals -    - treatment focused care   -plan for patient to have biliary drain placed on January 15, 2020 with planned discharge to follow bilirubin as an outpatient to determine potential initiation of chemotherapy  -patient may follow-up with palliative Care offices following discharge  I can be contacted through Doctor Mitchell Avendano for any questions regarding [facility] case  If there are any questions after business hours, you may reach out to the on call provider  Code Status: FULL CODE - Level 1   Decisional apparatus:  Patient is competent on my exam today  If competence is lost, patient's substitute decision maker would default to adult children by [place] 169  Interval history:       Tyler Rashid was seen and examined in bed this afternoon  Patient's family was present at time of examination  Discussed overnight events with nursing staff  Patient denies any complaints  He denied any nausea, vomiting, chest pain, abdominal pain, shortness of breath, constipation  He reports that he is appreciated medical care received while in hospital   He reports improvement in pain management  He is awaiting biliary drainage placement on January 15, 2020 for further planning of oncology treatment  MEDICATIONS / ALLERGIES:     all current active meds have been reviewed and current meds:   Current Facility-Administered Medications   Medication Dose Route Frequency    baclofen tablet 15 mg  15 mg Oral TID    bisacodyl (DULCOLAX) EC tablet 10 mg  10 mg Oral Daily PRN    enoxaparin (LOVENOX) subcutaneous injection 100 mg  100 mg Subcutaneous Q12H [facility]    gabapentin (NEURONTIN) capsule 200 mg  200 mg Oral BID    metoprolol succinate (TOPROL-XL) 24 hr tablet 25 mg  25 mg Oral Daily    oxyCODONE (ROXICODONE) IR tablet 5 mg  5 mg Oral Q4H PRN    polyethylene glycol (MIRALAX) packet 17 g  17 g Oral Daily PRN    tamsulosin (FLOMAX) capsule 0 4 mg  0 4 mg Oral Daily With Dinner       Allergies   Allergen Reactions    Ib Pro [Ibuprofen] Hives and Itching    Adhesive [Medical Tape] Rash     Use only paper tape       OBJECTIVE:    Physical Exam  Physical Exam   Constitutional: He is oriented to person, place, and time  He appears well-developed and well-nourished  He is active and cooperative  He is easily aroused  Non-toxic appearance  He does not have a sickly appearance  He does not appear ill  No distress  Nasal cannula in place  HENT:   Head: Normocephalic and atraumatic  Eyes: Pupils are equal, round, and reactive to light  EOM are normal  Right eye exhibits no discharge  Left eye exhibits no discharge  Scleral icterus is present  Neck: Normal range of motion  No thyromegaly present  Cardiovascular: Normal rate, regular rhythm and intact distal pulses     Pulmonary/Chest: Effort normal and breath sounds normal  No respiratory distress  He exhibits no tenderness  Abdominal: Soft  Bowel sounds are normal  He exhibits distension  There is no tenderness  Musculoskeletal: Normal range of motion  He exhibits no edema, tenderness or deformity  Neurological: He is alert, oriented to person, place, and time and easily aroused  No cranial nerve deficit  Skin: Skin is warm and dry  No rash noted  No erythema  No pallor  Generalized jaundice   Psychiatric: He has a normal mood and affect  His behavior is normal  Judgment and thought content normal    Vitals reviewed  Lab Results:   I have personally reviewed pertinent labs  , CBC:   Lab Results   Component Value Date    WBC 8 06 01/13/2020    HGB 8 1 (L) 01/13/2020    HCT 25 3 (L) 01/13/2020     (H) 01/13/2020     01/13/2020    MCH 35 1 (H) 01/13/2020    MCHC 32 0 01/13/2020    RDW 14 3 01/13/2020    MPV 10 8 01/13/2020   , CMP:   Lab Results   Component Value Date    SODIUM 134 (L) 01/13/2020    K 3 6 01/13/2020     01/13/2020    CO2 26 01/13/2020    BUN 10 01/13/2020    CREATININE 0 63 01/13/2020    CALCIUM 8 3 01/13/2020    AST 88 (H) 01/13/2020    ALT 79 (H) 01/13/2020    ALKPHOS 887 (H) 01/13/2020    EGFR 96 01/13/2020     Imaging Studies: Fl Ercp Biliary Only    Result Date: 1/11/2020  Impression: Fluoroscopic guidance for ERCP  Please see procedure report for full details   Workstation performed: [id number]      I have personally reviewed imaging reports    MD Osmar Willett 33 and Supportive Care Fellow  [phone]

## 2020-01-14 NOTE — PROGRESS NOTES
Progress Note - Surgical Oncology   Tyler Rashid 66 y o  male MRN: [id number]  [facility] Encounter: [id number]  01/14/20  3:12 PM      Subjective/Objective   No chief complaint on file  Subjective:  No complaints  Still with pain at the fungating tumor site on the right upper abdomen    Objective:      Blood pressure 97/66, pulse 83, temperature 99 4 °F (37 4 °C), resp  rate 16, height 5' 10" (1 778 m), weight 97 kg (213 lb 13 5 oz), SpO2 100 %  ,Body mass index is 30 68 kg/m²  Intake/Output Summary (Last 24 hours) at 1/14/2020 1512  Last data filed at 1/14/2020 0600  Gross per 24 hour   Intake 180 ml   Output 675 ml   Net -495 ml       Invasive Devices     Central Venous Catheter Line            Port A Cath 01/07/20 Right Chest 6 days                Physical Exam:   Head and neck: is normocephalic  Neck is supple without adenopathy  Sclerae are anicteric  Mucous membranes are moist  No JVD  Abdomen: Soft, nontender, nondistended    Tumor extending through the abdominal wall  Extremities: Without cyanosis, clubbing, or edema, symmetric  Neuro: Grossly nonfocal  Skin is warm and anicteric  Psych: Patient is pleasant and talkative              Lab Results:  Lab Results   Component Value Date    WBC 6 47 01/14/2020    HGB 10 3 (L) 01/14/2020    HCT 31 9 (L) 01/14/2020     (H) 01/14/2020     01/14/2020     Lab Results   Component Value Date    CALCIUM 8 4 01/14/2020    K 3 5 01/14/2020    CO2 27 01/14/2020     01/14/2020    BUN 10 01/14/2020    CREATININE 0 58 (L) 01/14/2020     No results found for: AFP  Lab Results   Component Value Date    CALCIUM 8 4 01/14/2020     No results found for: CEA    Total bilirubin is 9 36      Assessment:  57-year-old male with metastatic colon cancer  Hyperbilirubinemia  Fungating abdominal wall mass    Plan:  Await biliary drainage  Consider chemotherapy if bilirubin gets below 3  Possible cryoablation of the rib by IR  Continue palliative care follow-up    Subhash Tyson MD  3:12 PM

## 2020-01-14 NOTE — PROGRESS NOTES
Progress note - Palliative and Supportive Care   Karly Liu 66 y o  male [id number]    Assessment:              - Dinah Greer a 66 y  o  male with metastatic colon cancer who was transferred to [facility] for ERCP and iron drainage of obstructive jaundice secondary to tumor invasion  PSC has been consulted for symptom management, goals of care discussion and pain management  Patient Active Problem List    Diagnosis Date Noted    History of Trigeminal neuralgia 01/08/2020    Chronic hyponatremia 01/08/2020    Low hemoglobin 01/08/2020    Colon cancer metastasized to multiple sites ([facility] ) 01/07/2020    Hypertension 01/07/2020    Painless jaundice secondary to parenchymal infiltration of tumor along with biliary compression  01/07/2020    BPH (benign prostatic hyperplasia)     Atrial fibrillation ([facility] )        Plan:  1  Symptom management -   · Pain management regimen:  ? Baclofen 15mg TID  ? Increase Gabapentin to 300mg BID  ? Oxycodone IR 5mg q3pkttr PRN   ? Required 5 doses in the last 24 hours  ? Total OMEs in last 24 hours: 30mg  · Bowel Regimen:  ? Dulcolax and Miralax     2  Goals -               - treatment focused care              -plan for patient to have biliary drain placed on January 15, 2020               -patient may follow-up with palliative Care offices following discharge    -Continues to wish to pursue chemotherapy and further treatment goals    I can be contacted through [employer] for any questions regarding Lisa Ek case  If there are any questions after business hours, you may reach out to the on call provider  Code Status: FULL CODE - Level 1   Decisional apparatus:  Patient is competent on my exam today  If competence is lost, patient's substitute decision maker would default to wife by [place] Act 169  Advance Directive / Living Will / POLST:  none    Interval history:       Karly Liu was seen and examined in bed this afternoon   Patient's family was not present at time of examination  Discussed overnight events with nursing staff  MEDICATIONS / ALLERGIES:     all current active meds have been reviewed and current meds:   Current Facility-Administered Medications   Medication Dose Route Frequency    baclofen tablet 15 mg  15 mg Oral TID    bisacodyl (DULCOLAX) EC tablet 10 mg  10 mg Oral Daily PRN    gabapentin (NEURONTIN) capsule 300 mg  300 mg Oral BID    metoprolol succinate (TOPROL-XL) 24 hr tablet 25 mg  25 mg Oral Daily    oxyCODONE (ROXICODONE) IR tablet 5 mg  5 mg Oral Q4H PRN    polyethylene glycol (MIRALAX) packet 17 g  17 g Oral Daily PRN    tamsulosin (FLOMAX) capsule 0 4 mg  0 4 mg Oral Daily With Dinner       Allergies   Allergen Reactions    Ib Pro [Ibuprofen] Hives and Itching    Adhesive [Medical Tape] Rash     Use only paper tape       OBJECTIVE:    Physical Exam  Physical Exam   Constitutional: He appears well-developed and well-nourished  He is active and cooperative  He is easily aroused  Non-toxic appearance  He does not have a sickly appearance  He does not appear ill  No distress  Nasal cannula in place  HENT:   Head: Normocephalic and atraumatic  Eyes: Pupils are equal, round, and reactive to light  EOM are normal  Right eye exhibits no discharge  Left eye exhibits no discharge  Scleral icterus is present  Neck: Normal range of motion  Neck supple  No thyromegaly present  Cardiovascular: Normal rate and regular rhythm  Pulmonary/Chest: He is in respiratory distress  Abdominal: Soft  He exhibits no distension  There is no tenderness  Musculoskeletal: Normal range of motion  He exhibits no edema, tenderness or deformity  Neurological: He is alert and easily aroused  Skin: Skin is warm and dry  No rash noted  No erythema  No pallor  Generalized jaundice   Psychiatric: He has a normal mood and affect  His behavior is normal  Judgment and thought content normal    Vitals reviewed        Lab Results:   I have personally reviewed pertinent labs  , CBC:   Lab Results   Component Value Date    WBC 6 47 01/14/2020    HGB 10 3 (L) 01/14/2020    HCT 31 9 (L) 01/14/2020     (H) 01/14/2020     01/14/2020    MCH 34 9 (H) 01/14/2020    MCHC 32 3 01/14/2020    RDW 14 1 01/14/2020    MPV 10 6 01/14/2020   , CMP:   Lab Results   Component Value Date    SODIUM 134 (L) 01/14/2020    K 3 5 01/14/2020     01/14/2020    CO2 27 01/14/2020    BUN 10 01/14/2020    CREATININE 0 58 (L) 01/14/2020    CALCIUM 8 4 01/14/2020    AST 82 (H) 01/14/2020    ALT 77 01/14/2020    ALKPHOS 902 (H) 01/14/2020    EGFR 99 01/14/2020     Imaging Studies: Fl Ercp Biliary Only    Result Date: 1/11/2020  Impression: Fluoroscopic guidance for ERCP  Please see procedure report for full details   Workstation performed: [id number]      I have personally reviewed imaging reports    MD Osmar Williamson 33 and Supportive Care Fellow  [phone]

## 2020-01-14 NOTE — CONSULTS
Consultation - Radiation Oncology   Aldair Arrieta 66 y o  male MRN: [id number]  [facility] Encounter: [id number]        History of Present Illness   Physician Requesting Consult: Greg Charles MD  Reason for Consult / Principal Problem:  Stage IV metastatic colon carcinoma with lung, liver and right upper abdominal wall soft tissue metastasis  Hx and PE limited by:  No limitations  HPI: Aldair Arrieta is a 66y o  year old male who presents with a locally advanced colon carcinoma initially diagnosed in 2013  He had stage III disease at that time and had surgical resection with metastasis to 1/13 positive lymph nodes  He had a left hemicolectomy done in May 2013 at [facility], [facility] which is where he lives  He had adjuvant systemic chemotherapy with FOLFOX  He was then diagnosed with for liver metastasis in November of 2014  He had neoadjuvant FOLFIRI with bevacizumab for 5 cycles through February of 2015  He then had a laparoscopic radiofrequency ablation of liver metastasis of the liver lesions in segments 4A and segment 7 along with robotic wedge resection of segment 6 containing 2 liver lesions  He then had 6 more cycles of FOLFIRI ending in September of 2015  He developed painful right abdominal wall metastatic disease with some skin ulceration and underwent palliative radiation therapy ending in April of 2017 with 3000 cGy in 10 fractions  In June of 2019, he had CT scans that revealed metastatic disease to the lungs, hilar region, liver, and progression of subcutaneous right upper abdominal wall metastatic disease  He received capecitabine until October 2019  He came to stay with his daughters in [facility] and was evaluated [facility] in [facility]  He received further radiation therapy to the right upper abdominal wall with 3000 cGy in 5 fractions ending November 26, 2019      He is now admitted to the hospital with obstructive jaundice which has progressed  Patient previously had obstructive jaundice with a bilirubin of 40 and then had stent placed at [facility] with bilirubin going down to 16 per his report  The stent became obstructed and when he was admitted here to [facility] his bilirubin gone up to 24  He had ERCP January 8, 2020 with Dr Cristina Vines who was able to balloon dilate the proximal most extent of the patient's current stent but he was unable to dilate the stricture  The bilirubin has now decreased from 24 down to 10 55  He was seen by Dr Candida Millard and surgery has not been recommended  Recommendations were made for biliary stenting to reduce the bilirubin to less than 3 0 so that he can consider additional systemic chemotherapy  He was also seen by Dr Castillo Dalton from IR today to consider placement of biliary drainage catheters  Consults    Review of Systems   Fourteen point review of system is negative except for him losing about 30 lb over the last 3 months  He has some mild shortness of breath on exertion  He has right upper quadrant abdominal pain along with ulcerated tumor that does bruise and bleed at times  This had decreased after his radiation in November but now seems to be increasing  Historical Information   Previous Oncology History:  As per history of present illness    Past Medical History:   Diagnosis Date    Atrial fibrillation ([address] )     BPH (benign prostatic hyperplasia)     Cancer ([address] )     colon- johnny liver & lungs    Hypertension     Stroke ([address] )     2008- mild weakness left hand/arm    Trigeminal neuralgia     Tumor, metastatic ([address] )     Tumor, metastatic ([address] )      Past Surgical History:   Procedure Laterality Date    COLON SURGERY      2013    EYE SURGERY      LIVER SURGERY      TONSILLECTOMY         Family History   Problem Relation Age of Onset    Cancer Father     Colon cancer Father     Cancer Brother     Cancer Paternal Grandfather      Social History   Social History     Substance and Sexual Activity   Alcohol Use Never    Frequency: Never     Social History     Substance and Sexual Activity   Drug Use Never     Social History     Tobacco Use   Smoking Status Former Smoker    Types: Cigarettes    Last attempt to quit: Brynn Years since quittin 0   Smokeless Tobacco Never Used       Meds/Allergies   all current active meds have been reviewed    Allergies   Allergen Reactions    Ib Pro [Ibuprofen] Hives and Itching    Adhesive [Medical Tape] Rash     Use only paper tape       Objective     Intake/Output Summary (Last 24 hours) at 2020 2149  Last data filed at 2020 1300  Gross per 24 hour   Intake 240 ml   Output 300 ml   Net -60 ml     Invasive Devices     Central Venous Catheter Line            Port A Cath 20 Right Chest 6 days              Physical Exam   Constitutional: He is oriented to person, place, and time  He appears well-developed and well-nourished  No distress  HENT:   Head: Normocephalic and atraumatic  Mouth/Throat: No oropharyngeal exudate  Eyes: Pupils are equal, round, and reactive to light  Conjunctivae and EOM are normal  No scleral icterus  Neck: Normal range of motion  Neck supple  No tracheal deviation present  No thyromegaly present  Cardiovascular: Normal rate, regular rhythm and normal heart sounds  Pulmonary/Chest: Effort normal and breath sounds normal  No respiratory distress  He has no wheezes  He has no rales  Abdominal: Soft  Bowel sounds are normal  He exhibits no distension and no mass  There is no tenderness  There are well-healed abdominal incisions from his prior surgeries  In the right upper abdominal wall, she there is a tender mass with dressing intact  Musculoskeletal: Normal range of motion  He exhibits no edema or tenderness  Lymphadenopathy:     He has no cervical adenopathy     Neurological: He is alert and oriented to person, place, and time  No cranial nerve deficit  Coordination normal    Skin: Skin is warm and dry  No rash noted  He is not diaphoretic  No erythema  No pallor  Psychiatric: He has a normal mood and affect  His behavior is normal  Judgment and thought content normal    Nursing note and vitals reviewed  Lab Results: I have personally reviewed pertinent reports  Imaging Studies: I have personally reviewed pertinent reports  and I have personally reviewed pertinent films in PACS  EKG, Pathology, and Other Studies: I have personally reviewed pertinent reports  Assessment/Plan     Assessment and Plan:  Kain Chau is a 66y o  year old male who presented with a locally advanced lymph node positive colon carcinoma initially diagnosed in 2013  He developed for liver metastasis in 2014 and received chemotherapy followed by RFA and robotic wedge resections  He received 30 Gy ofpalliative radiation therapy to a painful right upper abdominal wall metastasis in April of 2017  In June 2017 he developed along, hilar, liver, and subcutaneous right upper abdominal wall metastatic disease  He received additional chemotherapy but his disease continues to progress  The right upper abdominal wall disease received an additional 30 Gy of radiation therapy in November 2019 at [facility]  He now has biliary obstruction status post stent placement with a reduction in his bilirubin now down to 10 55  He is not a candidate for any additional chemotherapy unless his bilirubin comes down to less than 3 0  After reviewing his imaging studies and the extent of his liver disease, external beam radiation therapy to the liver would not be of any benefit in relieving his obstruction or reducing his bilirubin  Additional biliary catheters would have a higher chance to further decompress his biliary obstruction quickly  He is also not a candidate for Sir sphere radiation therapy since his bilirubin need to be less than 2 0    He would like to have additional biliary catheters placed in order to try to reduce his bilirubin further such that he could consider additional systemic treatment  We discussed with him and his bilirubin still remains quite high and that additional drainage tubes may not reduce the bilirubin enough not for him to receive chemotherapy  He understands this but would like to try  He is scheduled for IR procedure on January 15, 2020  We did discuss palliative care/hospice care in the event he is not able to receive any additional systemic treatment  He will not be followed any more by radiation oncology since we cannot offer him any meaningful treatment  Code Status: Level 1 - Full Code  Advance Directive and Living Will: Yes    Power of :    POLST:      Counseling / Coordination of Care  Total floor / unit time spent today 50 minutes  Greater than 50% of total time was spent with the patient and / or family counseling and / or coordination of care   A description of the counseling / coordination of care: See Above

## 2020-01-14 NOTE — PROGRESS NOTES
INTERNAL MEDICINE RESIDENCY PROGRESS NOTE     Name: Scott Renee   Age & Sex: 66 y o  male   MRN: [id number]  Unit/Bed#: [facility] 909-01   Encounter: [id number]  Team: SOD Team A    PATIENT INFORMATION     Name: Scott Renee   Age & Sex: 66 y o  male   MRN: [id number]  Hospital Stay Days: 5    ASSESSMENT/PLAN     Principal Problem:    Painless jaundice secondary to parenchymal infiltration of tumor along with biliary compression   Active Problems:    BPH (benign prostatic hyperplasia)    Atrial fibrillation [address] [address] )    Colon cancer metastasized to multiple sites [facility])    Hypertension    History of Trigeminal neuralgia    Chronic hyponatremia      Chronic hyponatremia  Assessment & Plan   Na-134   - continue monitor with a m  CMP    History of Trigeminal neuralgia  Assessment & Plan  Diagnosed more than 10 years ago  Currently denying any symptoms  Holding home dose Tegretol secondary to adverse effects of elevated LFTs  · Gabapentin 200 mg b i d    Hypertension  Assessment & Plan  Last 20 for blood pressures have been -120  - continue metoprolol succinate, hold parameters reviewed  Colon cancer metastasized to multiple sites [facility])  Assessment & Plan  With mets to liver, lungs, and percutaneous abdominal lesion   Status post multiple trials of chemotherapy and radiation therapy  Most recent radiation 11/19  Recently establishing new oncologist with [facility]   Has an appointment scheduled with Dr Kelechi Walker   - ERCP and stent placement unsuccessful on 01/10/2020  Further management as highlighted above  - palliative care following for pain management  Recs:  Gabapentin 200 mg b i d , baclofen 15 mg t i d , oxycodone 5 mg q 4 hours p r n   - Plan for palliative stent with IR on Wednesday  - GI, Oncology, palliative following, appreciate recommendations      Atrial fibrillation ([facility] [address] )  Assessment & Plan  · Home regimen: Lovenox 100 mg every 12 hours, as recommended by his oncologist, metoprolol succinate 50 mg daily  · Lovenox was restarted  · Currently rate controlled  * Painless jaundice secondary to parenchymal infiltration of tumor along with biliary compression   Assessment & Plan  -  ERCP and stent unsuccessful 01/10/2020  - Evaluated by IR appreciated, plan for palliative stent on Wednesday  - plan for percutaneous biliary drain placement by IR, procedure date unknown currently  IR has been consulted  - oncology, GI, palliative, IR following            Disposition:Pending IR procedure  SUBJECTIVE     Patient seen and examined  No acute events overnight  Denied chest pain, SOB, abdominal pain  Tolerating diet  Rest of ROS negative  OBJECTIVE     Vitals:    20 0810 20 0829 20 1630 20 2300   BP:  124/79 114/73 105/77   BP Location:   Left arm    Pulse:  86 74 83   Resp:  20 20 18   Temp:  98 °F (36 7 °C) 98 6 °F (37 °C) 97 6 °F (36 4 °C)   TempSrc:   Oral Oral   SpO2: 98% 96% 100% 100%   Weight:       Height:          Temperature:   Temp (24hrs), Av 1 °F (36 7 °C), Min:97 6 °F (36 4 °C), Max:98 6 °F (37 °C)    Temperature: 97 6 °F (36 4 °C)  Intake & Output:  I/O        07 -  0700 701 -  0700 701 - 01/15 0700    P  O  720 420     Total Intake(mL/kg) 720 (7 4) 420 (4 3)     Urine (mL/kg/hr) 350 (0 2) 300 (0 1)     Stool 0      Total Output 350 300     Net +370 +120            Unmeasured Urine Occurrence 6 x      Unmeasured Stool Occurrence 1 x          Weights:   IBW: 73 kg    Body mass index is 30 68 kg/m²  Weight (last 2 days)     Date/Time   Weight    20 0600   97 (213 85)    20 0600   96 4 (212 52)            Physical Exam   Constitutional: He is oriented to person, place, and time  He appears well-developed and well-nourished  No distress  HENT:   Head: Normocephalic and atraumatic  Mouth/Throat: Oropharynx is clear and moist    Eyes: Pupils are equal, round, and reactive to light   EOM are normal  Scleral icterus is present  Neck: Neck supple  Cardiovascular: Normal rate, regular rhythm, normal heart sounds and intact distal pulses  Exam reveals no gallop and no friction rub  No murmur heard  Pulmonary/Chest: Effort normal and breath sounds normal  No stridor  No respiratory distress  He has no wheezes  Abdominal: Soft  Bowel sounds are normal  He exhibits no distension  There is no tenderness  There is no guarding  Musculoskeletal: He exhibits no edema  Lymphadenopathy:     He has no cervical adenopathy  Neurological: He is alert and oriented to person, place, and time  Skin: Skin is warm and dry  Abdominal wound noted, dressing intact, no active bleeding or discharge   Psychiatric: He has a normal mood and affect  LABORATORY DATA     Labs: I have personally reviewed pertinent reports  Results from last 7 days   Lab Units 01/13/20  0613 01/12/20  0639 01/11/20  0601 01/10/20  0528  01/08/20  0500   WBC Thousand/uL 8 06 6 09 9 08 6 66   < > 6 03   HEMOGLOBIN g/dL 8 1* 10 5* 11 0* 11 0*   < > 10 8*   HEMATOCRIT % 25 3* 31 7* 33 2* 32 3*   < > 31 6*   PLATELETS Thousands/uL 183 148* 150 145*   < > 133*   NEUTROS PCT %  --  70  --  72  --  72   MONOS PCT %  --  15*  --  17*  --  18*    < > = values in this interval not displayed        Results from last 7 days   Lab Units 01/13/20  0613 01/12/20  0639 01/11/20  0601   POTASSIUM mmol/L 3 6 3 6 4 5   CHLORIDE mmol/L 103 101 103   CO2 mmol/L 26 24 25   BUN mg/dL 10 10 13   CREATININE mg/dL 0 63 0 70 0 68   CALCIUM mg/dL 8 3 8 3 7 7*   ALK PHOS U/L 887* 865* 983*   ALT U/L 79* 83* 90*   AST U/L 88* 88* 97*     Results from last 7 days   Lab Units 01/08/20  0500   MAGNESIUM mg/dL 1 9          Results from last 7 days   Lab Units 01/09/20  2105 01/08/20  0500 01/07/20  1942   INR  1 50* 1 25* 1 32*   PTT seconds  --   --  53*     Results from last 7 days   Lab Units 01/07/20  1942   LACTIC ACID mmol/L 0 5     Results from last 7 days   Lab Units 01/07/20  1942   TROPONIN I ng/mL <0 02       IMAGING & DIAGNOSTIC TESTING     Radiology Results: I have personally reviewed pertinent reports  Fl Ercp Biliary Only    Result Date: 1/11/2020  Impression: Fluoroscopic guidance for ERCP  Please see procedure report for full details  Workstation performed: [id number]     Other Diagnostic Testing: I have personally reviewed pertinent reports  ACTIVE MEDICATIONS     Current Facility-Administered Medications   Medication Dose Route Frequency    baclofen tablet 15 mg  15 mg Oral TID    bisacodyl (DULCOLAX) EC tablet 10 mg  10 mg Oral Daily PRN    gabapentin (NEURONTIN) capsule 200 mg  200 mg Oral BID    metoprolol succinate (TOPROL-XL) 24 hr tablet 25 mg  25 mg Oral Daily    oxyCODONE (ROXICODONE) IR tablet 5 mg  5 mg Oral Q4H PRN    polyethylene glycol (MIRALAX) packet 17 g  17 g Oral Daily PRN    tamsulosin (FLOMAX) capsule 0 4 mg  0 4 mg Oral Daily With Dinner       VTE Pharmacologic Prophylaxis: Sequential compression device (Venodyne)   VTE Mechanical Prophylaxis: sequential compression device    Portions of the record may have been created with voice recognition software  Occasional wrong word or "sound a like" substitutions may have occurred due to the inherent limitations of voice recognition software    Read the chart carefully and recognize, using context, where substitutions have occurred   ==  Konstantin Milan, [address] [facility]  Internal Medicine Residency PGY-1

## 2020-01-14 NOTE — SOCIAL WORK
A post acute care recommendation was made by your care team for [facility]  Discussed [place] of Choice with patient  List of  [facility] agencies reviewed with pt verbally  PT requested referral to [address] [place] [address]  Referral entered in [place]

## 2020-01-14 NOTE — PROGRESS NOTES
Progress Note -Interventional Radiology PA Pastor Schaumann 66 y o  male MRN: [id number]  Unit/Bed#: [facility] 909-01 Encounter: [id number]    Assessment:    66year old male presenting with obstructive painless jaundice, metastatic colon cancer    Plan:  - IR planning for biliary drainage tomorrow  This will be with anesthesia  Please keep patient NPO after midnight  - continuing to trend bilirubin  Today 9 36 from 10 55 yesterday  - patient asks if anesthesia would be able to place tubing off his left side of face  Patient with history of trigeminal neuralgia to right side of face which was extreme earlier this week  - patient has been visited by [facility]  Appreciate their input for discharge    Patient Active Problem List   Diagnosis    BPH (benign prostatic hyperplasia)    [facility] [address] [address] [address]    Colon cancer metastasized to multiple sites ([facility] )    Hypertension    Painless jaundice secondary to parenchymal infiltration of tumor along with biliary compression     History of Trigeminal neuralgia    Chronic hyponatremia    Low hemoglobin          Subjective: Patient actively walking around room and appears more energized this morning  Only discomfort is soreness to his right sided mass after ambulating for extended period of time  Bili 9 36 today  Objective:    Vitals:  /80   Pulse 83   Temp 97 6 °F (36 4 °C) (Oral)   Resp 18   Ht 5' 10" (1 778 m)   Wt 97 kg (213 lb 13 5 oz)   SpO2 100%   BMI 30 68 kg/m²   Body mass index is 30 68 kg/m²    Weight (last 2 days)     Date/Time   Weight    01/13/20 0600   97 (213 85)    01/12/20 0600   96 4 (212 52)              I/Os:    Intake/Output Summary (Last 24 hours) at 1/14/2020 1305  Last data filed at 1/14/2020 0600  Gross per 24 hour   Intake 180 ml   Output 675 ml   Net -495 ml       Invasive Devices     Central Venous Catheter Line            Port A Cath 01/07/20 Right Chest 6 days                Physical Exam:  General appearance: alert and oriented, in no acute distress  Lungs: clear to auscultation bilaterally  Heart: regular rate and rhythm, S1, S2 normal, no murmur, click, rub or gallop  Abdomen: soft, only tender around ruq mass, bowel sounds +, some drainage from ruq mass  Skin: jaundiced                Lab Results and Cultures:   CBC with diff:   Lab Results   Component Value Date    WBC 6 47 01/14/2020    HGB 10 3 (L) 01/14/2020    HCT 31 9 (L) 01/14/2020     (H) 01/14/2020     01/14/2020    MCH 34 9 (H) 01/14/2020    MCHC 32 3 01/14/2020    RDW 14 1 01/14/2020    MPV 10 6 01/14/2020    NRBC 0 01/12/2020      BMP/CMP:  Lab Results   Component Value Date    K 3 5 01/14/2020     01/14/2020    CO2 27 01/14/2020    BUN 10 01/14/2020    CREATININE 0 58 (L) 01/14/2020    CALCIUM 8 4 01/14/2020    AST 82 (H) 01/14/2020    ALT 77 01/14/2020    ALKPHOS 902 (H) 01/14/2020    EGFR 99 01/14/2020   ,     Coags:   Lab Results   Component Value Date    PTT 53 (H) 01/07/2020    INR 1 50 (H) 01/09/2020   ,   Results from last 7 days   Lab Units 01/09/20  2105 01/08/20  0500 01/07/20  1942   PTT seconds  --   --  53*   INR  1 50* 1 25* 1 32*        Lipid Panel: No results found for: CHOL  No results found for: HDL  No results found for: HDL  No results found for: LDLCALC  No results found for: TRIG    HgbA1c: No results found for: HGBA1C    Blood Culture: No results found for: BLOODCX,   Urinalysis:   Lab Results   Component Value Date    Sandra Risen 12/27/2019    CLARITYU Slightly Cloudy 12/27/2019    SPECGRAV 1 020 12/27/2019    PHUR 6 5 12/27/2019    LEUKOCYTESUR Negative 12/27/2019    NITRITE Positive (A) 12/27/2019    GLUCOSEU 100 (1/10%) (A) 12/27/2019    KETONESU Trace (A) 12/27/2019    BILIRUBINUR Interference- unable to analyze (A) 12/27/2019    BLOODU Negative 12/27/2019   ,   Urine Culture: No results found for: URINECX,   Wound Culure:  No results found for: [facility]       Thank you for allowing me to participate in the care of Ryanne Humphries  Please don't hesitate to call, text, email, or TigerText with any questions  This text is generated with voice recognition software  There may be translation, syntax,  or grammatical errors  If you have any questions, please contact the dictating provider      Lolly Vargas PA-C

## 2020-01-15 NOTE — BRIEF OP NOTE (RAD/CATH)
IR TUBE PLACEMENT BILI Procedure Note    PATIENT NAME: Kirk Jiang  : 1945  MRN: [id number]    Pre-op Diagnosis:   1  Colon cancer metastasized to multiple sites [facility])    2  Painless jaundice secondary to parenchymal infiltration of tumor along with biliary compression       Post-op Diagnosis:   1  Colon cancer metastasized to multiple sites [facility])    2  Painless jaundice secondary to parenchymal infiltration of tumor along with biliary compression         Surgeon:   Yefri Posada MD  Assistants:     No qualified resident was available, Resident is only observing    Estimated Blood Loss: minimal     Findings:     PTC with access centrally in the stented portion  The flow of contrast away from the stent was brisk, no dilated ducts seen  No drain was placed       Specimens: none    Complications:  none    Anesthesia: Local and Clemente Vazquez MD     Date: 1/15/2020  Time: 2:51 PM

## 2020-01-15 NOTE — PROGRESS NOTES
Progress note - Palliative and Supportive Care   Karie Ribera 66 y o  male [id number]    Assessment:              - Janelle Bear a 66 y  o  male with metastatic colon cancer who was transferred to [facility] for ERCP and iron drainage of obstructive jaundice secondary to tumor invasion  PSC has been consulted for symptom management, goals of care discussion and pain management  Patient Active Problem List    Diagnosis Date Noted    History of Trigeminal neuralgia 01/08/2020    Chronic hyponatremia 01/08/2020    Low hemoglobin 01/08/2020    Colon cancer metastasized to multiple sites ([facility] ) 01/07/2020    Hypertension 01/07/2020    Painless jaundice secondary to parenchymal infiltration of tumor along with biliary compression  01/07/2020    BPH (benign prostatic hyperplasia)     Atrial fibrillation ([facility] )        Plan:  1  Symptom management -   · Pain management regimen:  ? Baclofen 15mg TID  ? Continue Gabapentin 300mg BID, may continue to uptitrate regimen by 200mg every 2-3 days  ? Oxycodone IR 5mg [id number] PRN   ? Required 4 doses in the last 24 hours  ? Total OMEs in last 24 hours: 30mg  · Bowel Regimen:  ? Dulcolax and Miralax     2  Goals -   · treatment focused care, continues to wish to pursue chemotherapy or radiation therapy if able as monitoring bilirubin  Remains hopeful that biliary drain will be effective in decreasing bilirubin to appropriate levels to allow for chemotherapy  · Scheduled for Biliary Drain placement with IR today  · May follow up with [facility] as outpatient  Contact information provided in discharge information for [facility] office at [place] [address]    I can be contacted through Doctor Mitchell Avendano for any questions regarding Calista Levy case  If there are any questions after business hours, you may reach out to the on call provider  Code Status: FULL CODE - Level 1   Decisional apparatus:  Patient is competent on my exam today    If competence is lost, patient's substitute decision maker would default to wife [place]  Advance Directive / Living Will / POLST:  none    Interval history:       Rebecca Lam was seen and examined in bed this morning  Patient's family was present at time of examination (wife, daughter and son in law)  Discussed overnight events with nursing staff  Denied any complaints  He stated that his current pain regimen was working well and he was happy with his regimen of oxycodone PRN, baclofen and gabapentin  He stated that he has been walking throughout the hallways to pass the time until his procedure this afternoon  MEDICATIONS / ALLERGIES:     all current active meds have been reviewed and current meds:   Current Facility-Administered Medications   Medication Dose Route Frequency    baclofen tablet 15 mg  15 mg Oral TID    bisacodyl (DULCOLAX) EC tablet 10 mg  10 mg Oral Daily PRN    gabapentin (NEURONTIN) capsule 300 mg  300 mg Oral BID    metoprolol succinate (TOPROL-XL) 24 hr tablet 25 mg  25 mg Oral Daily    oxyCODONE (ROXICODONE) IR tablet 5 mg  5 mg Oral Q4H PRN    polyethylene glycol (MIRALAX) packet 17 g  17 g Oral Daily PRN    tamsulosin (FLOMAX) capsule 0 4 mg  0 4 mg Oral Daily With Dinner       Allergies   Allergen Reactions    Ib Pro [Ibuprofen] Hives and Itching    Adhesive [Medical Tape] Rash     Use only paper tape       OBJECTIVE:    Physical Exam  Physical Exam   Constitutional: He is oriented to person, place, and time  He appears well-developed and well-nourished  He is active and cooperative  He is easily aroused  Non-toxic appearance  He does not have a sickly appearance  He does not appear ill  No distress  Nasal cannula in place  HENT:   Head: Normocephalic and atraumatic  Eyes: Pupils are equal, round, and reactive to light  EOM are normal  Scleral icterus is present  Neck: Normal range of motion  Neck supple  No thyromegaly present  Cardiovascular: Normal rate and regular rhythm  Pulmonary/Chest: Effort normal and breath sounds normal  No respiratory distress  He exhibits no tenderness  Abdominal: Soft  He exhibits no distension  Musculoskeletal: Normal range of motion  He exhibits no edema, tenderness or deformity  Neurological: He is alert, oriented to person, place, and time and easily aroused  Skin: Skin is warm and dry  No rash noted  He is not diaphoretic  No erythema  No pallor  Generalized jaundice throughout upper body   Psychiatric: He has a normal mood and affect  His behavior is normal  Judgment and thought content normal    Vitals reviewed  Lab Results:   I have personally reviewed pertinent labs  , CBC:   Lab Results   Component Value Date    WBC 6 09 01/15/2020    HGB 9 8 (L) 01/15/2020    HCT 30 3 (L) 01/15/2020     (H) 01/15/2020     01/15/2020    MCH 35 3 (H) 01/15/2020    MCHC 32 3 01/15/2020    RDW 14 1 01/15/2020    MPV 10 5 01/15/2020   , CMP:   Lab Results   Component Value Date    SODIUM 137 01/15/2020    K 3 5 01/15/2020     01/15/2020    CO2 28 01/15/2020    BUN 12 01/15/2020    CREATININE 0 63 01/15/2020    CALCIUM 8 2 (L) 01/15/2020    AST 79 (H) 01/15/2020    ALT 71 01/15/2020    ALKPHOS 835 (H) 01/15/2020    EGFR 96 01/15/2020     Imaging Studies: Fl Ercp Biliary Only    Result Date: 1/11/2020  Impression: Fluoroscopic guidance for ERCP  Please see procedure report for full details   Workstation performed: [id number]      I have personally reviewed imaging reports    Duane Hicks, MD [address] and Supportive Care Fellow

## 2020-01-15 NOTE — ANESTHESIA POSTPROCEDURE EVALUATION
Post-Op Assessment Note    CV Status:  Stable  Pain Score: 0    Pain management: adequate     Mental Status:  Awake and somnolent   Hydration Status:  Stable   PONV Controlled:  None   Airway Patency:  Patent   Post Op Vitals Reviewed: Yes      Staff: CRNA   Comments: Patient resting in PACU, no c/o pain or nausea  Airway patent, VSS             BP   111/71 (83)   Temp   98 0   Pulse   97   Resp   11   SpO2   99% on FM

## 2020-01-15 NOTE — ANESTHESIA PREPROCEDURE EVALUATION
Review of Systems/Medical History  Patient summary reviewed  Chart reviewed      Cardiovascular  Hypertension , Dysrhythmias , atrial fibrillation,    Pulmonary  Smoker ex-smoker  ,   Comment: Ca mets to lungs     GI/Hepatic      Comment: Gastrointestinal ca, mets to liver; jaundice     Prostatic disorder, benign prostatic hyperplasia       Endo/Other  Negative endo/other ROS      GYN  Negative gynecology ROS          Hematology  Negative hematology ROS      Musculoskeletal  Negative musculoskeletal ROS        Neurology    CVA , residual symptoms,   Comment: Trigeminal neuralgia; CVA 2008 residual sx, left sided weakness Psychology   Negative psychology ROS              Physical Exam    Airway    Mallampati score: II  TM Distance: >3 FB  Neck ROM: full     Dental       Cardiovascular  Rhythm: irregular,     Pulmonary  Pulmonary exam normal     Other Findings        Anesthesia Plan  ASA Score- 3     Anesthesia Type- general with ASA Monitors  Additional Monitors:   Airway Plan: ETT  Plan Factors-    Induction- intravenous  Postoperative Plan- Plan for postoperative opioid use  Planned trial extubation    Informed Consent- Anesthetic plan and risks discussed with patient  I personally reviewed this patient with the CRNA  Discussed and agreed on the Anesthesia Plan with the CRNA  Ivan Kirkpatrick

## 2020-01-15 NOTE — PROGRESS NOTES
Resume po diet when medically appropriate; recommend vanilla ensure enlive BID when back on solid foods

## 2020-01-15 NOTE — SEDATION DOCUMENTATION
IR Procedure Bedrest Start Time is [id number]  Entry sites CDI with dressing and skin glue  RUQ wound redressed with petroleum gauze and ABD  Pt to PACU post  Report called Graciela AYOUB

## 2020-01-15 NOTE — PROGRESS NOTES
INTERNAL MEDICINE RESIDENCY PROGRESS NOTE     Name: Dede Luna   Age & Sex: 66 y o  male   MRN: [id number]  Unit/Bed#: [facility] 909-01   Encounter: [id number]  Team: SOD Team A    PATIENT INFORMATION     Name: Dede Luna   Age & Sex: 66 y o  male   MRN: [id number]  Hospital Stay Days: 6    ASSESSMENT/PLAN     Principal Problem:    Painless jaundice secondary to parenchymal infiltration of tumor along with biliary compression   Active Problems:    BPH (benign prostatic hyperplasia)    Atrial fibrillation ([facility] )    Colon cancer metastasized to multiple sites [facility])    Hypertension    History of Trigeminal neuralgia    Chronic hyponatremia    Chronic hyponatremia  Assessment & Plan   Na-137   - continue monitor with a m  CMP     History of Trigeminal neuralgia  Assessment & Plan  Diagnosed more than 10 years ago  Currently denying any symptoms  Holding home dose Tegretol secondary to adverse effects of elevated LFTs, states o2 therapy helps, which he had for a few hours today  · Continue Gabapentin 200 mg b i d     Hypertension  Assessment & Plan  Last blood pressures have been -120  - continue metoprolol succinate, hold parameters reviewed      Colon cancer metastasized to multiple sites [facility])  Assessment & Plan  With mets to liver, lungs, and percutaneous abdominal lesion   Status post multiple trials of chemotherapy and radiation therapy  Most recent radiation 11/19    - Drain placement today unsuccessful    - palliative care following for pain management  Recs:  Gabapentin 200 mg b i d , baclofen 15 mg t i d , oxycodone 5 mg q 4 hours p r n   - GI, Oncology, palliative following, appreciate recommendations    - Considering stent was unsuccessful, patient is medically stable, will considering discharging     Atrial fibrillation ([facility] )  Assessment & Plan  · Home regimen: Lovenox 100 mg every 12 hours, as recommended by his oncologist, metoprolol succinate 50 mg daily  · Continue lovenox  · Currently rate controlled      * Painless jaundice secondary to parenchymal infiltration of tumor along with biliary compression   Assessment & Plan  -  ERCP and stent unsuccessful 01/10/2020  - Evaluated by IR appreciated  - Drain placement today unsuccessful   - oncology, GI, palliative, IR following   - Patient is otherwise medically stable, considering discharge in evening      [place]     Patient seen and examined  No acute events overnight  This morning he state his trigeminal neuralgia was present  He states nasal cannula O2 helps  Denied chest pain, SOB, abdominal pain  Tolerating diet  Rest of ROS negative  OBJECTIVE     Vitals:    01/15/20 1448 01/15/20 1500 01/15/20 1515 01/15/20 1530   BP: 111/71 97/76 109/64 97/70   Pulse: 98 98 96 94   Resp: 18 15  14   Temp: 98 °F (36 7 °C)   99 °F (37 2 °C)   TempSrc:       SpO2: 100% 97% 97% 100%   Weight:       Height:          Temperature:   Temp (24hrs), Av 6 °F (37 °C), Min:98 °F (36 7 °C), Max:99 2 °F (37 3 °C)    Temperature: 99 °F (37 2 °C)  Intake & Output:  I/O        07 -  0700  07 -  0700 701 - 01/15 0700    P  O  720 420     Total Intake(mL/kg) 720 (7 4) 420 (4 3)     Urine (mL/kg/hr) 350 (0 2) 300 (0 1)     Stool 0      Total Output 350 300     Net +370 +120            Unmeasured Urine Occurrence 6 x      Unmeasured Stool Occurrence 1 x          Weights:   IBW: 73 kg    Body mass index is 30 15 kg/m²  Weight (last 2 days)     Date/Time   Weight    01/15/20 0600   95 3 (210 1)    20 0600   97 (213 85)            Physical Exam   Constitutional: He is oriented to person, place, and time  He appears well-developed and well-nourished  No distress  HENT:   Head: Normocephalic and atraumatic  Mouth/Throat: Oropharynx is clear and moist    Eyes: Pupils are equal, round, and reactive to light  EOM are normal  Scleral icterus is present  Neck: Neck supple     Cardiovascular: Normal rate, regular rhythm, normal heart sounds and intact distal pulses  Exam reveals no gallop and no friction rub  No murmur heard  Pulmonary/Chest: Effort normal and breath sounds normal  No stridor  No respiratory distress  He has no wheezes  Abdominal: Soft  Bowel sounds are normal  He exhibits no distension  There is no tenderness  There is no guarding  Musculoskeletal: He exhibits no edema  Lymphadenopathy:     He has no cervical adenopathy  Neurological: He is alert and oriented to person, place, and time  Skin: Skin is warm and dry  Abdominal wound noted, dressing intact, no active bleeding or discharge  Visablly jaundiced   Psychiatric: He has a normal mood and affect  LABORATORY DATA     Labs: I have personally reviewed pertinent reports  Results from last 7 days   Lab Units 01/15/20  0535 01/14/20  0617 01/13/20  0613 01/12/20  0639  01/10/20  0528   WBC Thousand/uL 6 09 6 47 8 06 6 09   < > 6 66   HEMOGLOBIN g/dL 9 8* 10 3* 8 1* 10 5*   < > 11 0*   HEMATOCRIT % 30 3* 31 9* 25 3* 31 7*   < > 32 3*   PLATELETS Thousands/uL 156 159 183 148*   < > 145*   NEUTROS PCT %  --   --   --  70  --  72   MONOS PCT %  --   --   --  15*  --  17*    < > = values in this interval not displayed  Results from last 7 days   Lab Units 01/15/20  0535 01/14/20  0617 01/13/20  0613   POTASSIUM mmol/L 3 5 3 5 3 6   CHLORIDE mmol/L 104 102 103   CO2 mmol/L 28 27 26   BUN mg/dL 12 10 10   CREATININE mg/dL 0 63 0 58* 0 63   CALCIUM mg/dL 8 2* 8 4 8 3   ALK PHOS U/L 835* 902* 887*   ALT U/L 71 77 79*   AST U/L 79* 82* 88*              Results from last 7 days   Lab Units 01/09/20  2105   INR  1 50*               IMAGING & DIAGNOSTIC TESTING     Radiology Results: I have personally reviewed pertinent reports  Fl Ercp Biliary Only    Result Date: 1/11/2020  Impression: Fluoroscopic guidance for ERCP  Please see procedure report for full details   Workstation performed: [id number]     Other Diagnostic Testing: I have personally reviewed pertinent reports  ACTIVE MEDICATIONS     Current Facility-Administered Medications   Medication Dose Route Frequency    albuterol inhalation solution 2 5 mg  2 5 mg Nebulization Once PRN    baclofen tablet 15 mg  15 mg Oral TID    bisacodyl (DULCOLAX) EC tablet 10 mg  10 mg Oral Daily PRN    diphenhydrAMINE (BENADRYL) injection 12 5 mg  12 5 mg Intravenous Once PRN    gabapentin (NEURONTIN) capsule 300 mg  300 mg Oral BID    HYDROmorphone (DILAUDID) injection 0 5 mg  0 5 mg Intravenous Q10 Min PRN    ipratropium (ATROVENT) 0 02 % inhalation solution 0 5 mg  0 5 mg Nebulization Once PRN    metoprolol succinate (TOPROL-XL) 24 hr tablet 25 mg  25 mg Oral Daily    ondansetron (ZOFRAN) injection 4 mg  4 mg Intravenous Once PRN    oxyCODONE (ROXICODONE) IR tablet 5 mg  5 mg Oral Q4H PRN    polyethylene glycol (MIRALAX) packet 17 g  17 g Oral Daily PRN    sodium chloride 0 9 % infusion  125 mL/hr Intravenous Continuous    tamsulosin (FLOMAX) capsule 0 4 mg  0 4 mg Oral Daily With Dinner       VTE Pharmacologic Prophylaxis: Sequential compression device (Venodyne)   VTE Mechanical Prophylaxis: sequential compression device    Portions of the record may have been created with voice recognition software  Occasional wrong word or "sound a like" substitutions may have occurred due to the inherent limitations of voice recognition software    Read the chart carefully and recognize, using context, where substitutions have occurred   ==  Ila Loza, [facility] [facility]  Internal Medicine Residency PGY-1

## 2020-01-15 NOTE — PROGRESS NOTES
Progress Note - Surgical Oncology   Owen Pozo 66 y o  male MRN: [id number]  [facility] Encounter: [id number]  01/15/20  1:18 PM      Subjective/Objective   No chief complaint on file  Subjective:  No complaints    Objective:      Blood pressure 109/71, pulse 80, temperature 98 1 °F (36 7 °C), resp  rate 20, height 5' 10" (1 778 m), weight 95 3 kg (210 lb 1 6 oz), SpO2 100 %  ,Body mass index is 30 15 kg/m²  Intake/Output Summary (Last 24 hours) at 1/15/2020 1318  Last data filed at 1/15/2020 1302  Gross per 24 hour   Intake 790 ml   Output 1000 ml   Net -210 ml       Invasive Devices     Central Venous Catheter Line            Port A Cath 01/07/20 Right Chest 7 days          Airway            ETT  Cuffed;Oral 8 mm less than 1 day                Physical Exam:   Head and neck: is normocephalic  Neck is supple without adenopathy  Sclerae are anicteric   Mucous membranes are moist   Abdomen: Soft, nontender, nondistended, and with her right upper quadrant fungating skin mass  Extremities: Without cyanosis, clubbing, or edema, symmetric  Neuro: Grossly nonfocal  Skin is warm and icteric  Psych: Patient is pleasant and talkative              Lab Results:  Lab Results   Component Value Date    WBC 6 09 01/15/2020    HGB 9 8 (L) 01/15/2020    HCT 30 3 (L) 01/15/2020     (H) 01/15/2020     01/15/2020     Lab Results   Component Value Date    CALCIUM 8 2 (L) 01/15/2020    K 3 5 01/15/2020    CO2 28 01/15/2020     01/15/2020    BUN 12 01/15/2020    CREATININE 0 63 01/15/2020     No results found for: AFP  Lab Results   Component Value Date    CALCIUM 8 2 (L) 01/15/2020     No results found for: CEA  Total bilirubin is 8 06        Assessment:  68-year-old male with metastatic colon carcinoma fungating skin mass  Hyperbilirubinemia    Plan:  Plan for IR PTC today  Trend bilirubin    Johanne Adame MD  1:18 PM

## 2020-01-16 PROBLEM — Z51.5 PALLIATIVE CARE PATIENT: Status: ACTIVE | Noted: 2020-01-01

## 2020-01-16 NOTE — OCCUPATIONAL THERAPY NOTE
[address]  Evaluation     Patient Name: Praveena Tolentino  JPWRL'[id number] Date: 1/16/2020  Problem List  Principal Problem:    Painless jaundice secondary to parenchymal infiltration of tumor along with biliary compression   Active Problems:    BPH (benign prostatic hyperplasia)    Atrial fibrillation ([facility] )    Colon cancer metastasized to multiple sites [facility])    Hypertension    Trigeminal neuralgia    Chronic hyponatremia    Palliative care patient    Past Medical History  Past Medical History:   Diagnosis Date    [facility]     BPH (benign prostatic hyperplasia)     Cancer ([facility] )     colon- johnny liver & lungs    Hypertension     Stroke ([facility] )     2008- mild weakness left hand/arm    Trigeminal neuralgia     Tumor, [facility]     Tumor, metastatic ([facility] )      Past Surgical History  Past Surgical History:   Procedure Laterality Date    COLON SURGERY      2013    EYE SURGERY      LIVER SURGERY      TONSILLECTOMY             01/16/20 1138   Note Type   Note type Eval only   Restrictions/Precautions   Weight Bearing Precautions Per Order No   Other Precautions Fall Risk;Pain;Multiple lines; Chair Alarm; Bed Alarm   Pain Assessment   Pain Assessment 0-10   Pain Score 3   Pain Type Surgical pain   Pain Location Abdomen   Hospital Pain Intervention(s) Repositioned; Ambulation/increased activity; Emotional support   Response to Interventions Tolerated   Home Living   Type of [address] One level;Stairs to enter with rails; Performs ADLs on one level   Bathroom Shower/Tub Tub/shower unit   Bathroom Toilet Standard   Bathroom Equipment Shower chair   Bathroom Accessibility Accessible   Home Equipment Cane   Additional Comments Pt reports living in a 1 story home with 5 LEO      Prior Function   Level of [place] Independent with ADLs and functional mobility   Lives With Spouse   Receives Help From Family   ADL Assistance Independent   IADLs Independent   Vocational Retired Lifestyle   Autonomy Pt reports being I with ADLS, IADLS and using cane for mobility PTA  (+) drives    Reciprocal Relationships Pt lives with his wife who is able to assist as well supportive family members  Pt reports that he is rarely ever alone  Service to Others Retired   [address] Enjoys spending time with family    Psychosocial   Psychosocial (WDL) WDL   Subjective   Subjective Reports no concernsa bout returning home  ADL   Eating Assistance 7  Independent   Grooming Assistance 5  Supervision/Setup   UB Bathing Assistance 4  Minimal Assistance   LB Bathing Assistance 4  Minimal Assistance   UB Dressing Assistance 4  Minimal Assistance   LB Dressing Assistance 4  Minimal [address]  4  Minimal Assistance   Bed Mobility   Supine to Sit 5  Supervision   Additional items Assist x 1; Increased time required   Additional Comments After OT session pt seated in chair with alarm on and all needs within reach  Transfers   Sit to Stand 5  Supervision   Additional items Assist x 1   Stand to Sit 5  Supervision   Additional items Assist x 1   Functional Mobility   Functional Mobility 5  Supervision   Additional Comments Pt demonstrated household mobility with VC for proper technique with RW  Additional items Rolling walker   Balance   Static Sitting Good   Dynamic Sitting Fair +   Static Standing Fair +   Dynamic Standing Fair   Ambulatory Fair   Activity Tolerance   Activity Tolerance Patient limited by fatigue   Medical Staff Made Aware PT [address]    Nurse Made Aware RN confirmed okay to see pt   Cognition   Overall Cognitive Status [facility]   Arousal/Participation Alert; Cooperative   Attention Attends with cues to redirect   Orientation Level Oriented X4   Memory Within functional limits   Following Commands Follows one step commands without difficulty   Comments Pt is very pleasant and cooperative to work with therapy   Can become tangential at times but is easily redirected  Assessment   Limitation Decreased ADL status; Decreased endurance;Decreased high-level ADLs   Prognosis Fair   Assessment Pt is a 66 y o  male admitted to [facility] on 2020 w/ "painless jaundice secondary to parenchymal infiltration of tumor along with biliary compression " s/p cryo ablation on 1/15/2020  Comorbidities include a h/o BPH, trigeminal neuralgia, colon caner metastasized to multiple sites, hypertension and chrnoic hyponatremia  Pt with active OT orders and up with assistance  orders  Pt resides in a 1 story home with 5 LEO with his wife and supportive family nearby  Pt was I w/  ADLS and IADLS, (+) drove, & required use of SPC PTA  Currently pt is supervision for functional mobility and functional transfers and min A for ADLS  Pt is limited at this time 2*: pain, endurance, activity tolerance, functional mobility, forward functional reach and decreased I w/ ADLS/IADLS  The following Occupational Performance Areas to address include: bathing/shower, toilet hygiene, dressing and functional mobility  Pt scored overall  70/100 on the Barthel Index  Based on the aforementioned OT evaluation, functional performance deficits, and assessments, pt has been identified as a high complexity evaluation  From OT standpoint, anticipate d/c home with family support  Pt to continue to benefit from acute immediate OT services to address the following goals 3-5x/week to  w/in 7-10 days: Iraj Bonilla Goals   Patient Goals To return home    LTG Time Frame 7-10   Long Term Goal #1 See goals below    Plan   Treatment Interventions ADL retraining;Functional transfer training;UE strengthening/ROM; Endurance training;Patient/family training;Equipment evaluation/education; Compensatory technique education;Continued evaluation; Energy conservation; Activityengagement   Goal Expiration Date 20   OT Frequency 3-5x/wk   Recommendation   OT Discharge Recommendation Home with family support   OT - OK to Discharge Yes  (When medically appropriate)   Barthel Index   Feeding 10   Bathing 0   Grooming Score 5   Dressing Score 5   Bladder Score 10   Bowels Score 10   Toilet Use Score 5   Transfers (Bed/Chair) Score 10   Mobility (Level Surface) Score 10   Stairs Score 5   Barthel Index Score 70   Modified [place] Scale   Modified [place] Scale 3     GOALS    1) Pt will increase activity tolerance to G for 30 min txment sessions    2) Pt will complete UB/LB dressing/self care w/ mod I using adaptive device and DME as needed    3) Pt will complete bathing w/ Mod I w/ use of AE and DME as needed    4) Pt will complete toileting w/ mod I w/ G hygiene/thoroughness using DME as needed    5) Pt will improve functional transfers to Mod I on/off all surfaces using DME as needed w/ G balance/safety     6) Pt will improve functional mobility during ADL/IADL/leisure tasks to Mod I using DME as needed w/ G balance/safety     7) Pt will participate in simulated IADL management task with DME as needed to increase independence to  w/ G safety and endurance    8) Pt will demonstrate G carryover of pt/caregiver education and training as appropriate  9) Pt will demonstrate 100% carryover of energy conservation techniques t/o functional I/ADL/leisure tasks w/o cues s/p skilled education    10) Pt will independently identify and utilize 2-3 coping strategies to increase positive affect and promote overall well-being      11) Pt will engage in ongoing cognitive assessment w/ G participation to assist w/ safe d/c planning/recommendations as needed    Gunjan Santana, DAVID, OTR/L

## 2020-01-16 NOTE — SOCIAL WORK
Met with pt, discussed DCP  He would like [facility] for therapy due to weakness fro, hospital stay  Referral was entered for Manuel Burgos [facility] to [facility]  PT would like filled at [facility]    Cost $7 35 and agreeable

## 2020-01-16 NOTE — PHYSICAL THERAPY NOTE
Physical Therapy Evaluation Note     Patient Name: Viviana Rosales    FTKPJ'N Date: 1/16/2020     Problem List  Principal Problem:    Painless jaundice secondary to parenchymal infiltration of tumor along with biliary compression   Active Problems:    BPH (benign prostatic hyperplasia)    Atrial fibrillation ([facility] )    Colon cancer metastasized to multiple sites [facility])    Hypertension    Trigeminal neuralgia    Chronic hyponatremia    Palliative care patient       Past Medical History  Past Medical History:   Diagnosis Date    Atrial fibrillation ([facility] )     BPH (benign prostatic hyperplasia)     Cancer ([facility] )     colon- johnny liver & lungs    Hypertension     Stroke ([facility] )     2008- mild weakness left hand/arm    Trigeminal neuralgia     Tumor, metastatic ([facility] )     Tumor, metastatic ([facility] )         Past Surgical History  Past Surgical History:   Procedure Laterality Date    COLON SURGERY      2013    EYE SURGERY      LIVER SURGERY      TONSILLECTOMY             01/16/20 1137   Note Type   Note type Eval only   Pain Assessment   Pain Assessment 0-10   Pain Score 7   Pain Type Surgical pain   Pain Location Ankle   Pain Orientation Right   Home Living   Type of [address] One level   Additional Comments Pt lives with wife in a house with 5STE and FFSU  Pt was I prior to admission for ADL's and mobility  Pt drives  Prior Function   Level of [place] Independent with ADLs and functional mobility   Lives With Spouse   Receives Help From Family   ADL Assistance Independent   IADLs Independent   Vocational Retired   Restrictions/Precautions   [place] Bearing Precautions Per Order No   Other Precautions Fall Risk;Pain;Multiple lines; Bed Alarm; Chair Alarm   General   Family/Caregiver Present No   Cognition   Overall Cognitive Status WFL   Arousal/Participation Alert   Attention Within functional limits   Orientation Level Oriented X4   Memory Within functional limits   Following Commands Follows all commands and directions without difficulty   RLE Assessment   RLE Assessment WFL   LLE Assessment   LLE Assessment WFL   Coordination   Sensation WFL   Light Touch   RLE Light Touch Impaired   LLE Light Touch Impaired   Transfers   Sit to Stand 5  Supervision   Stand to Sit 5  Supervision   Ambulation/Elevation   Gait pattern Foward flexed; Shuffling   Gait Assistance 5  Supervision   Assistive Device Rolling walker   Distance 25ftx2   Stair Management Assistance 5  Supervision   Stair Management Technique One rail R   Number of Stairs 8   Balance   Static Sitting Good   Dynamic Sitting Good   Static Standing Fair +   Dynamic Standing Fair +   Ambulatory Fair   Endurance Deficit   Endurance Deficit Yes   Activity Tolerance   Activity Tolerance Patient limited by fatigue;Patient limited by pain   Medical Staff Made Aware OT   Nurse Made Aware nurse approved therapy session   Assessment   Prognosis Good   Problem List Decreased strength;Decreased endurance; Impaired balance;Decreased mobility;Pain   Assessment Pt is a 67 yo male admitted to [place] [address] on 1/9/2020 s/p painless jaundice  DX: chronic hyponatremia, hx of trigeminal neuralgia, hypertension, colon CA mets to multiple sites, A trial fibrillation, painless jaundice  Pt had tube placement  On 1/15/2020  Two patient identifiers were used to confirm  Pt lives with wife in a house with 5 LEO  Pt has the option of a [facility]  Pt was I for ADL's and mobility prior with the use of a SPC  Pt's impairments include reduced mobility, limited endurance, risk of falling, reduced sensation in B LE which is chronic  These impairments limit the ability of the patient to perform mobility without increased assistance, return to PLOF and participate in everyday life activities  Pt would benefit from continued skilled therapy while in the hospital to improve overall mobility and work towards a safe d/c   Recommend discharge to home with home PT  At the end of the session the patient was left in seated position with call bell and phone within reach  Chair alarm on  Goals   STG Expiration Date 01/30/20   Short Term Goal #1 STG 1: Pt will perform transfers at a MI level to return to baseline of function  STG 2: Pt will ambulate 300ft with RW/SPC at a MI level to reduce the level of assistance needed upon d/c home  STG 3: Pt will negotiate 5 steps with HR at a MI level to ensure safety with activity  STG 4: Pt will perform bed mobility at a I level to return to PLOF  PT Treatment Day 0   Plan   Treatment/Interventions Functional transfer training;LE strengthening/ROM; Elevations; Endurance training; Therapeutic exercise; Bed mobility;Gait training;Equipment eval/education   PT Frequency Other (Comment)  (3-5xkw)   Recommendation   Recommendation Home PT; Home with family support   PT - OK to Discharge No   Additional Comments would like to trial a SPC   Modified [place] Scale   Modified [place] Scale 4   Barthel Index   Feeding 10   Bathing 0   Grooming Score 5   Dressing Score 5   Bladder Score 10   Bowels Score 10   Toilet Use Score 5   Transfers (Bed/Chair) Score 10   Mobility (Level Surface) Score 0   Stairs Score 5   Barthel Index Score 60   Diana Onofre, Pt, DPT

## 2020-01-16 NOTE — PROGRESS NOTES
Progress note - Palliative and Supportive Care   Tami Lopez 66 y o  male [id number]    Assessment:              - Daphne Gonzalez a 66 y  o  male with metastatic colon cancer who was transferred to [facility] for ERCP and iron drainage of obstructive jaundice secondary to tumor invasion  PSC has been consulted for symptom management, goals of care discussion and pain management  Patient Active Problem List    Diagnosis Date Noted    Colon cancer metastasized to multiple sites [facility]) 01/07/2020     Priority: High    Trigeminal neuralgia 01/08/2020     Priority: Medium    Palliative care patient 01/16/2020     Priority: Low    Chronic hyponatremia 01/08/2020    Low hemoglobin 01/08/2020    Hypertension 01/07/2020    Painless jaundice secondary to parenchymal infiltration of tumor along with biliary compression  01/07/2020    BPH (benign prostatic hyperplasia)     Atrial fibrillation ([facility] )        Plan:  1  Symptom management -   · Pain management regimen:  ? Baclofen 15mg TID  ? Increase Gabapentin to 300mg TID  ? Oxycodone IR 5mg [id number] PRN   ? Required 4 doses in the last 24 hours  ? Total OMEs in last 24 hours: 30mg  ? Prescriptions for medications above have been sent to pharmacy for meds to beds  Patient has been using consistently 4 doses of oxycodone immediate release 5 mg daily and as such, was provided with enough medications to allow for follow-up with palliative care  · Bowel Regimen:  ? Dulcolax and Miralax     2  Goals -   · treatment focused care, continues to wish to pursue chemotherapy or radiation therapy if able as monitoring bilirubin  · Did not have biliary drain placed yesterday evening as there was brisk flow through the biliary tract  · Patient is anticipating discharge home  · May follow up with [facility] as outpatient   Contact information provided in discharge information for [facility] office at [place] [address]  · Prescriptions for pain regimen provided upon discharge  Plan/Recommendations discussed with SOD-A attendning    I can be contacted through [facility] Text for any questions regarding Nate Cortés case  If there are any questions after business hours, you may reach out to the on call provider  Code Status: FULL CODE - Level 1   Decisional apparatus:  Patient is competent on my exam today  If competence is lost, patient's substitute decision maker would default to wife by [place]  Advance Directive / Living Will / POLST:  none    Interval history:       Richard Curling was seen and examined in bed this morning  Patient's family was not present at time of examination  Discussed overnight events with nursing staff  Patient reported that his pain was well controlled with current regimen of oxycodone 5 mg every 4 hours p r n  As well as gabapentin and baclofen  He was ready for discharge to home with follow-up in the outpatient setting for both Oncology as well as ID care for pain management  MEDICATIONS / ALLERGIES:     all current active meds have been reviewed and current meds:   Current Facility-Administered Medications   Medication Dose Route Frequency    baclofen tablet 15 mg  15 mg Oral TID    bisacodyl (DULCOLAX) EC tablet 10 mg  10 mg Oral Daily PRN    gabapentin (NEURONTIN) capsule 300 mg  300 mg Oral BID    metoprolol succinate (TOPROL-XL) 24 hr tablet 25 mg  25 mg Oral Daily    oxyCODONE (ROXICODONE) IR tablet 5 mg  5 mg Oral Q4H PRN    polyethylene glycol (MIRALAX) packet 17 g  17 g Oral Daily PRN    tamsulosin (FLOMAX) capsule 0 4 mg  0 4 mg Oral Daily With Dinner       Allergies   Allergen Reactions    Ib Pro [Ibuprofen] Hives and Itching    Adhesive [Medical Tape] Rash     Use only paper tape       OBJECTIVE:    Physical Exam  Physical Exam   Constitutional: He is oriented to person, place, and time  He appears well-developed and well-nourished  He is active and cooperative  He is easily aroused  Non-toxic appearance   He does not have a sickly appearance  He does not appear ill  No distress  HENT:   Head: Normocephalic and atraumatic  Eyes: Pupils are equal, round, and reactive to light  EOM are normal  Right eye exhibits no discharge  Left eye exhibits no discharge  Scleral icterus is present  Neck: Normal range of motion  No thyromegaly present  Cardiovascular: Normal rate, regular rhythm and intact distal pulses  Pulmonary/Chest: Effort normal and breath sounds normal  No respiratory distress  He exhibits no tenderness  Abdominal: Soft  Bowel sounds are normal  He exhibits no distension  There is no tenderness  Musculoskeletal: Normal range of motion  He exhibits no edema, tenderness or deformity  Neurological: He is alert, oriented to person, place, and time and easily aroused  Skin: Skin is warm and dry  No rash noted  He is not diaphoretic  No erythema  No pallor  Generalized jaundice   Psychiatric: He has a normal mood and affect  His behavior is normal  Judgment and thought content normal    Vitals reviewed  Lab Results: I have personally reviewed pertinent labs  Results from last 7 days   Lab Units 01/15/20  0535 01/14/20  0617 01/13/20  0613   POTASSIUM mmol/L 3 5 3 5 3 6   CHLORIDE mmol/L 104 102 103   CO2 mmol/L 28 27 26   BUN mg/dL 12 10 10   CREATININE mg/dL 0 63 0 58* 0 63   CALCIUM mg/dL 8 2* 8 4 8 3   ALK PHOS U/L 835* 902* 887*   ALT U/L 71 77 79*   AST U/L 79* 82* 88*      Imaging Studies: Fl Ercp Biliary Only    Result Date: 1/11/2020  Impression: Fluoroscopic guidance for ERCP  Please see procedure report for full details   Workstation performed: [id number]      I have personally reviewed imaging reports    MD Michaela M[address] and Supportive Care Fellow  [phone]

## 2020-01-16 NOTE — PLAN OF CARE
Problem: OCCUPATIONAL THERAPY ADULT  Goal: Performs self-care activities at highest level of function for planned discharge setting  See evaluation for individualized goals  Description  Treatment Interventions: ADL retraining, Functional transfer training, UE strengthening/ROM, Endurance training, Patient/family training, Equipment evaluation/education, Compensatory technique education, Continued evaluation, Energy conservation, Activityengagement          See flowsheet documentation for full assessment, interventions and recommendations  Note:   Limitation: Decreased ADL status, Decreased endurance, Decreased high-level ADLs  Prognosis: Fair  Assessment: Pt is a 66 y o  male admitted to [facility] on 2020 w/ "painless jaundice secondary to parenchymal infiltration of tumor along with biliary compression " s/p cryo ablation on 1/15/2020  Comorbidities include a h/o BPH, trigeminal neuralgia, colon caner metastasized to multiple sites, hypertension and chrnoic hyponatremia  Pt with active OT orders and up with assistance  orders  Pt resides in a 1 story home with 5 LEO with his wife and supportive family nearby  Pt was I w/  ADLS and IADLS, (+) drove, & required use of SPC PTA  Currently pt is supervision for functional mobility and functional transfers and min A for ADLS  Pt is limited at this time 2*: pain, endurance, activity tolerance, functional mobility, forward functional reach and decreased I w/ ADLS/IADLS  The following Occupational Performance Areas to address include: bathing/shower, toilet hygiene, dressing and functional mobility  Pt scored overall  70/100 on the Barthel Index  Based on the aforementioned OT evaluation, functional performance deficits, and assessments, pt has been identified as a high complexity evaluation  From OT standpoint, anticipate d/c home with family support   Pt to continue to benefit from acute immediate OT services to address the following goals 3-5x/week to  w/in 7-10 days:        OT Discharge Recommendation: Home with family support  OT - OK to Discharge: Yes(When medically appropriate)

## 2020-01-16 NOTE — DISCHARGE SUMMARY
INTERNAL MEDICINE RESIDENCY DISCHARGE SUMMARY     Jyotsna Pablo   66 y o  male  MRN: [id number]  Room/Bed: [facility] 909/[facility] [address]    Encounter: [id number]    Principal Problem:    Painless jaundice secondary to parenchymal infiltration of tumor along with biliary compression   Active Problems:    BPH (benign prostatic hyperplasia)    Atrial fibrillation ([address] )    Colon cancer metastasized to multiple sites [facility])    Hypertension    History of Trigeminal neuralgia    Chronic hyponatremia      Chronic hyponatremia  Assessment & Plan   Na-134, in the setting of cancer   - asymptomatic continue monitor with a m  CMP     History of Trigeminal neuralgia  Assessment & Plan  Diagnosed more than 10 years ago  Currently denying any symptoms  Holding home dose Tegretol secondary to adverse effects of elevated LFTs, states o2 therapy helps, which he had for a few hours today  ·          Continue Gabapentin 200 mg b i d     Hypertension  Assessment & Plan  Last blood pressures have been -120  - continue metoprolol succinate, hold parameters reviewed      Colon cancer metastasized to multiple sites [facility])  Assessment & Plan  With mets to liver, lungs, and percutaneous abdominal lesion   Status post multiple trials of chemotherapy and radiation therapy  Most recent radiation 11/19    - Drain placement unsuccessful    - palliative care following for pain management  Recs:  Gabapentin, baclofen, oxycodone, scripts given   - GI, Oncology, palliative following, appreciate recommendations   -detected radiation oncology to re-evaluate patient, had discussion with daughter and patient stating that radiation would not be an option at this    - Considering stent was unsuccessful, patient is medically stable, will considering discharging today with palliative and oncology follow up       Atrial fibrillation (HCC)  Assessment & Plan  ·          Home regimen: Lovenox 100 mg every 12 hours, as recommended by his oncologist, metoprolol succinate 50 mg daily  ·          Continue lovenox  ·          Currently rate controlled      * Painless jaundice secondary to parenchymal infiltration of tumor along with biliary compression   Assessment & Plan  -  ERCP and stent unsuccessful 01/10/2020  - Evaluated by IR dionicio  - Cheli currently 7 13 downtrending  - Drain placement unsuccessful   - oncology, GI, palliative, IR following   - Patient is otherwise medically stable, considering discharge today      Abdominal Wound  Had fungating mass, chronic wound care   Patient can continue with current wound care, xeroform gauze provided  Patient stated he could take care of wound by himself as he was doing for past year and a half  [address] COURSE     38-year-old male with a past medical history of BPH, AFib on Lovenox, trigeminal neuralgia, colon cancer with metastasis to the lung, liver abdominal wall status post resection, chemoradiation therapy with recent percutaneous biliary stent placement in November of 2019 presented to [facility] as a transfer for endoscopic evaluation, possible ERCP and IR intervention  He was originally admitted to [facility] due to worsening painless jaundice, had undergone multiple trials of chemotherapy and received radiation therapy at [facility]  Last radiation was in November 2019  Found to have 2 liver lesions on most recent MRI scan, 1 of which was impinging on biliary duct  He was otherwise asymptomatic and remained hemodynamically stable  Was evaluated by Surgical Oncology, Medical Oncology, and Radiation Oncology while admitted  Was deemed to have limited options due to progression of cancer  Radiation oncologist stated that external beam radiation to the liver would unlikely provide benefit for patient and that bead therapy would only be considered if bilirubin was less than 3    Was evaluated by both gastroenterology and Interventional Radiology all admitted  He underwent an ERCP with unsuccessful stent placement  Was evaluated by Interventional Radiology, who attempted to place a biliary drain, but was unsuccessful  Palliative Care was also consulted and provided patient's pain medications  His bilirubin continue to trend downward last taken was 7 13, he remained hemodynamically stable  Had extensive discussion with family with regards to radiation and chemo therapy, patient will have follow up with Dr Berenice Chung with Hematology-Oncology 01/22/2020, as well as cryoablation therapy with IR on 01/21/2020  Patient family has stated that he had been taking care of abdominal wound by himself for 1 and half years, does not require VNA services  Seen by OT and PT who recommend discharge home with family support  Stable for discharge  DISCHARGE INFORMATION     PCP at Discharge: None, Infolink provided    Admitting Provider: Chastity Galindo MD  Admission Date: 1/9/2020    Discharge Provider: Jania Fernandez MD  Discharge Date: 1/16/2020    [facility]  Discharge Condition: stable  Discharge with Lines: no    Discharge Diet: regular diet  Activity Restrictions: none  Test Results Pending at Discharge: none    Discharge Diagnoses:  Principal Problem:    Painless jaundice secondary to parenchymal infiltration of tumor along with biliary compression   Active Problems:    BPH (benign prostatic hyperplasia)    Atrial fibrillation ([facility] [address] )    Colon cancer metastasized to multiple sites [facility])    Hypertension    History of Trigeminal neuralgia    Chronic hyponatremia  Resolved Problems:    * No resolved hospital problems  *      Consulting Providers:  Guerline Zazueta MD Gastroenterology  Rasheeda Sarkar MD Rad Onc  Shanon Baca MD Med Onc  Aubrie Dunn MD Surg Onc    Diagnostic & Therapeutic Procedures Performed:   Fl Ercp Biliary Only    Result Date: 1/11/2020  Impression: Fluoroscopic guidance for ERCP  Please see procedure report for full details  Workstation performed: [id number]       Code Status: Level 1 - Full Code  Advance Directive & Living Will: Received  Power of :    POLST:      Medications: Your Medications     STOP taking these medications     STOP taking these medications    carBAMazepine 300 MG 12 hr capsule   Commonly known as: CARBATROL     carBAMazepine 400 mg 12 hr tablet   Commonly known as: TEGretol XR     oxyCODONE 5 MG capsule   Commonly known as: OXY-IR   Replaced by: oxyCODONE 5 mg immediate release tablet    TAKE these medications     TAKE these medications     Morning Afternoon Evening Bedtime As Needed    Baclofen 5 MG Tabs   For: Trigeminal Nerve Pain   Take 15 mg by mouth 3 (three) times a day   Refills: 0  XXX XXX XXX      enoxaparin 100 mg/mL   Commonly known as: LOVENOX   Inject 100 mg under the skin every 12 (twelve) hours   Refills: 0  XXX  XXX      gabapentin 300 mg capsule   Commonly known as: NEURONTIN   Take 1 capsule (300 mg total) by mouth 3 (three) times a day   Refills: 0  XXX XXX XXX      metoprolol succinate 50 mg 24 hr tablet   Commonly known as: TOPROL-XL   Take 1 tablet (50 mg total) by mouth daily   Refills: 0          oxyCODONE 5 mg immediate release tablet   Commonly known as: ROXICODONE   Take 1 tablet (5 mg total) by mouth every 4 (four) hours as needed for moderate pain or severe pain for up to 10 daysMax Daily Amount: 30 mg   Refills: 0   Replaces: oxyCODONE 5 MG capsule      XXX    tamsulosin 0 4 mg   Commonly known as: FLOMAX   Take 0 4 mg by mouth daily with dinner   Refills: 0    XXX      XEROFORM PETROLAT PATCH 4"X4" Pads   Apply 1 each topically daily for 7 days   Refills: 1      XXX         Allergies:   Allergies   Allergen Reactions    Ib Pro [Ibuprofen] Hives and Itching    Adhesive [Medical Tape] Rash     Use only paper tape     Vitals:    01/16/20 1549   BP: 102/70   Pulse:    Resp: 18   Temp: 98 2 °F (36 8 °C)   SpO2:      Physical Exam:   GENERAL: NAD, visibly jaundiced  HEENT:  NC/AT, PERRL, EOMI, MMM,  scleral icterus noted  CARDIAC:  RRR, +S1/S2, no S3/S4 heard, no m/g/r  PULMONARY:  CTA B/L, no wheezing/rales/rhonci, non-labored breathing  ABDOMEN:  Soft, NT/ND, +BS, no rebound/guarding/rigidity  Extremities:  2+ Pulses in DP/PT  No edema, cyanosis, or clubbing  NEUROLOGIC:  Alert/oriented x3  No motor or sensory deficits  SKIN:  Jaundiced, abdominal wound appear wet with serous drainage, no bleeding  FOLLOW-UP     PCP Outpatient Follow-up:  Follow up with a PCP in 1-2 weeks    Consulting Providers Follow-up:  Follow up with Dr Fredrick Yoo on 1/22/2020  Follow up with Dr Drew Velázquez for cryoablation therapy on 1/21/2020  Follow up with palliative care as needed    Active Issues Requiring Follow-up:   none    Discharge Statement:   I spent 30 minutes minutes discharging the patient  This time was spent on the day of discharge  I had direct contact with the patient on the day of discharge  Additional documentation is required if more than 30 minutes were spent on discharge  Portions of the record may have been created with voice recognition software  Occasional wrong word or "sound a like" substitutions may have occurred due to the inherent limitations of voice recognition software    Read the chart carefully and recognize, using context, where substitutions have occurred     ==  Carla Myles, [facility]  Internal Medicine Resident PGY-1

## 2020-01-16 NOTE — PROGRESS NOTES
Progress Note - Surgical Oncology   Gunjan Patel 66 y o  male MRN: [id number]  [facility] Encounter: [id number]  01/16/20  4:34 PM      Subjective/Objective   No chief complaint on file  Subjective:  No complaints    Objective:  PTC not perform since there was good flow of bile  Blood pressure 102/70, pulse 77, temperature 98 2 °F (36 8 °C), resp  rate 18, height 5' 10" (1 778 m), weight 96 1 kg (211 lb 13 8 oz), SpO2 100 %  ,Body mass index is 30 4 kg/m²  Intake/Output Summary (Last 24 hours) at 1/16/2020 1634  Last data filed at 1/16/2020 1413  Gross per 24 hour   Intake 1360 ml   Output 500 ml   Net 860 ml       Invasive Devices     Central Venous Catheter Line            Port A Cath 01/07/20 Right Chest 8 days                Physical Exam:   Head and neck: is normocephalic  Neck is supple without adenopathy  Sclerae are anicteric  Mucous membranes are moist  No JVD  Abdomen: Soft, nontender, nondistended, and with fungating right upper quadrant mass  Extremities: Without cyanosis, clubbing, or edema, symmetric  Neuro: Grossly nonfocal  Skin is warm and icteric  Psych: Patient is pleasant and talkative              Lab Results:  Lab Results   Component Value Date    WBC 6 97 01/16/2020    HGB 10 2 (L) 01/16/2020    HCT 31 7 (L) 01/16/2020     (H) 01/16/2020     01/16/2020     Lab Results   Component Value Date    CALCIUM 8 5 01/16/2020    K 3 6 01/16/2020    CO2 25 01/16/2020     01/16/2020    BUN 13 01/16/2020    CREATININE 0 95 01/16/2020     No results found for: AFP  Lab Results   Component Value Date    CALCIUM 8 5 01/16/2020     No results found for: CEA  Bilirubin is 7        Assessment:  28-year-old male with metastatic colon cancer  PTC not placed    Plan:  Trend bilirubin  If it is able to go below 3, would restart chemotherapy  Surgical intervention would be possible for the right upper quadrant mass, but would likely require rib resection      Rafaela Taylor Jennifer Schaefer MD  4:34 PM

## 2020-01-16 NOTE — RESTORATIVE TECHNICIAN NOTE
Restorative Specialist Mobility Note       Activity: Ambulate in daniel, Ambulate in room, Bathroom privileges, Chair, Dangle, Stand at bedside(Educated/encouraged pt to ambulate with assistsance 3-4 x's/day  Bed alarm on   Pt callbell, phone/tray within reach )     Assistive Device: Other (Comment), Cane(HHA x1)       Vasquez URIAS, Restorative Technician,

## 2020-01-16 NOTE — DISCHARGE INSTRUCTIONS
Follow up with Dr Grace Liu who is your new oncologist  Follow up with a  PCP 1-2 weeks  Follow up with Palliative Care    Wound Care:  1) apply xeroform gauze  2) apply abd pad  3) apply paper tape  4) change daily as needed    Interventional Radiology  - We have scheduled you for a cryoablation with Dr Deborah Nichols on 1/21/20 at [address] will receive a phone call from IR prior to your procedure to discuss pre-procedure instructions  - Please hold your home Lovenox for 2 doses prior to your procedure  Do not inject lovenox day of your procedure  Palliative and Supportive Care  -continue taking baclofen 15 mg 3 times daily (morning, afternoon, evening) as well as gabapentin 300 mg 3 times daily (morning, afternoon, evening)  -you have been provided with prescriptions for oxycodone immediate release 5 mg every 4 hours as needed for pain control  Please keep a record of the amount of times daily you need to take your oxycodone for pain control for review with your palliative care doctor at your next visit   -many of the medications that you are taking for pain control increase the risk of constipation, as such, he is recommend appropriate hydration as well as physical activity and use of stimulant laxatives (senna or Miralax) if you have not had a bowel movement for greater than 2 days   -if you have not received a phone call from the palliative care office within 2 days of discharge for scheduling an appointment for follow-up please reach out to our office to schedule a follow-up appointment in your area  Colorectal Cancer   WHAT YOU SHOULD KNOW:   Colorectal cancer starts in the large intestine (colon) or rectum  INSTRUCTIONS:   Medicine:   · Antinausea medicine: This medicine may be given to calm your stomach and prevent vomiting  · Pain medicine: You may be given a prescription medicine to decrease pain  Do not wait until the pain is severe before you take this medicine      · Take your medicine as directed  Call your healthcare provider if you think your medicine is not helping or if you have side effects  Tell him if you are allergic to any medicine  Keep a list of the medicines, vitamins, and herbs you take  Include the amounts, and when and why you take them  Bring the list or the pill bottles to follow-up visits  Carry your medicine list with you in case of an emergency  Follow up with your oncologist as directed:  Write down your questions so you remember to ask them during your visits  Self-care:   · Drink liquids as directed:  Ask how much liquid to drink each day and which liquids are best for you  If you have nausea or diarrhea from cancer treatment, extra liquids may help decrease your risk of dehydration  · Eat healthy foods:  Healthy foods include fruits, vegetables, whole-grain breads, low-fat dairy products, beans, lean meats, and fish  This may help you feel better during treatment and decrease side effects  You may need to change what you eat during treatment  Do not eat foods or drink liquids that cause gas, such as cabbage, beans, onions, or soda  A nutritionist may help to plan the best meals and snacks for you  Contact your oncologist if:   · You have a fever  · You cannot control your diarrhea or constipation  · You vomit multiple times and cannot keep any food or liquids down  · Your pain is worse or does not go away after you take your pain medicine  · You see blood in your bowel movements  · You have questions or concerns about your condition or care  Return to the emergency department if:   · Your arm or leg feels warm, tender, and painful  It may look swollen and red  · You suddenly feel lightheaded and short of breath  · You have chest pain when you take a deep breath or cough  You may cough up blood  © 2014 [address] is for End User's use only and may not be sold, redistributed or otherwise used for commercial purposes  All illustrations and images included in CareNotes® are the copyrighted property of A D A M , Inc  or Tom Sylvester  The above information is an  only  It is not intended as medical advice for individual conditions or treatments  Talk to your doctor, nurse or pharmacist before following any medical regimen to see if it is safe and effective for you

## 2020-01-16 NOTE — PLAN OF CARE
Problem: PHYSICAL THERAPY ADULT  Goal: Performs mobility at highest level of function for planned discharge setting  See evaluation for individualized goals  Note:   Prognosis: Good  Problem List: Decreased strength, Decreased endurance, Impaired balance, Decreased mobility, Pain  Assessment: Pt is a 67 yo male admitted to [place] [address] on 1/9/2020 s/p painless jaundice  DX: chronic hyponatremia, hx of trigeminal neuralgia, hypertension, colon CA mets to multiple sites, A trial fibrillation, painless jaundice  Pt had tube placement  On 1/15/2020  Two patient identifiers were used to confirm  Pt lives with wife in a house with 5 LEO  Pt has the option of a [facility]  Pt was I for ADL's and mobility prior with the use of a SPC  Pt's impairments include reduced mobility, limited endurance, risk of falling, reduced sensation in B LE which is chronic  These impairments limit the ability of the patient to perform mobility without increased assistance, return to PLOF and participate in everyday life activities  Pt would benefit from continued skilled therapy while in the hospital to improve overall mobility and work towards a safe d/c  Recommend discharge to home with home PT  At the end of the session the patient was left in seated position with call bell and phone within reach  Chair alarm on  Recommendation: Home PT, Home with family support     PT - OK to Discharge: No    See flowsheet documentation for full assessment

## 2020-01-21 NOTE — ANESTHESIA POSTPROCEDURE EVALUATION
Post-Op Assessment Note    CV Status:  Stable    Pain management: adequate     Mental Status:  Sleepy   Hydration Status:  Stable   PONV Controlled:  Controlled   Airway Patency:  Patent   Post Op Vitals Reviewed: Yes      Staff: Anesthesiologist, CRNA           BP   102/66   Temp   96 8   Pulse  71   Resp   18   SpO2   100

## 2020-01-21 NOTE — DISCHARGE INSTRUCTIONS
Tumor Ablation   WHAT YOU NEED TO KNOW:    tumor ablation is a procedure to destroy cancer cells  Your healthcare provider may use cold temperatures, radio waves, ultrasound, or medicine to directly treat  tumors  DISCHARGE INSTRUCTIONS:   Medicines:   · Medicines  may be given to help decrease pain after your procedure  · Take your medicine as directed  Contact your healthcare provider if you think your medicine is not helping or if you have side effects  Tell him or her if you are allergic to any medicine  Keep a list of the medicines, vitamins, and herbs you take  Include the amounts, and when and why you take them  Bring the list or the pill bottles to follow-up visits  Carry your medicine list with you in case of an emergency  Follow up with your healthcare provider as directed:  Write down your questions so you remember to ask them during your visits  Rest as needed:  Slowly start to do more each day  Return to your daily activities as directed by your healthcare provider  Contact your healthcare provider if:   · You have a fever  · You have questions or concerns about your condition or care  Seek care immediately or call 911 if:   · You have chest pain or sudden trouble breathing  · You have pain or bleeding where the procedure was done  © 2017 [address] [facility] Information is for End User's use only and may not be sold, redistributed or otherwise used for commercial purposes  All illustrations and images included in CareNotes® are the copyrighted property of A [employer]  or Tom Sylvester  The above information is an  only  It is not intended as medical advice for individual conditions or treatments  Talk to your doctor, nurse or pharmacist before following any medical regimen to see if it is safe and effective for you

## 2020-01-21 NOTE — ANESTHESIA PREPROCEDURE EVALUATION
Review of Systems/Medical History  Patient summary reviewed  Chart reviewed  No history of anesthetic complications     Cardiovascular  Exercise tolerance (METS): >4,  Hypertension , Dysrhythmias , atrial fibrillation,    Pulmonary  Smoker ex-smoker  ,   Comment: Ca mets to lungs     GI/Hepatic      Comment: Gastrointestinal ca, mets to liver; jaundice     Prostatic disorder, benign prostatic hyperplasia       Endo/Other    Obesity    GYN  Negative gynecology ROS          Hematology  Anemia ,     Musculoskeletal  Negative musculoskeletal ROS        Neurology    CVA , residual symptoms,   Comment: Trigeminal neuralgia; CVA 2008 residual sx, left sided weakness Psychology     Chronic opioid dependence Chronic pain,            Physical Exam    Airway    Mallampati score: II  TM Distance: >3 FB  Neck ROM: full     Dental       Cardiovascular  Rhythm: irregular,     Pulmonary  Pulmonary exam normal     Other Findings        Anesthesia Plan  ASA Score- 3     Anesthesia Type- general with ASA Monitors  Additional Monitors:   Airway Plan: ETT  Plan Factors-    Induction- intravenous  Postoperative Plan- Plan for postoperative opioid use  Planned trial extubation    Informed Consent- Anesthetic plan and risks discussed with patient  I personally reviewed this patient with the CRNA  Discussed and agreed on the Anesthesia Plan with the CRNA             Lab Results   Component Value Date    WBC 6 97 01/16/2020    HGB 10 2 (L) 01/16/2020    HCT 31 7 (L) 01/16/2020     (H) 01/16/2020     01/16/2020     Lab Results   Component Value Date    CALCIUM 8 5 01/16/2020    K 3 6 01/16/2020    CO2 25 01/16/2020     01/16/2020    BUN 13 01/16/2020    CREATININE 0 95 01/16/2020     Lab Results   Component Value Date    INR 1 50 (H) 01/09/2020    INR 1 25 (H) 01/08/2020    INR 1 32 (H) 01/07/2020    PROTIME 17 7 (H) 01/09/2020    PROTIME 15 0 (H) 01/08/2020    PROTIME 15 7 (H) 01/07/2020     Lab Results Component Value Date    PTT 53 (H) 01/07/2020

## 2020-01-21 NOTE — BRIEF OP NOTE (RAD/CATH)
CT CRYO ABLATION (INCLUDES IMAGING) Procedure Note    PATIENT NAME: Karly Liu  : 1945  MRN: [id number]    Pre-op Diagnosis: No diagnosis found  Post-op Diagnosis: No diagnosis found  Surgeon:   Bella Weston MD  Assistants:     No qualified resident was available, Resident is only observing    Estimated Blood Loss: minimal     Findings:     Right rib and soft tissue lesion cryoablation  Hydro dissection also performed  Excellent imaging appearance, anticipate good result  Goal is palliation of symptoms  Specimens: none    Complications:  none    Anesthesia: local and general anesthesia       Bella Weston MD     Date: 2020  Time: 2:09 PM

## 2020-01-22 NOTE — PROGRESS NOTES
Oncology Consult Note  Rebecca Lam 66 y o  male MRN: [id number]  Unit/Bed#:  Encounter: [id number]      Presenting Complaint:  Metastatic colon cancer with KRAS mutation G12V    History of Presenting Illness:     The patient is 56-year-old  male who was diagnosed initially with stage III sigmoid adenocarcinoma, in 2013 status post sigmoid colectomy, K-jacobo mutation G12V, microsatellite stable, status post adjuvant systematic chemotherapy with FOLFOX    In November 2014 he was found to have hepatic metastases, status post 5 cycles of FOLFIRI and Avastin completed on 02/16/2015 with decrease in the size in the liver lesions, status post radiofrequency ablation on 04/2015 of the liver metastases at segment 4 8, and 7 and resection of segment 6, 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    He received few cycles of FOLFIRI and Avastin thereafter  March 2017 palliative radiation therapy to the painful right upper abdominal wall metastasis 30 Gy in 10 sessions at 6 month later on this subcutaneous masses start to grow and protrude through the skin he was treated with FOLFIRI and Avastin until December 2018    On 06/2019 CT scan of the chest showed progressing in the size of the pulmonary metastases with left hilar metastatic disease, enlarging hepatic metastases progressing in the size of the subcutaneous and muscular metastatic disease    Angiogram on 06/20/2019 showed metastatic lesion in the right anterolateral chest with no arterial supply to allow for embolization    Treated with capecitabine 2 weeks on 1 week off however with increase in the size of the mass in the right upper quadrant    CT scan on 10/2019 showed a new 2 cm pleural based right lower lobe mass, 3 relatively stable spiculated mass in the right upper lobe measuring 2 cm, left upper lobe measuring 3 5 cm with internal calcification, pleural based mass in the left base measuring 4 cm and increased soft tissue mass in the anterior right abdominal wall measuring 4 x 5 cm, 2 cm lower pole right kidney cyst    All of these were done at [facility]  He was admitted with obstructive jaundice, he had biliary stent for 3 months, could not be exchanged, admitted to the hospital in 2020 with obstructive jaundice bilirubin of 20, however manipulation of the metal biliary stent drained bile  PTC was aborted at this time    He is here with his wife, daughter for 3rd opinion    Denies any headache blurred vision nausea vomiting abdominal pain he reported dark urine however it is lighting denies any fever or chills    Review of Systems - As stated in the HPI otherwise the fourteen point review of systems was negative      Past Medical History:   Diagnosis Date    Atrial fibrillation ([facility] [address] [facility]     BPH (benign prostatic hyperplasia)     Cancer ([facility] [address] )     colon- johnny liver & lungs    Hypertension     Stroke ([facility] [address] )     - mild weakness left hand/arm    Trigeminal neuralgia     Tumor, metastatic ([facility] [address] )     Tumor, metastatic ([facility] [address] )        Social History     Socioeconomic History    Marital status: /Civil Union     Spouse name: None    Number of children: None    Years of education: None    Highest education level: None   Occupational History    None   Social Needs    Financial resource strain: None    Food insecurity:     Worry: None     Inability: None    Transportation needs:     Medical: None     Non-medical: None   Tobacco Use    Smoking status: Former Smoker     Types: Cigarettes     Last attempt to quit: 1981     Years since quittin 0    Smokeless tobacco: Never Used   Substance and Sexual Activity    Alcohol use: Never     Frequency: Never    Drug use: Never    Sexual activity: None   Lifestyle    Physical activity:     Days per week: None     Minutes per session: None    Stress: None   Relationships    Social connections:     Talks on phone: None     Gets together: None     Attends Judaism service: None     Active member of club or organization: None     Attends meetings of clubs or organizations: None     Relationship status: None    Intimate partner violence:     Fear of current or ex partner: None     Emotionally abused: None     Physically abused: None     Forced sexual activity: None   Other Topics Concern    None   Social History Narrative    None       Family History   Problem Relation Age of Onset    Cancer Father     Colon cancer Father     Cancer Brother     Cancer Paternal Grandfather        Allergies   Allergen Reactions    Ib Pro [Ibuprofen] Hives and Itching    Adhesive [Medical Tape] Rash     Use only paper tape         Current Outpatient Medications:     baclofen 5 MG TABS, Take 15 mg by mouth 3 (three) times a day, Disp: 45 tablet, Rfl: 0    Bismuth Tribromoph-Petrolatum (XEROFORM PETROLAT PATCH 4"X4") PADS, Apply 1 each topically daily for 7 days, Disp: 7 each, Rfl: 1    enoxaparin (LOVENOX) 100 mg/mL, Inject 100 mg under the skin every 12 (twelve) hours , Disp: , Rfl:     gabapentin (NEURONTIN) 300 mg capsule, Take 1 capsule (300 mg total) by mouth 3 (three) times a day, Disp: 45 capsule, Rfl: 0    metoprolol succinate (TOPROL-XL) 50 mg 24 hr tablet, Take 1 tablet (50 mg total) by mouth daily, Disp: 20 tablet, Rfl: 0    oxyCODONE (ROXICODONE) 5 mg immediate release tablet, Take 1 tablet (5 mg total) by mouth every 4 (four) hours as needed for moderate pain for up to 20 dosesMax Daily Amount: 30 mg, Disp: 20 tablet, Rfl: 0    tamsulosin (FLOMAX) 0 4 mg, Take 0 4 mg by mouth daily with dinner, Disp: , Rfl:   No current facility-administered medications for this visit         BP 98/62 (BP Location: Right arm, Patient Position: Sitting, Cuff Size: Standard)   Pulse 76   Temp 98 3 °F (36 8 °C)   Resp 14   Ht 5' 10" (1 778 m)   Wt 105 kg (232 lb)   BMI 33 29 kg/m²       General Appearance:    Alert, oriented        Eyes:    PERRL positive icterus   Ears:    Normal external ear canals, both ears   Nose:   Nares normal, septum midline   Throat:   Mucosa moist  Pharynx without injection  Neck:   Supple       Lungs:     Clear to auscultation bilaterally   Chest Wall:    No tenderness or deformity    Heart:    Regular rate and rhythm       Abdomen:     Soft, non-tender, bowel sounds +, no organomegaly large flattened mass on the right upper quadrant measuring 5 x 5 cm with satellite lesion consistent with skin metastasis           Extremities:   Extremities no cyanosis +3 edema bilaterally       Skin:   no rash ,+icterus  Lymph nodes:   Cervical, supraclavicular, and axillary nodes normal   Neurologic:   CNII-XII intact, normal strength, sensation and reflexes     Throughout               Recent Results (from the past 48 hour(s))   Bilirubin, direct    Collection Time: 01/21/20  8:20 AM   Result Value Ref Range    Bilirubin, Direct 4 44 (H) 0 00 - 0 20 mg/dL         Mri Abdomen W Wo Contrast And Mrcp    Result Date: 1/7/2020  Narrative: MRI OF THE ABDOMEN WITH AND WITHOUT CONTRAST WITH MRCP INDICATION:  Metastatic colon carcinoma with worsening jaundice  COMPARISON: CT abdomen pelvis 12/27/2019, 11/8/2019  TECHNIQUE:  The following pulse sequences were obtained:  Coronal and axial T2 with TE of 90 and 180 respectively, axial T2 with fat saturation, axial FIESTA fat-sat, axial T1-weighted in-and-out-of phase, axial DWI/ADC, pre-contrast axial T1 with fat saturation, post-contrast dynamic axial T1 with fat saturation at 20, 70, and 180 seconds, followed by coronal and 7 minute delayed axial T1 with fat saturation  3D MRCP images were obtained with radial thick slabs and projections  3D rendering was performed from the acquisition scanner  IV Contrast:  9 mL of gadobutrol injection (MULTI-DOSE) FINDINGS: LOWER CHEST:   Enhancing solid metastatic mass lesions at the bilateral lung bases again noted, the largest in the lingula measuring 5 9 x 5 5 cm    In the left lower lobe, a pleural-based mass measures 4 1 x 2 7 cm  Finally, in the right lower lobe, a paramediastinal mass measures 3 1 x 2 0 cm  A 4th lesion in the anterior segment right upper lobe measures 2 0 x 1 7 cm, only seen on the  images  The lesions are also stable since the most recent CT of 12/27/2019  No pleural effusions  LIVER:   A nonenhancing, hemorrhagic subcapsular lesion in the posterior right hepatic lobe (14/287) measures 2 6 x 2 6 cm with overlying retraction of the capsule, suspected treated metastasis  An infiltrating mass in the anterior segment right lobe adjacent to the gallbladder fossa measures at least 5 5 x 3 7 x 4 7 cm causing invasion of the posterior gallbladder wall as suspected on the recent CT  The mass appears to extend to the bifurcation of the intrahepatic bile ducts evoking both intrahepatic biliary ductal dilatation of both the right side and left-sided ducts  Finally, there are surgical changes of the inferior subcapsular right hepatic lobe possible side of tumor resection  The hepatic veins and portal veins are patent  BILE DUCTS:  As described on the recent CT, a biliary stent is located in the common hepatic duct extending to the level of the right-sided intrahepatic ducts  There is persistent intrahepatic biliary ductal dilatation of both the right and left-sided ducts despite stent placement unchanged since the abdominal CT  No definite enhancing tumor within the stent identified  GALLBLADDER:  As described above, there is tumor invasion of the posterior gallbladder wall best seen on series 14 image 191, with retained bile in the gallbladder fundus  PANCREAS:  Normal  No main pancreatic ductal dilation  ADRENAL GLANDS:  Normal  SPLEEN:  Normal  KIDNEYS/PROXIMAL URETERS:  No hydroureteronephrosis  No suspicious renal mass  Small right lower pole cyst noted  BOWEL:   No dilated loops of bowel  PERITONEUM/RETROPERITONEUM:  No ascites  LYMPH NODES:  No abdominal lymphadenopathy  VASCULAR STRUCTURES:  No aneurysm  ABDOMINAL WALL:  A heterogeneous enhancing right anterolateral abdominal wall mass (12/80) measures at least 4 9 x 4 5 cm extending to the overlying skin surface as described on recent CT  OSSEOUS STRUCTURES:  No suspicious osseous lesion  Impression: 1  Metastatic colon carcinoma with stable metastases at the lung bases, right anterolateral abdominal wall, and liver as described above in detail  The infiltrating lesion in the right lobe adjacent to the gallbladder fossa causes invasion of the gallbladder wall with secondary intrahepatic biliary ductal dilatation of both the right and left-sided ducts  2   Indwelling biliary stent extending from the common hepatic duct to the right-sided bile ducts  No intraluminal enhancement to suggest tumor infiltration  I personally discussed this study with Clemente Calle on 1/7/2020 at 9:07 AM  Workstation performed: [id number]     Fl Ercp Biliary Only    Result Date: 1/11/2020  Narrative: ERCP INDICATION:  History of painless jaundice  History of metastatic colon carcinoma  Biliary stent  COMPARISON:  Several prior studies, most recent of which is a contrast-enhanced MRI abdomen/MRCP January 6, 2020 and CT abdomen pelvis December 27, 2019  IMAGES:  14 FLUOROSCOPY TIME:   12 40 MINUTES CONTRAST: 50 mL of iohexol (OMNIPAQUE) FINDINGS: Fluoroscopic guidance was provided for performance of ERCP  BILIARY: The study is limited  There is an indwelling metallic stent in the common bile duct  Minimal contrast is seen beyond the stent  The visualized portions of the pancreatic duct appear grossly unremarkable  Impression: Fluoroscopic guidance for ERCP  Please see procedure report for full details   Workstation performed: [id number]     Ir Tube Placement Bili    Result Date: 1/20/2020  Narrative: Percutaneous cholangiogram Clinical History: Obstructing bile duct cancer post stent placement and recent ERCP intervention Contrast: 120 mL Omnipaque 350 Fluoro time: 16 5 minutes Technique: Informed consent was obtained after the pertinent risks and benefits were explained to the patient  General anesthesia was induced by the Anesthesia department  After review of the patient's recent imaging studies, right upper abdomen was prepped and draped in usual sterile fashion  Lidocaine was administered to the skin and a 21 gauge trocar needle was attempted to be advanced into multiple peripheral bile ducts however this was not successful  Finally using a combination of ultrasound and spin CT imaging the stent was accessed percutaneously with a 21-gauge 20 cm needle  Contrast opacification opacified the intrahepatic bile ducts the common bile duct, and contrast was seen to rapidly fill the small bowel without significant obstruction  Contrast would not remain within the bile ducts for access of peripheral bile duct  Therefore decision was made to terminate procedure at this time  Findings: Percutaneous cholangiogram showing common bile duct stent with some filling defects but brisk flow which had completely decompressed the bile ducts  Patient tolerated the procedure well no immediate postprocedural complication  Impression: Impression: Percutaneous cholangiogram with no drain placement secondary to complete decompression of system status post recent ERCP and therefore no access to peripheral intrahepatic biliary ducts  Workstation performed: [id number]     Ct Abdomen Pelvis With Contrast    Result Date: 12/27/2019  Narrative: CT ABDOMEN AND PELVIS WITH IV CONTRAST INDICATION:   Jaundice and weakness  Known metastatic colon cancer  COMPARISON:  November 8, 2019 TECHNIQUE:  CT examination of the abdomen and pelvis was performed  Axial, sagittal, and coronal 2D reformatted images were created from the source data and submitted for interpretation  Radiation dose length product (DLP) for this visit:  521 35 mGy-cm     This examination, like all CT scans performed in the [facility], was performed utilizing techniques to minimize radiation dose exposure, including the use of iterative  reconstruction and automated exposure control  IV Contrast:  100 mL of iohexol (OMNIPAQUE) Enteric Contrast:  Enteric contrast was not administered  FINDINGS: ABDOMEN LOWER CHEST:  There are lung masses in the lingula and the left lower lobe and the right lower lobe  These are presumably metastatic disease and seems similar to the prior study  LIVER/BILIARY TREE:  There is intrahepatic biliary dilatation which is slightly more prominent than on the prior study  There is a stent along the right biliary tree extending to the common hepatic duct area  A lateral right hepatic lobe lesion measures 2 9 cm and is essentially stable from prior  A lesion or lesions in the liver near the gallbladder appears stable as well measuring up to about 6 cm transverse diameter  The left hepatic lobe seems atrophic and has more significant biliary dilatation than the right lobe  GALLBLADDER:  There may be some thickening of the superior medial wall of the gallbladder as suggested on coronal image 57 series 601  The gallbladder wall appears to be up to about 7 mm thickness  This seems to be immediately adjacent to the area of liver tumor and direct tumor infiltration of the gallbladder would be a consideration  SPLEEN:  Unremarkable  PANCREAS:  Unremarkable  ADRENAL GLANDS:  Unremarkable  KIDNEYS/URETERS:  Round low-density lesion at the lower pole of the right kidney is stable, compatible with cyst   No acute renal pathology seen  STOMACH AND BOWEL:  There is evidence of prior sigmoid colon surgery  There is no recurrent mass or stricture seen in that location  APPENDIX:  No findings to suggest appendicitis  ABDOMINOPELVIC CAVITY:  No ascites or free intraperitoneal air  No lymphadenopathy  VESSELS:  Atherosclerotic changes are present  No evidence of aneurysm   PELVIS REPRODUCTIVE ORGANS:  Unremarkable for patient's age  URINARY BLADDER:  The bladder is only minimally distended which probably accounts for the diffuse thickened appearance of the wall  No stones  No discrete masses  ABDOMINAL WALL/INGUINAL REGIONS:  The inguinal area appears normal   There is a tumor or other masslike structure identified within the abdominal wall in the right upper quadrant near the lower edge of the rib cage  This seems to extend to the skin surface as a visible protuberance mass, similar to the prior exam  OSSEOUS STRUCTURES:  No acute fracture or destructive osseous lesion  Severe multilevel spinal degenerative changes are noted  No destructive bone lesions are appreciated  Impression: There is intrahepatic biliary dilatation despite the presence of the biliary stent  Compared with the prior study the biliary dilatation seems slightly more prominent  Therefore, the stent may not be functioning properly  Stable appearance of pre-existing liver lesions  Stable appearance of lung masses  Stable appearance of right upper abdominal wall tumor  Localized area of thickening of the wall of the gallbladder immediately adjacent to liver tumor which might indicate direct invasion of gallbladder wall   Workstation performed: [id number]     ECOG :2      Assessment and plan:  Metastatic colon cancer primary in the sigmoid colon diagnosed as stage III in 2013 status post resection with adjuvant FOLFOX with liver metastases in November 2014 treated with FOLFIRI and Avastin finished on 02/2015    Radiofrequency ablation to the liver lesions segment for a, 7 a robotic with each resection of segment 6 at [facility]    Received FOLFIRI and Avastin thereafter    Radiation therapy to the painful abdominal wall metastases 30 Gy in 10 sessions spanning from 03/28/2017-04/10/2017 K-jacobo mutated    Progression of disease on 06/2019 with pulmonary metastases, left hilar metastasis enlarging hepatic metastases, treated with capecitabine without any benefit    New pulmonary and pleural-based masses on CT scan on 10/14/2019, enlarging liver metastasis mildly    Admitted to the hospital with obstructive jaundice and bilirubin above 20, he had biliary stent metal subtype, could not be exchanged however manipulation a drain the bile    PTC was aborted at this time with declining in the bilirubin    ECOG score 1    I had long discussion almost 60 minutes with the patient and his family, the other 2 options are for stage IV incurable metastatic colorectal cancer K-jacobo mutated he is Lonsurf or regorafenib    However we need bilirubin below 3    CT scan of the chest and evaluation by Radiation Oncology the patient and the family think about radiofrequency ablation or stereotactic radiation therapy to the metastases    I believe treatment with Lonsurf is good idea provided bilirubin below 3, patient to have CBC, sed CMP every week and follow-up in 2 weeks will proceed from there

## 2020-01-28 NOTE — TELEPHONE ENCOUNTER
It would be best to make a final decision on medication regimen regarding this edema with a physical examination at his next visit  He does have a history of lower blood pressure with his current medication regimen and I would be hesitant to start a diuretic  He can use tighter, compression-like stockings that he can obtain over the counter ([employer], [employer], [employer]) to help with some edema control, but as you stated he has been controlling the symptoms well with elevation at the end of the day

## 2020-01-28 NOTE — TELEPHONE ENCOUNTER
Patient has a hosp fup apt scheduled with Dr Dimitris Nance at [facility] on 2/6/20  Patient states he started with fluid retention on both legs and feet over the weekend  When he does elevate his legs it seems to get better   This had happened just slightly one other time but not this much swelling as he is experiencing now  Is there something else he should do?

## 2020-01-29 NOTE — TELEPHONE ENCOUNTER
I talked to the patient daughter Blas Kim update her with the labs result, bilirubin 5 0 will repeat CMP next week and office visit and proceed from there

## 2020-01-30 PROBLEM — R60.0 LOWER EXTREMITY EDEMA: Status: ACTIVE | Noted: 2020-01-01

## 2020-01-30 PROBLEM — E80.6 HYPERBILIRUBINEMIA: Status: ACTIVE | Noted: 2020-01-01

## 2020-01-30 PROBLEM — R50.9 FEBRILE ILLNESS: Status: ACTIVE | Noted: 2020-01-01

## 2020-01-30 NOTE — ASSESSMENT & PLAN NOTE
· Acute febrile illness  Differentials includes cellulitis of abdominal wall mass vs tumor fever  · Did have obstructive jaundice with bilirubin as high as 40+ status post biliary stent at [facility] and recent hospitalization at [facility]  · Given vancomycin x1 in the emergency department  Have ID and oncology evaluate for further recommendations

## 2020-01-30 NOTE — PHYSICAL THERAPY NOTE
PT EVALUATION     01/30/20 1000   Pain Assessment   Pain Assessment 0-10   Pain Score 5  (R flank area)   Home Living   Type of [address] Two level;Stairs to enter with rails  (3+2 stairs to enter)   Home Equipment Cane   Additional Comments patient using cane out of home and for longer distance walking   Prior Function   Level of [place] Independent with ADLs and functional mobility   Lives With Spouse   Receives Help From Family   ADL Assistance Independent   IADLs Independent   Comments patient drives and cares for self and has family assist as needed   Restrictions/Precautions   Other Precautions Fall Risk;Pain   General   Additional Pertinent History chart reviewed, patient admitted with colon CA with mets, fever, R flank wound area, cellulitis  Patient independent prior to admission and recently moved to this area for medical care and to be closer to family   Family/Caregiver Present No   Cognition   Overall Cognitive Status WFL   Arousal/Participation Cooperative   Orientation Level Oriented X4   Following Commands Follows all commands and directions without difficulty   RLE Assessment   RLE Assessment   (ROM WFL, strength 4-/5)   LLE Assessment   LLE Assessment   (ROM WFL, strength 4-/5)   Coordination   Movements are Fluid and Coordinated 0   Coordination and Movement Description decreased coordination with pain in R flank area   Bed Mobility   Supine to Sit 4  Minimal assistance   Additional items Assist x 1;Verbal cues;LE management   Sit to Supine 4  Minimal assistance   Additional items Assist x 1;Verbal cues;LE management   Transfers   Sit to Stand 4  Minimal assistance   Additional items Assist x 1;Verbal cues   Stand to Sit 4  Minimal assistance   Additional items Assist x 1;Verbal cues   Ambulation/Elevation   Gait Assistance 4  Minimal assist   Additional items Assist x 1;Verbal cues; Tactile cues   Assistive Device   (none/hand hold)   Distance 5 feet with slow gait with R flank pain and unsteady gait   Balance   Static Sitting Fair   Dynamic Sitting Fair -   Static Standing Fair -   Dynamic Standing Fair -   Ambulatory Fair -   Activity Tolerance   Activity Tolerance Patient limited by fatigue;Patient limited by pain   Nurse Made Aware yes   Assessment   Prognosis Good   Problem List Decreased strength;Decreased range of motion;Decreased endurance; Impaired balance;Decreased mobility; Decreased coordination;Pain   Assessment Patient seen for Physical Therapy evaluation  Patient admitted with Febrile illness  Comorbidities affecting patient's physical performance include: lower extremity edema, palliative care, jaundice, colon CA with mets, trigeminal neuralgia, hyperbilirubinemia, afib, BPH  Patient with extensive medical history since Fall of last year with Ca and resulting care and complications  Personal factors affecting patient at time of initial evaluation include: lives in two story house, stairs to enter home, inability to navigate community distances, inability to navigate level surfaces without external assistance, inability to perform dynamic tasks in community, limited home support, inability to perform caregiver support/tasks, inability to perform physical activity, inability to perform ADLS and inability to perform IADLS   Prior to admission, patient was independent with functional mobility without assistive device, independent with ADLS, independent with IADLS, living in a multi-level home, ambulating household distance, ambulating community distances and home with family assist   Please find objective findings from Physical Therapy assessment regarding body systems outlined above with impairments and limitations including weakness, decreased ROM, impaired balance, decreased endurance, impaired coordination, gait deviations, pain, decreased activity tolerance, decreased functional mobility tolerance and fall risk    The Barthel Index was used as a functional outcome tool presenting with a score of 55 today indicating marked limitations of functional mobility and ADLS  Patient's clinical presentation is currently unstable/unpredictable as seen in patient's presentation of vital sign response, changing level of pain, increased fall risk, new onset of impairment of functional mobility, decreased endurance and new onset of weakness  Pt would benefit from continued Physical Therapy treatment to address deficits as defined above and maximize level of functional mobility  As demonstrated by objective findings, the assigned level of complexity for this evaluation is high  Goals   Patient Goals decrease pain and be able to walk   STG Expiration Date 02/06/20   Short Term Goal #1 transfers and gait with roller walker independently   Short Term Goal #2 gait endurance to functional household distances, strength BLEs 4/5 all to meet patient goal of improved mobility   LTG Expiration Date 02/13/20   Long Term Goal #1 transfers and gait independently without assistive device   Long Term Goal #2 return to independent functional activity   Plan   Treatment/Interventions ADL retraining;Functional transfer training;LE strengthening/ROM; Elevations; Therapeutic exercise; Endurance training;Patient/family training;Equipment eval/education; Bed mobility;Gait training; Compensatory technique education   PT Frequency 5x/wk   Recommendation   Recommendation Outpatient PT   Barthel Index   Feeding 10   Bathing 0   Grooming Score 0   Dressing Score 5   Bladder Score 10   Bowels Score 10   Toilet Use Score 5   Transfers (Bed/Chair) Score 10   Mobility (Level Surface) Score 0   Stairs Score 5   Barthel Index Score 54   Licensure   [place] License Number  Luz Mcwilliams PT [id number]

## 2020-01-30 NOTE — CONSULTS
Consultation - Infectious Disease   Beverley Alpers 66 y o  male MRN: [id number]  Unit/Bed#: [address] [place] [address] Encounter: [id number]      IMPRESSION & RECOMMENDATIONS:   1  SIRS syndrome and possible sepsis, POA  Patient presented on admission with fever along with mild leukocytosis  He has pain localizing around his cutaneous tumor  Cultures are pending and flu swab negative  UA unremarkable  No a cutaneous abscess on CT  Liver function tests seem to be improving  Differential at this time includes 2 and possible tumor fever or occult bacteremia  Will continue antibiotics as below  Continue to trend fever curve/vitals  Repeat CBC/chemistry tomorrow  Follow up pending culture data  Supportive care as per primary    2  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  Presenting at this time with fever and appears to have increasing discoloration and pain around the tumor site  No cutaneous abscess on CT  Possible superimposed cellulitis in the setting of recent cryotherapy  Will continue on IV vancomycin  Will send MRSA swab  Continue to trend fever curve/vitals  Repeat CBC/chemistry tomorrow  Follow up pending blood cultures  Local wound care as per primary  Ongoing follow-up by Oncology    3  Colon cancer with metastases  Patient unfortunately with progressive disease despite various treatments  Ongoing follow-up and management as per Oncology  4  Hyperbilirubinemia  Patient with elevated bilirubin and alkaline phosphatase at baseline  These values are actually improving after recent stenting  Repeat CMP tomorrow  Serial abdominal exams  Follow up pending cultures  Antibiotics as above    5  Lower extremity edema  Suspect that this is related to patient's tumor burden in his abdomen    Follow-up lower extremity Dopplers  Continue antibiotics as above  Anticoagulation/diuretics as per primary    Above plan discussed in detail with the patient and his son-in-law at bedside  Above plan discussed in detail with primary service attending  ID consult service will continue to follow closely  HISTORY OF PRESENT ILLNESS:  Reason for Consult:  Fever without clear cause    HPI: Duane Perea is a 66y o  year old male with past medical history significant for BPH, hypertension as well as metastatic colon cancer  Patient has previously undergone sigmoid resection as well as chemotherapy and had recurrence in 2014 requiring further chemo  Unfortunately his disease has progressed with worsening metastases over the last 2 years  Reportedly had recent biliary stent placed  There was also attempt made at external drain in January given elevated bilirubin however it was unsuccessful  Patient underwent cryoablation recently  He had routine CT scan performed and 1 returning home he developed severe right upper quadrant pain with fever which prompted him to come to the ER overnight  White count on admission was 10 8  Bilirubin again elevated  Creatinine 1 0  Lactate and flu testing negative  Patient was admitted for fevers and given 1 dose of vancomycin  Started on diuretics for lower extremity swelling and duplex ordered  Patient has been evaluated by Oncology  Fever curve seems have down trended  White count remains stable  Cultures are pending  Flu testing negative  Chest x-ray unremarkable  CT of the abdomen noted  Patient's other vitals are stable  UA unremarkable and other labs stable  We are consulted at this time for further assistance in management given this patient's fevers without clear source  On evaluation the patient is a very pleasant 70-year-old gentleman  He reported that he had cryotherapy on the 16th of this month  Noted having some mild pain intermittently at his tumor site    Yesterday when he came in for routine CT scan however he had fever and intermittent stabbing pain and felt overall unwell which prompted him to come to the hospital   His son-in-law is present at bedside and he reports that there is increasing purple discoloration on the skin  He questions if there is developing irritation from his bandages from home but again there is no ear picture documentation of progression of this lesion  Patient otherwise denies any history of MRSA infections or drug-resistant infections  He denies having any other prosthetic material in his body  Patient denies having any urinary symptoms, further GI issues other than constipation  REVIEW OF SYSTEMS:  A complete 12 point system-based review of systems is negative other than that noted in the HPI  PAST MEDICAL HISTORY:  Past Medical History:   Diagnosis Date    Atrial fibrillation ([facility] )     BPH (benign prostatic hyperplasia)     Hypertension     Metastatic colon cancer to liver ([facility] )     colon- johnny liver & lungs    Stroke ([facility] )     - mild weakness left hand/arm    Trigeminal neuralgia     Tumor, metastatic ([facility] )      Past Surgical History:   Procedure Laterality Date    COLON SURGERY          EYE SURGERY      IR TUBE PLACEMENT BILI  1/15/2020    LIVER SURGERY      TONSILLECTOMY         FAMILY HISTORY:  Non-contributory    SOCIAL HISTORY:  Social History   Social History     Substance and Sexual Activity   Alcohol Use Never    Frequency: Never     Social History     Substance and Sexual Activity   Drug Use Never     Social History     Tobacco Use   Smoking Status Former Smoker    Types: Cigarettes    Last attempt to quit: Dixie Linder Years since quittin 1   Smokeless Tobacco Never Used       ALLERGIES:  Allergies   Allergen Reactions    Ib Pro [Ibuprofen] Hives and Itching    Adhesive [Medical Tape] Rash     Use only paper tape       MEDICATIONS:  All current active medications have been reviewed      PHYSICAL EXAM:  Temp:  [99 7 °F (37 6 °C)-101 5 °F (38 6 °C)] 99 7 °F (37 6 °C)  HR:  [] 87  Resp:  [13-24] 20  BP: (128-144)/(68-90) 132/81  SpO2:  [94 %-100 %] 99 %  Temp (24hrs), Av 7 °F (38 2 °C), Min:99 7 °F (37 6 °C), Max:101 5 °F (38 6 °C)  Current: Temperature: 99 7 °F (37 6 °C)    Intake/Output Summary (Last 24 hours) at 2020 1343  Last data filed at 2020 0901  Gross per 24 hour   Intake 300 ml   Output    Net 300 ml       General Appearance:  Chronically ill-appearing, nontoxic, and in no distress   Head:  Normocephalic, without obvious abnormality, atraumatic   Eyes:  Conjunctiva pink and icteric sclera, both eyes   Nose: Nares normal, mucosa normal, no drainage   Throat: Oropharynx moist without lesions   Neck: Supple, symmetrical, no adenopathy, no tenderness/mass/nodules   Back:   Symmetric, no curvature, ROM normal, no CVA tenderness; no spinal or paraspinal muscle tenderness to palpation   Lungs:   Clear to auscultation bilaterally, respirations unlabored on room air   Chest Wall:  Patient with large protruding mass in the right upper quadrant  There is erythema and purple discoloration surrounding the entire lesion with greenish drainage and some bleeding  The skin is tender to palpation particularly between the 6 And 9 o'clock position the site is warm  Heart:  RRR; no murmur, rub or gallop   Abdomen:   Soft, non-tender, distended, positive bowel sounds    Extremities: No cyanosis, clubbing; 2 to 3+ pitting edema lower extremities bilaterally   Skin: No other rashes or lesions  No other draining wounds noted  Lymph nodes: Cervical, supraclavicular nodes normal   Neurologic: Alert and oriented times 3, extremity strength 5/5 and symmetric       LABS, IMAGING, & OTHER STUDIES:  Lab Results:  I have personally reviewed pertinent labs    Results from last 7 days   Lab Units 20  0644 20  0915   WBC Thousand/uL 10 73* 10 89* 9 87   HEMOGLOBIN g/dL 10 2* 11 0* 11 5*   PLATELETS Thousands/uL 138* 167 219     Results from last 7 days   Lab Units 20  0644 20  0915   POTASSIUM mmol/L 3 8 4 0 3 8 CHLORIDE mmol/L 99* 98* 98*   CO2 mmol/L 26 28 27   BUN mg/dL 8 9 8   CREATININE mg/dL 0 80 1 01 1 02   EGFR ml/min/1 73sq m 87 72 71   CALCIUM mg/dL 7 6* 7 7* 8 2*   AST U/L 43 58* 61*   ALT U/L 44 59 56   ALK PHOS U/L 383* 484* 504*     Results from last 7 days   Lab Units 01/30/20  0339 01/29/20  2212   BLOOD CULTURE  Received in Microbiology Lab  Culture in Progress  Received in Microbiology Lab  Culture in Progress  Imaging Studies:   I have personally reviewed pertinent imaging study reports and images in PACS  Other Studies:   I have personally reviewed pertinent reports

## 2020-01-30 NOTE — ED PROVIDER NOTES
History  Chief Complaint   Patient presents with    Fever - 9 weeks to 74 years     started with some chills this evening, intermittent nausea    Abdominal Pain     Intermittent sharp jabbing pains which the nausea seems to correlate with, hx of liver mets, recently had cryoablasion and stent in liver     Pt in ER with c/o sudden onset of fever/chills x 1 day, associated with worsening abd pain and nausea - Tmax 100 8  He denies diarrhea/vomiting  Pt has a hx of colon CA with liver and lung mets, last radiation was in Nov  Pt also has a skin lesion to right abdomen, which was treated with cryoablation 2wks ago, and abdominal pain is localized to the site of skin lesion  He denies chest pain/cough/rhinorrhea  Pt had a scheduled outpt chest CT with contrast this morning, prior to the onset of symptoms      History provided by:  Patient and relative   used: No    Fever - 9 weeks to 74 years   Temp source:  Oral  Severity:  Moderate  Onset quality:  Sudden  Timing:  Constant  Progression:  Worsening  Chronicity:  New  Relieved by:  None tried  Worsened by:  Nothing  Ineffective treatments:  None tried  Associated symptoms: nausea    Associated symptoms: no chest pain, no chills, no cough, no diarrhea, no dysuria, no rash and no vomiting    Risk factors: hx of cancer and immunosuppression    Abdominal Pain   Associated symptoms: fever and nausea    Associated symptoms: no chest pain, no chills, no constipation, no cough, no diarrhea, no dysuria, no hematuria, no shortness of breath and no vomiting        Prior to Admission Medications   Prescriptions Last Dose Informant Patient Reported?  Taking?   baclofen 5 MG TABS Past Week at Unknown time  No Yes   Sig: Take 15 mg by mouth 3 (three) times a day   gabapentin (NEURONTIN) 300 mg capsule 1/29/2020 at 1500  No Yes   Sig: Take 1 capsule (300 mg total) by mouth 3 (three) times a day   metoprolol succinate (TOPROL-XL) 50 mg 24 hr tablet 1/29/2020 at 0900 Self No Yes   Sig: Take 1 tablet (50 mg total) by mouth daily   Patient taking differently: Take 25 mg by mouth daily    oxyCODONE (ROXICODONE) 5 mg immediate release tablet 2020 at 1800  No Yes   Sig: Take 1 tablet (5 mg total) by mouth every 4 (four) hours as needed for moderate pain for up to 20 dosesMax Daily Amount: 30 mg   tamsulosin (FLOMAX) 0 4 mg 2020 at 0900  Yes Yes   Sig: Take 0 4 mg by mouth daily with dinner      Facility-Administered Medications: None       Past Medical History:   Diagnosis Date    Atrial fibrillation (HCC)     BPH (benign prostatic hyperplasia)     Hypertension     [facility] [address] [facility]     colon- johnny liver & lungs    [facility]     - mild weakness left hand/arm    Trigeminal neuralgia     Tumor, [facility] ([facility] )        Past Surgical History:   Procedure Laterality Date    COLON SURGERY          EYE SURGERY      IR TUBE PLACEMENT BILI  1/15/2020    LIVER SURGERY      TONSILLECTOMY         Family History   Problem Relation Age of Onset    Cancer Father     Colon cancer Father     Cancer Brother     Cancer Paternal Grandfather      I have reviewed and agree with the history as documented  Social History     Tobacco Use    Smoking status: Former Smoker     Types: Cigarettes     Last attempt to quit: 1981     Years since quittin 1    Smokeless tobacco: Never Used   Substance Use Topics    Alcohol use: Never     Frequency: Never    Drug use: Never        Review of Systems   Constitutional: Positive for fever  Negative for chills  Respiratory: Negative for cough, shortness of breath and wheezing  Cardiovascular: Negative for chest pain and palpitations  Gastrointestinal: Positive for abdominal pain and nausea  Negative for constipation, diarrhea and vomiting  Genitourinary: Negative for dysuria, flank pain, hematuria and urgency  Musculoskeletal: Negative for back pain  Skin: Negative for color change and rash  All other systems reviewed and are negative  Physical Exam  Physical Exam   Constitutional: He is oriented to person, place, and time  He appears well-developed and well-nourished  HENT:   Head: Normocephalic and atraumatic  Eyes: Pupils are equal, round, and reactive to light  EOM are normal    Cardiovascular: Normal rate, regular rhythm and normal heart sounds  Pulmonary/Chest: Effort normal and breath sounds normal    Abdominal: Soft  Bowel sounds are normal  He exhibits no distension and no mass  There is tenderness in the right upper quadrant  There is no rebound and no guarding  Neurological: He is alert and oriented to person, place, and time  Skin: Skin is warm and dry  Psychiatric: He has a normal mood and affect  His behavior is normal  Judgment and thought content normal    Nursing note and vitals reviewed        Vital Signs  ED Triage Vitals [01/29/20 2057]   Temperature Pulse Respirations Blood Pressure SpO2   (!) 101 5 °F (38 6 °C) 104 (!) 24 144/76 94 %      Temp Source Heart Rate Source Patient Position - Orthostatic VS BP Location FiO2 (%)   Tympanic Monitor Sitting Right arm --      Pain Score       8           Vitals:    01/30/20 0300 01/30/20 0315 01/30/20 0330 01/30/20 0435   BP:    134/90   Pulse: 96 94 76 98   Patient Position - Orthostatic VS:    Lying         Visual Acuity      ED Medications  Medications   baclofen tablet 15 mg (has no administration in time range)   gabapentin (NEURONTIN) capsule 300 mg (has no administration in time range)   metoprolol succinate (TOPROL-XL) 24 hr tablet 25 mg (has no administration in time range)   oxyCODONE (ROXICODONE) IR tablet 5 mg (has no administration in time range)   tamsulosin (FLOMAX) capsule 0 4 mg (has no administration in time range)   heparin (porcine) subcutaneous injection 5,000 Units (5,000 Units Subcutaneous Given 1/30/20 0519)   sodium chloride 0 9 % infusion (has no administration in time range)   acetaminophen (TYLENOL) tablet 325 mg (0 mg Oral Return to [facility] 1/30/20 0523)   ondansetron (ZOFRAN) injection 4 mg (has no administration in time range)   morphine (PF) 4 mg/mL injection 4 mg (has no administration in time range)   oxyCODONE (ROXICODONE) immediate release tablet 10 mg (has no administration in time range)   furosemide (LASIX) injection 20 mg (has no administration in time range)   sodium chloride 0 9 % bolus 1,000 mL (0 mL Intravenous Stopped 1/30/20 0338)   aspirin tablet 325 mg (325 mg Oral Given 1/29/20 2208)   morphine (PF) 4 mg/mL injection 4 mg (4 mg Intravenous Given 1/29/20 2211)   iohexol (OMNIPAQUE) 350 MG/ML injection (MULTI-DOSE) 100 mL (100 mL Intravenous Given 1/29/20 2349)   vancomycin (VANCOCIN) 1,750 mg in sodium chloride 0 9 % 500 mL IVPB (1,750 mg Intravenous New Bag 1/30/20 0345)       Diagnostic Studies  Results Reviewed     Procedure Component Value Units Date/Time    TSH, 3rd generation [[id number]]  (Abnormal) Collected:  01/29/20 2212    Lab Status:  Final result Specimen:  Blood from Arm, Right Updated:  01/30/20 0542     TSH 3RD GENERATON 4 450 uIU/mL     Narrative:       Patients undergoing fluorescein dye angiography may retain small amounts of fluorescein in the body for 48-72 hours post procedure  Samples containing fluorescein can produce falsely depressed TSH values  If the patient had this procedure,a specimen should be resubmitted post fluorescein clearance  Blood culture #1 [[id number]] Collected:  01/30/20 0339    Lab Status:   In process Specimen:  Blood from Central Venous Line Updated:  01/30/20 0356    Influenza A/B and RSV PCR [[id number]]  (Normal) Collected:  01/29/20 2328    Lab Status:  Final result Specimen:  Nasopharyngeal from Nose Updated:  01/30/20 0017     INFLUENZA A PCR None Detected     INFLUENZA B PCR None Detected     RSV PCR None Detected    NT-BNP PRO [[id number]]  (Abnormal) Collected:  01/29/20 2212    Lab Status:  Final result Specimen:  Blood from Arm, Right Updated:  01/29/20 2313     NT-proBNP 2,370 pg/mL     Comprehensive metabolic panel [[id number]]  (Abnormal) Collected:  01/29/20 2212    Lab Status:  Final result Specimen:  Blood from Arm, Right Updated:  01/29/20 2257     Sodium 131 mmol/L      Potassium 4 0 mmol/L      Chloride 98 mmol/L      CO2 28 mmol/L      ANION GAP 5 mmol/L      BUN 9 mg/dL      Creatinine 1 01 mg/dL      Glucose 131 mg/dL      Calcium 7 7 mg/dL      AST 58 U/L      ALT 59 U/L      Alkaline Phosphatase 484 U/L      Total Protein 6 0 g/dL      Albumin 2 3 g/dL      Total Bilirubin 4 60 mg/dL      eGFR 72 ml/min/1 73sq m     Narrative:       [place] guidelines for Chronic Kidney Disease (CKD):     Stage 1 with normal or high GFR (GFR > 90 mL/min/1 73 square meters)    Stage 2 Mild CKD (GFR = 60-89 mL/min/1 73 square meters)    Stage 3A Moderate CKD (GFR = 45-59 mL/min/1 73 square meters)    Stage 3B Moderate CKD (GFR = 30-44 mL/min/1 73 square meters)    Stage 4 Severe CKD (GFR = 15-29 mL/min/1 73 square meters)    Stage 5 End Stage CKD (GFR <15 mL/min/1 73 square meters)  Note: GFR calculation is accurate only with a steady state creatinine    Troponin I [[id number]]  (Normal) Collected:  01/29/20 2223    Lab Status:  Final result Specimen:  Blood from Arm, Right Updated:  01/29/20 2250     Troponin I <0 02 ng/mL     Lactic acid x2 [[id number]]  (Normal) Collected:  01/29/20 2212    Lab Status:  Final result Specimen:  Blood from Arm, Right Updated:  01/29/20 2249     LACTIC ACID 1 9 mmol/L     Narrative:       Result may be elevated if tourniquet was used during collection      APTT [[id number]]  (Normal) Collected:  01/29/20 2212    Lab Status:  Final result Specimen:  Blood from Arm, Right Updated:  01/29/20 2244     PTT 32 seconds     Protime-INR [[id number]]  (Normal) Collected:  01/29/20 2212    Lab Status:  Final result Specimen:  Blood from Arm, Right Updated:  01/29/20 2244     Protime 11 0 seconds      INR 1 03    CBC and differential [[id number]]  (Abnormal) Collected:  01/29/20 2212    Lab Status:  Final result Specimen:  Blood from Arm, Right Updated:  01/29/20 2229     WBC 10 89 Thousand/uL      RBC 3 13 Million/uL      Hemoglobin 11 0 g/dL      Hematocrit 33 5 %       fL      MCH 35 1 pg      MCHC 32 8 g/dL      RDW 14 1 %      MPV 10 3 fL      Platelets 206 Thousands/uL      Neutrophils Relative 79 %      Lymphocytes Relative 5 %      Monocytes Relative 16 %      Eosinophils Relative 0 %      Basophils Relative 0 %      Neutrophils Absolute 8 54 Thousands/µL      Lymphocytes Absolute 0 54 Thousands/µL      Monocytes Absolute 1 75 Thousand/µL      Eosinophils Absolute 0 04 Thousand/µL      Basophils Absolute 0 02 Thousands/µL     Blood culture #2 [[id number]] Collected:  01/29/20 2212    Lab Status: In process Specimen:  Blood from Arm, Right Updated:  01/29/20 2228    Procalcitonin [[id number]] Collected:  01/29/20 2212    Lab Status: In process Specimen:  Blood from Arm, Right Updated:  01/29/20 2226    UA w Reflex to Microscopic w Reflex to Culture [[id number]]     Lab Status:  No result Specimen:  Urine                  XR chest 1 view portable    (Results Pending)   CT abdomen pelvis with contrast    (Results Pending)              Procedures  Procedures         ED Course                               MDM  Number of Diagnoses or Management Options  Abdominal wall cellulitis:   Diagnosis management comments: CT report from [facility] reviewed  Will start pt on Vanco for cellulitis and admit to [facility]          Disposition  Final diagnoses:   Abdominal wall cellulitis   Ascites, malignant     Time reflects when diagnosis was documented in both MDM as applicable and the Disposition within this note     Time User Action Codes Description Comment    1/30/2020  2:43 AM America SERRANO Add [Z57 616] Abdominal wall cellulitis     1/30/2020  3:15 AM Hedie Mckeon Add [R50 9] Febrile illness 1/30/2020  3:16 AM Dk Garcia Add [C18 9] Colon cancer metastasized to multiple sites ([address] )     1/30/2020  6:00 AM Claudio SERRANO Add [R18 0] Ascites, malignant       ED Disposition     ED Disposition Condition Date/Time Comment    Admit Stable u Jan 30, 2020  2:42 AM Case was discussed with Dr Nedra Myrick and the patient's admission status was agreed to be Admission Status: inpatient status to the service of Dr Nedra Myrick   Follow-up Information    None         Current Discharge Medication List      CONTINUE these medications which have NOT CHANGED    Details   baclofen 5 MG TABS Take 15 mg by mouth 3 (three) times a day  Qty: 45 tablet, Refills: 0    Associated Diagnoses: Palliative care patient; Trigeminal neuralgia      gabapentin (NEURONTIN) 300 mg capsule Take 1 capsule (300 mg total) by mouth 3 (three) times a day  Qty: 45 capsule, Refills: 0    Associated Diagnoses: Palliative care patient; Trigeminal neuralgia      metoprolol succinate (TOPROL-XL) 50 mg 24 hr tablet Take 1 tablet (50 mg total) by mouth daily  Qty: 20 tablet, Refills: 0    Associated Diagnoses: Hypertension, unspecified type      oxyCODONE (ROXICODONE) 5 mg immediate release tablet Take 1 tablet (5 mg total) by mouth every 4 (four) hours as needed for moderate pain for up to 20 dosesMax Daily Amount: 30 mg  Qty: 20 tablet, Refills: 0    Associated Diagnoses: Colon cancer metastasized to multiple sites (HCC)      tamsulosin (FLOMAX) 0 4 mg Take 0 4 mg by mouth daily with dinner           No discharge procedures on file      ED Provider  Electronically Signed by           Stiven Nieto DO  01/30/20 9701

## 2020-01-30 NOTE — ASSESSMENT & PLAN NOTE
· Bilateral lower extremity edema likely in the setting of hepatic metastasis    · Consider lower extremity duplex to rule out DVT as patient has stopped lovenox  · Will start IV lasix and advised elevation of legs

## 2020-01-30 NOTE — H&P
H&P- Karly Schaffer 1945, 66 y o  male MRN: [id number]  Unit/Bed#: ED 09 Encounter: [id number]  Primary Care Provider: No primary care provider on file  Date and time admitted to hospital: 1/29/2020  9:17 PM        Assessment and Plan  * Febrile illness  Assessment & Plan  · Acute febrile illness  Differentials includes cellulitis of abdominal wall mass vs tumor fever  · Did have obstructive jaundice with bilirubin as high as 40+ status post biliary stent at [facility] and recent hospitalization at [facility]  · Given vancomycin x1 in the emergency department  Have ID and oncology evaluate for further recommendations  Lower extremity edema  Assessment & Plan  · Bilateral lower extremity edema likely in the setting of hepatic metastasis  · Consider lower extremity duplex to rule out DVT as patient has stopped lovenox  · Will start IV lasix and advised elevation of legs    Hyperbilirubinemia  Assessment & Plan  · Hyperbilirubinemia secondary to biliary obstruction from metastatic colon cancer  · Had metal stent placed at [facility]  · Recently transferred to [facility] for ERCP and possibility of external drain which both were unsuccessful  · Patient hoping for bilirubin to be less than 3 to try alternate chemotherapy    Results from last 7 days   Lab Units 01/29/20  2212 01/29/20  0915 01/23/20  1030   TOTAL BILIRUBIN mg/dL 4 60* 5 00* 5 90*       Trigeminal neuralgia  Assessment & Plan  · Trigeminal neuralgia which carbamazepine was recently discontinued due to transaminitis and started on gabapentin    Colon cancer metastasized to multiple sites [facility])  Assessment & Plan  · Metastatic colon cancer to multiple sites initially diagnosed in 2013 managed at [facility] status post sigmoid colectomy and chemotherapy  · Had recurrence in 2014 and recently started treatment at [facility] where metal biliary stenting was placed for hyperbilirubinemia    · Eventually the patient had worsening hyperbilirubinemia and was hospitalized at [facility] 1/9/2020 with attempt at ERCP and external drain unsuccessful but hyperbilirubinemia improved  · Did have cryoablation 1/21/2020 and saw Dr Elyn Kanner 1/22/2020; have oncology evaluate    Atrial fibrillation [facility])  Assessment & Plan  · Chronic atrial fibrillation on metoprolol succinate  Previously anticoagulated with lovenox 100 mg b i d  but currently on hold due to need for multiple procedures  BPH (benign prostatic hyperplasia)  Assessment & Plan  · BPH continue tamsulosin    VTE Prophylaxis: Heparin  Code Status:  Level one full code  Anticipated Length of Stay:  Patient will be admitted on an Inpatient basis with an anticipated length of stay of  greater than 2 midnights  Justification for Hospital Stay: Febrile illness  Total Time for Visit, including Counseling / Coordination of Care: x mins  Greater than 50% of this total time spent on direct patient counseling and coordination of care  Chief Complaint:     Chief Complaint   Patient presents with    Fever - 9 weeks to 74 years     started with some chills this evening, intermittent nausea    Abdominal Pain     Intermittent sharp jabbing pains which the nausea seems to correlate with, hx of liver mets, recently had cryoablasion and stent in liver     History of Present Illness: Aldair Arrieta is a 66 y o  male who presents with fever  The patient is originally from [address] [place] [address] and has a complex recent past medical history including metastatic colon cancer initially diagnosed in 2013  This was managed at [facility] requiring sigmoid resection and chemotherapy  He had recurrence in 2014 requiring further chemotherapy but within the last two years developed worsening metastasis including subcutaneous mass of right upper quadrant      The patient rented a house in [place] and started seeking treatment at [facility] where eventually he had a biliary stent placed for hyperbilirubinemia but eventually was told there was no other management possible  The patient eventually seeked a third opinion with [facility] January 2020 where he was admitted for hyperbilirubinemia and did undergo ERCP and external drain placement both unsuccessful  The patient's bilirubin did improve and he was discharged home where he underwent cryoablation  He went for a routine CT scan today and upon returning home he had severe right upper quadrant pain with fever  Review of Systems:  History obtained from chart review and the patient  General ROS: positive for  - fever  Psychological ROS: negative for - disorientation or hallucinations  Ophthalmic ROS: negative for - loss of vision  Respiratory ROS: negative for - cough or shortness of breath  Cardiovascular ROS: negative for - chest pain  Gastrointestinal ROS: positive for - abdominal pain  Genito-Urinary ROS: negative for - dysuria  Musculoskeletal ROS: positive for - muscle pain  Neurological ROS: negative for - seizures  Otherwise, all other 12 point review of systems normal     Past Medical and Surgical History:   Past Medical History:   Diagnosis Date    Atrial fibrillation ([facility] )     BPH (benign prostatic hyperplasia)     Hypertension     Metastatic colon cancer to liver ([facility] )     colon- [facility] liver & lungs    [facility]     2008- mild weakness left hand/arm    Trigeminal neuralgia     Tumor, metastatic ([facility] )      Past Surgical History:   Procedure Laterality Date    COLON SURGERY      2013    EYE SURGERY      IR TUBE PLACEMENT BILI  1/15/2020    LIVER SURGERY      TONSILLECTOMY       Meds/Allergies: Allergies: Allergies   Allergen Reactions    Ib Pro [Ibuprofen] Hives and Itching    Adhesive [Medical Tape] Rash     Use only paper tape     Prior to Admission Medications   Prescriptions Last Dose Informant Patient Reported?  Taking?   baclofen 5 MG TABS Past Week at Unknown time  No Yes   Sig: Take 15 mg by mouth 3 (three) times a day   gabapentin (NEURONTIN) 300 mg capsule 2020 at 1500  No Yes   Sig: Take 1 capsule (300 mg total) by mouth 3 (three) times a day   metoprolol succinate (TOPROL-XL) 50 mg 24 hr tablet 2020 at 0900 Self No Yes   Sig: Take 1 tablet (50 mg total) by mouth daily   Patient taking differently: Take 25 mg by mouth daily    oxyCODONE (ROXICODONE) 5 mg immediate release tablet 2020 at 1800  No Yes   Sig: Take 1 tablet (5 mg total) by mouth every 4 (four) hours as needed for moderate pain for up to 20 dosesMax Daily Amount: 30 mg   tamsulosin (FLOMAX) 0 4 mg 2020 at 0900  Yes Yes   Sig: Take 0 4 mg by mouth daily with dinner      Facility-Administered Medications: None     Social History:     Social History     Socioeconomic History    Marital status: /Civil Union     Spouse name: Not on file    Number of children: Not on file    Years of education: Not on file    Highest education level: Not on file   Occupational History    Occupation: retired   Social Needs    Financial resource strain: Not on file    Food insecurity:     Worry: Not on file     Inability: Not on file   [employer] needs:     Medical: Not on file     Non-medical: Not on file   Tobacco Use    Smoking status: Former Smoker     Types: Cigarettes     Last attempt to quit: 1981     Years since quittin 1    Smokeless tobacco: Never Used   Substance and Sexual Activity    Alcohol use: Never     Frequency: Never    Drug use: Never    Sexual activity: Not on file   Lifestyle    Physical activity:     Days per week: Not on file     Minutes per session: Not on file    Stress: Not on file   Relationships    Social connections:     Talks on phone: Not on file     Gets together: Not on file     Attends Mosque service: Not on file     Active member of club or organization: Not on file     Attends meetings of clubs or organizations: Not on file     Relationship status: Not on file    Intimate partner violence:     Fear of current or ex partner: Not on file     Emotionally abused: Not on file     Physically abused: Not on file     Forced sexual activity: Not on file   Other Topics Concern    Not on file   Social History Narrative    Not on file     Patient Pre-hospital Living Situation:   Patient Pre-hospital Level of Mobility:   Patient Pre-hospital Diet Restrictions:     Family History:  Family History   Problem Relation Age of Onset    Cancer Father     Colon cancer Father     Cancer Brother     Cancer Paternal Grandfather      Physical Exam:   Vitals:   Blood Pressure: 130/86 (01/30/20 0230)  Pulse: 96 (01/30/20 0300)  Temperature: 100 4 °F (38 °C) (01/30/20 0230)  Temp Source: Tympanic (01/30/20 0230)  Respirations: 18 (01/30/20 0300)  Weight - Scale: 110 kg (242 lb) (01/29/20 2057)  SpO2: 99 % (01/30/20 0300)    General appearance: alert, appears stated age and cooperative  Skin: fungating mass right upper quadrant draining  Head: atraumatic  Eyes: conjunctivae/corneas clear  PERRL, EOM's intact  Lungs: diminished breath sounds  Heart: irregularly irregular rhythm  Abdomen: soft right upper quadrant mass noted  Back: symmetric, no curvature  ROM normal  No CVA tenderness  Extremities: edema +2-3 lower extremities  Neurologic: Grossly normal  Psychiatric:  Appropriate mood    Lab Results: I have personally reviewed pertinent reports      Results from last 7 days   Lab Units 01/29/20 2212   WBC Thousand/uL 10 89*   HEMOGLOBIN g/dL 11 0*   HEMATOCRIT % 33 5*   PLATELETS Thousands/uL 167   NEUTROS PCT % 79*   LYMPHS PCT % 5*   MONOS PCT % 16*   EOS PCT % 0     Results from last 7 days   Lab Units 01/29/20  2212 01/29/20  0915 01/23/20  1030   SODIUM mmol/L 131* 132* 133*   POTASSIUM mmol/L 4 0 3 8 4 2   CHLORIDE mmol/L 98* 98* 99*   CO2 mmol/L 28 27 28   ANION GAP mmol/L 5 7 6   BUN mg/dL 9 8 15   CREATININE mg/dL 1 01 1 02 0 98   CALCIUM mg/dL 7 7* 8 2* 8 2*   ALBUMIN g/dL 2 3* 2 3* 2 3*   TOTAL BILIRUBIN mg/dL 4 60* 5 00* 5 90* ALK PHOS U/L 484* 504* 562*   ALT U/L 59 56 85*   AST U/L 58* 61* 104*   EGFR ml/min/1 73sq m 72 71 75   GLUCOSE RANDOM mg/dL 131  --   --      Results from last 7 days   Lab Units 01/29/20  2212   INR  1 03     Results from last 7 days   Lab Units 01/29/20  2223   TROPONIN I ng/mL <0 02     Results from last 7 days   Lab Units 01/29/20  2212   LACTIC ACID mmol/L 1 9         Results from last 7 days   Lab Units 01/29/20  2212   NT-PRO BNP pg/mL 2,370*            Invalid input(s): URIBILINOGEN        Results from last 7 days   Lab Units 01/29/20  2328   INFLUENZA A PCR  None Detected   INFLUENZA B PCR  None Detected   RSV PCR  None Detected     Imaging: I have personally reviewed pertinent films in PACS  Ct Chest W Contrast  Result Date: 1/29/2020  Impression: Presumed pulmonary, hepatic, lymphatic and abdominal wall metastatic lesions as described  Small dependent pleural effusions, mesenteric edema and small volume ascites, new or worsened from the prior but nonspecific  Common bile duct stent is partially imaged  Mild intrahepatic biliary ductal dilatation is seen, similar to the prior   Coronary atherosclerosis, and other findings as above  Workstation performed: [id number]       EKG, Pathology, and Other Studies Reviewed on Admission:   EKG  Result Date: 01/30/20  Personally reviewed strips with impression of:  Atrial fibrillation 107 bpm    Allscripts/ Epic Records Reviewed: Yes    ** Please Note: This note has been constructed using a voice recognition system   **

## 2020-01-30 NOTE — SOCIAL WORK
LOS day 1  Pt is a 30 day readmission and not a bundle  CM met with pt to discuss CM role and DCP  Pt lives with his spouse in one story home with 5 steps to enter home  Pt uses a cane when he is out of the house  Per MD notes - pt is renting a house in [place] while attending [facility] for chemo rx for colon cancer with mets to multiple sites  Pharmacy:  [facility] [place]; [facility]   PCP:  Dr Lety Leonardo  Pt denies h/o mental illness  DCP:  No discharge needs identified  Will have MD write order for out-pt wound care center  Family will drive pt home at discharge  CM dept will continue to follow pt's hospital course  CM reviewed discharge planning process including the following: identifying caregivers at home, preference for d/c planning needs, availability of treatment team to discuss questions or concerns patient and/or family may have regarding diagnosis, plan of care, old or new medications and discharge planning  Discharge Checklist reviewed and CM will continue to monitor for progress toward discharge goals in nursing and provider rounds

## 2020-01-30 NOTE — PROGRESS NOTES
Vancomycin Assessment    Get Zuniga is a 66 y o  male who is currently receiving vancomycin 1750mg IV q12h for skin-soft tissue infection     Relevant clinical data and objective history reviewed:  Creatinine   Date Value Ref Range Status   01/30/2020 0 80 0 60 - 1 30 mg/dL Final     Comment:     Specimen Icteric; Results May be Affected   01/29/2020 1 01 0 60 - 1 30 mg/dL Final     Comment:     Specimen Icteric; Results May be Affected   01/29/2020 1 02 0 60 - 1 30 mg/dL Final     Comment:     Specimen Icteric; Results May be Affected     /81 (BP Location: Left arm)   Pulse 87   Temp 99 7 °F (37 6 °C) (Oral)   Resp 20   Ht 5' 10" (1 778 m)   Wt 110 kg (242 lb)   SpO2 99%   BMI 34 72 kg/m²   No intake/output data recorded  Lab Results   Component Value Date/Time    BUN 8 01/30/2020 06:44 AM    WBC 10 73 (H) 01/30/2020 06:44 AM    HGB 10 2 (L) 01/30/2020 06:44 AM    HCT 30 2 (L) 01/30/2020 06:44 AM     (H) 01/30/2020 06:44 AM     (L) 01/30/2020 06:44 AM     Temp Readings from Last 3 Encounters:   01/30/20 99 7 °F (37 6 °C) (Oral)   01/22/20 98 3 °F (36 8 °C)   01/21/20 (!) 97 °F (36 1 °C)     Vancomycin Days of Therapy: 1    Assessment/Plan  The patient is currently on vancomycin utilizing scheduled dosing based on adjusted body weight (due to obesity)  Baseline risks associated with therapy include: advanced age  The patient is currently receiving 1750mg IV q12h and is clinically appropriate and dose will be continued  Pharmacy will also follow closely for s/sx of nephrotoxicity, infusion reactions and appropriateness of therapy  BMP and CBC will be ordered per protocol  Plan for trough as patient approaches steady state, prior to the 4th  dose at approximately 1500 on 1/31/20  Due to infection severity, will target a trough of 15-20 (appropriate for most indications)   Pharmacy will continue to follow the patients culture results and clinical progress daily      Annamaria RIOS Mikayla Woods, Pharmacist

## 2020-01-30 NOTE — PLAN OF CARE
Problem: Potential for Falls  Goal: Patient will remain free of falls  Description  INTERVENTIONS:  - Assess patient frequently for physical needs  -  Identify cognitive and physical deficits and behaviors that affect risk of falls  -  [place] fall precautions as indicated by assessment   - Educate patient/family on patient safety including physical limitations  - Instruct patient to call for assistance with activity based on assessment  - Modify environment to reduce risk of injury  - Consider OT/PT consult to assist with strengthening/mobility  Outcome: Progressing     Problem: Prexisting or High Potential for Compromised Skin Integrity  Goal: Skin integrity is maintained or improved  Description  INTERVENTIONS:  - Identify patients at risk for skin breakdown  - Assess and monitor skin integrity  - Assess and monitor nutrition and hydration status  - Monitor labs   - Assess for incontinence   - Turn and reposition patient  - Assist with mobility/ambulation  - Relieve pressure over bony prominences  - Avoid friction and shearing  - Provide appropriate hygiene as needed including keeping skin clean and dry  - Evaluate need for skin moisturizer/barrier cream  - Collaborate with interdisciplinary team   - Patient/family teaching  - Consider wound care consult   Outcome: Progressing     Problem: Nutrition/Hydration-ADULT  Goal: Nutrient/Hydration intake appropriate for improving, restoring or maintaining nutritional needs  Description  Monitor and assess patient's nutrition/hydration status for malnutrition  Collaborate with interdisciplinary team and initiate plan and interventions as ordered  Monitor patient's weight and dietary intake as ordered or per policy  Utilize nutrition screening tool and intervene as necessary  Determine patient's food preferences and provide high-protein, high-caloric foods as appropriate       INTERVENTIONS:  - Monitor oral intake, urinary output, labs, and treatment plans  - Assess nutrition and hydration status and recommend course of action  - Evaluate amount of meals eaten  - Assist patient with eating if necessary   - Allow adequate time for meals  - Recommend/ encourage appropriate diets, oral nutritional supplements, and vitamin/mineral supplements  - Order, calculate, and assess calorie counts as needed  - Recommend, monitor, and adjust tube feedings and TPN/PPN based on assessed needs  - Assess need for intravenous fluids  - Provide specific nutrition/hydration education as appropriate  - Include patient/family/caregiver in decisions related to nutrition  Outcome: Progressing     Problem: METABOLIC, FLUID AND ELECTROLYTES - ADULT  Goal: Electrolytes maintained within normal limits  Description  INTERVENTIONS:  - Monitor labs and assess patient for signs and symptoms of electrolyte imbalances  - Administer electrolyte replacement as ordered  - Monitor response to electrolyte replacements, including repeat lab results as appropriate  - Instruct patient on fluid and nutrition as appropriate  Outcome: Progressing  Goal: Fluid balance maintained  Description  INTERVENTIONS:  - Monitor labs   - Monitor I/O and WT  - Instruct patient on fluid and nutrition as appropriate  - Assess for signs & symptoms of volume excess or deficit  Outcome: Progressing     Problem: SKIN/TISSUE INTEGRITY - ADULT  Goal: Skin integrity remains intact  Description  INTERVENTIONS  - Identify patients at risk for skin breakdown  - Assess and monitor skin integrity  - Assess and monitor nutrition and hydration status  - Monitor labs (i e  albumin)  - Assess for incontinence   - Turn and reposition patient  - Assist with mobility/ambulation  - Relieve pressure over bony prominences  - Avoid friction and shearing  - Provide appropriate hygiene as needed including keeping skin clean and dry  - Evaluate need for skin moisturizer/barrier cream  - Collaborate with interdisciplinary team (i e  Nutrition, Rehabilitation, etc )   - Patient/family teaching  Outcome: Progressing  Goal: Incision(s), wounds(s) or drain site(s) healing without S/S of infection  Description  INTERVENTIONS  - Assess and document risk factors for skin impairment   - Assess and document dressing, incision, wound bed, drain sites and surrounding tissue  - Consider nutrition services referral as needed  - Oral mucous membranes remain intact  - Provide patient/ family education  Outcome: Progressing     Problem: HEMATOLOGIC - ADULT  Goal: Maintains hematologic stability  Description  INTERVENTIONS  - Assess for signs and symptoms of bleeding or hemorrhage  - Monitor labs  - Administer supportive blood products/factors as ordered and appropriate  Outcome: Progressing

## 2020-01-30 NOTE — ASSESSMENT & PLAN NOTE
· Chronic atrial fibrillation on metoprolol succinate  Previously anticoagulated with lovenox 100 mg b i d  but currently on hold due to need for multiple procedures

## 2020-01-30 NOTE — CONSULTS
Medical Oncology/Hematology Consult Note  Nina Sung, 66 y  o , 1945  3 [place] [address] Red frannie-*, [id number]  01/30/20    Assessment and Plan:    1  Metastatic colon cancer with KRAS mutation G12V  Patient is status post cryotherapy ablation for the right ribs/side tumor on 1/21/20  Interval scans demonstrate slight decrease in mass with post treatment changes noted on the skin  Additional systemic therapy depends on decrease in bilirubin  This was outlined during consultation with primary oncologist, Dr Magnus Cedeño on 1/22/20  Patient will also have to be without a fever in order to initiate therapy  At this time, patient has follow-up with Dr Ines Roberts next week  I will contact his team so they are aware of his hospitalization/complication  2   Febrile illness  Patient has been given vancomycin in the emergency room and infectious disease consultation is pending  3   Lower extremity edema, pitting bilaterally  Patient notes that this has been an issue since cryoablation  Patient has known significant liver disease from metastatic colon cancer  Presently primary service is treating with IV Lasix and positional changes  If this is worsening, recommend ultrasounds of the legs bilaterally to r/o Thrombosis    Please do not hesitate to contact me if you have any questions or need additional information  Thank you for this consult   __________________________________________________________________________________________________    Reason for Consultation: Colon Cancer; Known to service- Current patient of Dr Ines Roberts    Chief Complaint   Patient presents with    Fever - 9 weeks to 74 years     started with some chills this evening, intermittent nausea    Abdominal Pain     Intermittent sharp jabbing pains which the nausea seems to correlate with, hx of liver mets, recently had cryoablasion and stent in liver     History of present illness:   This is a 33-year-old male who was admitted for abdominal pain and fever on 1/29/20  Cancer history:   The following was taken from Dr Torrey Person consult note dated 1/22/20:  " The patient is 64-year-old  male who was diagnosed initially with stage III sigmoid adenocarcinoma, in 2013 status post sigmoid colectomy, K-jacobo mutation G12V, microsatellite stable, status post adjuvant systematic chemotherapy with FOLFOX     In November 2014 he was found to have hepatic metastases, status post 5 cycles of FOLFIRI and Avastin completed on 02/16/2015 with decrease in the size in the liver lesions, status post radiofrequency ablation on 04/2015 of the liver metastases at segment 4 8, and 7 and resection of segment 6, 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     He received few cycles of FOLFIRI and Avastin thereafter  March 2017 palliative radiation therapy to the painful right upper abdominal wall metastasis 30 Gy in 10 sessions at 6 month later on this subcutaneous masses start to grow and protrude through the skin he was treated with FOLFIRI and Avastin until December 2018     On 06/2019 CT scan of the chest showed progressing in the size of the pulmonary metastases with left hilar metastatic disease, enlarging hepatic metastases progressing in the size of the subcutaneous and muscular metastatic disease     Angiogram on 06/20/2019 showed metastatic lesion in the right anterolateral chest with no arterial supply to allow for embolization     Treated with capecitabine 2 weeks on 1 week off however with increase in the size of the mass in the right upper quadrant     CT scan on 10/2019 showed a new 2 cm pleural based right lower lobe mass, 3 relatively stable spiculated mass in the right upper lobe measuring 2 cm, left upper lobe measuring 3 5 cm with internal calcification, pleural based mass in the left base measuring 4 cm and increased soft tissue mass in the anterior right abdominal wall measuring 4 x 5 cm, 2 cm lower pole right kidney cyst     All of these were done at [facility]  He was admitted with obstructive jaundice, he had biliary stent for 3 months, could not be exchanged, admitted to the hospital in January 2020 with obstructive jaundice bilirubin of 20, however manipulation of the metal biliary stent drained bile  PTC was aborted at this time"    At the conclusion of Dr Lexus max consultation, he offered the patient 2 systemic options however in order for him to be a candidate for treatment, his bilirubin must be at 3 mg/dL lower  Patient was advised to follow-up with oncologist in 2 weeks with interval imaging and bilirubin level taken prior to the appointment  CT scan completed on 1/29/19 demonstrated slight decrease in size of abdominal wall mass which extends to the skin surface with expected post treatment change in the lesion and surrounding tissues without evidence of abscess or soft tissue gas  Evidence for metastatic disease in the lungs and liver as noted on previous scans without significant range  Biliary stent in place, stable position with continued biliary ductal dilation  Small right greater than left pleural effusions and a small amount of abdominal pelvic ascites  Patient presented to the [facility] on 1/29/20 secondary to fever and right-sided abdominal pain  Patient was admitted and given antibiotics  Workup regarding fevers pending  Repeat bilirubin is improved, in the 4 mg/dL range  Hematology consult was requested  Interval history:  Patient notes right-sided abdominal pain still persists  Patient notes response to pain medications  Patient takes 5 mg of oxycodone as an outpatient, in the hospital he has been given 10  Patient has yet to note a significant change in pain control with increased dosage  Patient otherwise, does not of questions for Hematology  Primary service is working on fever      Patient still had a low-grade fever of 100 overnight  Review of Systems   Constitutional: Positive for activity change, fatigue and fever  Negative for appetite change  HENT: Negative for nosebleeds  Respiratory: Negative for cough, choking and shortness of breath  Cardiovascular: Positive for leg swelling (New over the past 2 weeks)  Negative for chest pain and palpitations  Gastrointestinal: Positive for abdominal pain  Negative for abdominal distention, anal bleeding, blood in stool, constipation, diarrhea, nausea and vomiting  Endocrine: Negative for cold intolerance  Genitourinary: Negative for hematuria  Musculoskeletal: Negative for myalgias  Skin: Negative for color change, pallor and rash  Allergic/Immunologic: Negative for immunocompromised state  Neurological: Negative for headaches  Hematological: Negative for adenopathy  Does not bruise/bleed easily  All other systems reviewed and are negative  All other review of systems were negative  Past medical history:   Past Medical History:   Diagnosis Date    Atrial fibrillation ([facility] )     BPH (benign prostatic hyperplasia)     Hypertension     Metastatic colon cancer to liver ([facility] )     colon- johnny liver & lungs    Stroke ([place] [address] )     2008- mild weakness left hand/arm    Trigeminal neuralgia     Tumor, metastatic ([facility] )        Past surgical history:   Past Surgical History:   Procedure Laterality Date    COLON SURGERY      2013    EYE SURGERY      IR TUBE PLACEMENT BILI  1/15/2020    LIVER SURGERY      TONSILLECTOMY         Allergies:    Allergies   Allergen Reactions    Ib Pro [Ibuprofen] Hives and Itching    Adhesive [Medical Tape] Rash     Use only paper tape       Home medications:   Medications Prior to Admission   Medication    baclofen 5 MG TABS    gabapentin (NEURONTIN) 300 mg capsule    metoprolol succinate (TOPROL-XL) 50 mg 24 hr tablet    oxyCODONE (ROXICODONE) 5 mg immediate release tablet    tamsulosin (FLOMAX) 0 4 mg       Hospital medications:   Current Facility-Administered Medications:     acetaminophen (TYLENOL) tablet 325 mg, 325 mg, Oral, Q6H PRN, Kal Otoole, DO    baclofen tablet 15 mg, 15 mg, Oral, TID, Chi Richardson, DO, 15 mg at 20 1394    furosemide (LASIX) injection 20 mg, 20 mg, Intravenous, BID (diuretic), Chi Richardson, , 20 mg at 20 0750    gabapentin (NEURONTIN) capsule 300 mg, 300 mg, Oral, TID, Chi Richardson, DO, 300 mg at 20 3677    heparin (porcine) subcutaneous injection 5,000 Units, 5,000 Units, Subcutaneous, Q8H [address], 5,000 Units at 20 0519 **AND** [CANCELED] Platelet count, , , Once, Chi Richardson DO    metoprolol succinate (TOPROL-XL) 24 hr tablet 25 mg, 25 mg, Oral, Daily, Chi Richardson DO, 25 mg at 20 0853    morphine (PF) 4 mg/mL injection 4 mg, 4 mg, Intravenous, Q4H PRN, Chi Richardson DO    ondansetron (ZOFRAN) injection 4 mg, 4 mg, Intravenous, Q4H PRN, Kal Otoole, DO    oxyCODONE (ROXICODONE) immediate release tablet 10 mg, 10 mg, Oral, Q4H PRN, Chi Richardson, , 10 mg at 20 0853    oxyCODONE (ROXICODONE) IR tablet 5 mg, 5 mg, Oral, Q4H PRN, Kal Otoole, DO    sodium chloride 0 9 % infusion, 20 mL/hr, Intravenous, Continuous, Chi Richardson DO, Last Rate: 20 mL/hr at 20 0640, 20 mL/hr at 20 0640    tamsulosin (FLOMAX) capsule 0 4 mg, 0 4 mg, Oral, Daily With Dinner, Kal Otoole DO    Social history:   Social History     Tobacco Use    Smoking status: Former Smoker     Types: Cigarettes     Last attempt to quit:      Years since quittin 1    Smokeless tobacco: Never Used   Substance Use Topics    Alcohol use: Never     Frequency: Never    Drug use: Never       Family history:   Family History   Problem Relation Age of Onset    Cancer Father     Colon cancer Father     Cancer Brother     Cancer Paternal Grandfather        Vitals:  Vitals:    20 0747   BP: 132/81   Pulse: 87   Resp: 20   Temp: 99 7 °F (37 6 °C) SpO2: 99%       Physical Exam   Constitutional: He is oriented to person, place, and time  He appears well-developed  No distress  Jaundiced  Eyes: Scleral icterus is present  Cardiovascular: Regular rhythm  Tachycardia present  Pulmonary/Chest: Effort normal  No respiratory distress  Abdominal: Soft  There is tenderness ( right side, around bandage  )  Mild erythema of the skin extending beyond the bandage  No major changes overnight, per patient observation  Dressing with some discharge noted but not saturated  Musculoskeletal: He exhibits edema (Plus one pitting edema bilaterally  )  Neurological: He is alert and oriented to person, place, and time  Skin: Skin is warm and dry  Labs and Pertinent Reports Reviewed  Lab Results   Component Value Date    WBC 10 73 (H) 01/30/2020    HGB 10 2 (L) 01/30/2020    HCT 30 2 (L) 01/30/2020     (H) 01/30/2020     (L) 01/30/2020     Lab Results   Component Value Date    SODIUM 132 (L) 01/30/2020    K 3 8 01/30/2020    CL 99 (L) 01/30/2020    CO2 26 01/30/2020    AGAP 7 01/30/2020    BUN 8 01/30/2020    CREATININE 0 80 01/30/2020    GLUC 126 01/30/2020    GLUF 111 (H) 01/29/2020    CALCIUM 7 6 (L) 01/30/2020    AST 43 01/30/2020    ALT 44 01/30/2020    ALKPHOS 383 (H) 01/30/2020    TP 4 9 (L) 01/30/2020    TBILI 4 10 (H) 01/30/2020    EGFR 87 01/30/2020     XR chest 1 view portable  Narrative: CHEST     INDICATION:   tachycardia  Colon cancer  COMPARISON:  Chest CT from 1/29/2020  EXAM PERFORMED/VIEWS:  XR CHEST PORTABLE    FINDINGS:  Right port at cavoatrial junction  Cardiomediastinal silhouette appears unremarkable  Bilateral lung metastases  No acute disease  No effusion or pneumothorax  Osseous structures appear within normal limits for patient age  Impression: No acute cardiopulmonary disease  Bilateral lung metastases, better shown on recent chest CT      Workstation performed: [id number]  CT abdomen pelvis with contrast  Narrative: CT ABDOMEN AND PELVIS WITH IV CONTRAST    INDICATION:   RUQ skin lesion, 2wks s/p cryoablation with cellulitis and pain  Metastatic colon carcinoma; status post cryoablation right anterolateral abdominal wall lesion 1/21/2020; patient has cellulitis and pain in the region    COMPARISON:  Multiple prior examinations including images from cryoablation 1/21/2020 and CT 12/27/2019 as well as CT chest 1/29/2020    TECHNIQUE:  CT examination of the abdomen and pelvis was performed  In addition to portal venous phase postcontrast scanning through the abdomen and pelvis, delayed phase postcontrast scanning was performed through the upper abdominal viscera  Axial,   sagittal, and coronal 2D reformatted images were created from the source data and submitted for interpretation  Radiation dose length product (DLP) for this visit:  [id number] 84 12 mGy-cm   This examination, like all CT scans performed in the [facility], was performed utilizing techniques to minimize radiation dose exposure, including the use of iterative   reconstruction and automated exposure control  IV Contrast:  100 mL of iohexol (OMNIPAQUE)  Enteric Contrast:  Enteric contrast was not administered  FINDINGS:    ABDOMEN    LOWER CHEST:  Please refer to report of CT chest examination performed earlier in the day  Again identified is a prominent inferior lingular mass with central calcification measuring 5 9 x 5 7 cm extending to the medial pleural surface adjacent to the   heart as well as the lateral pleural surface similar to the prior examination  There is a posterolateral left lower lobe partially calcified mass again noted as well measuring 3 2 x 4 1 cm  Some dependent opacification of the right lung base likely   reflects atelectasis  Small right greater than left pleural effusions are present    There is a small mass in the posterior medial right lung base again noted measuring 1 9 x 3 0 cm     LIVER/BILIARY TREE:  There is a biliary stent identified extending from the common bile duct to the main right intrahepatic duct  There is mild right and moderate left biliary ductal dilatation again noted which is unchanged  Diffuse atrophy of the   left lobe of the liver is again noted  Postoperative change in the right lobe the liver is again noted presumably representing previous partial hepatic resection  Several hepatic mass lesions consistent with known metastasis are again identified   including a lesion which appears to invade the posterior gallbladder wall (2 6 x 2 6 cm) as well as one in the superior right lobe of the liver (2 5 x 2 2 cm) extending to the subcapsular region, one in segment 4 liver (3 6 x 2 8 cm), and a lesion in   segment 4 of the liver  Splenic and main portal vein as well as right portal vein are patent  Left portal vein does not opacify and may be thrombosed  Kenia Irving GALLBLADDER:  Some focal gallbladder wall thickening in noted posteromedially  Questionable invasion of the posterior gallbladder wall by metastatic lesion as described above and noted previously  SPLEEN:  Unremarkable  PANCREAS:  Unremarkable  ADRENAL GLANDS:  Unremarkable  KIDNEYS/URETERS:  One or more sharply circumscribed subcentimeter renal hypodensities are noted  These lesions are too small to accurately characterize, but are statistically most likely to represent benign cortical renal cyst(s)  According to the   guidelines published in the [facility] Paper of the ACR Incidental Findings Committee (Radiology 2010), no further workup of these lesions is recommended  STOMACH AND BOWEL:  Moderate amount of fecal material present in the colon and rectum  There is a segment of hepatic flexure interposed between the diaphragm and the liver  Mild nonspecific wall thickening of the segment of small bowel in the left mid   abdomen is noted  No intestinal obstruction is identified      APPENDIX:  No findings to suggest appendicitis  ABDOMINOPELVIC CAVITY:  There is a mild amount of abdominal and pelvic ascites present  No free air identified  VESSELS:  Unremarkable for patient's age  PELVIS    REPRODUCTIVE ORGANS:  Unremarkable for patient's age  URINARY BLADDER:  Mild nonspecific circumferential wall thickening  ABDOMINAL WALL/INGUINAL REGIONS:  There is diffuse mild-moderate subcutaneous edema present likely reflecting 3rd spacing  There is again identified a soft tissue mass involving the abdominal wall musculature and subcutaneous soft tissues in the right   anterolateral upper abdominal/lower chest wall extending to and involving the skin where there is a overlying fungating mass  The abdominal wall lesion was treated previously with cryoablation on 1/21/2020  Currently measures approximately 3 9 x 2 7 x   5 8 cm (previously 4 3 x 2 9 x 6 7 cm)  p no abscess noted  No gas in the lesion is identified  There is some inflammatory type stranding noted in the adjacent soft tissues both superficial to and deep to this lesion likely reflecting posttreatment   change  No definite invasion of/destruction of the underlying osseous structures is noted  OSSEOUS STRUCTURES:  Degenerative change present throughout the spine  No definite lucent or sclerotic lesions are noted  Impression: 1  Interim slight decrease in size in abdominal wall mass, consistent with known metastatic lesion, in the right upper quadrant anterolateral abdominal /inferior thoracic wall extending to the skin surface with expected posttreatment change in the   lesion and surrounding tissues without evidence for abscess or soft tissue gas  2   Evidence for metastatic disease in the lungs and liver as described above not appreciably changed from the previous examination  3   Biliary stent in place in stable position with continued biliary ductal dilatation    4   Small right greater than left pleural effusions and small amount of abdominal and pelvic ascites  5   Additional findings as noted  Findings concur with the preliminary report by Dr Isis Garcia from [facility]       Workstation performed: [id number]        Please note: This report has been generated by voice recognition software system  Therefore, there may be syntax, spelling and/or grammatical errors  Please call if you have any questions

## 2020-01-30 NOTE — ASSESSMENT & PLAN NOTE
· Hyperbilirubinemia secondary to biliary obstruction from metastatic colon cancer  · Had metal stent placed at [facility]  · Recently transferred to [facility] for ERCP and possibility of external drain which both were unsuccessful    · Patient hoping for bilirubin to be less than 3 to try alternate chemotherapy    Results from last 7 days   Lab Units 01/29/20  2212 01/29/20  0915 01/23/20  1030   TOTAL BILIRUBIN mg/dL 4 60* 5 00* 5 90*

## 2020-01-30 NOTE — ASSESSMENT & PLAN NOTE
· Metastatic colon cancer to multiple sites initially diagnosed in 2013 managed at [facility] status post sigmoid colectomy and chemotherapy  · Had recurrence in 2014 and recently started treatment at [facility] where metal biliary stenting was placed for hyperbilirubinemia    · Eventually the patient had worsening hyperbilirubinemia and was hospitalized at [facility] 1/9/2020 with attempt at ERCP and external drain unsuccessful but hyperbilirubinemia improved  · Did have cryoablation 1/21/2020 and saw Dr Grace Liu 1/22/2020; have oncology evaluate

## 2020-01-30 NOTE — ASSESSMENT & PLAN NOTE
· Trigeminal neuralgia which carbamazepine was recently discontinued due to transaminitis and started on gabapentin

## 2020-01-31 NOTE — ASSESSMENT & PLAN NOTE
[id number]    Significant events:  HS blood glucose 304, Dr. Braden updated and 5 units Humalog given.    · Home regimen:  Lovenox 100 mg every 12 hours, as recommended by his oncologist, metoprolol succinate 50 mg daily  · Holding Lovenox  · Currently rate controlled

## 2020-01-31 NOTE — ASSESSMENT & PLAN NOTE
· Acute febrile illness  SIRS, possible sepsis POA   · Differentials includes cellulitis of abdominal wall by site of cutaneous tumor vs tumor fever  · Did have obstructive jaundice with bilirubin as high as 40+ status post biliary stent at [facility] and recent hospitalization at [facility]  · Given vancomycin x1 in the emergency department which was being continued but was discontinued yesterday as MRSA negative  Patient was started on IV cefazolin and transitioned to Keflex on discharge  Blood cultures negative so far      · Fevers resolved

## 2020-01-31 NOTE — PROGRESS NOTES
Vancomycin Assessment    Kirk Jiang is a 66 y o  male who is currently receiving vancomycin 1750mg IV q12h for skin-soft tissue infection     Relevant clinical data and objective history reviewed:  Creatinine   Date Value Ref Range Status   01/31/2020 0 89 0 60 - 1 30 mg/dL Final     Comment:     Specimen Icteric; Results May be Affected   01/30/2020 0 80 0 60 - 1 30 mg/dL Final     Comment:     Specimen Icteric; Results May be Affected   01/29/2020 1 01 0 60 - 1 30 mg/dL Final     Comment:     Specimen Icteric; Results May be Affected     /74 (BP Location: Left arm)   Pulse 90   Temp 99 1 °F (37 3 °C) (Oral)   Resp 17   Ht 5' 10" (1 778 m)   Wt 110 kg (242 lb)   SpO2 99%   BMI 34 72 kg/m²   I/O last 3 completed shifts: In: 580 [P O :580]  Out: 1400 [Urine:1400]  Lab Results   Component Value Date/Time    BUN 8 01/31/2020 05:04 AM    WBC 10 08 01/31/2020 05:04 AM    HGB 10 3 (L) 01/31/2020 05:04 AM    HCT 31 2 (L) 01/31/2020 05:04 AM     (H) 01/31/2020 05:04 AM     (L) 01/31/2020 05:04 AM     Temp Readings from Last 3 Encounters:   01/31/20 99 1 °F (37 3 °C) (Oral)   01/22/20 98 3 °F (36 8 °C)   01/21/20 (!) 97 °F (36 1 °C)     Vancomycin Days of Therapy: 2  Assessment/Plan    The patient is currently on vancomycin utilizing scheduled dosing  Baseline risks associated with therapy include: concomitant nephrotoxic medications and advanced age  The patient is receiving 1750mg IV q12h with the most recent vancomycin level being not at steady-state and sub-therapeutic (13 2) based on a goal of 15-20 (appropriate for most indications) ; therefore, after clinical evaluation will be changed to 1500mg IVPB q 8 hours  Dosing time will be changed to 0000,0800,1600  Pharmacy will continue to follow closely for s/sx of nephrotoxicity, infusion reactions and appropriateness of therapy  BMP and CBC will be ordered per protocol    Plan for trough as patient approaches steady state, prior to the 5th  dose at approximately 0700 on 2/2/20  Pharmacy will continue to follow the patients culture results and clinical progress daily      Neelima Steve

## 2020-01-31 NOTE — ASSESSMENT & PLAN NOTE
· Bilateral lower extremity edema likely in the setting of hepatic metastasis + component of volume overload as patient also received fluids while at [facility] and as per family swelling worsened after that and there also appears to be a chronic component as he does have leg swelling even at baseline  · lower extremity duplex ruled out DVT   · He was on IV lasix while here with some improvement in his leg swelling  As per daughter, swelling appears to be close to his baseline  As per patient even chronically he has days where swelling worsens and he keeps his legs elevated and swelling improves  advised elevation of legs, compression stocking  · Patient to follow up with PCP after discharge to discuss if he needs to be on p r n   Diuretic for leg swelling if swelling worsens after discharge

## 2020-01-31 NOTE — PROGRESS NOTES
Progress Note - Infectious Disease   Amy Lagos 66 y o  male MRN: [id number]  Unit/Bed#: [address] [place] [address] Encounter: [id number]      Impression/Plan:  1  SIRS syndrome and possible sepsis, POA  Patient presented on admission with fever along with mild leukocytosis  He has pain localizing around his cutaneous tumor  Blood cultures so far without growth, flu swab negative  UA unremarkable  No a cutaneous abscess on CT  Liver function tests seem to be improving  Differential at this time includes 2 and possible tumor fever or occult bacteremia  Will continue antibiotics as below  Continue to trend fever curve/vitals  Repeat CBC/chemistry tomorrow  Follow up pending culture data  Supportive care as per primary     2  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  Presenting at this time with fever and appears to have increasing discoloration and pain around the tumor site  No cutaneous abscess on CT  Suspect superimposed cellulitis given improvement in pain and fever curve  Blood and MRSA culture pending  Deeper palpation on exam limited by pain  Clinical images uploaded  Will continue on IV vancomycin for now  Follow-up MRSA swab and blood cultures  Continue to trend fever curve/vitals  Repeat CBC/chemistry tomorrow  Follow up pending blood cultures  Local wound care as per primary  Ongoing follow-up by Oncology  Low threshold for repeat imaging  If cultures negative including MRSA swab and patient clinically doing well will transition to Keflex to complete 7 day course in next 24-48 hours      3  Colon cancer with metastases  Patient unfortunately with progressive disease despite various treatments  Ongoing follow-up and management as per Oncology      4  Hyperbilirubinemia  Patient with elevated bilirubin and alkaline phosphatase at baseline  These values are actually improving after recent stenting    Repeat CMP tomorrow  Serial abdominal exams  Follow up pending cultures  Antibiotics as above     5  Lower extremity edema  Suspect that this is related to patient's tumor burden in his abdomen  Follow-up lower extremity Dopplers  Continue antibiotics as above  Anticoagulation/diuretics as per primary     Above plan discussed in detail with the patient at bedside  Primary service updated of the above plan via Tiger text      ID consult service will continue to follow closely  Antibiotics:  Vancomycin 2    24 hour events:  No acute events noted overnight on chart review  Patient is currently afebrile  White blood cell count 10 0  MRSA swab and blood cultures pending  No new imaging  Patient's other vitals are stable  Patient's other labs including LFTs unremarkable    Subjective:  Patient denies having any nausea, vomiting, chest pain or shortness of breath  He reports subjective improvement along with improvement in pain around his cutaneous tumor  Objective:  Vitals:  Temp:  [98 2 °F (36 8 °C)-99 °F (37 2 °C)] 99 °F (37 2 °C)  HR:  [89-97] 97  Resp:  [18] 18  BP: (135-151)/(78-81) 151/81  SpO2:  [98 %-99 %] 99 %  Temp (24hrs), Av 6 °F (37 °C), Min:98 2 °F (36 8 °C), Max:99 °F (37 2 °C)  Current: Temperature: 99 °F (37 2 °C)    Physical Exam:   General Appearance:  Alert, interactive, nontoxic, no acute distress  Chronically ill-appearing  Jaundiced on exam    Throat: Oropharynx moist without lesions  Lungs:   Clear to auscultation bilaterally; no wheezes, rhonchi or rales; respirations unlabored room air   Heart:  RRR; no murmur, rub or gallop appreciated   Abdomen:   Distended, positive bowel sounds  Patient's skin is indurated and tender to deeper palpation around his cutaneous tumor  Previously noted erythema/discoloration improving  Less pain on palpation  Clinical images added to chart  Extremities: No clubbing, cyanosis; continue 2+ pitting edema lower extremities by low   Skin: No other new rashes or lesions  No other new draining wounds noted  Labs, Imaging, & Other studies:   All pertinent labs and imaging studies were personally reviewed  Results from last 7 days   Lab Units 01/31/20  0504 01/30/20  0644 01/29/20  2212   WBC Thousand/uL 10 08 10 73* 10 89*   HEMOGLOBIN g/dL 10 3* 10 2* 11 0*   PLATELETS Thousands/uL 138* 138* 167     Results from last 7 days   Lab Units 01/31/20  0504 01/30/20  0644 01/29/20  2212   POTASSIUM mmol/L 3 4* 3 8 4 0   CHLORIDE mmol/L 99* 99* 98*   CO2 mmol/L 27 26 28   BUN mg/dL 8 8 9   CREATININE mg/dL 0 89 0 80 1 01   EGFR ml/min/1 73sq m 83 87 72   CALCIUM mg/dL 7 2* 7 6* 7 7*   AST U/L 35 43 58*   ALT U/L 35 44 59   ALK PHOS U/L 339* 383* 484*     Results from last 7 days   Lab Units 01/30/20  0339 01/29/20 2212   BLOOD CULTURE  Received in Microbiology Lab  Culture in Progress  Received in Microbiology Lab  Culture in Progress

## 2020-01-31 NOTE — ASSESSMENT & PLAN NOTE
· Metastatic colon cancer to multiple sites initially diagnosed in 2013 managed at [facility] status post sigmoid colectomy and chemotherapy  · Had recurrence in 2014 and recently started treatment at [facility] where metal biliary stenting was placed for hyperbilirubinemia  · Eventually the patient had worsening hyperbilirubinemia and was hospitalized at [facility] 1/9/2020 with attempt at ERCP and external drain unsuccessful but hyperbilirubinemia improved  · Did have cryoablation 1/21/2020 and saw Dr Ilana Starkey 1/22/2020  Appreciate oncology input and patient can follow-up with oncologist after discharge    As per family he has an appointment on Tuesday

## 2020-01-31 NOTE — PHYSICAL THERAPY NOTE
PT TREATMENT     01/31/20 1701   Pain Assessment   Pain Assessment Matt-Baker FACES   Matt-Baker FACES Pain Rating 6   Pain Location Rib cage   Pain Orientation Right   Pain Frequency   (with activity)   Restrictions/Precautions   Other Precautions Pain   General   Chart Reviewed Yes   Cognition   Overall Cognitive Status WFL   Subjective   Subjective "I was planning on getting OOB today, but I didn't"   Bed Mobility   Supine to Sit 4  Minimal assistance   Transfers   Sit to Stand 5  Supervision   Stand to Sit 5  Supervision   Stand pivot 5  Supervision   Additional items   (with walker )   Ambulation/Elevation   Gait Assistance   (supervision/min assist)   Assistive Device Rolling walker   Distance 150 feet with change in direction   Balance   Static Sitting Fair +   Dynamic Sitting Fair +   Static Standing Fair   Dynamic Standing Fair -   Activity Tolerance   Activity Tolerance Patient tolerated treatment well;Patient limited by fatigue   Assessment   Prognosis Good   Problem List Decreased strength;Decreased endurance; Impaired balance;Decreased mobility;Pain   Assessment Pt does well with encouragement  Pt able to ambulate in the hallway today with a walker with a steady gait  Pt encouraged to sit OOB for his meal which he agreed to  Plan to try ambulating with a cane next session as pt does not have a walker at home and pt was fully ambultory prior to admission  Plan   Treatment/Interventions ADL retraining;Functional transfer training;LE strengthening/ROM; Therapeutic exercise; Endurance training;Equipment eval/education; Bed mobility;Gait training;Patient/family training   Progress Progressing toward goals   PT Frequency 5x/wk   Recommendation   Recommendation Outpatient PT   Equipment Recommended   (pt has a cane)   Licensure   [place] License Number  Lady Taylor PT [id number]

## 2020-01-31 NOTE — PLAN OF CARE
Problem: PHYSICAL THERAPY ADULT  Goal: Performs mobility at highest level of function for planned discharge setting  See evaluation for individualized goals  Outcome: Progressing  Note:   Prognosis: Good  Problem List: Decreased strength, Decreased endurance, Impaired balance, Decreased mobility, Pain  Assessment: Pt does well with encouragement  Pt able to ambulate in the hallway today with a walker with a steady gait  Pt encouraged to sit OOB for his meal which he agreed to  Plan to try ambulating with a cane next session as pt does not have a walker at home and pt was fully ambultory prior to admission  Recommendation: Outpatient PT          See flowsheet documentation for full assessment

## 2020-01-31 NOTE — PLAN OF CARE
Problem: Potential for Falls  Goal: Patient will remain free of falls  Description  INTERVENTIONS:  - Assess patient frequently for physical needs  -  Identify cognitive and physical deficits and behaviors that affect risk of falls  -  [place] fall precautions as indicated by assessment   - Educate patient/family on patient safety including physical limitations  - Instruct patient to call for assistance with activity based on assessment  - Modify environment to reduce risk of injury  - Consider OT/PT consult to assist with strengthening/mobility  Outcome: Progressing     Problem: Prexisting or High Potential for Compromised Skin Integrity  Goal: Skin integrity is maintained or improved  Description  INTERVENTIONS:  - Identify patients at risk for skin breakdown  - Assess and monitor skin integrity  - Assess and monitor nutrition and hydration status  - Monitor labs   - Assess for incontinence   - Turn and reposition patient  - Assist with mobility/ambulation  - Relieve pressure over bony prominences  - Avoid friction and shearing  - Provide appropriate hygiene as needed including keeping skin clean and dry  - Evaluate need for skin moisturizer/barrier cream  - Collaborate with interdisciplinary team   - Patient/family teaching  - Consider wound care consult   Outcome: Progressing     Problem: Nutrition/Hydration-ADULT  Goal: Nutrient/Hydration intake appropriate for improving, restoring or maintaining nutritional needs  Description  Monitor and assess patient's nutrition/hydration status for malnutrition  Collaborate with interdisciplinary team and initiate plan and interventions as ordered  Monitor patient's weight and dietary intake as ordered or per policy  Utilize nutrition screening tool and intervene as necessary  Determine patient's food preferences and provide high-protein, high-caloric foods as appropriate       INTERVENTIONS:  - Monitor oral intake, urinary output, labs, and treatment plans  - Assess nutrition and hydration status and recommend course of action  - Evaluate amount of meals eaten  - Assist patient with eating if necessary   - Allow adequate time for meals  - Recommend/ encourage appropriate diets, oral nutritional supplements, and vitamin/mineral supplements  - Order, calculate, and assess calorie counts as needed  - Recommend, monitor, and adjust tube feedings and TPN/PPN based on assessed needs  - Assess need for intravenous fluids  - Provide specific nutrition/hydration education as appropriate  - Include patient/family/caregiver in decisions related to nutrition  Outcome: Progressing     Problem: METABOLIC, FLUID AND ELECTROLYTES - ADULT  Goal: Electrolytes maintained within normal limits  Description  INTERVENTIONS:  - Monitor labs and assess patient for signs and symptoms of electrolyte imbalances  - Administer electrolyte replacement as ordered  - Monitor response to electrolyte replacements, including repeat lab results as appropriate  - Instruct patient on fluid and nutrition as appropriate  Outcome: Progressing  Goal: Fluid balance maintained  Description  INTERVENTIONS:  - Monitor labs   - Monitor I/O and WT  - Instruct patient on fluid and nutrition as appropriate  - Assess for signs & symptoms of volume excess or deficit  Outcome: Progressing     Problem: SKIN/TISSUE INTEGRITY - ADULT  Goal: Skin integrity remains intact  Description  INTERVENTIONS  - Identify patients at risk for skin breakdown  - Assess and monitor skin integrity  - Assess and monitor nutrition and hydration status  - Monitor labs (i e  albumin)  - Assess for incontinence   - Turn and reposition patient  - Assist with mobility/ambulation  - Relieve pressure over bony prominences  - Avoid friction and shearing  - Provide appropriate hygiene as needed including keeping skin clean and dry  - Evaluate need for skin moisturizer/barrier cream  - Collaborate with interdisciplinary team (i e  Nutrition, Rehabilitation, etc )   - Patient/family teaching  Outcome: Progressing  Goal: Incision(s), wounds(s) or drain site(s) healing without S/S of infection  Description  INTERVENTIONS  - Assess and document risk factors for skin impairment   - Assess and document dressing, incision, wound bed, drain sites and surrounding tissue  - Consider nutrition services referral as needed  - Oral mucous membranes remain intact  - Provide patient/ family education  Outcome: Progressing     Problem: HEMATOLOGIC - ADULT  Goal: Maintains hematologic stability  Description  INTERVENTIONS  - Assess for signs and symptoms of bleeding or hemorrhage  - Monitor labs  - Administer supportive blood products/factors as ordered and appropriate  Outcome: Progressing

## 2020-01-31 NOTE — PROGRESS NOTES
[facility] Progress Note  Patient: Andi Linder 66 y o  male   MRN: [id number]  PCP: No primary care provider on file  Unit/Bed#: [address] [place] [address] Encounter: [id number]  Date Of Visit: 01/31/20    Problem List:    Principal Problem:    Febrile illness  Active Problems:    BPH (benign prostatic hyperplasia)    Atrial fibrillation (HCC)    Colon cancer metastasized to multiple sites [facility])    Trigeminal neuralgia    Hyperbilirubinemia    Lower extremity edema      Assessment & Plan:    * Febrile illness  Assessment & Plan  · Acute febrile illness  SIRS, possible sepsis POA   · Differentials includes cellulitis of abdominal wall by side of cutaneous tumor vs tumor fever  · Did have obstructive jaundice with bilirubin as high as 40+ status post biliary stent at [facility] and recent hospitalization at [facility]  · Given vancomycin x1 in the emergency department which is being continued  Blood cultures negative so far  Follow-up MRSA swab    Lower extremity edema  Assessment & Plan  · Bilateral lower extremity edema likely in the setting of hepatic metastasis  · lower extremity duplex ruled out DVT   · Continue IV lasix and advised elevation of legs    Trigeminal neuralgia  Assessment & Plan  · Trigeminal neuralgia which carbamazepine was recently discontinued due to transaminitis and started on gabapentin  Colon cancer metastasized to multiple sites [facility])  Assessment & Plan  · Metastatic colon cancer to multiple sites initially diagnosed in 2013 managed at [facility] status post sigmoid colectomy and chemotherapy  · Had recurrence in 2014 and recently started treatment at [facility] where metal biliary stenting was placed for hyperbilirubinemia  · Eventually the patient had worsening hyperbilirubinemia and was hospitalized at [facility] 1/9/2020 with attempt at ERCP and external drain unsuccessful but hyperbilirubinemia improved  · Did have cryoablation 1/21/2020 and saw Dr Krzysztof Mejía 1/22/2020  Appreciate oncology input and patient can follow-up with oncologist after discharge    Atrial fibrillation [facility])  Assessment & Plan  · Chronic atrial fibrillation on metoprolol succinate  Previously anticoagulated with lovenox 100 mg b i d  but currently on hold due to need for multiple procedures    BPH (benign prostatic hyperplasia)  Assessment & Plan  · continue tamsulosin        VTE Pharmacologic Prophylaxis:   Pharmacologic: Heparin  Mechanical VTE Prophylaxis in Place: No    Patient Centered Rounds: I have performed bedside rounds with nursing staff today  Discussions with Specialists or Other Care Team Provider: yes    Education and Discussions with Family / Patient: yes    Time Spent for Care: 30 minutes  More than 50% of total time spent on counseling and coordination of care as described above  Current Length of Stay: 1 day(s)    Current Patient Status: Inpatient   Certification Statement: The patient will continue to require additional inpatient hospital stay due to SIRs/sepsis and possible cellulitis requiring IV antibiotics and awaiting final cultures    Discharge Plan: home    Code Status: Level 1 - Full Code      Subjective:   Patient states he feels much better today  Reports some pain around his tumor site    Objective:     Vitals:   Temp (24hrs), Av 8 °F (37 1 °C), Min:98 2 °F (36 8 °C), Max:99 1 °F (37 3 °C)    Temp:  [98 2 °F (36 8 °C)-99 1 °F (37 3 °C)] 99 1 °F (37 3 °C)  HR:  [89-97] 90  Resp:  [16-18] 17  BP: (128-151)/(74-81) 128/74  SpO2:  [98 %-99 %] 99 %  Body mass index is 34 72 kg/m²  Input and Output Summary (last 24 hours):     No intake or output data in the 24 hours ending 20 1633    Physical Exam:     Physical Exam   Constitutional: He is oriented to person, place, and time  No distress  HENT:   Head: Normocephalic and atraumatic  Eyes: EOM are normal  Right eye exhibits no discharge  Left eye exhibits no discharge  No scleral icterus     Cardiovascular: Irregular rate and rhythm   Pulmonary/Chest: Effort normal and breath sounds normal  No respiratory distress  He has no wheezes  He has no rales  Abdominal: Soft  He exhibits no distension  Cutaneous tumor noted and right upper quadrant with some surrounding erythema and tenderness to the region  Dressing in place with drainage   Musculoskeletal: He exhibits edema  Neurological: He is alert and oriented to person, place, and time  No cranial nerve deficit  Skin: He is not diaphoretic  Psychiatric: He has a normal mood and affect  Additional Data:     Labs:    Results from last 7 days   Lab Units 01/31/20  0504  01/29/20 2212   WBC Thousand/uL 10 08   < > 10 89*   HEMOGLOBIN g/dL 10 3*   < > 11 0*   HEMATOCRIT % 31 2*   < > 33 5*   PLATELETS Thousands/uL 138*   < > 167   NEUTROS PCT %  --   --  79*   LYMPHS PCT %  --   --  5*   MONOS PCT %  --   --  16*   EOS PCT %  --   --  0    < > = values in this interval not displayed  Results from last 7 days   Lab Units 01/31/20  0504   POTASSIUM mmol/L 3 4*   CHLORIDE mmol/L 99*   CO2 mmol/L 27   BUN mg/dL 8   CREATININE mg/dL 0 89   CALCIUM mg/dL 7 2*   ALK PHOS U/L 339*   ALT U/L 35   AST U/L 35     Results from last 7 days   Lab Units 01/29/20  2212   INR  1 03       * I Have Reviewed All Lab Data Listed Above  * Additional Pertinent Lab Tests Reviewed: [facility] Admission Reviewed      Imaging:  Imaging Reports Reviewed Today Include: Vas L E, CT abd/pelvis  Imaging Personally Reviewed by Myself Includes:  N/A    Recent Cultures (last 7 days):     Results from last 7 days   Lab Units 01/30/20  0339 01/29/20 2212   BLOOD CULTURE  No Growth at 24 hrs  No Growth at 24 hrs         Last 24 Hours Medication List:     Current Facility-Administered Medications:  acetaminophen 325 mg Oral Q6H PRN Angela Lars, DO    baclofen 15 mg Oral TID Angela Lars, DO    furosemide 20 mg Intravenous BID (diuretic) Angela Lars, DO    gabapentin 300 mg Oral TID Cassie Frost, DO    heparin (porcine) 5,000 Units Subcutaneous [facility] Luis Dylan, DO    metoprolol succinate 25 mg Oral Daily Chi Richardson, DO    metroNIDAZOLE 1,000 mg Oral Daily Angelamaycol Gillette, DO    morphine injection 4 mg Intravenous Q4H PRN Chi Richardson, DO    ondansetron 4 mg Intravenous Q4H PRN Cassie Frost, DO    oxyCODONE 10 mg Oral Q4H PRN Cassie Frost, DO    oxyCODONE 5 mg Oral Q4H PRN Cassie Frost, DO    sodium chloride 20 mL/hr Intravenous Continuous Chi Richardson, DO Last Rate: 20 mL/hr (01/30/20 0640)   tamsulosin 0 4 mg Oral Daily With [employer], DO    vancomycin 20 mg/kg (Adjusted) Intravenous Q12H Irene Russo MD Last Rate: 1,750 mg (01/31/20 0306)          Today, Patient Was Seen By: Bairon Abernathy DO    ** Please Note: "This note has been constructed using a voice recognition system  Therefore there may be syntax, spelling, and/or grammatical errors   Please call if you have any questions  "**

## 2020-01-31 NOTE — ASSESSMENT & PLAN NOTE
· Trigeminal neuralgia which carbamazepine was recently discontinued due to transaminitis and on gabapentin    · Reported worsening facial pain today which improved after he received gabapentin and baclofen

## 2020-02-01 NOTE — PROGRESS NOTES
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 and recent cryoablation in January 2020  Presently Mr Mc states that his pain is controlled, appetite has improved slightly, no nausea or vomiting  The bilirubin level is trended below - continues to come down - this is obviously good  Recent temperature was within normal limits  Patient is on vancomycin and Flagyl - being followed by infectious disease  Recent white count, hemoglobin level and platelet count is okay/acceptable  Lower extremity swelling is less/better than before  Mr Jeanette Neil has a follow-up appointment with   [facility] next week  The plan is to continue to monitor the patient to see if he will be a candidate for additional systemic treatments in the future  If the patient is not able to be discharged by Tuesday, I will help arrange a rescheduled appointment  The above was discussed with the patient; all questions were answered  30 minutes was spent with patient, 50% of time coordinating care on the floor  _________________________________________________________________________________________    Chief complaint:  Fevers, chills, nausea, abdominal pain    History of present illness:  78-year-old male with a history of recurrent sigmoid adenocarcinoma (liver metastases) s/p RFA, chemotherapy, right upper abdominal wall metastatic lesions treated with RT, status post cryoablation of right-sided rib/abdominal tumor in January 2020  Presently Mr Mc states feeling okay, slightly better than before  Right upper quadrant pain is relatively well controlled  Appetite is +/- but no nausea vomiting  Activities are still limited      Hospital medications list:    Current Facility-Administered Medications:  acetaminophen 325 mg Oral Q6H PRN Get Mckay DO baclofen 15 mg Oral TID Arnol Carroll, DO    furosemide 20 mg Intravenous BID (diuretic) Arnol Carroll, DO    gabapentin 300 mg Oral TID Arnol Carroll, DO    heparin (porcine) 5,000 Units Subcutaneous [facility] Chifer Richardson, DO    metoprolol succinate 25 mg Oral Daily Chi Dylan, DO    metroNIDAZOLE 1,000 mg Oral Daily Angela Revankar, DO    morphine injection 4 mg Intravenous Q4H PRN Chi Dylan, DO    ondansetron 4 mg Intravenous Q4H PRN Arnol Carroll, DO    oxyCODONE 10 mg Oral Q4H PRN Arnol Carroll, DO    oxyCODONE 5 mg Oral Q4H PRN Arnol Carroll, DO    sodium chloride 20 mL/hr Intravenous Continuous Arnol Carroll, DO Last Rate: 20 mL/hr (01/30/20 0640)   tamsulosin 0 4 mg Oral Daily With Respiratory Motion, DO    vancomycin 1,250 mg Intravenous Q8H Jennifer Truong MD Last Rate: 1,250 mg (02/01/20 0959)       Physical exam  /79 (BP Location: Left arm)   Pulse 102   Temp 98 °F (36 7 °C) (Oral)   Resp 20   Ht 5' 10" (1 778 m)   Wt 110 kg (242 lb)   SpO2 100%   BMI 34 72 kg/m²   Constitutional:  Somewhat obese male, no respiratory distress  Eyes: PERRL, conjunctiva pale are, + icteric    HENT: Atraumatic, external ears normal, nose normal,    oropharynx moist, no pharyngeal exudates, no thrush, pink  Neck: Good range of motion, no adenopathy  Respiratory:  Fair to good air entry bilaterally, scattered bilateral rhonchi  Cardiovascular: Normal rate, normal rhythm, no murmurs, no gallops, no rubs    GI: Soft, nondistended, obese, cannot palpate liver or spleen, limited exam  : No costovertebral angle tenderness, normal reproductive organs, no discharge  Musculoskeletal: No pain or tenderness with palpation of joints, muscles or bones  Integument:  Pale, slightly jaundiced, warm, scattered ecchymoses and purpura, no active  Lymphatic: No adenopathy in the neck, supra-clavicular region, axilla and groin bilaterally  Extremities:  2+ bilateral lower extremity pitting edema, no cords, pulses are decreased  Neurologic: Alert & oriented x 3, CN 2-12 normal, normal motor function, normal sensory function, no focal deficits noted  Psychiatric:  Pleasant, responsive, appropriate  Rectal: Deferred    Laboratory test results              Results for Leesa Hein (MRN [id number]) as of 2/1/2020 11:00   Ref  Range 2/1/2020 06:40   WBC Latest Ref Range: 4 31 - 10 16 Thousand/uL 8 91   Red Blood Cell Count Latest Ref Range: 3 88 - 5 62 Million/uL 3 05 (L)   Hemoglobin Latest Ref Range: 12 0 - 17 0 g/dL 10 5 (L)   HCT Latest Ref Range: 36 5 - 49 3 % 32 5 (L)   MCV Latest Ref Range: 82 - 98 fL 107 (H)   MCH Latest Ref Range: 26 8 - 34 3 pg 34 4 (H)   MCHC Latest Ref Range: 31 4 - 37 4 g/dL 32 3   RDW Latest Ref Range: 11 6 - 15 1 % 14 2   Platelet Count Latest Ref Range: 149 - 390 Thousands/uL 153     Results for Leesa Hein (MRN [id number]) as of 2/1/2020 11:00   Ref   Range 2/1/2020 06:40   Sodium Latest Ref Range: 136 - 145 mmol/L 133 (L)   Potassium Latest Ref Range: 3 5 - 5 3 mmol/L 4 2   Chloride Latest Ref Range: 100 - 108 mmol/L 99 (L)   CO2 Latest Ref Range: 21 - 32 mmol/L 29   Anion Gap Latest Ref Range: 4 - 13 mmol/L 5   BUN Latest Ref Range: 5 - 25 mg/dL 9   Creatinine Latest Ref Range: 0 60 - 1 30 mg/dL 0 95   Glucose, Random Latest Ref Range: 65 - 140 mg/dL 112   Calcium Latest Ref Range: 8 3 - 10 1 mg/dL 7 5 (L)

## 2020-02-01 NOTE — DISCHARGE INSTR - OTHER ORDERS
Wound/Skin Care Plan:   1  Fungating tumor wound care: Cleanse  Be sure to cleanse periwound skin also  Apply Cavilon or similar alcohol free barrier film to periwound skin to protect from drainage  Crush (two) 500 mg Flagyl tablets and then sprinkle liberally across the tumor  This will help to control odor  Then cover with Adaptic or similar contact layer  May use Xeroform gauze  The contact layer is to prevent dressing materials from adhering to the wound bed because   fungating tumors are very vascular and bleed very easily  On top of the Adaptic or similar contact layer, place Maxorb or similar calcium alginate dressing  Cut to fit wound size  The calcium alginate is an absorptive dressing that is meant to handle moderate to heavy drainage  Final dressing on wound is ABD pad  Secure with patient's own hypoallergenic tape  Change once a day and prn  Dressing changes may need to be increased in frequency if heavy drainage and also to control odor  2   Patient may follow-up at the [facility] outpatient wound care center for management of fungating tumor  [facility] call [facility] [address] [facility] at [phone] if you would like to schedule an appointment  There are other dressing and wound care options available to manage fungating tumors in case the Flagyl and calcium alginate dressing are not effective

## 2020-02-01 NOTE — CONSULTS
The patient's vancomycin therapy has been discontinued  Pharmacy will sign off now, thank you for this consult

## 2020-02-01 NOTE — PROGRESS NOTES
Vancomycin Assessment    Scott Renee is a 66 y o  male who is currently receiving vancomycin 1500mg IV Q8h for skin-soft tissue infection     Relevant clinical data and objective history reviewed:  Creatinine   Date Value Ref Range Status   02/01/2020 0 95 0 60 - 1 30 mg/dL Final     Comment:     Specimen Icteric; Results May be Affected   01/31/2020 0 89 0 60 - 1 30 mg/dL Final     Comment:     Specimen Icteric; Results May be Affected   01/30/2020 0 80 0 60 - 1 30 mg/dL Final     Comment:     Specimen Icteric; Results May be Affected     /79 (BP Location: Left arm)   Pulse 102   Temp 98 °F (36 7 °C) (Oral)   Resp 20   Ht 5' 10" (1 778 m)   Wt 110 kg (242 lb)   SpO2 100%   BMI 34 72 kg/m²   No intake/output data recorded  Lab Results   Component Value Date/Time    BUN 9 02/01/2020 06:40 AM    WBC 8 91 02/01/2020 06:40 AM    HGB 10 5 (L) 02/01/2020 06:40 AM    HCT 32 5 (L) 02/01/2020 06:40 AM     (H) 02/01/2020 06:40 AM     02/01/2020 06:40 AM     Temp Readings from Last 3 Encounters:   02/01/20 98 °F (36 7 °C) (Oral)   01/22/20 98 3 °F (36 8 °C)   01/21/20 (!) 97 °F (36 1 °C)     Vancomycin Days of Therapy: 3    Assessment/Plan  The patient is currently on vancomycin utilizing scheduled dosing  The patient is receiving 1500mg IV Q8h, and based on a goal of 15-20 (appropriate for most indications) ;  after clinical evaluation will be changed to 1250mg IV Q8h   Pharmacy will continue to follow closely for s/sx of nephrotoxicity, infusion reactions and appropriateness of therapy  BMP and CBC will be ordered per protocol  Plan for trough as patient approaches steady state, prior to the 5th  dose at approximately 0900 on 2/2/20  Pharmacy will continue to follow the patients culture results and clinical progress daily      Peg Mallory, Pharmacist

## 2020-02-01 NOTE — PLAN OF CARE
Problem: Potential for Falls  Goal: Patient will remain free of falls  Description  INTERVENTIONS:  - Assess patient frequently for physical needs  -  Identify cognitive and physical deficits and behaviors that affect risk of falls  -  [place] fall precautions as indicated by assessment   - Educate patient/family on patient safety including physical limitations  - Instruct patient to call for assistance with activity based on assessment  - Modify environment to reduce risk of injury  - Consider OT/PT consult to assist with strengthening/mobility  Outcome: Progressing     Problem: Prexisting or High Potential for Compromised Skin Integrity  Goal: Skin integrity is maintained or improved  Description  INTERVENTIONS:  - Identify patients at risk for skin breakdown  - Assess and monitor skin integrity  - Assess and monitor nutrition and hydration status  - Monitor labs   - Assess for incontinence   - Turn and reposition patient  - Assist with mobility/ambulation  - Relieve pressure over bony prominences  - Avoid friction and shearing  - Provide appropriate hygiene as needed including keeping skin clean and dry  - Evaluate need for skin moisturizer/barrier cream  - Collaborate with interdisciplinary team   - Patient/family teaching  - Consider wound care consult   Outcome: Progressing     Problem: Nutrition/Hydration-ADULT  Goal: Nutrient/Hydration intake appropriate for improving, restoring or maintaining nutritional needs  Description  Monitor and assess patient's nutrition/hydration status for malnutrition  Collaborate with interdisciplinary team and initiate plan and interventions as ordered  Monitor patient's weight and dietary intake as ordered or per policy  Utilize nutrition screening tool and intervene as necessary  Determine patient's food preferences and provide high-protein, high-caloric foods as appropriate       INTERVENTIONS:  - Monitor oral intake, urinary output, labs, and treatment plans  - Assess nutrition and hydration status and recommend course of action  - Evaluate amount of meals eaten  - Assist patient with eating if necessary   - Allow adequate time for meals  - Recommend/ encourage appropriate diets, oral nutritional supplements, and vitamin/mineral supplements  - Order, calculate, and assess calorie counts as needed  - Recommend, monitor, and adjust tube feedings and TPN/PPN based on assessed needs  - Assess need for intravenous fluids  - Provide specific nutrition/hydration education as appropriate  - Include patient/family/caregiver in decisions related to nutrition  Outcome: Progressing     Problem: METABOLIC, FLUID AND ELECTROLYTES - ADULT  Goal: Electrolytes maintained within normal limits  Description  INTERVENTIONS:  - Monitor labs and assess patient for signs and symptoms of electrolyte imbalances  - Administer electrolyte replacement as ordered  - Monitor response to electrolyte replacements, including repeat lab results as appropriate  - Instruct patient on fluid and nutrition as appropriate  Outcome: Progressing  Goal: Fluid balance maintained  Description  INTERVENTIONS:  - Monitor labs   - Monitor I/O and WT  - Instruct patient on fluid and nutrition as appropriate  - Assess for signs & symptoms of volume excess or deficit  Outcome: Progressing     Problem: SKIN/TISSUE INTEGRITY - ADULT  Goal: Skin integrity remains intact  Description  INTERVENTIONS  - Identify patients at risk for skin breakdown  - Assess and monitor skin integrity  - Assess and monitor nutrition and hydration status  - Monitor labs (i e  albumin)  - Assess for incontinence   - Turn and reposition patient  - Assist with mobility/ambulation  - Relieve pressure over bony prominences  - Avoid friction and shearing  - Provide appropriate hygiene as needed including keeping skin clean and dry  - Evaluate need for skin moisturizer/barrier cream  - Collaborate with interdisciplinary team (i e  Nutrition, Rehabilitation, etc )   - Patient/family teaching  Outcome: Progressing  Goal: Incision(s), wounds(s) or drain site(s) healing without S/S of infection  Description  INTERVENTIONS  - Assess and document risk factors for skin impairment   - Assess and document dressing, incision, wound bed, drain sites and surrounding tissue  - Consider nutrition services referral as needed  - Oral mucous membranes remain intact  - Provide patient/ family education  Outcome: Progressing     Problem: HEMATOLOGIC - ADULT  Goal: Maintains hematologic stability  Description  INTERVENTIONS  - Assess for signs and symptoms of bleeding or hemorrhage  - Monitor labs  - Administer supportive blood products/factors as ordered and appropriate  Outcome: Progressing

## 2020-02-01 NOTE — PROGRESS NOTES
[facility] Progress Note  Patient: Andi Linder 66 y o  male   MRN: [id number]  PCP: No primary care provider on file  Unit/Bed#: [address] [place] [address] Encounter: [id number]  Date Of Visit: 02/01/20    Problem List:    Principal Problem:    Febrile illness  Active Problems:    BPH (benign prostatic hyperplasia)    Atrial fibrillation (HCC)    Colon cancer metastasized to multiple sites [facility])    Trigeminal neuralgia    Hyperbilirubinemia    Lower extremity edema      Assessment & Plan:    * Febrile illness  Assessment & Plan  · Acute febrile illness  SIRS, possible sepsis POA   · Differentials includes cellulitis of abdominal wall by side of cutaneous tumor vs tumor fever  · Did have obstructive jaundice with bilirubin as high as 40+ status post biliary stent at [facility] and recent hospitalization at [facility]  · Given vancomycin x1 in the emergency department which was being continued but discontinued today as MRSA negative  Patient started on IV cefazolin  Blood cultures negative so far  Lower extremity edema  Assessment & Plan  · Bilateral lower extremity edema likely in the setting of hepatic metastasis +component of volume overload as patient also received fluids while at [facility] and as per family swelling worsened after that  · lower extremity duplex ruled out DVT   · Continue IV lasix and advised elevation of legs    Hyperbilirubinemia  Assessment & Plan  · Hyperbilirubinemia secondary to biliary obstruction from metastatic colon cancer  · Had metal stent placed at [facility]  · Recently transferred to [facility] for ERCP and possibility of external drain which both were unsuccessful    · Patient hoping for bilirubin to be less than 3 to try alternate chemotherapy    Results from last 7 days   Lab Units 01/31/20  0504 01/30/20  0644 01/29/20  2212 01/29/20  0915   TOTAL BILIRUBIN mg/dL 3 70* 4 10* 4 60* 5 00*       Trigeminal neuralgia  Assessment & Plan  · Trigeminal neuralgia which carbamazepine was recently discontinued due to transaminitis and on gabapentin  Colon cancer metastasized to multiple sites [facility])  Assessment & Plan  · Metastatic colon cancer to multiple sites initially diagnosed in 2013 managed at [facility] status post sigmoid colectomy and chemotherapy  · Had recurrence in 2014 and recently started treatment at [facility] where metal biliary stenting was placed for hyperbilirubinemia  · Eventually the patient had worsening hyperbilirubinemia and was hospitalized at [facility] 1/9/2020 with attempt at ERCP and external drain unsuccessful but hyperbilirubinemia improved  · Did have cryoablation 1/21/2020 and saw Dr Sarwat Varma 1/22/2020  Appreciate oncology input and patient can follow-up with oncologist after discharge    Atrial fibrillation [facility])  Assessment & Plan  · Chronic atrial fibrillation on metoprolol succinate  Previously anticoagulated with lovenox 100 mg b i d  but currently on hold due to need for multiple procedures  BPH (benign prostatic hyperplasia)  Assessment & Plan  · continue tamsulosin  VTE Pharmacologic Prophylaxis:   Pharmacologic: Heparin  Mechanical VTE Prophylaxis in Place: No    Patient Centered Rounds: I have performed bedside rounds with nursing staff today  Discussions with Specialists or Other Care Team Provider: yes - ID    Education and Discussions with Family / Patient: yes - daughter, wife, son-in-law    Time Spent for Care: 35 min  More than 50% of total time spent on counseling and coordination of care as described above      Current Length of Stay: 2 day(s)    Current Patient Status: Inpatient   Certification Statement: The patient will continue to require additional inpatient hospital stay due to SIRs/sepsis and possible cellulitis requiring change in IV antibiotics and patient with fever over the last 24 hours    Discharge Plan: home    Code Status: Level 1 - Full Code      Subjective:   Patient states that he felt confused earlier in the day but now feels much better  Denies any shortness of breath    Objective:     Vitals:   Temp (24hrs), Av 5 °F (36 9 °C), Min:97 2 °F (36 2 °C), Max:100 7 °F (38 2 °C)    Temp:  [97 2 °F (36 2 °C)-100 7 °F (38 2 °C)] 97 9 °F (36 6 °C)  HR:  [] 109  Resp:  [18-20] 20  BP: (100-129)/(68-81) 119/81  SpO2:  [98 %-100 %] 99 %  Body mass index is 34 72 kg/m²  Input and Output Summary (last 24 hours):     No intake or output data in the 24 hours ending 20 8069    Physical Exam:     Physical Exam   Constitutional: He is oriented to person, place, and time  No distress  HENT:   Head: Normocephalic and atraumatic  Eyes: EOM are normal  Right eye exhibits no discharge  Left eye exhibits no discharge  Scleral icterus is present  Cardiovascular: Normal rate and regular rhythm  Pulmonary/Chest: Effort normal and breath sounds normal  No respiratory distress  He has no wheezes  He has no rales  Abdominal: Soft  Bowel sounds are normal  He exhibits no distension  There is tenderness (by site of cutaneous tumor)  There is no guarding  Dressing in place over the tumor with drainage noted on it   Musculoskeletal: He exhibits edema (B/L L  E - at baseline as per daughter)  Neurological: He is alert and oriented to person, place, and time  No cranial nerve deficit  Skin: He is not diaphoretic  Psychiatric: He has a normal mood and affect         Additional Data:     Labs:    Results from last 7 days   Lab Units 20  0640   WBC Thousand/uL 8 91   HEMOGLOBIN g/dL 10 5*   HEMATOCRIT % 32 5*   PLATELETS Thousands/uL 153   NEUTROS PCT % 74   LYMPHS PCT % 7*   MONOS PCT % 17*   EOS PCT % 1     Results from last 7 days   Lab Units 20  0640 20  0504   POTASSIUM mmol/L 4 2 3 4*   CHLORIDE mmol/L 99* 99*   CO2 mmol/L 29 27   BUN mg/dL 9 8   CREATININE mg/dL 0 95 0 89   CALCIUM mg/dL 7 5* 7 2*   ALK PHOS U/L  --  339*   ALT U/L  --  35   AST U/L  --  35     Results from last 7 days   Lab Units 01/29/20  2212   INR  1 03       * I Have Reviewed All Lab Data Listed Above  * Additional Pertinent Lab Tests Reviewed: Juaningjuan josé 64 Admission Reviewed      Imaging:  Imaging Reports Reviewed Today Include: Vas L E, CT abd/pelvis  Imaging Personally Reviewed by Myself Includes:  N/A    Recent Cultures (last 7 days):     Results from last 7 days   Lab Units 01/30/20  0339 01/29/20 2212   BLOOD CULTURE  No Growth at 48 hrs  No Growth at 48 hrs  Last 24 Hours Medication List:     Current Facility-Administered Medications:  acetaminophen 325 mg Oral Q6H PRN Angie Colonel, DO    baclofen 15 mg Oral TID Angie Colonel, DO    cefazolin 2,000 mg Intravenous Q8H Jose Ramon Salinas MD Last Rate: 2,000 mg (02/01/20 1518)   furosemide 20 mg Intravenous BID (diuretic) Angie Colonel, DO    gabapentin 300 mg Oral TID Angie Colonel, DO    heparin (porcine) 5,000 Units Subcutaneous [facility] Chi Richardson, DO    metoprolol succinate 25 mg Oral Daily Chi Richardson, DO    metroNIDAZOLE 1,000 mg Oral Daily Angela Revankar, DO    ondansetron 4 mg Intravenous Q4H PRN Angie Colonel, DO    oxyCODONE 10 mg Oral Q4H PRN Angie Colonel, DO    oxyCODONE 5 mg Oral Q4H PRN Angie Colonel, DO    sodium chloride 20 mL/hr Intravenous Continuous Angie Colonel, DO Last Rate: 20 mL/hr (01/30/20 0640)   tamsulosin 0 4 mg Oral Daily With Dinner Angie Colonel, DO           Today, Patient Was Seen By: Eugena Mcburney, DO    ** Please Note: "This note has been constructed using a voice recognition system  Therefore there may be syntax, spelling, and/or grammatical errors   Please call if you have any questions  "**

## 2020-02-01 NOTE — PROGRESS NOTES
Progress Note - Infectious Disease   Yadiel Cervantes 66 y o  male MRN: [id number]  Unit/Bed#: [address] Encounter: [id number]      Impression/Plan:    1  SIRS syndrome and possible sepsis, POA   Patient presented on admission with fever along with mild leukocytosis   He has pain localizing around his cutaneous tumor  Blood cultures so far negative,  flu swab negative   UA unremarkable   No cutaneous abscess on CT   Liver function tests seem to be improving   Differential at this time includes abdominal wall cellulitis surrounding the site of cutaneous tumor and possible tumor fever or occult bacteremia  Will continue antibiotics as below  Continue to trend fever curve/vitals  Repeat CBC/chemistry tomorrow  Follow up pending culture data  Supportive care as per primary     2  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   Patient with known cutaneous tumor   Presenting at this time with fever and appears to have increasing discoloration and pain around the tumor site   No cutaneous abscess on CT  Suspect superimposed cellulitis given improvement in pain and fever curve  Blood culture negative so far, MRSA screen negative  abdominal pain is slowly improving  Stop vancomycin IV  Start cefazolin IV  Follow-up final result of blood cultures  Continue to trend fever curve/vitals  Repeat CBC/chemistry tomorrow  Local wound care as per primary  Ongoing follow-up by Oncology  Low threshold for repeat imaging  If blood culture remains negative, patient clinically doing well will transition to Keflex to complete 7 day course in next 24-48 hours      3  Colon cancer with metastases   Patient unfortunately with progressive disease despite various treatments   Ongoing follow-up and management as per Oncology      4  Hyperbilirubinemia   Patient with elevated bilirubin and alkaline phosphatase at baseline   These values are actually improving after recent stenting    Repeat CMP tomorrow  Serial abdominal exams  Follow up pending cultures  Antibiotics as above     5  Lower extremity edema   Suspect that this is related to patient's tumor burden in his abdomen  Follow-up lower extremity Dopplers  Continue antibiotics as above  Anticoagulation/diuretics as per primary     Above plan discussed in detail with the patient at bedside  ID consult service will continue to follow closely       Antibiotics:  Vancomycin 3  Cefazolin IV day 1      Subjective:  Patient had a temperature of 100 7° yesterday; he denies chills or sweats; no cough, shortness of breath; Patient is complaining of pain over the right side of the face secondary to his known trigeminal neuralgia  He is also reporting mild abdominal pain, but he reports that his abdominal pain has significantly improved over the past 24 hours  Objective:  Vitals:  Temp:  [97 2 °F (36 2 °C)-100 7 °F (38 2 °C)] 98 °F (36 7 °C)  HR:  [] 102  Resp:  [17-20] 20  BP: (100-129)/(68-79) 117/79  SpO2:  [98 %-100 %] 100 %  Temp (24hrs), Av 8 °F (37 1 °C), Min:97 2 °F (36 2 °C), Max:100 7 °F (38 2 °C)  Current: Temperature: 98 °F (36 7 °C)    Physical Exam:   General Appearance:  Alert, interactive, nontoxic, no acute distress  Throat: Oropharynx moist without lesions  Lungs:   Clear to auscultation bilaterally; no wheezes, rhonchi or rales; respirations unlabored   Heart:  RRR; no murmur, rub or gallop   Abdomen:   Soft, non-tender, non-distended, positive bowel sounds  Mild right upper quadrant tenderness at the site of the cutaneous tumor  Abdominal wall erythema at the right upper quadrant ; dressing also has small amount of sanguinous drainage   Extremities: No clubbing, cyanosis or edema   Skin: No new rashes or lesions  Draining wound over right upper quadrant of abdomen         Labs, Imaging, & Other studies:   All pertinent labs and imaging studies were personally reviewed  Results from last 7 days   Lab Units 20  0640 20  0504 20  0644   WBC Thousand/uL 8 91 10 08 10 73*   HEMOGLOBIN g/dL 10 5* 10 3* 10 2*   PLATELETS Thousands/uL 153 138* 138*     Results from last 7 days   Lab Units 02/01/20  0640 01/31/20  0504 01/30/20  0644 01/29/20 2212   SODIUM mmol/L 133* 132* 132* 131*   POTASSIUM mmol/L 4 2 3 4* 3 8 4 0   CHLORIDE mmol/L 99* 99* 99* 98*   CO2 mmol/L 29 27 26 28   BUN mg/dL 9 8 8 9   CREATININE mg/dL 0 95 0 89 0 80 1 01   EGFR ml/min/1 73sq m 77 83 87 72   CALCIUM mg/dL 7 5* 7 2* 7 6* 7 7*   AST U/L  --  35 43 58*   ALT U/L  --  35 44 59   ALK PHOS U/L  --  339* 383* 484*     Results from last 7 days   Lab Units 01/30/20  1800 01/30/20  0339 01/29/20 2212   BLOOD CULTURE   --  No Growth at 48 hrs  No Growth at 48 hrs     MRSA CULTURE ONLY  No Methicillin Resistant Staphlyococcus aureus (MRSA) isolated  --   --      Results from last 7 days   Lab Units 01/29/20 2212   PROCALCITONIN ng/ml 0 07

## 2020-02-01 NOTE — ASSESSMENT & PLAN NOTE
· Hyperbilirubinemia secondary to biliary obstruction from metastatic colon cancer  · Had metal stent placed at [facility]  · Recently transferred to [facility] for ERCP and possibility of external drain which both were unsuccessful  · Patient hoping for bilirubin to be less than 3 to try alternate chemotherapy    T bili on discharge 3 1    Results from last 7 days   Lab Units 02/02/20  0448 01/31/20  0504 01/30/20  0644 01/29/20  2212 01/29/20  0915   TOTAL BILIRUBIN mg/dL 3 10* 3 70* 4 10* 4 60* 5 00*

## 2020-02-01 NOTE — CONSULTS
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  Primary cancer is colon cancer  Currently receiving treatment at [facility]  Patient states that he has had IR intervention a couple of times in the past in order to treat fungating tumor  He says initially that the intervention works but then the tumor starts to grow again  Periwound of fungating tumor is fiery red, indurated, warm to touch, and painful to touch  Fiery redness extends out 10cm at [employer] and extends out approximately 5 cm everywhere else  Tumor with odor even after cleansed  Patient says that drainage is a problem  Current home wound care regimen is to cover with adaptic then ABD pads  He needs to use a special hypoallergenic tape  He brought this in with him  Patient says that odor and drainage are his two biggest concerns with the fungating tumor  Wound/Skin Care Plan:   1  Fungating tumor wound care: Cleanse  Be sure to cleanse periwound skin also  Apply Cavilon barrier film to periwound skin to protect from drainage  Crush (two) 500 mg Flagyl tablets and then sprinkle liberally across the tumor  This will help to control odor  Then cover with Adaptic contact layer  The contact layer is to prevent dressing materials from adhering to the wound bed because fungating tumors are very vascular and bleed very easily  On top of the Adaptic contact layer, place Maxorb calcium alginate dressing  Cut to fit wound size  The calcium alginate is an absorptive dressing that is meant to handle moderate to heavy drainage  Final dressing on wound is ABD pad  Secure with patient's own hypoallergenic tape  Change once a day and prn  Dressing changes may need to be increased in frequency if heavy drainage and to control odor     2  If patient is here on Monday, I will follow-up to see if the Flagyl helped control the odor and the calcium alginate helped manage the drainage  Patient also can follow-up at the outpatient wound care center for management of the fungating tumor  There are other dressing and wound care options available to manage fungating tumors in case the Flagyl and calcium alginate are not effective  I verbally instructed the patient and Jewell Guerrero RN on how to perform the wound care  I showed the patient the actual dressings  Patient able to repeat back to me the steps in the wound care  I also will put in discharge instructions  I provided patient with initial discharge wound care supplies  I discussed with Dr Lul Olivares  She will prescribe Flagyl for wound care at discharge  Vitals: Blood pressure 128/74, pulse 90, temperature 99 1 °F (37 3 °C), temperature source Oral, resp  rate 17, height 5' 10" (1 778 m), weight 110 kg (242 lb), SpO2 99 %  ,Body mass index is 34 72 kg/m²  Wound 01/07/20 Other (Comment) Abdomen Mid;Right (Active)   Wound Image    1/31/2020  1:34 PM   Wound Description Beefy red;Bleeding;Dark edges;Drainage 1/31/2020  7:01 AM   Gina-wound Assessment Pink;Denuded;Fragile 1/31/2020  7:01 AM   Drainage Amount Moderate 1/31/2020  7:01 AM   Drainage Description Bloody 1/31/2020  7:01 AM   Treatments Site care 1/31/2020  7:01 AM   Dressing ABD; Protective barrier 1/31/2020  2:45 PM   Wound packed? No 1/31/2020  7:01 AM   Dressing Changed New 1/31/2020  2:45 PM   Patient Tolerance Tolerated well 1/31/2020  2:45 PM   Dressing Status Clean;Dry; Intact 1/31/2020  2:45 PM

## 2020-02-02 PROBLEM — R50.9 FEBRILE ILLNESS: Status: RESOLVED | Noted: 2020-01-01 | Resolved: 2020-01-01

## 2020-02-02 PROBLEM — D49.9 TUMOR: Status: ACTIVE | Noted: 2020-01-01

## 2020-02-02 NOTE — NURSING NOTE
Pt left via wheelchair  IV removed by ESTELITA Wang RN and port deaccessed by [employer] prior to discharge  Pt left with belongings and AVS reviewed with patient, his wife, and daughter

## 2020-02-02 NOTE — PLAN OF CARE
Problem: Potential for Falls  Goal: Patient will remain free of falls  Description  INTERVENTIONS:  - Assess patient frequently for physical needs  -  Identify cognitive and physical deficits and behaviors that affect risk of falls  -  [place] fall precautions as indicated by assessment   - Educate patient/family on patient safety including physical limitations  - Instruct patient to call for assistance with activity based on assessment  - Modify environment to reduce risk of injury  - Consider OT/PT consult to assist with strengthening/mobility  Outcome: Progressing     Problem: Prexisting or High Potential for Compromised Skin Integrity  Goal: Skin integrity is maintained or improved  Description  INTERVENTIONS:  - Identify patients at risk for skin breakdown  - Assess and monitor skin integrity  - Assess and monitor nutrition and hydration status  - Monitor labs   - Assess for incontinence   - Turn and reposition patient  - Assist with mobility/ambulation  - Relieve pressure over bony prominences  - Avoid friction and shearing  - Provide appropriate hygiene as needed including keeping skin clean and dry  - Evaluate need for skin moisturizer/barrier cream  - Collaborate with interdisciplinary team   - Patient/family teaching  - Consider wound care consult   Outcome: Progressing     Problem: Nutrition/Hydration-ADULT  Goal: Nutrient/Hydration intake appropriate for improving, restoring or maintaining nutritional needs  Description  Monitor and assess patient's nutrition/hydration status for malnutrition  Collaborate with interdisciplinary team and initiate plan and interventions as ordered  Monitor patient's weight and dietary intake as ordered or per policy  Utilize nutrition screening tool and intervene as necessary  Determine patient's food preferences and provide high-protein, high-caloric foods as appropriate       INTERVENTIONS:  - Monitor oral intake, urinary output, labs, and treatment plans  - Assess nutrition and hydration status and recommend course of action  - Evaluate amount of meals eaten  - Assist patient with eating if necessary   - Allow adequate time for meals  - Recommend/ encourage appropriate diets, oral nutritional supplements, and vitamin/mineral supplements  - Assess need for intravenous fluids  - Provide specific nutrition/hydration education as appropriate  - Include patient/family/caregiver in decisions related to nutrition   Outcome: Progressing     Problem: METABOLIC, FLUID AND ELECTROLYTES - ADULT  Goal: Electrolytes maintained within normal limits  Description  INTERVENTIONS:  - Monitor labs and assess patient for signs and symptoms of electrolyte imbalances  - Administer electrolyte replacement as ordered  - Monitor response to electrolyte replacements, including repeat lab results as appropriate  - Instruct patient on fluid and nutrition as appropriate  Outcome: Progressing  Goal: Fluid balance maintained  Description  INTERVENTIONS:  - Monitor labs   - Monitor I/O and WT  - Instruct patient on fluid and nutrition as appropriate  - Assess for signs & symptoms of volume excess or deficit  Outcome: Progressing     Problem: SKIN/TISSUE INTEGRITY - ADULT  Goal: Skin integrity remains intact  Description  INTERVENTIONS  - Identify patients at risk for skin breakdown  - Assess and monitor skin integrity  - Assess and monitor nutrition and hydration status  - Monitor labs (i e  albumin)  - Assess for incontinence   - Turn and reposition patient  - Assist with mobility/ambulation  - Relieve pressure over bony prominences  - Avoid friction and shearing  - Provide appropriate hygiene as needed including keeping skin clean and dry  - Evaluate need for skin moisturizer/barrier cream  - Collaborate with interdisciplinary team (i e  Nutrition, Rehabilitation, etc )   - Patient/family teaching  Outcome: Progressing  Goal: Incision(s), wounds(s) or drain site(s) healing without S/S of infection  Description  INTERVENTIONS  - Assess and document risk factors for skin impairment   - Assess and document dressing, incision, wound bed, drain sites and surrounding tissue  - Consider nutrition services referral as needed  - Oral mucous membranes remain intact  - Provide patient/ family education  Outcome: Progressing     Problem: HEMATOLOGIC - ADULT  Goal: Maintains hematologic stability  Description  INTERVENTIONS  - Assess for signs and symptoms of bleeding or hemorrhage  - Monitor labs  - Administer supportive blood products/factors as ordered and appropriate  Outcome: Progressing

## 2020-02-02 NOTE — DISCHARGE SUMMARY
Discharge Summary - [facility]    Patient Information: Genia Cortez 66 y o  male MRN: [id number]  Unit/Bed#: [address] Encounter: [id number]    Discharging Physician / Practitioner: Ni Corrigan DO  PCP: No primary care provider on file  Admission Date: 1/29/2020  Discharge Date: 02/02/20    Reason for Admission: Fever - 9 weeks to 74 years (started with some chills this evening, intermittent nausea) and Abdominal Pain (Intermittent sharp jabbing pains which the nausea seems to correlate with, hx of liver mets, recently had cryoablasion and stent in liver)      Discharge Diagnoses:     Principal Problem (Resolved):    Febrile illness  Active Problems:    BPH (benign prostatic hyperplasia)    Atrial fibrillation ([facility] [address] )    Colon cancer metastasized to multiple sites [facility])    Trigeminal neuralgia    Hyperbilirubinemia    Lower extremity edema    Tumor        * Febrile illnessresolved as of 2/2/2020  Assessment & Plan  · Acute febrile illness  SIRS, possible sepsis POA   · Differentials includes cellulitis of abdominal wall by site of cutaneous tumor vs tumor fever  · Did have obstructive jaundice with bilirubin as high as 40+ status post biliary stent at [facility] and recent hospitalization at [facility]  · Given vancomycin x1 in the emergency department which was being continued but was discontinued yesterday as MRSA negative  Patient was started on IV cefazolin and transitioned to Keflex on discharge  Blood cultures negative so far  · Fevers resolved    Tumor  Assessment & Plan  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  Wound care was consulted and wound care orders placed by RN    Patient also started on Flagyl which he is to crush and apply to the wound  Follow-up at the [address] [place] [address] after discharge      Lower extremity edema  Assessment & Plan  · Bilateral lower extremity edema likely in the setting of hepatic metastasis + component of volume overload as patient also received fluids while at [facility] and as per family swelling worsened after that and there also appears to be a chronic component as he does have leg swelling even at baseline  · lower extremity duplex ruled out DVT   · He was on IV lasix while here with some improvement in his leg swelling  As per daughter, swelling appears to be close to his baseline  As per patient even chronically he has days where swelling worsens and he keeps his legs elevated and swelling improves  advised elevation of legs, compression stocking  · Patient to follow up with PCP after discharge to discuss if he needs to be on p r n  Diuretic for leg swelling if swelling worsens after discharge    Hyperbilirubinemia  Assessment & Plan  · Hyperbilirubinemia secondary to biliary obstruction from metastatic colon cancer  · Had metal stent placed at [facility]  · Recently transferred to [facility] for ERCP and possibility of external drain which both were unsuccessful  · Patient hoping for bilirubin to be less than 3 to try alternate chemotherapy  T bili on discharge 3 1    Results from last 7 days   Lab Units 02/02/20  0448 01/31/20  0504 01/30/20  0644 01/29/20  2212 01/29/20  0915   TOTAL BILIRUBIN mg/dL 3 10* 3 70* 4 10* 4 60* 5 00*       Trigeminal neuralgia  Assessment & Plan  · Trigeminal neuralgia which carbamazepine was recently discontinued due to transaminitis and on gabapentin    · Reported worsening facial pain today which improved after he received gabapentin and baclofen    Colon cancer metastasized to multiple sites [facility])  Assessment & Plan  · Metastatic colon cancer to multiple sites initially diagnosed in 2013 [facility] status post sigmoid colectomy and chemotherapy  · Had recurrence in 2014 and recently started treatment at [facility] where metal biliary stenting was placed for hyperbilirubinemia  · Eventually the patient had worsening hyperbilirubinemia and was hospitalized at [facility] 1/9/2020 with attempt at ERCP and external drain unsuccessful but hyperbilirubinemia improved  · Did have cryoablation 1/21/2020 and saw Dr Zenon Maldonado 1/22/2020  Appreciate oncology input and patient can follow-up with oncologist after discharge  As per family he has an appointment on Tuesday    Atrial fibrillation [facility])  Assessment & Plan  · Chronic atrial fibrillation on metoprolol succinate  Previously anticoagulated with lovenox 100 mg b i d  but currently on hold due to need for multiple procedures    BPH (benign prostatic hyperplasia)  Assessment & Plan  · continue tamsulosin      Consultations During Hospital Stay:  [address] [place] [address]  IP CONSULT TO CASE MANAGEMENT  IP CONSULT TO PHARMACY  IP CONSULT TO WOUND CARE  IP CONSULT TO WOUND CARE    Procedures Performed:     · none    Significant Findings:     · Refer to hospital course and above listed diagnosis related plan for details    Imaging while in hospital:    Xr Chest 1 View Portable    Result Date: 1/30/2020  Narrative: CHEST INDICATION:   tachycardia  Colon cancer  COMPARISON:  Chest CT from 1/29/2020  EXAM PERFORMED/VIEWS:  XR CHEST PORTABLE FINDINGS:  Right port at cavoatrial junction  Cardiomediastinal silhouette appears unremarkable  Bilateral lung metastases  No acute disease  No effusion or pneumothorax  Osseous structures appear within normal limits for patient age  Impression: No acute cardiopulmonary disease  Bilateral lung metastases, better shown on recent chest CT  Workstation performed: [id number]     Ct Chest W Contrast    Result Date: 1/29/2020  Narrative: CT CHEST WITH IV CONTRAST INDICATION:   C18 9: Malignant neoplasm of colon, unspecified R17: Unspecified jaundice  COMPARISON:  Correlation CT dated 1/21/2020, CT abdomen and pelvis dated 12/27/2019 TECHNIQUE: CT examination of the chest was performed   Axial, sagittal, and coronal 2D reformatted images were created from the source data and submitted for interpretation  Radiation dose length product (DLP) for this visit:  471 52 mGy-cm   This examination, like all CT scans performed in the [facility], was performed utilizing techniques to minimize radiation dose exposure, including the use of iterative  reconstruction and automated exposure control  IV Contrast:  85 mL of iohexol (OMNIPAQUE) FINDINGS: LUNGS:  There is redemonstration of multiple pulmonary metastatic lesions including a dominant irregular mass inferior left lingula with some coarse central calcifications  This measures approximately 6 0 x 5 8 cm in size (axial image 27, series 2)  There is another mass in the posterior left lower lobe with some central calcifications which measures approximately 4 5 x 3 0 cm in size (axial image 37, series 2)  Additional irregular nodules in the anterior right upper lobe measuring approximately 2 5 x 2 1 cm in size (axial image 19, series 2) and a subpleural nodule in the posterior right lower lobe measuring approximately 3 0 x 1 9 cm in size (axial image 34, series 2) are also noted  There is a 5 mm nodule in the posterior right upper lobe (axial image 17, series 2)  Some scattered reticular nodular opacities adjacent to the dominant lesions probably represents lymphangitic spread of neoplasm  The lungs otherwise are grossly clear  PLEURA:  Small dependent pleural effusions  There is some pleural thickening in the periphery of the inferior left lingula and in the posterior right lower lobe adjacent to the previously described nodules/mass lesions  HEART/GREAT VESSELS:  The heart appears normal in size  Mild to moderate aortic and coronary atherosclerosis  Tortuous aorta; no aortic aneurysm  MEDIASTINUM AND STEVEN:  The large airways are patent  No dominant mediastinal mass is seen  Some shotty mediastinal lymph nodes are seen which is nonspecific    There is a dominant left hilar lymph node which measures approximately 1 6 cm in size (axial image 22, series 2), suspicious for lymphatic metastatic disease  CHEST WALL AND LOWER NECK:   No discrete axillary adenopathy  Mild body wall edema  Right-sided Mediport extends into the SVC VISUALIZED STRUCTURES IN THE UPPER ABDOMEN:  Common bile duct stent is partially imaged  Mild intrahepatic biliary ductal dilatation is seen, similar to the prior 2 7 cm target appearing lesion in the anterior liver with an adjacent 3 7 cm similar-appearing lesion as well as a 2 9 cm lesion in the superior right hepatic lobe probably represent hepatic metastatic disease  Small-volume ascites and mesenteric edema is noted  There is redemonstration of the right upper anterior abdominal wall mass/metastatic lesions which measures approximately 5 6 x 4 8 cm in size, previously measuring 6 3 x 4 9 cm in size  OSSEOUS STRUCTURES: Multilevel degenerative changes of the visualized spine  No acute fracture or destructive osseous lesion  Impression: Presumed pulmonary, hepatic, lymphatic and abdominal wall metastatic lesions as described  Small dependent pleural effusions, mesenteric edema and small volume ascites, new or worsened from the prior but nonspecific  Common bile duct stent is partially imaged  Mild intrahepatic biliary ductal dilatation is seen, similar to the prior   Coronary atherosclerosis, and other findings as above  Workstation performed: [id number]     Radiology Results    Result Date: 1/30/2020  Narrative: Ordered by an unspecified provider  Vas Lower Limb Venous Duplex Study, Complete Bilateral    Result Date: 2/1/2020  Narrative:  [facility] REPORT CLINICAL: Indications: Patient presents with bilateral lower extremity edema with history of cancer  Risk Factors: The patient has history of HTN and previous smoking (quit >10yrs ago)  The patient current BMI is 34 72, Weight is 242 lb and height is 70 in     CONCLUSION:  Impression: RIGHT LOWER LIMB: No evidence of acute or chronic deep vein thrombosis  No evidence of superficial thrombophlebitis noted  Doppler evaluation shows a normal response to augmentation maneuvers  Popliteal, posterior tibial and anterior tibial arterial Doppler waveforms are triphasic  LEFT LOWER LIMB: No evidence of acute or chronic deep vein thrombosis  No evidence of superficial thrombophlebitis noted  Doppler evaluation shows a normal response to augmentation maneuvers  Popliteal, posterior tibial and anterior tibial arterial Doppler waveforms are triphasic  SIGNATURE: Electronically Signed by: Nancie Estrada MD on 2020-02-01 02:08:55 AM    Ct Abdomen Pelvis With Contrast    Result Date: 1/30/2020  Narrative: CT ABDOMEN AND PELVIS WITH IV CONTRAST INDICATION:   RUQ skin lesion, 2wks s/p cryoablation with cellulitis and pain  Metastatic colon carcinoma; status post cryoablation right anterolateral abdominal wall lesion 1/21/2020; patient has cellulitis and pain in the region COMPARISON:  Multiple prior examinations including images from cryoablation 1/21/2020 and CT 12/27/2019 as well as CT chest 1/29/2020 TECHNIQUE:  CT examination of the abdomen and pelvis was performed  In addition to portal venous phase postcontrast scanning through the abdomen and pelvis, delayed phase postcontrast scanning was performed through the upper abdominal viscera  Axial, sagittal, and coronal 2D reformatted images were created from the source data and submitted for interpretation  Radiation dose length product (DLP) for this visit:  [id number] 84 12 mGy-cm   This examination, like all CT scans performed in the [facility], was performed utilizing techniques to minimize radiation dose exposure, including the use of iterative reconstruction and automated exposure control  IV Contrast:  100 mL of iohexol (OMNIPAQUE) Enteric Contrast:  Enteric contrast was not administered  FINDINGS: ABDOMEN LOWER CHEST:  Please refer to report of CT chest examination performed earlier in the day    Again identified is a prominent inferior lingular mass with central calcification measuring 5 9 x 5 7 cm extending to the medial pleural surface adjacent to the heart as well as the lateral pleural surface similar to the prior examination  There is a posterolateral left lower lobe partially calcified mass again noted as well measuring 3 2 x 4 1 cm  Some dependent opacification of the right lung base likely reflects atelectasis  Small right greater than left pleural effusions are present  There is a small mass in the posterior medial right lung base again noted measuring 1 9 x 3 0 cm  LIVER/BILIARY TREE:  There is a biliary stent identified extending from the common bile duct to the main right intrahepatic duct  There is mild right and moderate left biliary ductal dilatation again noted which is unchanged  Diffuse atrophy of the left lobe of the liver is again noted  Postoperative change in the right lobe the liver is again noted presumably representing previous partial hepatic resection  Several hepatic mass lesions consistent with known metastasis are again identified including a lesion which appears to invade the posterior gallbladder wall (2 6 x 2 6 cm) as well as one in the superior right lobe of the liver (2 5 x 2 2 cm) extending to the subcapsular region, one in segment 4 liver (3 6 x 2 8 cm), and a lesion in segment 4 of the liver  Splenic and main portal vein as well as right portal vein are patent  Left portal vein does not opacify and may be thrombosed  Alpesh Enamorado GALLBLADDER:  Some focal gallbladder wall thickening in noted posteromedially  Questionable invasion of the posterior gallbladder wall by metastatic lesion as described above and noted previously  SPLEEN:  Unremarkable  PANCREAS:  Unremarkable  ADRENAL GLANDS:  Unremarkable  KIDNEYS/URETERS:  One or more sharply circumscribed subcentimeter renal hypodensities are noted   These lesions are too small to accurately characterize, but are statistically most likely to represent benign cortical renal cyst(s)  According to the guidelines published in the [employer] Paper of the ACR Incidental Findings Committee (Radiology 2010), no further workup of these lesions is recommended  STOMACH AND BOWEL:  Moderate amount of fecal material present in the colon and rectum  There is a segment of hepatic flexure interposed between the diaphragm and the liver  Mild nonspecific wall thickening of the segment of small bowel in the left mid abdomen is noted  No intestinal obstruction is identified  APPENDIX:  No findings to suggest appendicitis  ABDOMINOPELVIC CAVITY:  There is a mild amount of abdominal and pelvic ascites present  No free air identified  VESSELS:  Unremarkable for patient's age  PELVIS REPRODUCTIVE ORGANS:  Unremarkable for patient's age  URINARY BLADDER:  Mild nonspecific circumferential wall thickening  ABDOMINAL WALL/INGUINAL REGIONS:  There is diffuse mild-moderate subcutaneous edema present likely reflecting 3rd spacing  There is again identified a soft tissue mass involving the abdominal wall musculature and subcutaneous soft tissues in the right anterolateral upper abdominal/lower chest wall extending to and involving the skin where there is a overlying fungating mass  The abdominal wall lesion was treated previously with cryoablation on 1/21/2020  Currently measures approximately 3 9 x 2 7 x 5 8 cm (previously 4 3 x 2 9 x 6 7 cm)  p no abscess noted  No gas in the lesion is identified  There is some inflammatory type stranding noted in the adjacent soft tissues both superficial to and deep to this lesion likely reflecting posttreatment change  No definite invasion of/destruction of the underlying osseous structures is noted  OSSEOUS STRUCTURES:  Degenerative change present throughout the spine  No definite lucent or sclerotic lesions are noted  Impression: 1    Interim slight decrease in size in abdominal wall mass, consistent with known metastatic lesion, in the right upper quadrant anterolateral abdominal /inferior thoracic wall extending to the skin surface with expected posttreatment change in the lesion and surrounding tissues without evidence for abscess or soft tissue gas  2   Evidence for metastatic disease in the lungs and liver as described above not appreciably changed from the previous examination  3   Biliary stent in place in stable position with continued biliary ductal dilatation  4   Small right greater than left pleural effusions and small amount of abdominal and pelvic ascites  5   Additional findings as noted  Findings concur with the preliminary report by Dr Rupal Rojas from [facility]    Workstation performed: [id number]       Incidental Findings:   · Bilateral pleural effusions, abdominal/pelvic ascites    Test Results Pending at Discharge (will require follow up):   · As per After Visit Summary     Outpatient Tests Requested:  · none    Complications:  Refer to hospital course and above listed diagnosis related plan, if any    Hospital Course: Get Zuniga is a 66 y o  male patient who originally presented to the hospital on 1/29/2020 due to fever, abdominal pain  Patient with history of metastatic colon cancer and had a biliary stent placed for hyperbilirubinemia at [facility]  In January 2020 he was admitted for hyperbilirubinemia and underwent ERCP and external drain placement which were unsuccessful  Patient has a cutaneous/fungating abdominal tumor  He recently underwent cryoablation  Patient was admitted and started on IV antibiotics for febrile illness and seen by ID while here  Antibiotics were later changed to IV cefazolin when MRSA came back negative  Patient was also diuresed with IV Lasix  Once fevers resolved, patient discharged home on oral antibiotics  Discharge plan was coordinated with ID prior to discharge    Discharge plan was also discussed with patient, his wife and daughter who were present at bedside    Please see above list of diagnoses and related plan for additional information  Condition at Discharge: stable     Discharge Day Visit / Exam:     Subjective:  Patient reported worsening right sided facial pain from his trigeminal neurology which improved significantly after getting baclofen and gabapentin    Vitals: Blood Pressure: 113/68 (02/02/20 0857)  Pulse: 93 (02/02/20 0857)  Temperature: 98 1 °F (36 7 °C) (02/02/20 0721)  Temp Source: Axillary (02/02/20 0721)  Respirations: 14 (02/02/20 0721)  Height: 5' 10" (177 8 cm) (01/30/20 0435)  Weight - Scale: 110 kg (242 lb) (01/29/20 2057)  SpO2: 96 % (02/02/20 0857)  Exam:   Physical Exam   Constitutional: He is oriented to person, place, and time  No distress  HENT:   Head: Normocephalic and atraumatic  Eyes: EOM are normal  Right eye exhibits no discharge  Left eye exhibits no discharge  Cardiovascular: Normal rate and regular rhythm  Pulmonary/Chest: Effort normal and breath sounds normal  No respiratory distress  He has no wheezes  He has no rales  Abdominal: Soft  Bowel sounds are normal    Cutaneous tumor noted on the right upper abdomen with surrounding erythema which has improved with tenderness to the region   Musculoskeletal: He exhibits edema (B/L L  E)  Neurological: He is alert and oriented to person, place, and time  No cranial nerve deficit  Skin: He is not diaphoretic  Psychiatric: He has a normal mood and affect  Discharge instructions/Information to patient and family:(Discharge Medications and Follow up):   See after visit summary for information provided to patient and family  Provisions for Follow-Up Care:  See after visit summary for information related to follow-up care and any pertinent home health orders  Disposition: Home    Planned Readmission:  No     Discharge Statement:  I spent 35 minutes discharging the patient  This time was spent on the day of discharge   I had direct contact with the patient on the day of discharge  Greater than 50% of the total time was spent examining patient, answering all patient questions, arranging and discussing plan of care with patient as well as directly providing post-discharge instructions  Additional time then spent on discharge activities  Discharge Medications:  See after visit summary for reconciled discharge medications provided to patient and family  ** Please Note: "This note has been constructed using a voice recognition system  Therefore there may be syntax, spelling, and/or grammatical errors   Please call if you have any questions  "**

## 2020-02-02 NOTE — PLAN OF CARE
Problem: Potential for Falls  Goal: Patient will remain free of falls  Description  INTERVENTIONS:  - Assess patient frequently for physical needs  -  Identify cognitive and physical deficits and behaviors that affect risk of falls  -  [place] fall precautions as indicated by assessment   - Educate patient/family on patient safety including physical limitations  - Instruct patient to call for assistance with activity based on assessment  - Modify environment to reduce risk of injury  - Consider OT/PT consult to assist with strengthening/mobility  Outcome: Progressing     Problem: Prexisting or High Potential for Compromised Skin Integrity  Goal: Skin integrity is maintained or improved  Description  INTERVENTIONS:  - Identify patients at risk for skin breakdown  - Assess and monitor skin integrity  - Assess and monitor nutrition and hydration status  - Monitor labs   - Assess for incontinence   - Turn and reposition patient  - Assist with mobility/ambulation  - Relieve pressure over bony prominences  - Avoid friction and shearing  - Provide appropriate hygiene as needed including keeping skin clean and dry  - Evaluate need for skin moisturizer/barrier cream  - Collaborate with interdisciplinary team   - Patient/family teaching  - Consider wound care consult   Outcome: Progressing     Problem: Nutrition/Hydration-ADULT  Goal: Nutrient/Hydration intake appropriate for improving, restoring or maintaining nutritional needs  Description  Monitor and assess patient's nutrition/hydration status for malnutrition  Collaborate with interdisciplinary team and initiate plan and interventions as ordered  Monitor patient's weight and dietary intake as ordered or per policy  Utilize nutrition screening tool and intervene as necessary  Determine patient's food preferences and provide high-protein, high-caloric foods as appropriate       INTERVENTIONS:  - Monitor oral intake, urinary output, labs, and treatment plans  - Assess nutrition and hydration status and recommend course of action  - Evaluate amount of meals eaten  - Assist patient with eating if necessary   - Allow adequate time for meals  - Recommend/ encourage appropriate diets, oral nutritional supplements, and vitamin/mineral supplements  - Assess need for intravenous fluids  - Provide specific nutrition/hydration education as appropriate  - Include patient/family/caregiver in decisions related to nutrition   Outcome: Progressing     Problem: METABOLIC, FLUID AND ELECTROLYTES - ADULT  Goal: Electrolytes maintained within normal limits  Description  INTERVENTIONS:  - Monitor labs and assess patient for signs and symptoms of electrolyte imbalances  - Administer electrolyte replacement as ordered  - Monitor response to electrolyte replacements, including repeat lab results as appropriate  - Instruct patient on fluid and nutrition as appropriate  Outcome: Progressing  Goal: Fluid balance maintained  Description  INTERVENTIONS:  - Monitor labs   - Monitor I/O and WT  - Instruct patient on fluid and nutrition as appropriate  - Assess for signs & symptoms of volume excess or deficit  Outcome: Progressing     Problem: SKIN/TISSUE INTEGRITY - ADULT  Goal: Skin integrity remains intact  Description  INTERVENTIONS  - Identify patients at risk for skin breakdown  - Assess and monitor skin integrity  - Assess and monitor nutrition and hydration status  - Monitor labs (i e  albumin)  - Assess for incontinence   - Turn and reposition patient  - Assist with mobility/ambulation  - Relieve pressure over bony prominences  - Avoid friction and shearing  - Provide appropriate hygiene as needed including keeping skin clean and dry  - Evaluate need for skin moisturizer/barrier cream  - Collaborate with interdisciplinary team (i e  Nutrition, Rehabilitation, etc )   - Patient/family teaching  Outcome: Progressing  Goal: Incision(s), wounds(s) or drain site(s) healing without S/S of infection  Description  INTERVENTIONS  - Assess and document risk factors for skin impairment   - Assess and document dressing, incision, wound bed, drain sites and surrounding tissue  - Consider nutrition services referral as needed  - Oral mucous membranes remain intact  - Provide patient/ family education  Outcome: Progressing     Problem: HEMATOLOGIC - ADULT  Goal: Maintains hematologic stability  Description  INTERVENTIONS  - Assess for signs and symptoms of bleeding or hemorrhage  - Monitor labs  - Administer supportive blood products/factors as ordered and appropriate  Outcome: Progressing

## 2020-02-02 NOTE — DISCHARGE INSTRUCTIONS
Call and set up appointment with your primary care doctor    Keep your legs elevated as much as possible   Use compression stockings for your leg swelling

## 2020-02-02 NOTE — INCIDENTAL FINDINGS
The following findings require follow up:  Radiographic finding   Finding: Bilateral pleural effusions, abdominal/pelvic ascites    Please notify the following clinician to assist with the follow up:  Your Primary Care Doctor

## 2020-02-02 NOTE — ASSESSMENT & PLAN NOTE
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

## 2020-02-03 NOTE — TELEPHONE ENCOUNTER
Patient sees Dr Kathia Roberts tomorrow and has 4 oxycodone pills left and is out of baclofen which was instituted in hospital along with neurontin for trigeminal neuralgia  Patient does not have a PCP  Meds pended to Dr Kathia Roberts

## 2020-02-04 PROBLEM — C44.509: Status: ACTIVE | Noted: 2020-01-01

## 2020-02-04 PROBLEM — C78.00 PULMONARY METASTASES (HCC): Status: ACTIVE | Noted: 2020-01-01

## 2020-02-04 PROBLEM — K59.00 CONSTIPATION: Status: ACTIVE | Noted: 2020-01-01

## 2020-02-04 PROBLEM — G89.3 CANCER ASSOCIATED PAIN: Status: ACTIVE | Noted: 2020-01-01

## 2020-02-04 NOTE — PROGRESS NOTES
Hematology Outpatient Follow - Up Note  Sahil Hussein 66 y o  male MRN: @ Encounter: [id number]        Date:  2/4/2020        Assessment/ Plan:      Metastatic colon cancer primary in the sigmoid colon diagnosed as stage III in 2013 status post resection with adjuvant FOLFOX with liver metastases in November 2014 treated with FOLFIRI and Avastin finished on 02/2015     Radiofrequency ablation to the liver lesions segment for a, 7 a robotic with each resection of segment 6 at [facility]     Received FOLFIRI and Avastin thereafter     Radiation therapy to the painful abdominal wall metastases 30 Gy in 10 sessions spanning from 03/28/2017-04/10/2017 K-jacobo mutated     Progression of disease on 06/2019 with pulmonary metastases, left hilar metastasis enlarging hepatic metastases, treated with capecitabine without any benefit     New pulmonary and pleural-based masses on CT scan on 10/14/2019, enlarging liver metastasis mildly     Admitted to the hospital with obstructive jaundice and bilirubin above 20, he had biliary stent metal subtype, could not be exchanged however manipulation a drain the bile, bilirubin coming down nicely to 3 1    Fever secondary to ablation of the skin metastatic lesion on the lateral aspect of the right abdomen    Patient to be treated with Lonsurf 30 milligram/meter squared b i d  5 days every week 2 weeks on 2 weeks off    Side effects such as pancytopenia nausea vomiting fatigue diarrhea dehydration sepsis and the need for growth factor such as filgrastim told, he agreed to proceed    Follow-up in 2 weeks    Continue CBC, CMP every week          HPI: The patient is 28-year-old  male who was diagnosed initially with stage III sigmoid adenocarcinoma, in 2013 status post sigmoid colectomy, K-jacobo mutation G12V, microsatellite stable, status post adjuvant systematic chemotherapy with FOLFOX     In November 2014 he was found to have hepatic metastases, status post 5 cycles of FOLFIRI and Avastin completed on 02/16/2015 with decrease in the size in the liver lesions, status post radiofrequency ablation on 04/2015 of the liver metastases at segment 4 8, and 7 and resection of segment 6, 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     He received few cycles of FOLFIRI and Avastin thereafter  March 2017 palliative radiation therapy to the painful right upper abdominal wall metastasis 30 Gy in 10 sessions at 6 month later on this subcutaneous masses start to grow and protrude through the skin he was treated with FOLFIRI and Avastin until December 2018     On 06/2019 CT scan of the chest showed progressing in the size of the pulmonary metastases with left hilar metastatic disease, enlarging hepatic metastases progressing in the size of the subcutaneous and muscular metastatic disease     Angiogram on 06/20/2019 showed metastatic lesion in the right anterolateral chest with no arterial supply to allow for embolization     Treated with capecitabine 2 weeks on 1 week off however with increase in the size of the mass in the right upper quadrant     CT scan on 10/2019 showed a new 2 cm pleural based right lower lobe mass, 3 relatively stable spiculated mass in the right upper lobe measuring 2 cm, left upper lobe measuring 3 5 cm with internal calcification, pleural based mass in the left base measuring 4 cm and increased soft tissue mass in the anterior right abdominal wall measuring 4 x 5 cm, 2 cm lower pole right kidney cyst     All of these were done at [facility]  He was admitted with obstructive jaundice, he had biliary stent for 3 months, could not be exchanged, admitted to the hospital in January 2020 with obstructive jaundice bilirubin of 20, however manipulation of the metal biliary stent drained bile  PTC was aborted at this time     Interval History:  He is not candidate for additional radiation therapy  Was admitted to the hospital with fever, no evidence of infection could be necrosis fever secondary to previous micro ablation    Bilirubin is coming down nicely to 3 1 on 02/02/2020       Previous Treatment:         Test Results:    Imaging: Xr Chest 1 View Portable    Result Date: 1/30/2020  Narrative: CHEST INDICATION:   tachycardia  Colon cancer  COMPARISON:  Chest CT from 1/29/2020  EXAM PERFORMED/VIEWS:  XR CHEST PORTABLE FINDINGS:  Right port at cavoatrial junction  Cardiomediastinal silhouette appears unremarkable  Bilateral lung metastases  No acute disease  No effusion or pneumothorax  Osseous structures appear within normal limits for patient age  Impression: No acute cardiopulmonary disease  Bilateral lung metastases, better shown on recent chest CT  Workstation performed: [id number]     Ct Chest W Contrast    Result Date: 1/29/2020  Narrative: CT CHEST WITH IV CONTRAST INDICATION:   C18 9: Malignant neoplasm of colon, unspecified R17: Unspecified jaundice  COMPARISON:  Correlation CT dated 1/21/2020, CT abdomen and pelvis dated 12/27/2019 TECHNIQUE: CT examination of the chest was performed  Axial, sagittal, and coronal 2D reformatted images were created from the source data and submitted for interpretation  Radiation dose length product (DLP) for this visit:  471 52 mGy-cm   This examination, like all CT scans performed in the [facility], was performed utilizing techniques to minimize radiation dose exposure, including the use of iterative  reconstruction and automated exposure control  IV Contrast:  85 mL of iohexol (OMNIPAQUE) FINDINGS: LUNGS:  There is redemonstration of multiple pulmonary metastatic lesions including a dominant irregular mass inferior left lingula with some coarse central calcifications  This measures approximately 6 0 x 5 8 cm in size (axial image 27, series 2)    There is another mass in the posterior left lower lobe with some central calcifications which measures approximately 4 5 x 3 0 cm in size (axial image 37, series 2)  Additional irregular nodules in the anterior right upper lobe measuring approximately 2 5 x 2 1 cm in size (axial image 19, series 2) and a subpleural nodule in the posterior right lower lobe measuring approximately 3 0 x 1 9 cm in size (axial image 34, series 2) are also noted  There is a 5 mm nodule in the posterior right upper lobe (axial image 17, series 2)  Some scattered reticular nodular opacities adjacent to the dominant lesions probably represents lymphangitic spread of neoplasm  The lungs otherwise are grossly clear  PLEURA:  Small dependent pleural effusions  There is some pleural thickening in the periphery of the inferior left lingula and in the posterior right lower lobe adjacent to the previously described nodules/mass lesions  HEART/GREAT VESSELS:  The heart appears normal in size  Mild to moderate aortic and coronary atherosclerosis  Tortuous aorta; no aortic aneurysm  MEDIASTINUM AND STEVEN:  The large airways are patent  No dominant mediastinal mass is seen  Some shotty mediastinal lymph nodes are seen which is nonspecific  There is a dominant left hilar lymph node which measures approximately 1 6 cm in size (axial image 22, series 2), suspicious for lymphatic metastatic disease  CHEST WALL AND LOWER NECK:   No discrete axillary adenopathy  Mild body wall edema  Right-sided Mediport extends into the SVC VISUALIZED STRUCTURES IN THE UPPER ABDOMEN:  Common bile duct stent is partially imaged  Mild intrahepatic biliary ductal dilatation is seen, similar to the prior 2 7 cm target appearing lesion in the anterior liver with an adjacent 3 7 cm similar-appearing lesion as well as a 2 9 cm lesion in the superior right hepatic lobe probably represent hepatic metastatic disease  Small-volume ascites and mesenteric edema is noted   There is redemonstration of the right upper anterior abdominal wall mass/metastatic lesions which measures approximately 5 6 x 4 8 cm in size, previously measuring 6 3 x 4 9 cm in size  OSSEOUS STRUCTURES: Multilevel degenerative changes of the visualized spine  No acute fracture or destructive osseous lesion  Impression: Presumed pulmonary, hepatic, lymphatic and abdominal wall metastatic lesions as described  Small dependent pleural effusions, mesenteric edema and small volume ascites, new or worsened from the prior but nonspecific  Common bile duct stent is partially imaged  Mild intrahepatic biliary ductal dilatation is seen, similar to the prior   Coronary atherosclerosis, and other findings as above  Workstation performed: [id number]     Mri Abdomen W Wo Contrast And Mrcp    Result Date: 1/7/2020  Narrative: MRI OF THE ABDOMEN WITH AND WITHOUT CONTRAST WITH MRCP INDICATION:  Metastatic colon carcinoma with worsening jaundice  COMPARISON: CT abdomen pelvis 12/27/2019, 11/8/2019  TECHNIQUE:  The following pulse sequences were obtained:  Coronal and axial T2 with TE of 90 and 180 respectively, axial T2 with fat saturation, axial FIESTA fat-sat, axial T1-weighted in-and-out-of phase, axial DWI/ADC, pre-contrast axial T1 with fat saturation, post-contrast dynamic axial T1 with fat saturation at 20, 70, and 180 seconds, followed by coronal and 7 minute delayed axial T1 with fat saturation  3D MRCP images were obtained with radial thick slabs and projections  3D rendering was performed from the acquisition scanner  IV Contrast:  9 mL of gadobutrol injection (MULTI-DOSE) FINDINGS: LOWER CHEST:   Enhancing solid metastatic mass lesions at the bilateral lung bases again noted, the largest in the lingula measuring 5 9 x 5 5 cm  In the left lower lobe, a pleural-based mass measures 4 1 x 2 7 cm  Finally, in the right lower lobe, a paramediastinal mass measures 3 1 x 2 0 cm  A 4th lesion in the anterior segment right upper lobe measures 2 0 x 1 7 cm, only seen on the  images   The lesions are also stable since the most recent CT of 12/27/2019  No pleural effusions  LIVER:   A nonenhancing, hemorrhagic subcapsular lesion in the posterior right hepatic lobe (14/287) measures 2 6 x 2 6 cm with overlying retraction of the capsule, suspected treated metastasis  An infiltrating mass in the anterior segment right lobe adjacent to the gallbladder fossa measures at least 5 5 x 3 7 x 4 7 cm causing invasion of the posterior gallbladder wall as suspected on the recent CT  The mass appears to extend to the bifurcation of the intrahepatic bile ducts evoking both intrahepatic biliary ductal dilatation of both the right side and left-sided ducts  Finally, there are surgical changes of the inferior subcapsular right hepatic lobe possible side of tumor resection  The hepatic veins and portal veins are patent  BILE DUCTS:  As described on the recent CT, a biliary stent is located in the common hepatic duct extending to the level of the right-sided intrahepatic ducts  There is persistent intrahepatic biliary ductal dilatation of both the right and left-sided ducts despite stent placement unchanged since the abdominal CT  No definite enhancing tumor within the stent identified  GALLBLADDER:  As described above, there is tumor invasion of the posterior gallbladder wall best seen on series 14 image 191, with retained bile in the gallbladder fundus  PANCREAS:  Normal  No main pancreatic ductal dilation  ADRENAL GLANDS:  Normal  SPLEEN:  Normal  KIDNEYS/PROXIMAL URETERS:  No hydroureteronephrosis  No suspicious renal mass  Small right lower pole cyst noted  BOWEL:   No dilated loops of bowel  PERITONEUM/RETROPERITONEUM:  No ascites  LYMPH NODES:  No abdominal lymphadenopathy  VASCULAR STRUCTURES:  No aneurysm  ABDOMINAL WALL:  A heterogeneous enhancing right anterolateral abdominal wall mass (12/80) measures at least 4 9 x 4 5 cm extending to the overlying skin surface as described on recent CT  OSSEOUS STRUCTURES:  No suspicious osseous lesion  Impression: 1  Metastatic colon carcinoma with stable metastases at the lung bases, right anterolateral abdominal wall, and liver as described above in detail  The infiltrating lesion in the right lobe adjacent to the gallbladder fossa causes invasion of the gallbladder wall with secondary intrahepatic biliary ductal dilatation of both the right and left-sided ducts  2   Indwelling biliary stent extending from the common hepatic duct to the right-sided bile ducts  No intraluminal enhancement to suggest tumor infiltration  I personally discussed this study with Mely Holt on 1/7/2020 at 9:07 AM  Workstation performed: [id number]     Fl Ercp Biliary Only    Result Date: 1/11/2020  Narrative: ERCP INDICATION:  History of painless jaundice  History of metastatic colon carcinoma  Biliary stent  COMPARISON:  Several prior studies, most recent of which is a contrast-enhanced MRI abdomen/MRCP January 6, 2020 and CT abdomen pelvis December 27, 2019  IMAGES:  14 FLUOROSCOPY TIME:   12 40 MINUTES CONTRAST: 50 mL of iohexol (OMNIPAQUE) FINDINGS: Fluoroscopic guidance was provided for performance of ERCP  BILIARY: The study is limited  There is an indwelling metallic stent in the common bile duct  Minimal contrast is seen beyond the stent  The visualized portions of the pancreatic duct appear grossly unremarkable  Impression: Fluoroscopic guidance for ERCP  Please see procedure report for full details  Workstation performed: [id number]     Ir Tube Placement Bili    Result Date: 1/20/2020  Narrative: Percutaneous cholangiogram Clinical History: Obstructing bile duct cancer post stent placement and recent ERCP intervention Contrast: 120 mL Omnipaque 350 Fluoro time: 16 5 minutes Technique: Informed consent was obtained after the pertinent risks and benefits were explained to the patient  General anesthesia was induced by the Anesthesia department   After review of the patient's recent imaging studies, right upper abdomen was prepped and draped in usual sterile fashion  Lidocaine was administered to the skin and a 21 gauge trocar needle was attempted to be advanced into multiple peripheral bile ducts however this was not successful  Finally using a combination of ultrasound and spin CT imaging the stent was accessed percutaneously with a 21-gauge 20 cm needle  Contrast opacification opacified the intrahepatic bile ducts the common bile duct, and contrast was seen to rapidly fill the small bowel without significant obstruction  Contrast would not remain within the bile ducts for access of peripheral bile duct  Therefore decision was made to terminate procedure at this time  Findings: Percutaneous cholangiogram showing common bile duct stent with some filling defects but brisk flow which had completely decompressed the bile ducts  Patient tolerated the procedure well no immediate postprocedural complication  Impression: Impression: Percutaneous cholangiogram with no drain placement secondary to complete decompression of system status post recent ERCP and therefore no access to peripheral intrahepatic biliary ducts  Workstation performed: [id number]     Ct Cryo Ablation (includes Imaging)    Result Date: 1/25/2020  Narrative: CT guided abdominal wall mass cryoablation Clinical History: Abdominal wall mass  Metastatic cancer Operators: Holayter Images: 106 No qualified resident was available  Procedure: After explaining the risks and benefits of procedure to the patient, informed consent was obtained  Under general anesthesia, the patient was placed in a supine  position and CT used to localize the right abdominal wall mass  The overlying skin was prepped and draped in usual sterile fashion local anesthesia obtained with a 1% lidocaine solution  1st using CT guidance hydrodissection was performed to pole the colon and liver away from the abdominal wall   Under CT guidance, 3 ice force cryoablation probes were advanced into different areas of the renal mass  Repeat imaging was performed to confirm proper positioning  Cryoablation was performed using a standard freeze-thaw cycle  A repeat CT scan after the first freeze demonstrates the ice ball completely surrounding the inferior aspect of the lesion but some was not covered on the anterior aspect of the lesion  For this reason all probes were retracted and repeat standard freeze cycle was performed  Subsequent CT scan imaging demonstrated excellent ablation of base of the mass, likely to provide good symptomatic relief, not intended for tumor site of purposes  Hydrodissection catheter also remains in position and there is good separation of bowel and liver suggesting no thermal injury likely to occur  Following the procedure, the probes were removed  The patient tolerated the procedure well and left the department in stable condition  Impression: Impression: Successful CT-guided abdominal wall tumor cryoablation for symptomatic relief  Workstation performed: [id number]     Radiology Results    Result Date: 1/30/2020  Narrative: Ordered by an unspecified provider  Vas Lower Limb Venous Duplex Study, Complete Bilateral    Result Date: 2/1/2020  Narrative:  [facility] REPORT CLINICAL: Indications: Patient presents with bilateral lower extremity edema with history of cancer  Risk Factors: The patient has history of HTN and previous smoking (quit >10yrs ago)  The patient current BMI is 34 72, Weight is 242 lb and height is 70 in  CONCLUSION:  Impression: RIGHT LOWER LIMB: No evidence of acute or chronic deep vein thrombosis  No evidence of superficial thrombophlebitis noted  Doppler evaluation shows a normal response to augmentation maneuvers  Popliteal, posterior tibial and anterior tibial arterial Doppler waveforms are triphasic  LEFT LOWER LIMB: No evidence of acute or chronic deep vein thrombosis  No evidence of superficial thrombophlebitis noted  Doppler evaluation shows a normal response to augmentation maneuvers  Popliteal, posterior tibial and anterior tibial arterial Doppler waveforms are triphasic  SIGNATURE: Electronically Signed by: Erin River MD on 2020-02-01 02:08:55 AM    Ct Abdomen Pelvis With Contrast    Result Date: 1/30/2020  Narrative: CT ABDOMEN AND PELVIS WITH IV CONTRAST INDICATION:   RUQ skin lesion, 2wks s/p cryoablation with cellulitis and pain  Metastatic colon carcinoma; status post cryoablation right anterolateral abdominal wall lesion 1/21/2020; patient has cellulitis and pain in the region COMPARISON:  Multiple prior examinations including images from cryoablation 1/21/2020 and CT 12/27/2019 as well as CT chest 1/29/2020 TECHNIQUE:  CT examination of the abdomen and pelvis was performed  In addition to portal venous phase postcontrast scanning through the abdomen and pelvis, delayed phase postcontrast scanning was performed through the upper abdominal viscera  Axial, sagittal, and coronal 2D reformatted images were created from the source data and submitted for interpretation  Radiation dose length product (DLP) for this visit:  [id number] 84 12 mGy-cm   This examination, like all CT scans performed in the [facility], was performed utilizing techniques to minimize radiation dose exposure, including the use of iterative reconstruction and automated exposure control  IV Contrast:  100 mL of iohexol (OMNIPAQUE) Enteric Contrast:  Enteric contrast was not administered  FINDINGS: ABDOMEN LOWER CHEST:  Please refer to report of CT chest examination performed earlier in the day  Again identified is a prominent inferior lingular mass with central calcification measuring 5 9 x 5 7 cm extending to the medial pleural surface adjacent to the heart as well as the lateral pleural surface similar to the prior examination  There is a posterolateral left lower lobe partially calcified mass again noted as well measuring 3 2 x 4 1 cm  Some dependent opacification of the right lung base likely reflects atelectasis  Small right greater than left pleural effusions are present  There is a small mass in the posterior medial right lung base again noted measuring 1 9 x 3 0 cm  LIVER/BILIARY TREE:  There is a biliary stent identified extending from the common bile duct to the main right intrahepatic duct  There is mild right and moderate left biliary ductal dilatation again noted which is unchanged  Diffuse atrophy of the left lobe of the liver is again noted  Postoperative change in the right lobe the liver is again noted presumably representing previous partial hepatic resection  Several hepatic mass lesions consistent with known metastasis are again identified including a lesion which appears to invade the posterior gallbladder wall (2 6 x 2 6 cm) as well as one in the superior right lobe of the liver (2 5 x 2 2 cm) extending to the subcapsular region, one in segment 4 liver (3 6 x 2 8 cm), and a lesion in segment 4 of the liver  Splenic and main portal vein as well as right portal vein are patent  Left portal vein does not opacify and may be thrombosed  [place] GALLBLADDER:  Some focal gallbladder wall thickening in noted posteromedially  Questionable invasion of the posterior gallbladder wall by metastatic lesion as described above and noted previously  SPLEEN:  Unremarkable  PANCREAS:  Unremarkable  ADRENAL GLANDS:  Unremarkable  KIDNEYS/URETERS:  One or more sharply circumscribed subcentimeter renal hypodensities are noted  These lesions are too small to accurately characterize, but are statistically most likely to represent benign cortical renal cyst(s)  According to the guidelines published in the [facility] Paper of the ACR Incidental Findings Committee (Radiology 2010), no further workup of these lesions is recommended  STOMACH AND BOWEL:  Moderate amount of fecal material present in the colon and rectum    There is a segment of hepatic flexure interposed between the diaphragm and the liver  Mild nonspecific wall thickening of the segment of small bowel in the left mid abdomen is noted  No intestinal obstruction is identified  APPENDIX:  No findings to suggest appendicitis  ABDOMINOPELVIC CAVITY:  There is a mild amount of abdominal and pelvic ascites present  No free air identified  VESSELS:  Unremarkable for patient's age  PELVIS REPRODUCTIVE ORGANS:  Unremarkable for patient's age  URINARY BLADDER:  Mild nonspecific circumferential wall thickening  ABDOMINAL WALL/INGUINAL REGIONS:  There is diffuse mild-moderate subcutaneous edema present likely reflecting 3rd spacing  There is again identified a soft tissue mass involving the abdominal wall musculature and subcutaneous soft tissues in the right anterolateral upper abdominal/lower chest wall extending to and involving the skin where there is a overlying fungating mass  The abdominal wall lesion was treated previously with cryoablation on 1/21/2020  Currently measures approximately 3 9 x 2 7 x 5 8 cm (previously 4 3 x 2 9 x 6 7 cm)  p no abscess noted  No gas in the lesion is identified  There is some inflammatory type stranding noted in the adjacent soft tissues both superficial to and deep to this lesion likely reflecting posttreatment change  No definite invasion of/destruction of the underlying osseous structures is noted  OSSEOUS STRUCTURES:  Degenerative change present throughout the spine  No definite lucent or sclerotic lesions are noted  Impression: 1  Interim slight decrease in size in abdominal wall mass, consistent with known metastatic lesion, in the right upper quadrant anterolateral abdominal /inferior thoracic wall extending to the skin surface with expected posttreatment change in the lesion and surrounding tissues without evidence for abscess or soft tissue gas   2   Evidence for metastatic disease in the lungs and liver as described above not appreciably changed from the previous examination  3   Biliary stent in place in stable position with continued biliary ductal dilatation  4   Small right greater than left pleural effusions and small amount of abdominal and pelvic ascites  5   Additional findings as noted  Findings concur with the preliminary report by Dr Gus Wu from [facility]    Workstation performed: [id number]       Labs:   Lab Results   Component Value Date    WBC 8 91 02/01/2020    HGB 10 5 (L) 02/01/2020    HCT 32 5 (L) 02/01/2020     (H) 02/01/2020     02/01/2020     Lab Results   Component Value Date    K 3 4 (L) 02/02/2020    CL 98 (L) 02/02/2020    CO2 30 02/02/2020    BUN 9 02/02/2020    CREATININE 1 07 02/02/2020    GLUF 111 (H) 01/29/2020    CALCIUM 7 8 (L) 02/02/2020    AST 31 02/02/2020    ALT 30 02/02/2020    ALKPHOS 335 (H) 02/02/2020    EGFR 67 02/02/2020       No results found for: IRON, TIBC, FERRITIN    Lab Results   Component Value Date    [id number] 815 01/10/2020         ROS: Review of Systems   Constitutional: Negative  Negative for appetite change, chills, diaphoresis, fatigue, fever and unexpected weight change  HENT:   Negative for hearing loss, lump/mass, mouth sores, nosebleeds, sore throat, trouble swallowing and voice change  Eyes: Negative  Negative for eye problems and icterus  Respiratory: Negative  Negative for chest tightness, cough, hemoptysis and shortness of breath  Cardiovascular: Negative for chest pain and leg swelling  Gastrointestinal: Negative for abdominal distention, abdominal pain, blood in stool, constipation, diarrhea (Improved on lenalidomide dose reduction) and nausea  Endocrine: Negative  Genitourinary: Negative for dysuria, frequency, hematuria and pelvic pain  Musculoskeletal: Negative  Negative for arthralgias, back pain, flank pain, gait problem, myalgias and neck stiffness  Skin: Negative for itching and rash     Neurological: Negative for dizziness, gait problem, headaches, light-headedness, numbness and speech difficulty  Hematological: Negative for adenopathy  Does not bruise/bleed easily  Psychiatric/Behavioral: Negative for confusion, decreased concentration, depression and sleep disturbance  The patient is not nervous/anxious  Current Medications: Reviewed  Allergies: Reviewed  PMH/FH/SH:  Reviewed      Physical Exam:    Body surface area is 2 27 meters squared  Wt Readings from Last 3 Encounters:   20 110 kg (243 lb)   20 110 kg (242 lb)   20 105 kg (232 lb)        Temp Readings from Last 3 Encounters:   20 (!) 101 4 °F (38 6 °C) (Tympanic)   20 98 1 °F (36 7 °C) (Axillary)   20 98 3 °F (36 8 °C)        BP Readings from Last 3 Encounters:   20 110/70   20 113/68   20 98/62         Pulse Readings from Last 3 Encounters:   20 (!) 110   20 93   20 76        Physical Exam   Constitutional: He appears well-developed  HENT:   Head: Normocephalic  Eyes: Pupils are equal, round, and reactive to light  Scleral icterus is present  Neck: Normal range of motion  Abdominal: He exhibits mass (On the lateral aspect of the right upper quadrant measuring 10 x 10 cm consistent with metastatic disease)  He exhibits no distension  There is no tenderness  Musculoskeletal: He exhibits edema ( +2 edema bilaterally)  Skin: No rash noted  No erythema  ECO    Goals and Barriers:  Current Goal: Minimize effects of disease  Barriers: None  Patient's Capacity to Self Care:  Patient is able to self care      Code Status: [unfilled]

## 2020-02-05 NOTE — PROGRESS NOTES
2/4/2020 received notification from clinical pt will be starting Lonsurf 20-8  19mg      Pt has [employer]  ID # [id number]  BIN # [id number]  PCN # [id number]  GRP # [id number]    Submitted for auth through cover my meds  2/5/2020 received approval letter from [employer]  Per the approval letter [facility] #[id number] is valid from 2/4/2020 through 2/4/2021    Notified clinical, [facility], and finance

## 2020-02-05 NOTE — PROGRESS NOTES
2-4-20  Received New Oral Chemo Start email from providers office  [facility] requested Marisol Coy is needed  2-5-20  Completed [employer] [facility]  Application,  Forwarded to provider for signature  Waiting on copay    Call placed to patient to discuss funding options, signature and income documents needed    2-13-20  Spoke with patient, he will meet with me next Thrusday 2-20-20

## 2020-02-06 NOTE — TELEPHONE ENCOUNTER
Patient notified of new scripts for 1 month  Verbalizes understanding that we cannot refill his oxycodone until seen in our office  Pt offered no complaints to this nurse

## 2020-02-06 NOTE — TELEPHONE ENCOUNTER
Since we did see him in his 1/9-16/20 admission and we made some changes to his medications, I'm comfortable giving a month of those two medications  But since he has had a hospitalization since that time and has postponed an office visit, I wouldn't be comfortable renewing his oxycodone or other narcotic should he need that before he sees me (or another [facility] provider) in the office  Sending those 2 meds to SATHISH Sunshine  Thanks! Discussed case w/ senior attending prior to decision

## 2020-02-06 NOTE — TELEPHONE ENCOUNTER
He has not established care with palliative and supportive care  They will have to call the original prescriber

## 2020-02-06 NOTE — TELEPHONE ENCOUNTER
Pt son in law called office on behalf of pt  States he was unable to keep his appointment to day with Dr Miriam Shepherd re no transportation and took ill to drive himself  Pt is rescheduled for 03/03/20 with Dr Sylvester Balderas in [place] office  Son in law reports that pt is requesting refills on 2 of his medications  Son in law states his neuralgia is very bad and worse since he has been sick  1  Gabapentin 300 mg 3 x day  45/15 01/16/20  Per Dr Bradford Cantu   2  Baclofen 5 mg 3 tabs 3 X day  63/7 02/03/20  Per Kevan BRASHER    Would 1 of you be able to fill these scripts until pt seen in clinic as HFU  [facility] [place]    Thank you

## 2020-02-11 NOTE — TELEPHONE ENCOUNTER
Patient was seen today for a CPE/NP with Dr Hallie Michael  Patients daughter Dwight Birmingham was present at the visit and is requesting some assistance for her father at home  As per Dr Hallie Michael, patient needs VNA services set up   For any questions or concerns please call Tata Baumann MA

## 2020-02-11 NOTE — PROGRESS NOTES
Assessment/Plan:    No problem-specific Assessment & Plan notes found for this encounter  Could titrate gabapentin advised in future, fall and sedation risk aware, could see if baclofen does anything for trigeminal neuralgia by stopping, options for pain control by palliative care but could refer to px mgmt as I told them I would not refill/manage opioids  Neurosurg opinion for trigeminal neuralgia    Metastatic colon ca, cont hematology/oncology f/u, advised of risk of bili/ast/alt/alp on labs last week suspicious for recurrent biliary obstruction, they will contact their oncologist    Jaundice persists    Chronic afib, uncertain duration, cont BB, offer cardiology eval offered, current rate control in with BB, rate recheck 76s, family state they have not had a discussion yet about risks of bleeding vs protection of embolic CVA  They are aware of both risks and will think it over     htn stable    VNA referral for home care    Handicap placard requested and done    bph stable, I refilled flomax    Edema, use compression stockings, elevate legs, limit salt, may be partly due to low albumin    45 minutes spent with patient with over 50% of the time spent counseling        Diagnoses and all orders for this visit:    Metastatic colon cancer to liver [facility])  -     Ambulatory Referral to Home Health; Future    Trigeminal neuralgia  -     Ambulatory referral to Neurosurgery; Future    Painless jaundice secondary to parenchymal infiltration of tumor along with biliary compression     Essential hypertension    Atrial fibrillation, unspecified type [facility])  -     Ambulatory referral to Cardiology; Future    Benign prostatic hyperplasia, unspecified whether lower urinary tract symptoms present  -     tamsulosin (FLOMAX) 0 4 mg; Take 1 capsule (0 4 mg total) by mouth daily with dinner    Hyperbilirubinemia    Cancer associated pain  -     Ambulatory Referral to  R[facility] Su[facility];  Future    Hypertension, unspecified type  - metoprolol succinate (TOPROL-XL) 25 mg 24 hr tablet; Take 1 tablet (25 mg total) by mouth daily            Return in about 1 month (around 3/11/2020) for Recheck  Subjective:      Patient ID: Genia Cortez is a 66 y o  male  Chief Complaint   Patient presents with    Physical Exam     new patient to establish care, jlopezcma     Foot Swelling    Jaundice       HPI  Moved from [address] [place] [address] to [facility], [facility]    Colon CA, metastatic  Has oncologist  High bili  Liver and lung mets  Chemo planned in future  Cryoablation few wks ago of right side of abdomen    Stent in [place]  IR doctor Dr Kris Gamez fibrillation  Was on lovenox  Has CAF since Nov 2019  No VKA or NOAC in past    Gabapentin for trigem neuralgia  Tegretol in past but liver issues  Baclofen new, seems to help  Seems to be sleepy when taken    trigem neuralgia for 10-12y  Hx of TIA    abx from wound care per pt    Flow better on flomax    Oxycodone qid per pt  Lately, harris for trig neuralgias  Palliative care manages and prescribes this  Never been to a neurosurgeon    Lasix for water retention  No chf  Taking daily  Recently taken for first time  Not been walking much    The following portions of the patient's history were reviewed and updated as appropriate: allergies, current medications, past family history, past medical history, past social history, past surgical history and problem list     Review of Systems   Constitutional: Negative for fever  HENT: Negative for trouble swallowing  Eyes: Negative for discharge  Respiratory: Negative for shortness of breath  Cardiovascular: Positive for leg swelling  Negative for chest pain  Neurological: Negative for syncope           Current Outpatient Medications   Medication Sig Dispense Refill    Baclofen 5 MG TABS Take 15 mg by mouth 3 (three) times a day 90 tablet 0    furosemide (LASIX) 20 mg tablet Take 1 tablet (20 mg total) by mouth daily 30 tablet 2    gabapentin (NEURONTIN) 300 mg capsule Take 1 capsule (300 mg total) by mouth 3 (three) times a day 90 capsule 0    metoprolol succinate (TOPROL-XL) 25 mg 24 hr tablet Take 1 tablet (25 mg total) by mouth daily 30 tablet 5    metroNIDAZOLE (FLAGYL) 500 mg tablet Crush 2 Flagyl tablets  Sprinkle onto fungating tumor  Then cover with adaptic contact layer followed by maxorb calcium alginate dressing  Then cover with ABD pad and secure with patient's own hypoallergenic tape  Do once a day and prn 60 tablet 0    oxyCODONE (ROXICODONE) 5 mg immediate release tablet Take 1 tablet (5 mg total) by mouth every 4 (four) hours as needed for moderate pain for up to 20 dosesMax Daily Amount: 30 mg 20 tablet 0    tamsulosin (FLOMAX) 0 4 mg Take 1 capsule (0 4 mg total) by mouth daily with dinner 30 capsule 5     No current facility-administered medications for this visit  Objective:    /64   Pulse (!) 118   Temp 98 3 °F (36 8 °C)   Resp 16   Ht 5' 10" (1 778 m)   Wt 107 kg (235 lb)   SpO2 99%   BMI 33 72 kg/m²        Physical Exam   Constitutional: He appears well-developed  No distress  Visibly jaundiced   HENT:   Head: Normocephalic  Right Ear: External ear normal    Left Ear: External ear normal    Mouth/Throat: No oropharyngeal exudate  Eyes: Conjunctivae are normal  Scleral icterus is present  Neck: Neck supple  No tracheal deviation present  Cardiovascular: Normal rate, normal heart sounds and intact distal pulses  An irregularly irregular rhythm present  Exam reveals no friction rub  Pulmonary/Chest: Effort normal  No respiratory distress  He has no wheezes  He has no rales  Abdominal: Soft  Bowel sounds are normal  There is no tenderness  There is no guarding  Suspect fluid wave   Musculoskeletal: He exhibits no edema or deformity  Neurological: He is alert  He exhibits normal muscle tone  Skin: Skin is warm and dry  No pallor     Psychiatric: His behavior is normal  Thought content normal    Nursing note and vitals reviewed  BMI Counseling: Body mass index is 33 72 kg/m²  The BMI is above normal  Nutrition recommendations include increasing intake of lean protein  No pharmacotherapy was ordered                Oly Dee DO

## 2020-02-12 NOTE — PROGRESS NOTES
Patient returned this CM stating he only needed to know if OPCM could help with providing transportation to appts and also getting blood work drawn at home  Patient currently is not receiving visiting nurse service  This CM agreed to assist as able and will make referral to OP  Carl Rhodes for her assistance

## 2020-02-13 NOTE — PROGRESS NOTES
This CM received return call from [address] [place] [address] at [address] informed Mark Burnette that PCP will need to place a new referral for VNA services because  VNA does not serve [place]  CM recommended VNA that serves [place] such as [facility] which has access to [employer]  Mark Burnette agreed to notify PCP

## 2020-02-13 NOTE — TELEPHONE ENCOUNTER
Spoke to [place] from [facility], she said [facility] not come to [facility]  We have to contact [facility]  Dr Bing Whiteside, please add new order with community /vna services  PT/NURSE   Cheng Stevenson MA

## 2020-02-13 NOTE — TELEPHONE ENCOUNTER
----- Message from Lois Gaona DO sent at 2/11/2020  6:40 PM EST -----  Regarding: vna  Please arrange VNA for him

## 2020-02-13 NOTE — TELEPHONE ENCOUNTER
Cinda Hart Outpatient care manager calling for you in regards to this patient    Please return her call at [phone]

## 2020-02-13 NOTE — TELEPHONE ENCOUNTER
Emelyn Tao from [facility] calling stating that the order that was sent to them needs to state if the patient needs a nurse, PT or BOTH PT/Nurse  But the order needs to state that      Please advise

## 2020-02-18 NOTE — ASSESSMENT & PLAN NOTE
· Patient complaining of facial pain/trigeminal neuralgia since 2008  · Hx of colon CA with liver mets, was hospitalized in November and taken off of carbamezapime due to liver toxicity  Symptoms worsened significantly since then  · Follows [facility] distribution  Currently on Baclofen and Gabapentin but states is becoming tolerant of these medications and episodes of pain are intensifying  · No neurological deficits on exam   · MRI brain w/wo ordered to r/o additional cause of symptoms including further metastasis  · Once imaging completed, patient to be seen by Dr Lee Walker for consideration of rhizotomy  · Additionally patient has been followed every 6 months by Dr Luis Carlos Mariano in [place]  Family in process of transferring all care here to [facility] so referral placed for neurology  · This was discussed extensively with patient and son-in-law and they are both agreeable with this plan

## 2020-02-18 NOTE — PROGRESS NOTES
Neurosurgery Office Note  Mango Toth 66 y o  male MRN: [id number]      Assessment/Plan     Trigeminal neuralgia  · Patient complaining of facial pain/trigeminal neuralgia since 2008  · Hx of colon CA with liver mets, was hospitalized in November and taken off of carbamezapime due to liver toxicity  Symptoms worsened significantly since then  · Follows V2 distribution  Currently on Baclofen and Gabapentin but states is becoming tolerant of these medications and episodes of pain are intensifying  · No neurological deficits on exam   · MRI brain w/wo ordered to r/o additional cause of symptoms including further metastasis  · Once imaging completed, patient to be seen by Dr Reza Albert for consideration of rhizotomy  · Additionally patient has been followed every 6 months by Dr Madhu Moody in [address] [place] [address] in process of transferring all care here to [place] so referral placed for neurology  · This was discussed extensively with patient and son-in-law and they are both agreeable with this plan  Diagnoses and all orders for this visit:    Trigeminal neuralgia  -     Ambulatory referral to Neurosurgery  -     MRI brain with and without contrast; Future  -     Ambulatory referral to Neurology; Future  -     LORazepam (ATIVAN) 0 5 mg tablet; Take 1 tablet (0 5 mg total) by mouth 2 (two) times a day for 2 doses 1 tab PO 30 mins prior to MRI, then 1 tab PO PRN immediately prior to MRI            CHIEF COMPLAINT    Chief Complaint   Patient presents with    Consult     workup for trigeminal neuralgia       HISTORY    History of Present Illness     66y o  year old male with a history of colon cancer with metastasis to liver and lungs, hypertension, atrial fibrillation, BPH, trigeminal neuralgia who presents for evaluation of trigeminal neuralgia symptoms that he has been experiencing since 2008  He was referred by his PCP Dr Carlos Cuadra    He states up until recently his pain was relatively well controlled on carbamazepine  He was recently hospitalized in November for his colon cancer and his carbamazepine was stopped due to liver toxicity  He was placed on gabapentin 2 weeks ago as well as baclofen during an additional hospitalization  He states these have helped somewhat but he has beginning to feel that his pain is becoming resistant to these medications  He states certain activities like chewing talking and brushing his teeth severely exacerbated his pain  He states the pain is almost constant  He feels he has lost weight secondary to the pain when he eats although he does endorse that he is edematous secondary to his cancer  He describes the pain as a burning electrical stabbing like shock from his ear to the corner of his mouth  He states that of all of the current medical issues he is dealing with right now, his facial pain is affecting his life the most negatively  HPI    See Discussion    REVIEW OF SYSTEMS    Review of Systems   Constitutional: Negative  HENT: Negative  Eyes: Negative  Respiratory: Positive for shortness of breath  Negative for wheezing  Cardiovascular: Negative  Gastrointestinal: Positive for constipation  Negative for abdominal pain, nausea and vomiting  Endocrine: Negative  Genitourinary: Negative  Musculoskeletal: Positive for gait problem (uses cane to keep himself steady)  Negative for back pain (from not walking and sitting), myalgias and neck pain  Skin: Negative  Allergic/Immunologic: Negative  Neurological: Positive for dizziness, weakness (general overall weakness from not doing much) and numbness (neuropathy in feet, a little in his fingertips)  Negative for tremors, seizures, speech difficulty, light-headedness and headaches  Right sided facial pain from forehead down to cheek/lip  Difficultly speaking and eating when it hurts  Feels like a burning and electrical shock pain  Hematological: Bruises/bleeds easily (mainly hands)  Psychiatric/Behavioral: Negative  Meds/Allergies     Current Outpatient Medications   Medication Sig Dispense Refill    Baclofen 5 MG TABS Take 15 mg by mouth 3 (three) times a day 90 tablet 0    furosemide (LASIX) 20 mg tablet Take 1 tablet (20 mg total) by mouth daily 30 tablet 2    gabapentin (NEURONTIN) 300 mg capsule Take 1 capsule (300 mg total) by mouth 3 (three) times a day (Patient taking differently: Take by mouth 300mg in the morning and afternoon and 600mg at night) 90 capsule 0    metoprolol succinate (TOPROL-XL) 25 mg 24 hr tablet Take 1 tablet (25 mg total) by mouth daily 30 tablet 5    metroNIDAZOLE (FLAGYL) 500 mg tablet Crush 2 Flagyl tablets  Sprinkle onto fungating tumor  Then cover with adaptic contact layer followed by maxorb calcium alginate dressing  Then cover with ABD pad and secure with patient's own hypoallergenic tape  Do once a day and prn 60 tablet 0    oxyCODONE (ROXICODONE) 5 mg immediate release tablet Take 1 tablet (5 mg total) by mouth every 4 (four) hours as needed for moderate pain for up to 20 dosesMax Daily Amount: 30 mg 20 tablet 0    tamsulosin (FLOMAX) 0 4 mg Take 1 capsule (0 4 mg total) by mouth daily with dinner 30 capsule 5    LORazepam (ATIVAN) 0 5 mg tablet Take 1 tablet (0 5 mg total) by mouth 2 (two) times a day for 2 doses 1 tab PO 30 mins prior to MRI, then 1 tab PO PRN immediately prior to MRI 2 tablet 0     No current facility-administered medications for this visit          Allergies   Allergen Reactions    Ib Pro [Ibuprofen] Hives and Itching    Adhesive [Medical Tape] Rash     Use only paper tape       PAST HISTORY    Past Medical History:   Diagnosis Date    Atrial fibrillation ([facility] )     BPH (benign prostatic hyperplasia)     Hypertension     Metastatic colon cancer to liver ([facility] )     colon- johnny liver & lungs    Stroke ([facility] )     2008- mild weakness left hand/arm    Trigeminal neuralgia     Tumor, metastatic (HCC)        Past Surgical History:   Procedure Laterality Date    COLON SURGERY      2013    EYE SURGERY      IR TUBE PLACEMENT BILI  1/15/2020    LIVER SURGERY      TONSILLECTOMY         Social History     Tobacco Use    Smoking status: Former Smoker     Types: Cigarettes     Last attempt to quit: 1981     Years since quittin 1    Smokeless tobacco: Never Used   Substance Use Topics    Alcohol use: Never     Frequency: Never    Drug use: Never       Family History   Problem Relation Age of Onset    Cancer Father     Colon cancer Father     Cancer Brother     Cancer Paternal Grandfather          Above history personally reviewed  EXAM    Vitals:Blood pressure 122/84, pulse (!) 106, temperature (!) 97 4 °F (36 3 °C), temperature source Tympanic, resp  rate 16, height 5' 10" (1 778 m), weight 102 kg (225 lb)  ,Body mass index is 32 28 kg/m²  Physical Exam   Constitutional: He is oriented to person, place, and time  He appears well-developed and well-nourished  No distress  Eyes: Pupils are equal, round, and reactive to light  EOM are normal  Right eye exhibits no discharge  Left eye exhibits no discharge  Scleral icterus is present  Neck: Normal range of motion  Neck supple  Cardiovascular: Normal rate and regular rhythm  Pulmonary/Chest: Effort normal and breath sounds normal  No respiratory distress  Abdominal: Soft  Bowel sounds are normal  He exhibits no distension  There is no tenderness  Musculoskeletal: Normal range of motion  Neurological: He is alert and oriented to person, place, and time  He displays normal reflexes  No cranial nerve deficit or sensory deficit  He exhibits normal muscle tone  He has a normal Finger-Nose-Finger Test  Coordination normal    Reflex Scores:       Tricep reflexes are 1+ on the right side and 1+ on the left side  Bicep reflexes are 1+ on the right side and 1+ on the left side         Brachioradialis reflexes are 1+ on the right side and 1+ on the left side        Patellar reflexes are 1+ on the right side and 1+ on the left side  Achilles reflexes are 1+ on the right side and 1+ on the left side  Skin: Skin is warm and dry  He is not diaphoretic  Grossly icteric   Psychiatric: He has a normal mood and affect  His speech is normal and behavior is normal  Judgment and thought content normal        Neurologic Exam     Mental Status   Oriented to person, place, and time  Oriented to person  Oriented to place  Oriented to time  Oriented to year, month and date  Registration: recalls 3 of 3 objects  Attention: normal  Concentration: normal    Speech: speech is normal   Level of consciousness: alert  Knowledge: good and consistent with education  Able to name object  Cranial Nerves   Cranial nerves II through XII intact  CN III, IV, VI   Pupils are equal, round, and reactive to light  Extraocular motions are normal    Right pupil: Size: 3 mm  Shape: regular  Reactivity: brisk  Consensual response: intact  Accommodation: intact  Left pupil: Size: 3 mm  Shape: regular  Reactivity: brisk  Consensual response: intact  Accommodation: intact  Nystagmus: none   Diplopia: none  Conjugate gaze: present    CN V   Facial sensation intact  Right facial sensation deficit: none  Left facial sensation deficit: none  Right corneal reflex: normal  Left corneal reflex: normal    CN VII   Facial expression full, symmetric       CN VIII   Hearing: intact    CN IX, X   Palate: symmetric    CN XI   Right sternocleidomastoid strength: normal  Left sternocleidomastoid strength: normal  Right trapezius strength: normal  Left trapezius strength: normal    CN XII   Tongue: not atrophic  Fasciculations: absent  Tongue deviation: none  Pain along V2 distribution on right     Motor Exam   Muscle bulk: normal  Overall muscle tone: normal  Right arm pronator drift: absent  Left arm pronator drift: absent    Strength   Right deltoid: 5/5  Left deltoid: 5/5  Right biceps: 5/5  Left biceps: 5/5  Right triceps: 5/5  Left triceps: 5/5  Right quadriceps: 5/5  Left quadriceps: 5/5  Right hamstrin/5  Left hamstrin/5  Right anterior tibial: 5/5  Left anterior tibial: 5/5  Right posterior tibial: 5/5  Left posterior tibial: 5/5  Right peroneal: 5/5  Left peroneal: 5/5  Right gastroc: 5/5  Left gastroc: 5/5    Sensory Exam   Light touch normal    Proprioception normal      Gait, Coordination, and Reflexes     Coordination   Finger to nose coordination: normal    Tremor   Resting tremor: absent  Intention tremor: absent  Action tremor: absent    Reflexes   Right brachioradialis: 1+  Left brachioradialis: 1+  Right biceps: 1+  Left biceps: 1+  Right triceps: 1+  Left triceps: 1+  Right patellar: 1+  Left patellar: 1+  Right achilles: 1+  Left achilles: 1+  Right : 1+  Left : 1+  Right Byrnes: absent  Left Byrnes: absent  Right ankle clonus: absent  Left ankle clonus: absent        MEDICAL DECISION MAKING    Imaging Studies:     Xr Chest 1 View Portable    Result Date: 2020  Narrative: CHEST INDICATION:   tachycardia  Colon cancer  COMPARISON:  Chest CT from 2020  EXAM PERFORMED/VIEWS:  XR CHEST PORTABLE FINDINGS:  Right port at cavoatrial junction  Cardiomediastinal silhouette appears unremarkable  Bilateral lung metastases  No acute disease  No effusion or pneumothorax  Osseous structures appear within normal limits for patient age  Impression: No acute cardiopulmonary disease  Bilateral lung metastases, better shown on recent chest CT  Workstation performed: [id number]     Ct Chest W Contrast    Result Date: 2020  Narrative: CT CHEST WITH IV CONTRAST INDICATION:   C18 9: Malignant neoplasm of colon, unspecified R17: Unspecified jaundice  COMPARISON:  Correlation CT dated 2020, CT abdomen and pelvis dated 2019 TECHNIQUE: CT examination of the chest was performed   Axial, sagittal, and coronal 2D reformatted images were created from the source data and submitted for interpretation  Radiation dose length product (DLP) for this visit:  471 52 mGy-cm   This examination, like all CT scans performed in the [facility], was performed utilizing techniques to minimize radiation dose exposure, including the use of iterative  reconstruction and automated exposure control  IV Contrast:  85 mL of iohexol (OMNIPAQUE) FINDINGS: LUNGS:  There is redemonstration of multiple pulmonary metastatic lesions including a dominant irregular mass inferior left lingula with some coarse central calcifications  This measures approximately 6 0 x 5 8 cm in size (axial image 27, series 2)  There is another mass in the posterior left lower lobe with some central calcifications which measures approximately 4 5 x 3 0 cm in size (axial image 37, series 2)  Additional irregular nodules in the anterior right upper lobe measuring approximately 2 5 x 2 1 cm in size (axial image 19, series 2) and a subpleural nodule in the posterior right lower lobe measuring approximately 3 0 x 1 9 cm in size (axial image 34, series 2) are also noted  There is a 5 mm nodule in the posterior right upper lobe (axial image 17, series 2)  Some scattered reticular nodular opacities adjacent to the dominant lesions probably represents lymphangitic spread of neoplasm  The lungs otherwise are grossly clear  PLEURA:  Small dependent pleural effusions  There is some pleural thickening in the periphery of the inferior left lingula and in the posterior right lower lobe adjacent to the previously described nodules/mass lesions  HEART/GREAT VESSELS:  The heart appears normal in size  Mild to moderate aortic and coronary atherosclerosis  Tortuous aorta; no aortic aneurysm  MEDIASTINUM AND STEVEN:  The large airways are patent  No dominant mediastinal mass is seen  Some shotty mediastinal lymph nodes are seen which is nonspecific    There is a dominant left hilar lymph node which measures approximately 1 6 cm in size (axial image 22, series 2), suspicious for lymphatic metastatic disease  CHEST WALL AND LOWER NECK:   No discrete axillary adenopathy  Mild body wall edema  Right-sided Mediport extends into the SVC VISUALIZED STRUCTURES IN THE UPPER ABDOMEN:  Common bile duct stent is partially imaged  Mild intrahepatic biliary ductal dilatation is seen, similar to the prior 2 7 cm target appearing lesion in the anterior liver with an adjacent 3 7 cm similar-appearing lesion as well as a 2 9 cm lesion in the superior right hepatic lobe probably represent hepatic metastatic disease  Small-volume ascites and mesenteric edema is noted  There is redemonstration of the right upper anterior abdominal wall mass/metastatic lesions which measures approximately 5 6 x 4 8 cm in size, previously measuring 6 3 x 4 9 cm in size  OSSEOUS STRUCTURES: Multilevel degenerative changes of the visualized spine  No acute fracture or destructive osseous lesion  Impression: Presumed pulmonary, hepatic, lymphatic and abdominal wall metastatic lesions as described  Small dependent pleural effusions, mesenteric edema and small volume ascites, new or worsened from the prior but nonspecific  Common bile duct stent is partially imaged  Mild intrahepatic biliary ductal dilatation is seen, similar to the prior   Coronary atherosclerosis, and other findings as above  Workstation performed: [id number]     Ct Cryo Ablation (includes Imaging)    Addendum Date: 2/10/2020 Addendum:   ADDENDUM: Cryoablation probes were advanced into the abdominal wall tumor only  Result Date: 2/10/2020  Narrative: CT guided abdominal wall mass cryoablation Clinical History: Abdominal wall mass  Metastatic cancer Operators: Holayter Images: 106 No qualified resident was available  Procedure: After explaining the risks and benefits of procedure to the patient, informed consent was obtained   Under general anesthesia, the patient was placed in a supine  position and CT used to localize the right abdominal wall mass  The overlying skin was prepped and draped in usual sterile fashion local anesthesia obtained with a 1% lidocaine solution  1st using CT guidance hydrodissection was performed to pole the colon and liver away from the abdominal wall  Under CT guidance, 3 ice force cryoablation probes were advanced into different areas of the renal mass  Repeat imaging was performed to confirm proper positioning  Cryoablation was performed using a standard freeze-thaw cycle  A repeat CT scan after the first freeze demonstrates the ice ball completely surrounding the inferior aspect of the lesion but some was not covered on the anterior aspect of the lesion  For this reason all probes were retracted and repeat standard freeze cycle was performed  Subsequent CT scan imaging demonstrated excellent ablation of base of the mass, likely to provide good symptomatic relief, not intended for tumor site of purposes  Hydrodissection catheter also remains in position and there is good separation of bowel and liver suggesting no thermal injury likely to occur  Following the procedure, the probes were removed  The patient tolerated the procedure well and left the department in stable condition  Impression: Impression: Successful CT-guided abdominal wall tumor cryoablation for symptomatic relief  Workstation performed: [id number]     Radiology Results    Result Date: 1/30/2020  Narrative: Ordered by an unspecified provider  Vas Lower Limb Venous Duplex Study, Complete Bilateral    Result Date: 2/1/2020  Narrative:  [facility] REPORT CLINICAL: Indications: Patient presents with bilateral lower extremity edema with history of cancer  Risk Factors: The patient has history of HTN and previous smoking (quit >10yrs ago)  The patient current BMI is 34 72, Weight is 242 lb and height is 70 in     CONCLUSION:  Impression: RIGHT LOWER LIMB: No evidence of acute or chronic deep vein thrombosis  No evidence of superficial thrombophlebitis noted  Doppler evaluation shows a normal response to augmentation maneuvers  Popliteal, posterior tibial and anterior tibial arterial Doppler waveforms are triphasic  LEFT LOWER LIMB: No evidence of acute or chronic deep vein thrombosis  No evidence of superficial thrombophlebitis noted  Doppler evaluation shows a normal response to augmentation maneuvers  Popliteal, posterior tibial and anterior tibial arterial Doppler waveforms are triphasic  SIGNATURE: Electronically Signed by: Chris Chua MD on 2020-02-01 02:08:55 AM    Ct Abdomen Pelvis With Contrast    Result Date: 1/30/2020  Narrative: CT ABDOMEN AND PELVIS WITH IV CONTRAST INDICATION:   RUQ skin lesion, 2wks s/p cryoablation with cellulitis and pain  Metastatic colon carcinoma; status post cryoablation right anterolateral abdominal wall lesion 1/21/2020; patient has cellulitis and pain in the region COMPARISON:  Multiple prior examinations including images from cryoablation 1/21/2020 and CT 12/27/2019 as well as CT chest 1/29/2020 TECHNIQUE:  CT examination of the abdomen and pelvis was performed  In addition to portal venous phase postcontrast scanning through the abdomen and pelvis, delayed phase postcontrast scanning was performed through the upper abdominal viscera  Axial, sagittal, and coronal 2D reformatted images were created from the source data and submitted for interpretation  Radiation dose length product (DLP) for this visit:  [id number] 84 12 mGy-cm   This examination, like all CT scans performed in the [facility], was performed utilizing techniques to minimize radiation dose exposure, including the use of iterative reconstruction and automated exposure control  IV Contrast:  100 mL of iohexol (OMNIPAQUE) Enteric Contrast:  Enteric contrast was not administered  FINDINGS: ABDOMEN LOWER CHEST:  Please refer to report of CT chest examination performed earlier in the day  Again identified is a prominent inferior lingular mass with central calcification measuring 5 9 x 5 7 cm extending to the medial pleural surface adjacent to the heart as well as the lateral pleural surface similar to the prior examination  There is a posterolateral left lower lobe partially calcified mass again noted as well measuring 3 2 x 4 1 cm  Some dependent opacification of the right lung base likely reflects atelectasis  Small right greater than left pleural effusions are present  There is a small mass in the posterior medial right lung base again noted measuring 1 9 x 3 0 cm  LIVER/BILIARY TREE:  There is a biliary stent identified extending from the common bile duct to the main right intrahepatic duct  There is mild right and moderate left biliary ductal dilatation again noted which is unchanged  Diffuse atrophy of the left lobe of the liver is again noted  Postoperative change in the right lobe the liver is again noted presumably representing previous partial hepatic resection  Several hepatic mass lesions consistent with known metastasis are again identified including a lesion which appears to invade the posterior gallbladder wall (2 6 x 2 6 cm) as well as one in the superior right lobe of the liver (2 5 x 2 2 cm) extending to the subcapsular region, one in segment 4 liver (3 6 x 2 8 cm), and a lesion in segment 4 of the liver  Splenic and main portal vein as well as right portal vein are patent  Left portal vein does not opacify and may be thrombosed  [employer] GALLBLADDER:  Some focal gallbladder wall thickening in noted posteromedially  Questionable invasion of the posterior gallbladder wall by metastatic lesion as described above and noted previously  SPLEEN:  Unremarkable  PANCREAS:  Unremarkable  ADRENAL GLANDS:  Unremarkable  KIDNEYS/URETERS:  One or more sharply circumscribed subcentimeter renal hypodensities are noted   These lesions are too small to accurately characterize, but are statistically most likely to represent benign cortical renal cyst(s)  According to the guidelines published in the [facility] Paper of the ACR Incidental Findings Committee (Radiology 2010), no further workup of these lesions is recommended  STOMACH AND BOWEL:  Moderate amount of fecal material present in the colon and rectum  There is a segment of hepatic flexure interposed between the diaphragm and the liver  Mild nonspecific wall thickening of the segment of small bowel in the left mid abdomen is noted  No intestinal obstruction is identified  APPENDIX:  No findings to suggest appendicitis  ABDOMINOPELVIC CAVITY:  There is a mild amount of abdominal and pelvic ascites present  No free air identified  VESSELS:  Unremarkable for patient's age  PELVIS REPRODUCTIVE ORGANS:  Unremarkable for patient's age  URINARY BLADDER:  Mild nonspecific circumferential wall thickening  ABDOMINAL WALL/INGUINAL REGIONS:  There is diffuse mild-moderate subcutaneous edema present likely reflecting 3rd spacing  There is again identified a soft tissue mass involving the abdominal wall musculature and subcutaneous soft tissues in the right anterolateral upper abdominal/lower chest wall extending to and involving the skin where there is a overlying fungating mass  The abdominal wall lesion was treated previously with cryoablation on 1/21/2020  Currently measures approximately 3 9 x 2 7 x 5 8 cm (previously 4 3 x 2 9 x 6 7 cm)  p no abscess noted  No gas in the lesion is identified  There is some inflammatory type stranding noted in the adjacent soft tissues both superficial to and deep to this lesion likely reflecting posttreatment change  No definite invasion of/destruction of the underlying osseous structures is noted  OSSEOUS STRUCTURES:  Degenerative change present throughout the spine  No definite lucent or sclerotic lesions are noted  Impression: 1    Interim slight decrease in size in abdominal wall mass, consistent with known metastatic lesion, in the right upper quadrant anterolateral abdominal /inferior thoracic wall extending to the skin surface with expected posttreatment change in the lesion and surrounding tissues without evidence for abscess or soft tissue gas  2   Evidence for metastatic disease in the lungs and liver as described above not appreciably changed from the previous examination  3   Biliary stent in place in stable position with continued biliary ductal dilatation  4   Small right greater than left pleural effusions and small amount of abdominal and pelvic ascites  5   Additional findings as noted  Findings concur with the preliminary report by Dr Clem Ramirez from [employer]    Workstation performed: [id number]       I have personally reviewed pertinent reports     and I have personally reviewed pertinent films in PACS

## 2020-02-19 NOTE — LETTER
[address]  [address] [address]  Dept: [phone]    February 20, 2020     Patient: Nina Sung   YOB: 1945   Date of Visit: 2/19/2020       To Whom it May Concern:    Nina Sung is under my professional care  He was seen in the hospital from 2/19/2020   to 02/20/20  He {Return to school/sport/work:73635}  If you have any questions or concerns, please don't hesitate to call           Sincerely,          Raymundo Allan PA-C

## 2020-02-19 NOTE — TELEPHONE ENCOUNTER
Patient's daughter Darshan Jim called in today stating that patient's billirubin levels are high  She states Dr Morenita Butler advised that if this happens she should take him to the [facility]  She called in today to advise that she will be taking him to [facility] this afternoon and wanted to let Dr Morenita Butler know  Spoke with Arley Trimble at [employer] and she advised to email Dr Morenita Butler directly  Sent email to advise  Patient's daughter aware

## 2020-02-20 PROBLEM — R19.01 ABDOMINAL WALL MASS OF RIGHT UPPER QUADRANT: Status: ACTIVE | Noted: 2020-01-01

## 2020-02-20 PROBLEM — C34.90 METASTATIC PRIMARY LUNG CANCER (HCC): Status: ACTIVE | Noted: 2020-01-01

## 2020-02-20 PROBLEM — R74.01 TRANSAMINITIS: Status: ACTIVE | Noted: 2020-01-01

## 2020-02-20 PROBLEM — I10 HYPERTENSION: Chronic | Status: ACTIVE | Noted: 2020-01-01

## 2020-02-20 PROBLEM — C18.9 COLON CANCER METASTASIZED TO MULTIPLE SITES (HCC): Chronic | Status: ACTIVE | Noted: 2020-01-01

## 2020-02-20 PROBLEM — K59.00 CONSTIPATION: Chronic | Status: ACTIVE | Noted: 2020-01-01

## 2020-02-20 NOTE — CONSULTS
Consult Note - Wound   Florin Gama 66 y o  male MRN: [id number]  Unit/Bed#: -01 Encounter: [id number]      History and Present Illness:  Patient is a 66year old male with Pmhx  of metastatic colon ca now admitted with abnormal lab values and increased jaundice  Per patient he has been managing his fungating abdominal lesion at home with dressings recommended by last wound care nurse how saw him  He is awake, alert and oriented with fair mobility in bed, he is able to ambulate although reports feeling weaker than usual  Per patient the skin around his lesion feels more sensitive and tender lately, removal of tape or simple palpation is uncomfortable enough  Patient is agreeable for wound care nurse to assess and implement wound care plan while he remains admitted  BMI: Estimated body mass index is 32 28 kg/m² as calculated from the following:    Height as of this encounter: 5' 10" (1 778 m)  Weight as of this encounter: 102 kg (225 lb)      History  Past Medical History:   Diagnosis Date    [facility]     BPH (benign prostatic hyperplasia)     Hypertension     Metastatic colon cancer to liver ([facility] )     colon- johnny liver & lungs    Stroke ([facility] )     2008- mild weakness left hand/arm    Trigeminal neuralgia     Tumor, metastatic ([facility] )      Past Surgical History:   Procedure Laterality Date    COLON SURGERY      2013    EYE SURGERY      IR TUBE PLACEMENT BILI  1/15/2020    LIVER SURGERY      TONSILLECTOMY         Problem List:   Patient Active Problem List   Diagnosis    BPH (benign prostatic hyperplasia)    Atrial fibrillation ([address] )    Colon cancer metastasized to multiple sites ([facility] )    Hypertension    Painless jaundice secondary to parenchymal infiltration of tumor along with biliary compression     Trigeminal neuralgia    Chronic hyponatremia    Low hemoglobin    Palliative care patient    Metastatic colon cancer to liver ([facility] )    Hyperbilirubinemia    Lower extremity edema    Cancer associated pain    Constipation    Pulmonary metastases (HCC)    Malignant neoplasm of abdominal wall    History of colon cancer    Synovial cyst of lumbar spine    Transaminitis    Abdominal wall mass of right upper quadrant       Medications:  Current Facility-Administered Medications   Medication Dose Route Frequency Provider Last Rate Last Dose    baclofen tablet 15 mg  15 mg Oral TID Mariela Esparza MD   15 mg at 02/20/20 1102    docusate sodium (COLACE) capsule 100 mg  100 mg Oral BID Mariela Esparza MD        enoxaparin (LOVENOX) subcutaneous injection 40 mg  40 mg Subcutaneous Daily Mariela Esparza MD        gabapentin (NEURONTIN) capsule 300 mg  300 mg Oral TID Mariela Esparza MD   300 mg at 02/20/20 1100    metoprolol succinate (TOPROL-XL) 24 hr tablet 25 mg  25 mg Oral Daily Mariela Esparza MD   25 mg at 02/20/20 1108    oxyCODONE (ROXICODONE) immediate release tablet 10 mg  10 mg Oral Q3H PRN Siobhan Grace MD   10 mg at 02/20/20 1105    oxyCODONE (ROXICODONE) immediate release tablet 20 mg  20 mg Oral Q3H PRN Siobhan Grace MD        senna (SENOKOT) tablet 8 6 mg  1 tablet Oral Daily Mariela Esparza MD        tamsulosin (FLOMAX) capsule 0 4 mg  0 4 mg Oral Daily With Elaina Muniz MD             Assessment Findings:   1-Fungatin cancerous lesion to RUQ with periwound erythema and small brown drainage  Periwound is non macerated but extremely tender with dry desquamation, no mal odor noted  Additional nodular lesion can be appreciated to lateral aspect of larger lesion, likely malignant in nature  Skin/Wound Care:  1-Gently irrigate fungating cancerous lesion w/NSS, cover wound w/adaptic, silver calcium alginate then cover with large sacral Allevyn foam daily  2-Encourage patient to turn/reposition q2h for pressure re-distrubution on skin  3-Elevate heels to offload pressure    4-Moisturize skin daily with skin nourishing cream (patient may self apply)      Vitals: Blood pressure 110/75, pulse 96, temperature 98 3 °F (36 8 °C), temperature source Oral, resp  rate 16, height 5' 10" (1 778 m), weight 102 kg (225 lb), SpO2 97 %  ,Body mass index is 32 28 kg/m²  Wound 01/07/20 Other (Comment) Abdomen Mid;Right (Active)   Wound Image    2/20/2020 12:13 PM   Wound Description Black; Izora Bald 2/20/2020 12:13 PM   Gina-wound Assessment Erythema 2/20/2020 12:13 PM   Wound Length (cm) 5 5 cm 2/20/2020 12:13 PM   Wound Width (cm) 7 5 cm 2/20/2020 12:13 PM   Calculated Wound Area (cm^2) 41 25 cm^2 2/20/2020 12:13 PM   Drainage Amount Small 2/20/2020 12:13 PM   Drainage Description Brown;Serous 2/20/2020 12:13 PM   Treatments Cleansed;Site care 2/20/2020 12:13 PM   Dressing Calcium Alginate; Foam, Silicon (eg  Allevyn, etc) 2/20/2020 12:13 PM   Patient Tolerance Tolerated well 2/20/2020 12:13 PM   Dressing Status Dry; Intact; Reinforced 2/20/2020  9:00 AM           Wound care provided following assessment , our recommendations are placed as nursing orders, wound care to continue following, please call ext [phone] with questions or concerns          Feliciano Gamez, RN, BSN, [facility]

## 2020-02-20 NOTE — CONSULTS
Consultation - [facility]  Aldair Arrieta 66 y o  male MRN: [id number]  Unit/Bed#: -01 Encounter: [id number]              Inpatient consult to gastroenterology     Performed by  Pamela Piedra MD     Authorized by Aneta Colunga PA-C              Reason for Consult / Principal Problem:     Obstructive jaundice from metastatic colon cancer       ASSESSMENT AND PLAN:      Obstructive jaundice from metastatic colon cancer   25-year-old male patient with history of metastatic colon cancer to the liver  Status post colon and liver resection performed at [facility] initially  The patient developed recurrence of the malignancy years later presenting with worsening jaundice and was evaluated at [facility] with metal stent placement  Patient was evaluated at [address] [place] [address] last month due to worsening jaundice  ERCP was attempted but was not successful in exchanging the biliary stent to decrease bilirubin to less than 3 to start palliative chemotherapy  Patient was evaluated by IR during that admission but PTC was not performed since the intrahepatic bile ducts were not localized and the patient was discharged home  Although bilirubin decreased after ERCP this started to go up again and the patient required another admission  Currently his bilirubin is 23, currently no signs or symptoms of cholangitis, afebrile, normal white count, no abdominal pain  This time his intrahepatic bile ducts were more dilated and intervention radiology performed a successful external and internal biliary drain  His bilirubin is suspected to decrease and we do not foresee the need of ERCP during this admission  ______________________________________________________________________    HPI:    25-year-old male patient with past medical history significant for metastatic colon cancer status post colonic and the resection, after treatment he developed worsening jaundice and was seen at [facility] where he had ERCP with metal stent placement, then patient switch care to [facility] and was evaluated by the GI team 1 month ago, a time another ERCP was attempted but was unsuccessful in exchanging stent although his bilirubin decreased after ERCP, the patient notices worsening jaundice along with choluria and acholia over the last 2 weeks and was admitted to the hospital for further management of his jaundice  Currently he is awake alert oriented x3, denies nausea or vomiting, denies any recent episodes of fever, no abdominal pain, he is having bowel movements    REVIEW OF SYSTEMS:    CONSTITUTIONAL: Denies any fever, chills, rigors, and weight loss  HEENT: No earache or tinnitus  Denies hearing loss or visual disturbances  CARDIOVASCULAR: No chest pain or palpitations  RESPIRATORY: Denies any cough, hemoptysis, shortness of breath or dyspnea on exertion  GASTROINTESTINAL: As noted in the History of Present Illness  GENITOURINARY: No problems with urination  Denies any hematuria or dysuria  NEUROLOGIC: No dizziness or vertigo, denies headaches  MUSCULOSKELETAL: Denies any muscle or joint pain  SKIN: Denies skin rashes or itching  ENDOCRINE: Denies excessive thirst  Denies intolerance to heat or cold  PSYCHOSOCIAL: Denies depression or anxiety  Denies any recent memory loss         Historical Information   Past Medical History:   Diagnosis Date    Atrial fibrillation ([address] )     BPH (benign prostatic hyperplasia)     Hypertension     Metastatic colon cancer to liver ([address] )     colon- johnny liver & lungs    Stroke ([address] )     2008- mild weakness left hand/arm    Trigeminal neuralgia     Tumor, metastatic ([address] )      Past Surgical History:   Procedure Laterality Date    COLON SURGERY      2013    EYE SURGERY      IR TUBE PLACEMENT BILI  1/15/2020    LIVER SURGERY      TONSILLECTOMY       Social History   Social History     Substance and Sexual Activity   Alcohol Use Never    Frequency: Never     Social History Substance and Sexual Activity   Drug Use Never     Social History     Tobacco Use   Smoking Status Former Smoker    Types: Cigarettes    Last attempt to quit: Addi Nicholson Years since quittin 1   Smokeless Tobacco Never Used     Family History   Problem Relation Age of Onset    Cancer Father     Colon cancer Father     Cancer Brother     Cancer Paternal Grandfather        Meds/Allergies     Medications Prior to Admission   Medication    Baclofen 5 MG TABS    furosemide (LASIX) 20 mg tablet    gabapentin (NEURONTIN) 300 mg capsule    LORazepam (ATIVAN) 0 5 mg tablet    metoprolol succinate (TOPROL-XL) 25 mg 24 hr tablet    tamsulosin (FLOMAX) 0 4 mg    metroNIDAZOLE (FLAGYL) 500 mg tablet    oxyCODONE (ROXICODONE) 5 mg immediate release tablet     Current Facility-Administered Medications   Medication Dose Route Frequency    baclofen tablet 15 mg  15 mg Oral TID    docusate sodium (COLACE) capsule 100 mg  100 mg Oral BID    enoxaparin (LOVENOX) subcutaneous injection 40 mg  40 mg Subcutaneous Daily    gabapentin (NEURONTIN) capsule 300 mg  300 mg Oral TID    HYDROmorphone (DILAUDID) injection 1 mg  1 mg Intravenous Q2H PRN    metoprolol succinate (TOPROL-XL) 24 hr tablet 25 mg  25 mg Oral Daily    oxyCODONE (ROXICODONE) immediate release tablet 10 mg  10 mg Oral Q3H PRN    oxyCODONE (ROXICODONE) immediate release tablet 20 mg  20 mg Oral Q3H PRN    senna (SENOKOT) tablet 8 6 mg  1 tablet Oral Daily    sodium chloride 0 9 % infusion  75 mL/hr Intravenous Continuous    tamsulosin (FLOMAX) capsule 0 4 mg  0 4 mg Oral Daily With Dinner       Allergies   Allergen Reactions    Ib Pro [Ibuprofen] Hives and Itching    Adhesive [Medical Tape] Rash     Use only paper tape           Objective     Blood pressure (!) 89/52, pulse 96, temperature 97 8 °F (36 6 °C), resp  rate 14, height 5' 10" (1 778 m), weight 102 kg (225 lb), SpO2 97 %  Body mass index is 32 28 kg/m²        Intake/Output Summary (Last 24 hours) at 2/20/2020 1815  Last data filed at 2/20/2020 1745  Gross per 24 hour   Intake 1000 ml   Output 2205 ml   Net -1205 ml         PHYSICAL EXAM:      General Appearance:   Alert, cooperative, no distress   HEENT:   Normocephalic, atraumatic, scleral icterus  Neck:  Supple, symmetrical, trachea midline   Lungs:   Clear to auscultation bilaterally; no rales, rhonchi or wheezing; respirations unlabored    Heart[de-identified]   Regular rate and rhythm; no murmur, rub, or gallop     Abdomen:   Soft, non-tender, non-distended; normal bowel sounds; no masses, no organomegaly, cutaneous metastasis in the right upper quadrant     Genitalia:   Deferred    Rectal:   Deferred    Extremities:  No cyanosis, clubbing or edema    Skin:  No rashes, or lesions, he is jaundiced   Lymph nodes:  No palpable cervical lymphadenopathy          Lab Results:   Admission on 02/19/2020   Component Date Value    WBC 02/19/2020 6 98     RBC 02/19/2020 3 28*    Hemoglobin 02/19/2020 10 9*    Hematocrit 02/19/2020 32 3*    MCV 02/19/2020 99*    MCH 02/19/2020 33 2     MCHC 02/19/2020 33 7     RDW 02/19/2020 17 2*    MPV 02/19/2020 11 0     Platelets 79/02/5732 147*    nRBC 02/19/2020 0     Neutrophils Relative 02/19/2020 76*    Immat GRANS % 02/19/2020 1     Lymphocytes Relative 02/19/2020 8*    Monocytes Relative 02/19/2020 14*    Eosinophils Relative 02/19/2020 1     Basophils Relative 02/19/2020 0     Neutrophils Absolute 02/19/2020 5 33     Immature Grans Absolute 02/19/2020 0 06     Lymphocytes Absolute 02/19/2020 0 54*    Monocytes Absolute 02/19/2020 0 98     Eosinophils Absolute 02/19/2020 0 04     Basophils Absolute 02/19/2020 0 03     Sodium 02/19/2020 135*    Potassium 02/19/2020 3 0*    Chloride 02/19/2020 101     CO2 02/19/2020 26     ANION GAP 02/19/2020 8     BUN 02/19/2020 12     Creatinine 02/19/2020 0 92     Glucose 02/19/2020 120     Calcium 02/19/2020 8 3     AST 02/19/2020 354*    ALT 02/19/2020 239*    Alkaline Phosphatase 02/19/2020 1,262*    Total Protein 02/19/2020 5 7*    Albumin 02/19/2020 1 9*    Total Bilirubin 02/19/2020 23 07*    eGFR 02/19/2020 81     Protime 02/19/2020 15 0*    INR 02/19/2020 1 22*    PTT 02/19/2020 29     Sodium 02/20/2020 137     Potassium 02/20/2020 3 4*    Chloride 02/20/2020 104     CO2 02/20/2020 26     ANION GAP 02/20/2020 7     BUN 02/20/2020 11     Creatinine 02/20/2020 0 74     Glucose 02/20/2020 95     Calcium 02/20/2020 8 0*    eGFR 02/20/2020 90     Magnesium 02/20/2020 2 2     WBC 02/20/2020 6 33     RBC 02/20/2020 3 13*    Hemoglobin 02/20/2020 10 2*    Hematocrit 02/20/2020 30 2*    MCV 02/20/2020 97     MCH 02/20/2020 32 6     MCHC 02/20/2020 33 8     RDW 02/20/2020 17 9*    Platelets 00/49/9595 133*    MPV 02/20/2020 11 2     Total Bilirubin 02/20/2020 21 00*    Bilirubin, Direct 02/20/2020 18 54*    Alkaline Phosphatase 02/20/2020 1,120*    AST 02/20/2020 333*    ALT 02/20/2020 220*    Total Protein 02/20/2020 5 3*    Albumin 02/20/2020 1 7*       Imaging Studies: I have personally reviewed pertinent imaging studies

## 2020-02-20 NOTE — ASSESSMENT & PLAN NOTE
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

## 2020-02-20 NOTE — ED PROVIDER NOTES
History  Chief Complaint   Patient presents with    Jaundice     per patient, "my bilirubin is very high and i am jaundice, last time this happend they tried to do an external drain, they told me to come in tonight "    Abnormal Lab     Patient presents for evaluation of jaundice and elevated bilirubin  Past medical history for colon cancer with Mets to liver and lungs, patient follows with an oncologist with potential plans for future chemotherapy  Patient has had history of elevated bilirubin in the past with the ERCP done on January 10th  Patient has been having monitoring blood work in his bilirubin has increased from 3-23 over the past week  Given this development they contacted her oncologist as well as a interventional radiologist who suggested them come into the hospital for likely admission and procedures for additional interventions  Patient states that he currently feels weak but that he usually does when his bilirubin gets high  Denies any nausea/vomiting, pain anywhere, constipation/diarrhea, fever chills, shortness of breath, dysuria/hematuria  Prior to Admission Medications   Prescriptions Last Dose Informant Patient Reported? Taking?    Baclofen 5 MG TABS   No Yes   Sig: Take 15 mg by mouth 3 (three) times a day   LORazepam (ATIVAN) 0 5 mg tablet   No Yes   Sig: Take 1 tablet (0 5 mg total) by mouth 2 (two) times a day for 2 doses 1 tab PO 30 mins prior to MRI, then 1 tab PO PRN immediately prior to MRI   furosemide (LASIX) 20 mg tablet   No Yes   Sig: Take 1 tablet (20 mg total) by mouth daily   gabapentin (NEURONTIN) 300 mg capsule   No Yes   Sig: Take 1 capsule (300 mg total) by mouth 3 (three) times a day   Patient taking differently: Take by mouth 300mg in the morning and afternoon and 600mg at night   metoprolol succinate (TOPROL-XL) 25 mg 24 hr tablet   No Yes   Sig: Take 1 tablet (25 mg total) by mouth daily   metroNIDAZOLE (FLAGYL) 500 mg tablet   No Yes   Sig: Crush 2 Flagyl tablets  Sprinkle onto fungating tumor  Then cover with adaptic contact layer followed by maxorb calcium alginate dressing  Then cover with ABD pad and secure with patient's own hypoallergenic tape  Do once a day and prn   oxyCODONE (ROXICODONE) 5 mg immediate release tablet   No Yes   Sig: Take 1 tablet (5 mg total) by mouth every 4 (four) hours as needed for moderate pain for up to 20 dosesMax Daily Amount: 30 mg   tamsulosin (FLOMAX) 0 4 mg   No Yes   Sig: Take 1 capsule (0 4 mg total) by mouth daily with dinner      Facility-Administered Medications: None       Past Medical History:   Diagnosis Date    [facility] [address] [facility]     BPH (benign prostatic hyperplasia)     Hypertension     [facility]     colon- johnny liver & lungs    Stroke [facility]     - mild weakness left hand/arm    Trigeminal neuralgia     Tumor[facility]        Past Surgical History:   Procedure Laterality Date    COLON SURGERY          EYE SURGERY      IR TUBE PLACEMENT BILI  1/15/2020    LIVER SURGERY      TONSILLECTOMY         Family History   Problem Relation Age of Onset    Cancer Father     Colon cancer Father     Cancer Brother     Cancer Paternal Grandfather      I have reviewed and agree with the history as documented  Social History     Tobacco Use    Smoking status: Former Smoker     Types: Cigarettes     Last attempt to quit: 1981     Years since quittin 1    Smokeless tobacco: Never Used   Substance Use Topics    Alcohol use: Never     Frequency: Never    Drug use: Never        Review of Systems   Constitutional: Positive for fatigue  Negative for activity change, chills and fever  Respiratory: Negative for cough and shortness of breath  Cardiovascular: Negative for chest pain and palpitations  Gastrointestinal: Negative for abdominal distention, abdominal pain, constipation, diarrhea, nausea and vomiting     Genitourinary: Negative for dysuria and hematuria  Musculoskeletal: Negative for arthralgias, myalgias and neck pain  Skin: Positive for color change  Neurological: Negative for dizziness, syncope, light-headedness and headaches  All other systems reviewed and are negative  Physical Exam  ED Triage Vitals [02/19/20 1850]   Temperature Pulse Respirations Blood Pressure SpO2   98 °F (36 7 °C) 75 17 122/87 94 %      Temp Source Heart Rate Source Patient Position - Orthostatic VS BP Location FiO2 (%)   Oral Monitor Sitting Right arm --      Pain Score       No Pain             Orthostatic Vital Signs  Vitals:    02/19/20 2230 02/20/20 0000 02/20/20 0230 02/20/20 0245   BP: 122/91 135/85 117/79 117/79   Pulse: 74 102 92 98   Patient Position - Orthostatic VS:           Physical Exam   Constitutional: He is oriented to person, place, and time  He appears well-developed and well-nourished  No distress  HENT:   Head: Normocephalic and atraumatic  Right Ear: External ear normal    Left Ear: External ear normal    Eyes: Pupils are equal, round, and reactive to light  Conjunctivae and EOM are normal  Right eye exhibits no discharge  Left eye exhibits no discharge  Scleral icterus is present  Neck: Normal range of motion  Neck supple  No JVD present  No tracheal deviation present  Cardiovascular: Normal rate, regular rhythm, normal heart sounds and intact distal pulses  Exam reveals no gallop and no friction rub  No murmur heard  Pulmonary/Chest: Effort normal and breath sounds normal  No respiratory distress  He has no wheezes  He has no rales  Abdominal: Soft  Bowel sounds are normal  He exhibits no distension and no mass  There is no tenderness  There is no guarding  Musculoskeletal: Normal range of motion  He exhibits no edema, tenderness or deformity  Neurological: He is alert and oriented to person, place, and time  No cranial nerve deficit or sensory deficit  He exhibits normal muscle tone   Coordination normal    Skin: He is not diaphoretic  Jaundiced   Psychiatric: He has a normal mood and affect  His behavior is normal  Judgment and thought content normal    Vitals reviewed        ED Medications  Medications   baclofen tablet 15 mg (has no administration in time range)   gabapentin (NEURONTIN) capsule 300 mg (has no administration in time range)   metoprolol succinate (TOPROL-XL) 24 hr tablet 25 mg (has no administration in time range)   oxyCODONE (ROXICODONE) IR tablet 5 mg (has no administration in time range)   tamsulosin (FLOMAX) capsule 0 4 mg (has no administration in time range)   docusate sodium (COLACE) capsule 100 mg (has no administration in time range)   senna (SENOKOT) tablet 8 6 mg (has no administration in time range)   polyethylene glycol (MIRALAX) packet 17 g (has no administration in time range)   sodium chloride 0 9 % with KCl 20 mEq/L infusion (premix) (100 mL/hr Intravenous New Bag 2/20/20 0254)   enoxaparin (LOVENOX) subcutaneous injection 40 mg (has no administration in time range)   morphine (MSIR) IR tablet 30 mg (30 mg Oral Given 2/20/20 0236)   potassium chloride (K-DUR,KLOR-CON) CR tablet 40 mEq (40 mEq Oral Given 2/19/20 2245)   iohexol (OMNIPAQUE) 350 MG/ML injection (MULTI-DOSE) 100 mL (100 mL Intravenous Given 2/19/20 2220)       Diagnostic Studies  Results Reviewed     Procedure Component Value Units Date/Time    Comprehensive metabolic panel [[id number]]  (Abnormal) Collected:  02/19/20 2107    Lab Status:  Final result Specimen:  Blood from Arm, Left Updated:  02/19/20 2209     Sodium 135 mmol/L      Potassium 3 0 mmol/L      Chloride 101 mmol/L      CO2 26 mmol/L      ANION GAP 8 mmol/L      BUN 12 mg/dL      Creatinine 0 92 mg/dL      Glucose 120 mg/dL      Calcium 8 3 mg/dL       U/L       U/L      Alkaline Phosphatase 1,262 U/L      Total Protein 5 7 g/dL      Albumin 1 9 g/dL      Total Bilirubin 23 07 mg/dL      eGFR 81 ml/min/1 73sq m     Narrative:       National Kid[employer] guidelines for Chronic Kidney Disease (CKD):     Stage 1 with normal or high GFR (GFR > 90 mL/min/1 73 square meters)    Stage 2 Mild CKD (GFR = 60-89 mL/min/1 73 square meters)    Stage 3A Moderate CKD (GFR = 45-59 mL/min/1 73 square meters)    Stage 3B Moderate CKD (GFR = 30-44 mL/min/1 73 square meters)    Stage 4 Severe CKD (GFR = 15-29 mL/min/1 73 square meters)    Stage 5 End Stage CKD (GFR <15 mL/min/1 73 square meters)  Note: GFR calculation is accurate only with a steady state creatinine    Protime-INR [[id number]]  (Abnormal) Collected:  02/19/20 2107    Lab Status:  Final result Specimen:  Blood from Arm, Left Updated:  02/19/20 2143     Protime 15 0 seconds      INR 1 22    APTT [[id number]]  (Normal) Collected:  02/19/20 2107    Lab Status:  Final result Specimen:  Blood from Arm, Left Updated:  02/19/20 2143     PTT 29 seconds     CBC and differential [[id number]]  (Abnormal) Collected:  02/19/20 2107    Lab Status:  Final result Specimen:  Blood from Arm, Left Updated:  02/19/20 2137     WBC 6 98 Thousand/uL      RBC 3 28 Million/uL      Hemoglobin 10 9 g/dL      Hematocrit 32 3 %      MCV 99 fL      MCH 33 2 pg      MCHC 33 7 g/dL      RDW 17 2 %      MPV 11 0 fL      Platelets 053 Thousands/uL      nRBC 0 /100 WBCs      Neutrophils Relative 76 %      Immat GRANS % 1 %      Lymphocytes Relative 8 %      Monocytes Relative 14 %      Eosinophils Relative 1 %      Basophils Relative 0 %      Neutrophils Absolute 5 33 Thousands/µL      Immature Grans Absolute 0 06 Thousand/uL      Lymphocytes Absolute 0 54 Thousands/µL      Monocytes Absolute 0 98 Thousand/µL      Eosinophils Absolute 0 04 Thousand/µL      Basophils Absolute 0 03 Thousands/µL                  CT abdomen pelvis with contrast   Final Result by Hosea Jerry MD (02/19 2344)         1  Stable position of common bile duct stent  Stent patency not assessed  No significant change in intrahepatic biliary dilatation     2  Stable hepatic metastases, one of which may invade the gallbladder  3   Stable mild to moderate ascites  4   Increasing size of visualized pulmonary metastases at the lung bases  Workstation performed: [id number]         IR consult    (Results Pending)         Procedures  Procedures      ED Course                               MDM  Number of Diagnoses or Management Options  Elevated bilirubin:   Hypokalemia:   Jaundice:   Diagnosis management comments: Repeat laboratory analysis verified the patient's elevated bilirubin  A CT scan of the abdomen and pelvis showed worsening tumor burden, in place stent without evaluation of patency, without any acute pathology  Patient's case was discussed with jinny the patient was admitted for further inpatient evaluation and likely surgical intervention  Disposition  Final diagnoses:   Hypokalemia   Jaundice   Elevated bilirubin     Time reflects when diagnosis was documented in both MDM as applicable and the Disposition within this note     Time User Action Codes Description Comment    2/20/2020 12:21 AM Kristian Duet Add [E87 6] Hypokalemia     2/20/2020 12:21 AM Kristian Duet Add [R17] Jaundice     2/20/2020 12:21 AM Kristian Duet Add [R17] Elevated bilirubin     2/20/2020  1:00 AM Jose Kelly Add [C18 9] Colon cancer metastasized to [facility] sites [facility])       ED Disposition     ED Disposition Condition Date/Time Comment    Admit Stable Thu Feb 20, 2020 12:21 AM Case was discussed with jinny and the patient's admission status was agreed to be Admission Status: inpatient status to the service of Dr Rafa Wills  Follow-up Information    None         Patient's Medications   Discharge Prescriptions    No medications on file     No discharge procedures on file  ED Provider  Attending physically available and evaluated Owen Pozo I managed the patient along with the ED Attending      Electronically Signed by         Harika Martinez MD  02/20/20 5306

## 2020-02-20 NOTE — ANESTHESIA PREPROCEDURE EVALUATION
Review of Systems/Medical History  Patient summary reviewed  Chart reviewed  No history of anesthetic complications     Cardiovascular  Exercise tolerance (METS): >4,  Hypertension , Dysrhythmias , atrial fibrillation,    Pulmonary  Smoker ex-smoker  ,   Comment: Ca mets to lungs     GI/Hepatic    Liver disease ,   Comment: Gastrointestinal ca, mets to liver; jaundice     Prostatic disorder, benign prostatic hyperplasia       Endo/Other    Obesity    GYN  Negative gynecology ROS          Hematology  Anemia ,     Musculoskeletal  Negative musculoskeletal ROS        Neurology    CVA , residual symptoms,   Comment: Trigeminal neuralgia; CVA 2008 residual sx, left sided weakness Psychology     Chronic opioid dependence Chronic pain,            Physical Exam    Airway    Mallampati score: II  TM Distance: >3 FB  Neck ROM: full     Dental       Cardiovascular  Rhythm: irregular,     Pulmonary  Pulmonary exam normal     Other Findings        Anesthesia Plan  ASA Score- 3     Anesthesia Type- general with ASA Monitors  Additional Monitors:   Airway Plan: ETT  Plan Factors-    Induction- intravenous  Postoperative Plan- Plan for postoperative opioid use  Planned trial extubation    Informed Consent- Anesthetic plan and risks discussed with patient  I personally reviewed this patient with the CRNA  Discussed and agreed on the Anesthesia Plan with the CRNA             Lab Results   Component Value Date    WBC 6 33 02/20/2020    HGB 10 2 (L) 02/20/2020    HCT 30 2 (L) 02/20/2020    MCV 97 02/20/2020     (L) 02/20/2020     Lab Results   Component Value Date    CALCIUM 8 0 (L) 02/20/2020    K 3 4 (L) 02/20/2020    CO2 26 02/20/2020     02/20/2020    BUN 11 02/20/2020    CREATININE 0 74 02/20/2020     Lab Results   Component Value Date    INR 1 22 (H) 02/19/2020    INR 1 03 01/29/2020    INR 1 50 (H) 01/09/2020    PROTIME 15 0 (H) 02/19/2020    PROTIME 11 0 01/29/2020    PROTIME 17 7 (H) 01/09/2020 Lab Results   Component Value Date    PTT 29 02/19/2020

## 2020-02-20 NOTE — H&P
[address] Internal Medicine    [facility] 1945, 66 [facility]  [facility] MRN: [id number]    Unit/Bed#: ED 27 Encounter: [id number]    Primary Care Provider: Josiah Johns DO   Date and time admitted to hospital: 2/19/2020  8:26 PM        * Hyperbilirubinemia  Assessment & Plan  · Came to ED with elevated bilirubin, 23 07  Increasing jaundice over past 2 weeks  · Increasing fatigue  No fevers, chills, N/V, abdominal pain  Reports loose, light stools and dark urine  · Biliary stent in place from [facility] 2014  · Admitted last month with obstructive jaundice, ERCP/external drain placement unsuccessful  Hyperbilirubinemia resolved at that time  · Plan as below  Transaminitis  Assessment & Plan  · POA , , ALP 1262  · Plan as below  Colon cancer metastasized to multiple sites [facility])  Assessment & Plan  · 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, abdominal wall, lungs  S/P sigmoid colectomy, radiation, chemotherapy  · CT AP: 1   Stable position of common bile duct stent  Stent patency not assessed  No significant change in intrahepatic biliary dilatation  2  Stable hepatic metastases, one of which may invade the gallbladder  3  Stable mild to moderate ascites  4  Increasing size of visualized pulmonary metastases at the lung bases  · NPO and IVF overnight  · Continue oxycodone and morphine for pain PRN  · Consult oncology, surgical oncology, IR, palliative care appreciated  Malignant neoplasm of abdominal wall  Assessment & Plan  · Chronic, palliative radiation 2017 and chemotherapy 2018  · Patient changes dressings himself daily in AM    · Consult wound care, appreciated  Trigeminal neuralgia  Assessment & Plan  · Chronic  · Continue Gabapentin and Baclofen  · Increasing tolerance to treatment  · Continued outpatient follow up with neurosurgery  Lower extremity edema  Assessment & Plan  · Chronic, equal bilaterally  · Lasix daily   Hold while patient is hydrated overnight  · Compression stockings  Hypertension  Assessment & Plan  · Well controlled  · Continue Metoprolol daily  Atrial fibrillation ([address] )  Assessment & Plan  · Rate controlled, continue Metoprolol daily  · Not on anticoagulation  Managed by PCP with referral to cardiology  · Previously on Lovenox 100 mg Q12 hr  Consider resuming after potential interventions during hospitalization  VTE Prophylaxis: Enoxaparin (Lovenox)  / sequential compression device   Code Status: Level 1 Full Code  POLST: POLST is not applicable to this patient  Discussion with family: patient    Anticipated Length of Stay:  Patient will be admitted on an Inpatient basis with an anticipated length of stay of  Greater than 2 midnights  Justification for Hospital Stay: evaluation by oncology, surgical oncology, IR    Total Time for Visit, including Counseling / Coordination of Care: 45 minutes  Greater than 50% of this total time spent on direct patient counseling and coordination of care  Chief Complaint:   "my bilirubin level"    History of Present Illness: Scott Renee is a 66 y o  male with PMHx significant for metastatic colon cancer, trigeminal neuralgia, HTN, a fib, lower extremity edema who presents with abnormal lab work  Over past two weeks he has noticed increasing jaundice  Associated generalized fatigue  Stools loose and lighter, urine darker  Outpatient lab work revealed elevated bilirubin and he was recommended to come in by oncology  He changes the dressing on his abdominal wall lesion, no changes from baseline per patient  Patient denies fevers, URI symptoms, CP, SOB, abdominal pain, bloody BM, hematuria, dysuria  He was admitted earlier this month for hyperbilirubinemia, stent exchange and drain placement was unsuccessful but hyperbilirubinemia resolved at discharge  Admitted inpatient for evaluation by oncology, surgical oncology, IR, and palliative care       Review of Systems:    Review of Systems Constitutional: Positive for activity change and fatigue  Negative for chills, diaphoresis and fever  HENT: Negative for congestion and sore throat  R side trigeminal neuralgia pain  Respiratory: Negative for chest tightness, shortness of breath and wheezing  Cardiovascular: Negative for chest pain, palpitations and leg swelling  Gastrointestinal: Negative for abdominal pain, blood in stool, diarrhea, nausea and vomiting  Notes stools have been loose and light colored  Genitourinary: Negative for dysuria and hematuria  Notes urine has been darker colored  Musculoskeletal: Negative for back pain  Chronic right abdominal wall pain from lesion  Skin: Positive for color change  Neurological: Positive for weakness  Negative for dizziness, syncope and numbness  Psychiatric/Behavioral: Negative for agitation and confusion  Past Medical and Surgical History:     Past Medical History:   Diagnosis Date    [facility]rial fibrillation ([facility] )     BPH (benign prostatic hyperplasia)     Hypertension     Metastatic colon cancer to liver ([facility] )     colon- johnny liver & lungs    Stroke ([facility] )     2008- mild weakness left hand/arm    Trigeminal neuralgia     Tumor, metastatic ([facility] )        Past Surgical History:   Procedure Laterality Date    COLON SURGERY      2013    EYE SURGERY      IR TUBE PLACEMENT BILI  1/15/2020    LIVER SURGERY      TONSILLECTOMY         Meds/Allergies:    Prior to Admission medications    Medication Sig Start Date End Date Taking?  Authorizing Provider   Baclofen 5 MG TABS Take 15 mg by mouth 3 (three) times a day 2/6/20  Yes Ashley Cardenas MD   furosemide (LASIX) 20 mg tablet Take 1 tablet (20 mg total) by mouth daily 2/4/20  Yes Shanon Fields MD   gabapentin (NEURONTIN) 300 mg capsule Take 1 capsule (300 mg total) by mouth 3 (three) times a day  Patient taking differently: Take by mouth 300mg in the morning and afternoon and 600mg at night 2/6/20  Yes Batool Adrian MD   LORazepam (ATIVAN) 0 5 mg tablet Take 1 tablet (0 5 mg total) by mouth 2 (two) times a day for 2 doses 1 tab PO 30 mins prior to MRI, then 1 tab PO PRN immediately prior to MRI 20 Yes SAMEERA Bell   metoprolol succinate (TOPROL-XL) 25 mg 24 hr tablet Take 1 tablet (25 mg total) by mouth daily 20  Yes Jaret Jerry DO   metroNIDAZOLE (FLAGYL) 500 mg tablet Crush 2 Flagyl tablets  Sprinkle onto fungating tumor  Then cover with adaptic contact layer followed by maxorb calcium alginate dressing  Then cover with ABD pad and secure with patient's own hypoallergenic tape  Do once a day and prn 2/2/20 3/2/20 Yes SAMEERA Thapa   oxyCODONE (ROXICODONE) 5 mg immediate release tablet Take 1 tablet (5 mg total) by mouth every 4 (four) hours as needed for moderate pain for up to 20 dosesMax Daily Amount: 30 mg 2/3/20  Yes Antonino Marie MD   tamsulosin [facility]) 0 4 mg Take 1 capsule (0 4 mg total) by mouth daily with dinner 20  Yes Jaret Jerry DO     I have reviewed home medications with patient personally  Allergies:    Allergies   Allergen Reactions    Ib Pro [Ibuprofen] Hives and Itching    Adhesive [Medical Tape] Rash     Use only paper tape       Social History:     Marital Status: /Civil Union   Occupation: retired  Patient Pre-hospital Living Situation: home with wife  Patient Pre-hospital Level of Mobility: limited with cane  Patient Pre-hospital Diet Restrictions: none  Substance Use History:   Social History     Substance and Sexual Activity   Alcohol Use Never    Frequency: Never     Social History     Tobacco Use   Smoking Status Former Smoker    Types: Cigarettes    Last attempt to quit: Magui Shoulder Years since quittin 1   Smokeless Tobacco Never Used     Social History     Substance and Sexual Activity   Drug Use Never       Family History:    Family History   Problem Relation Age of Onset    Cancer Father     Colon cancer Father     Cancer Brother     Cancer Paternal Grandfather        Physical Exam:     Vitals:   Blood Pressure: 117/79 (02/20/20 0245)  Pulse: 98 (02/20/20 0245)  Temperature: 98 °F (36 7 °C) (02/19/20 1850)  Temp Source: Oral (02/19/20 1850)  Respirations: 16 (02/20/20 0245)  SpO2: 99 % (02/20/20 0245)    Physical Exam   Constitutional: He is oriented to person, place, and time  He appears well-developed and well-nourished  No distress  HENT:   Head: Normocephalic and atraumatic  Eyes: Pupils are equal, round, and reactive to light  Scleral icterus is present  Neck: Normal range of motion  Neck supple  Cardiovascular:   Irregularly irregular  Pulmonary/Chest: Effort normal and breath sounds normal  No respiratory distress  He has no wheezes  He exhibits no tenderness  Abdominal: Soft  Bowel sounds are normal  He exhibits distension  There is no tenderness  Dressing intact RUQ  Some oozing in center of dressing, - removed dressing, lesion at baseline per patient  Surrounding erythema  Musculoskeletal: He exhibits no tenderness  1+/2+ lower extremity pitting edema  Equal bilaterally  Neurological: He is alert and oriented to person, place, and time  Skin: He is not diaphoretic  Jaundiced  Psychiatric: He has a normal mood and affect  His behavior is normal    Nursing note and vitals reviewed  Additional Data:     Lab Results: I have personally reviewed pertinent reports        Results from last 7 days   Lab Units 02/19/20 2107   WBC Thousand/uL 6 98   HEMOGLOBIN g/dL 10 9*   HEMATOCRIT % 32 3*   PLATELETS Thousands/uL 147*   NEUTROS PCT % 76*   LYMPHS PCT % 8*   MONOS PCT % 14*   EOS PCT % 1     Results from last 7 days   Lab Units 02/19/20 2107   SODIUM mmol/L 135*   POTASSIUM mmol/L 3 0*   CHLORIDE mmol/L 101   CO2 mmol/L 26   BUN mg/dL 12   CREATININE mg/dL 0 92   ANION GAP mmol/L 8   CALCIUM mg/dL 8 3   ALBUMIN g/dL 1 9*   TOTAL BILIRUBIN mg/dL 23 07*   ALK PHOS U/L 1,262* ALT U/L 239*   AST U/L 354*   GLUCOSE RANDOM mg/dL 120     Results from last 7 days   Lab Units 02/19/20  2107   INR  1 22*                   Imaging: I have personally reviewed pertinent reports  CT abdomen pelvis with contrast   Final Result by Norma Abbott MD (02/19 9058)         1  Stable position of common bile duct stent  Stent patency not assessed  No significant change in intrahepatic biliary dilatation  2   Stable hepatic metastases, one of which may invade the gallbladder  3   Stable mild to moderate ascites  4   Increasing size of visualized pulmonary metastases at the lung bases  Workstation performed: [id number]         IR consult    (Results Pending)       EKG, Pathology, and Other Studies Reviewed on Admission:   · CT AP    Allscripts / Epic Records Reviewed: Yes     ** Please Note: This note has been constructed using a voice recognition system   **

## 2020-02-20 NOTE — ASSESSMENT & PLAN NOTE
· Chronic, equal bilaterally  · Lasix daily  Hold while patient is hydrated overnight  · Compression stockings

## 2020-02-20 NOTE — ASSESSMENT & PLAN NOTE
· Chronic, palliative radiation 2017 and chemotherapy 2018  · Patient changes dressings himself daily in AM    · Consult wound care, appreciated

## 2020-02-20 NOTE — SOCIAL WORK
Cm reviewed role with pt  Pt was alert and oriented during the conversation  Pt reported his wife and dtr as his emergency contacts - Rebecca Tan (wife) [phone], Lawrence Arshad (dtr) [phone]  Pt reported that he currently lives in a 1 story home with 5 steps with railings to enter and has a permanent residence in [place] which is a 2 story home with 1 step w/o railings to enter and 12-14 steps with a railings between floors  Pt reported that he was IPTA with ADLS  PT denied inpatient PT/OT, inpatient MH and substance abuse treatment  PT confirmed [facility]HC from [facility] in 2008 and current services from [address] [place] [address]  Pt reported [facility] in [place] as his pharmacy of choice  Pt reported Dr Carol Pettit as his PCP  Pt is retired and is able to drive himself  CM reviewed d/c planning process including the following: identifying help at home, patient preference for d/c planning needs, Discharge Lounge, [facility] Meds to Bed program, availability of treatment team to discuss questions or concerns patient and/or family may have regarding understanding medications and recognizing signs and symptoms once discharged  CM also encouraged patient to follow up with all recommended appointments after discharge  Patient advised of importance for patient and family to participate in managing patients medical well being     ~ I have read and agree with the above documentation    RAH Morales

## 2020-02-20 NOTE — CONSULTS
Consultation - Interventional Radiology  Nina Sung 66 y o  male MRN: [id number]  Unit/Bed#: -01 Encounter: [id number]        Consults    ASSESSMENT/PLAN:    57-year-old male with past medical history of stage IV metastatic colon cancer, placement of common bile duct stent, jaundice presenting with elevated bilirubin, obstructive painless jaundice, weakness    - will plan for attempt at inferior PTC tube today  Discussion was had with patient that previous risks of PTC tube placement are the same as prior conversations including risk of bleeding, damage to surrounding structures, incomplete drainage/no drainage, no affect to bilirubin  Also reiterated to patient that tube will may be in place for the rest of his life  Patient is in agreement and would like to proceed  Wife was also on the phone and agreed to proceed  - unlikely bilirubin will ever be low enough (under 2 5 per oncology note) to proceed with chemotherapy  -please keep NPO  - if PTC tube is placed, patient will need setup with VNA prior to discharge  - appreciate input from multiple specialties  Appreciate palliative consult to discuss goals of care      Problem List     * (Principal) Hyperbilirubinemia    BPH (benign prostatic hyperplasia)    Atrial fibrillation (HCC) (Chronic)    Colon cancer metastasized to multiple sites [facility]) (Chronic)    Overview Signed 2/4/2020  1:25 PM by Ale Hernández MD     liver, lungs, abdominal wall, gallbladder wall         Hypertension (Chronic)    Painless jaundice secondary to parenchymal infiltration of tumor along with biliary compression     Trigeminal neuralgia    Chronic hyponatremia    Low hemoglobin    Palliative care patient    Metastatic colon cancer to liver [facility])    Overview Addendum 2/9/2020 10:13 PM by Oly Dee DO            Lower extremity edema    Cancer associated pain    Constipation (Chronic)    Pulmonary metastases (HCC)    Malignant neoplasm of abdominal wall    History of colon cancer    Overview Signed 2/9/2020 10:13 PM by Theo Diaz DO              Synovial cyst of lumbar spine    Transaminitis    Abdominal wall mass of right upper quadrant          Reason for Consult / Principal Problem:  Elevated bilirubin    HPI: Tyler Rashid is a 66y o  year old male with past medical history of stage IV metastatic colon cancer, common bile duct stent, jaundice who presents with Hyperbilirubinemia  Patient is well-known to IR service  Patient had prolonged hospitalization in January due to hyperbilirubinemia  During last hospitalization, ERCP was attempted to clear his longstanding biliary stent  IR also attempted to place PTC tube but was successful due to complete decompression of system  Bilirubin however did decrease to 3-4 most likely due to manipulation by IR and GI ERCP  Patient did undergo cryoablation of right abdominal mass as an outpatient  Patient was due to follow-up with Dr Arley Sanders this a m  to discuss results of cryoablation  However, call was made to IR last evening by daughter discussing patient's jaundice and weakness  Patient was advised to return to ED if patient was symptomatic  Upon arrival to ED, patient's bilirubin was found to be 23 07   CT abdomen pelvis in ED revealed stable position of bile duct stent, stable intrahepatic bile duct dilatation  Right lobe liver masses were also stable  Patient states he has felt increasing weakness over the last 2 weeks  Does state his stools are very light  Denies fevers, chills, nausea, vomiting  States he does still have tenderness to right abdominal mass  Bilirubin today is 21 from 23 yesterday  Review of Systems   Constitutional: Positive for fatigue  Negative for activity change, appetite change, chills and fever  HENT: Negative for congestion and sore throat  Respiratory: Negative for cough and shortness of breath  Cardiovascular: Negative for chest pain and palpitations  Gastrointestinal: Positive for abdominal distention  Negative for abdominal pain, constipation, diarrhea, nausea and vomiting  Genitourinary: Negative for hematuria  Skin: Positive for color change (jaundice) and wound (right abdominal mass)  Neurological: Negative for light-headedness and headaches  Hematological: Does not bruise/bleed easily  Psychiatric/Behavioral: Negative for confusion and decreased concentration  Past Medical History:  Past Medical History:   Diagnosis Date    Atrial fibrillation ([place] [address] )     BPH (benign prostatic hyperplasia)     Hypertension     Metastatic colon cancer to liver ([place] [address] )     colon- johnny liver & lungs    Stroke ([place] [address] )     2008- mild weakness left hand/arm    Trigeminal neuralgia     Tumor, metastatic ([place] [address] )        Past Surgical History:  Past Surgical History:   Procedure Laterality Date    COLON SURGERY      2013    EYE SURGERY      IR TUBE PLACEMENT BILI  1/15/2020    LIVER SURGERY      TONSILLECTOMY         Social History:  Social History     Substance and Sexual Activity   Alcohol Use Never    Frequency: Never     Social History     Substance and Sexual Activity   Drug Use Never     Social History     Tobacco Use   Smoking Status Former Smoker    Types: Cigarettes    Last attempt to quit: Kikejessica Jaimes Years since quittin 1   Smokeless Tobacco Never Used       Family History:  Family History   Problem Relation Age of Onset    Cancer Father     Colon cancer Father     Cancer Brother     Cancer Paternal Grandfather        Allergies:   Allergies   Allergen Reactions    Ib Pro [Ibuprofen] Hives and Itching    Adhesive [Medical Tape] Rash     Use only paper tape       Medications:  Medications Prior to Admission   Medication    Baclofen 5 MG TABS    furosemide (LASIX) 20 mg tablet    gabapentin (NEURONTIN) 300 mg capsule    LORazepam (ATIVAN) 0 5 mg tablet    metoprolol succinate (TOPROL-XL) 25 mg 24 hr tablet    tamsulosin (FLOMAX) 0 4 mg  metroNIDAZOLE (FLAGYL) 500 mg tablet    oxyCODONE (ROXICODONE) 5 mg immediate release tablet     Current Facility-Administered Medications   Medication Dose Route Frequency    baclofen tablet 15 mg  15 mg Oral TID    docusate sodium (COLACE) capsule 100 mg  100 mg Oral BID    enoxaparin (LOVENOX) subcutaneous injection 40 mg  40 mg Subcutaneous Daily    gabapentin (NEURONTIN) capsule 300 mg  300 mg Oral TID    metoprolol succinate (TOPROL-XL) 24 hr tablet 25 mg  25 mg Oral Daily    oxyCODONE (ROXICODONE) immediate release tablet 10 mg  10 mg Oral Q3H PRN    oxyCODONE (ROXICODONE) immediate release tablet 20 mg  20 mg Oral Q3H PRN    senna (SENOKOT) tablet 8 6 mg  1 tablet Oral Daily    tamsulosin (FLOMAX) capsule 0 4 mg  0 4 mg Oral Daily With Dinner       Vitals:  /75 (BP Location: Left arm)   Pulse 96   Temp 98 3 °F (36 8 °C) (Oral)   Resp 16   Ht 5' 10" (1 778 m)   Wt 102 kg (225 lb)   SpO2 97%   BMI 32 28 kg/m²   Body mass index is 32 28 kg/m²    Weight (last 2 days)     Date/Time   Weight    02/20/20 1108   102 (225)              I/Os:  No intake or output data in the 24 hours ending 02/20/20 1345    PHYSICAL EXAM  General appearance: alert and oriented, in no acute distress  Skin: jaundice, scleral icterus, right abdominal external mass with small amount of necrosis, tender to lateral edge of mass  Head: Normocephalic, without obvious abnormality  Heart: regular rate and rhythm, S1, S2 normal, no murmur, click, rub or gallop  Lungs: clear to auscultation bilaterally  Abdomen: mild distension but soft, non tender to palpation, bowel sounds +, RUQ abdominal mass   Back: negative  Rectal: deferred  Neurological: aaox3, speech normal    Lab Results and Cultures:   CBC with diff:   Lab Results   Component Value Date    WBC 6 33 02/20/2020    HGB 10 2 (L) 02/20/2020    HCT 30 2 (L) 02/20/2020    MCV 97 02/20/2020     (L) 02/20/2020    MCH 32 6 02/20/2020    MCHC 33 8 02/20/2020 RDW 17 9 (H) 02/20/2020    MPV 11 2 02/20/2020    NRBC 0 02/19/2020      BMP/CMP:  Lab Results   Component Value Date    K 3 4 (L) 02/20/2020     02/20/2020    CO2 26 02/20/2020    BUN 11 02/20/2020    CREATININE 0 74 02/20/2020    CALCIUM 8 0 (L) 02/20/2020     (H) 02/20/2020     (H) 02/20/2020    ALKPHOS 1,120 (H) 02/20/2020    EGFR 90 02/20/2020   ,     Coags:   Lab Results   Component Value Date    PTT 29 02/19/2020    INR 1 22 (H) 02/19/2020   ,   Results from last 7 days   Lab Units 02/19/20  2107   PTT seconds 29   INR  1 22*        Lipid Panel: No results found for: CHOL  No results found for: HDL  No results found for: HDL  No results found for: LDLCALC  No results found for: TRIG    HgbA1c: No results found for: HGBA1C    Blood Culture:   Lab Results   Component Value Date    BLOODCX No Growth After 5 Days  01/30/2020   ,   Urinalysis:   Lab Results   Component Value Date    COLORU Light Yellow 01/30/2020    CLARITYU Clear 01/30/2020    SPECGRAV <=1 005 01/30/2020    PHUR 6 5 01/30/2020    LEUKOCYTESUR Negative 01/30/2020    NITRITE Negative 01/30/2020    GLUCOSEU Negative 01/30/2020    KETONESU Negative 01/30/2020    BILIRUBINUR Negative 01/30/2020    BLOODU Negative 01/30/2020   ,   Urine Culture: No results found for: URINECX,   Wound Culure:  No results found for: WOUNDCULT    Imaging Studies: I have personally reviewed pertinent films in PACS with a Radiologist     Counseling / Coordination of Care  Total time spent today  45 minutes  Greater than 50% of total time was spent with the patient and / or family counseling and / or coordination of care  Thank you for allowing me to participate in the care of Pastor Jeterbette  Please don't hesitate to call, text, email, or TigerText with any questions  This text is generated with voice recognition software  There may be translation, syntax,  or grammatical errors   If you have any questions, please contact the dictating provider      Manelida Salgado PA-C

## 2020-02-20 NOTE — ASSESSMENT & PLAN NOTE
· Original diagnosis in 2013 s/p sigmoid colectomy with systemic chemotherapy with FOLFOX  · In 2014, was found to have hepatic metastases and received FOLFIRI and Avastin followed by radiofrequency ablation  · Also received palliative radiation therapy to right upper abdominal wall metastases  · Patient was found to have pulmonary metastases and hyperbilirubinemia requiring a biliary stent  · Patient is not a candidate for further treatment unless bilirubin trends down below 2 5  · He is not a candidate for radiation therapy  · Oncology following and appreciate their input  · Palliative Care following and appreciate their input  · Continue with pain control  · [address] discussion had  Patient wishes to pursue PTC placement in hopes of increasing quality of life

## 2020-02-20 NOTE — CONSULTS
Consultation - Surgical Oncology   Sahil Hussein 66 y o  male MRN: [id number]  Unit/Bed#: -01 Encounter: [id number]        HPI: Sahil Hussein is a 66y o  year old male who presents with increasing jaundice, weakness  Patient with known stage IV metastatic colon cancer  Patient initially had his colon resection done in [facility] just [address]  Patient had undergone a sigmoid colon resection along with a right hepatic lobe wedge resection with ablation (2013)  Patient has been followed at [facility] as well as [employer]  Patient has undergone chemotherapy for his liver and lung metastasis  He also has a subcutaneous now fungating lesion in the right upper quadrant which was radiated prior  His last chemotherapy was in October of 2019  His last colonoscopy was in 2018 which was negative room  Patient has been seen here by Dr Nick Frausto from Gastroenterology for an ERCP which was attempted with to no avail to clear his common bile duct stent that was placed in [facility]  Patient was then sent to interventional Radiology  for a PTC catheter which was unable to be placed since his ducts were too small  Patient's total bilirubin after both of those attempted procedures became approximately 3 0  For the last 2 weeks patient has noticed increasing  Jaundice  He did have an appointment this morning at8 o'clock with our interventional radiologist for a PTC catheter  Patient then was admitted and interventional Radiology well attempt to  catheter tomorrow morning  Patient's total bilirubin is 21 today with a direct bilirubin of 18 5; patient has white blood cell count of 6 3 and a platelet count of 692       Review of Systems   General:  Increasing malaise, fatigue, jaundice  HEENT; No migraines, no diplopia, no visual disturbances  No frequent sore throats  No ear pain  No dysphagia  Cardiac: No chest pains  Lungs: no SOB, Lungs metastases  GI: metastatic colon cancer diagnosed in 2013, Liver metastases  : no hematuria  CNS: no seizures    Historical Information   Past Medical History:   Diagnosis Date    [facility]     BPH (benign prostatic hyperplasia)     Hypertension     Metastatic [facility] to liver [facility] )     colon- johnny liver & lungs    Stroke ([facility] )     2008- mild weakness left hand/arm    Trigeminal neuralgia     [facility], [email or web address]      Past Surgical History:   Procedure Laterality Date    COLON SURGERY      2013    EYE SURGERY      IR TUBE PLACEMENT BILI  1/15/2020    LIVER SURGERY      TONSILLECTOMY       Social History   Social History     Substance and Sexual Activity   Alcohol Use Never    Frequency: Never     Social History     Substance and Sexual Activity   Drug Use Never     Social History     Tobacco Use   Smoking Status Former Smoker    Types: Cigarettes    Last attempt to quit: 1981    Years since quittin 1   Smokeless Tobacco Never Used     Family History   Problem Relation Age of Onset    Cancer Father     Colon cancer Father     Cancer Brother     Cancer Paternal Grandfather        Meds/Allergies   all current active meds have been reviewed, current meds:   Current Facility-Administered Medications   Medication Dose Route Frequency    baclofen tablet 15 mg  15 mg Oral TID    docusate sodium (COLACE) capsule 100 mg  100 mg Oral BID    enoxaparin (LOVENOX) subcutaneous injection 40 mg  40 mg Subcutaneous Daily    gabapentin (NEURONTIN) capsule 300 mg  300 mg Oral TID    metoprolol succinate (TOPROL-XL) 24 hr tablet 25 mg  25 mg Oral Daily    oxyCODONE (ROXICODONE) immediate release tablet 10 mg  10 mg Oral Q3H PRN    oxyCODONE (ROXICODONE) immediate release tablet 20 mg  20 mg Oral Q3H PRN    senna (SENOKOT) tablet 8 6 mg  1 tablet Oral Daily    sodium chloride 0 9 % with KCl 20 mEq/L infusion (premix)  100 mL/hr Intravenous Continuous    tamsulosin (FLOMAX) capsule 0 4 mg  0 4 mg Oral Daily With Dinner    and PTA meds:    Medications Prior to Admission   Medication    Baclofen 5 MG TABS    furosemide (LASIX) 20 mg tablet    gabapentin (NEURONTIN) 300 mg capsule    LORazepam (ATIVAN) 0 5 mg tablet    metoprolol succinate (TOPROL-XL) 25 mg 24 hr tablet    oxyCODONE (ROXICODONE) 5 mg immediate release tablet    tamsulosin (FLOMAX) 0 4 mg    metroNIDAZOLE (FLAGYL) 500 mg tablet       Allergies   Allergen Reactions    Ib Pro [Ibuprofen] Hives and Itching    Adhesive [Medical Tape] Rash     Use only paper tape       Objective   No intake or output data in the 24 hours ending 02/20/20 1125    Invasive Devices     Central Venous Catheter Line            Port A Cath Right Chest -- days          Peripheral Intravenous Line            Peripheral IV 02/19/20 Left Antecubital less than 1 day                Physical Exam   General: Apparent jaundice, well-hydrated, well-developed, in no apparent acute respiratory or abdominal distress  HEENT:  Appears normocephalic, trachea appears midline,  Cardiac; irregularly irregular rhythm, no murmurs audible, Port-A-Cath right upper chest  Lungs:  Clear bilaterally no rales or rhonchi  Abdomen:  Large fungating mass right upper quadrant, apparent radiation consequences to the skin in the right upper quadrant  Wound covered with Maxorb along with Adaptic and a large abdominal pad  No drainage seen, dressing changed  Abdomen is does not appear distended, soft, tenderness only around lesion  Extremities:  No apparent edema    Lab Results:   Total bili 21, direct bili 18, WBC 6 3, platelets 322   Imaging Studies:  February 19th, 2020 CT AP:  Common bile duct stent in place, no change in biliary duct dilation, stable liver metastasis possibly invading gallbladder, stable moderate ascites, increased pulmonary mets from prior scan      Code Status: Level 1 - Full Code  Advance Directive and Living Will: Yes    Power of :    POLST:      Counseling / Coordination of Care  Total floor / unit time spent today 90 minutes  Greater than 50% of total time was spent with the patient and / or family counseling and / or coordination of care  Assessment:  Metastatic colon cancer  Jaundice  Common bile duct stent  Right upper quadrant fungating lesion    Plan:  1  No surgical intervention at present time  2  Spoke with Gastroenterology and they will plan on doing another ERCP after interventional RI radiology attempts PTC tomorrow  3   Follow labs

## 2020-02-20 NOTE — SEDATION DOCUMENTATION
Paracentesis and biliary drain placement completed by Dr Kimberly Thomson with anesthesia  SCDs and ivan hugger in place  2200 mL clear lm fluid removed via paracentesis and specimens to lab  Wound dressing and drain dressings CDI post procedure  Report called to bedside RN, Kiesha Carbajal  Pt transported to PACU with Anesthesia and report also given to PACU RN  IR scheduling notified to please contact patient for drain exchange in 6 weeks with Dr Kimberly Thomson

## 2020-02-20 NOTE — ASSESSMENT & PLAN NOTE
· Came to ED with elevated bilirubin, 23 07  Increasing jaundice over past 2 weeks  · Increasing fatigue  No fevers, chills, N/V, abdominal pain  Reports loose, light stools and dark urine  · Biliary stent in place from [place] 2014  · Admitted last month with obstructive jaundice, ERCP/external drain placement unsuccessful  Hyperbilirubinemia resolved at that time  · Plan as below

## 2020-02-20 NOTE — CONSULTS
Consultation - Palliative and Supportive Care   Rhonda Roper 66 y o  male [id number]    Assessment:   66year old male with PMH of colon cancer metastatic to the liver, lungs, and abdominal wall, trigeminal neuralgia, atrial fibrillation, and hypertension admitted for management of obstructive jaundice  Plan:  1  Symptom management    - Pain control    - Oxycodone 10mg q3h PRN for moderate pain    - Oxycodone 20mg q3h PRN for severe pain    - Hydromorphone 1mg IV q2h PRN for breakthrough pain   - Trigeminal neuralgia    - Patient was taking gabapentin TID (300, 300, 600) at home  Will increase current dose from 300 TID to home dose and consider increasing dose further if symptoms are not well controlled  - Wound care    - Per wound care team    2  Goals   - Patient would like to pursue IR PTC placement in hopes of increased quality of life  He enjoys completing small projects around the house and would like to feel well enough to do so  - Will re-evaluate goals of care after patient has met with all of the specialists on his care team     Code Status: Full Code - Level 1   Decisional apparatus:  Patient is competent on my exam today  If competence is lost, patient's substitute decision maker would default to wife Jaswinder Lea [place] 169  Advance Directive / Living Will / POLST:  Advance Directive can be found under Media tab dated 1/8/2020  Patient has designated his wife as POA  I have reviewed the patient's controlled substance dispensing history in the Prescription Drug Monitoring Program in compliance with the [facility] regulations before prescribing any controlled substances  We appreciate the invitation to be involved in this patient's care  We will continue to follow  Please do not hesitate to reach our on call provider through our clinic answering service at  should you have acute symptom control concerns        IDENTIFICATION:  Inpatient consult to Palliative Care  Consult performed by: Morro Calloway  Consult ordered by: Adolfo Prado MD        Physician Requesting Consult: Adolfo Prado MD  Reason for Consult / Principal Problem: Goals of care, symptom management  Hx and PE limited by: patient discomfort    HISTORY OF PRESENT ILLNESS:       Toña Napier is a 66 y o  male with PMH colon cancer metastatic to the liver, lungs, and abdominal wall s/p surgery, chemotherapy and radiation (last chemo 10/2019), trigeminal neuralgia, Afib, and HTN who presented to the hospital last night with fatigue and hyperbilirubinemia  He was hospitalized last month with similar symptoms and underwent ERCP to try to clear his longstanding biliary stent  This was not successful but his hyperbilirubinemia resolved and he was discharged  He was introduced to our team during that hospitalization and is scheduled to follow up outpatient with Dr Demario Santoro next month  He was also scheduled to see IR outpatient for possible placement of a PTC to relieve his biliary congestion  Over the past two weeks, Fredrick Umaña experienced progressively increasing jaundice and fatigue prompting him to seek evaluation  His bilirubin was found to be 23 and he was admitted for evaluation by oncology, surgical oncology, and IR  Today, he is pleasant and interactive despite significant discomfort from his trigeminal neuralgia and abdominal wound  He notes that he had the best night's sleep he's had in months after his pain was controlled with a dose of morphine last night  Fredrick Umaña lives at home with his wife Jessica Tripathi and has two daughters that live nearby and are heavily involved in his care  He states that he would like the three of them to have shared decision making should he become incompetent  Review of Systems   Constitution: Positive for malaise/fatigue  Negative for chills and fever  HENT: Negative for congestion           R-sided trigeminal neuralgia pain   Eyes: Negative for pain, photophobia and visual disturbance  Cardiovascular: Negative for chest pain and palpitations  Respiratory: Negative for cough and shortness of breath  Skin: Positive for poor wound healing  Negative for itching  Musculoskeletal: Positive for muscle weakness  Negative for joint pain and joint swelling  Gastrointestinal: Positive for abdominal pain, change in bowel habit and jaundice  Negative for nausea and vomiting  Pt notes baseline constipation but recently stools have been looser and lighter in color   Genitourinary:        Pt notes darkening of urine   Neurological: Negative for seizures and sensory change  Psychiatric/Behavioral: Negative for altered mental status  The patient has insomnia          Past Medical History:   Diagnosis Date    [facility]     BPH (benign prostatic hyperplasia)     Hypertension     Metastatic colon cancer to liver ([facility] )     colon- johnyn liver & lungs    Stroke ([facility] )     2008- mild weakness left hand/arm    Trigeminal neuralgia     Tumor, metastatic ([facility] )      Past Surgical History:   Procedure Laterality Date    COLON SURGERY      2013    EYE SURGERY      IR TUBE PLACEMENT BILI  1/15/2020    LIVER SURGERY      TONSILLECTOMY       Social History     Socioeconomic History    Marital status: /Civil Union     Spouse name: Arlet cOhoa Number of children: 2    Years of education: Not on file    Highest education level: Not on file   Occupational History    Occupation: retired   Social Needs    Financial resource strain: Not on file    Food insecurity:     Worry: Not on file     Inability: Not on file   Pencil You In needs:     Medical: Not on file     Non-medical: Not on file   Tobacco Use    Smoking status: Former Smoker     Types: Cigarettes     Last attempt to quit: 1981     Years since quittin 1    Smokeless tobacco: Never Used   Substance and Sexual Activity    Alcohol use: Never     Frequency: Never    Drug use: Never    Sexual activity: Not on file   Lifestyle    Physical activity:     Days per week: Not on file     Minutes per session: Not on file    Stress: Not on file   Relationships    Social connections:     Talks on phone: Not on file     Gets together: Not on file     Attends Holiness service: Not on file     Active member of club or organization: Not on file     Attends meetings of clubs or organizations: Not on file     Relationship status: Not on file    Intimate partner violence:     Fear of current or ex partner: Not on file     Emotionally abused: Not on file     Physically abused: Not on file     Forced sexual activity: Not on file   Other Topics Concern    Not on file   Social History Narrative    Jerry Benitez and Elana Burnett have 2 daughters together - Rupal and Chris Clifford  They lived in [place] but recently moved to [place] to be closer to [place] who only lives 10mins away  Family History   Problem Relation Age of Onset    Cancer Father     Colon cancer Father     Cancer Brother     Cancer Paternal Grandfather        MEDICATIONS / ALLERGIES:    current meds:   Current Facility-Administered Medications   Medication Dose Route Frequency    baclofen tablet 15 mg  15 mg Oral TID    docusate sodium (COLACE) capsule 100 mg  100 mg Oral BID    enoxaparin (LOVENOX) subcutaneous injection 40 mg  40 mg Subcutaneous Daily    gabapentin (NEURONTIN) capsule 300 mg  300 mg Oral TID    metoprolol succinate (TOPROL-XL) 24 hr tablet 25 mg  25 mg Oral Daily    oxyCODONE (ROXICODONE) immediate release tablet 10 mg  10 mg Oral Q3H PRN    oxyCODONE (ROXICODONE) immediate release tablet 20 mg  20 mg Oral Q3H PRN    senna (SENOKOT) tablet 8 6 mg  1 tablet Oral Daily    tamsulosin (FLOMAX) capsule 0 4 mg  0 4 mg Oral Daily With Dinner    and PTA meds:   Prior to Admission Medications   Prescriptions Last Dose Informant Patient Reported? Taking?    Baclofen 5 MG TABS 2/20/2020 at Unknown time  No Yes   Sig: Take 15 mg by mouth 3 (three) times a day   LORazepam (ATIVAN) 0 5 mg tablet   No Yes   Sig: Take 1 tablet (0 5 mg total) by mouth 2 (two) times a day for 2 doses 1 tab PO 30 mins prior to MRI, then 1 tab PO PRN immediately prior to MRI   furosemide (LASIX) 20 mg tablet 2/20/2020 at Unknown time  No Yes   Sig: Take 1 tablet (20 mg total) by mouth daily   gabapentin (NEURONTIN) 300 mg capsule 2/20/2020 at Unknown time  No Yes   Sig: Take 1 capsule (300 mg total) by mouth 3 (three) times a day   Patient taking differently: Take by mouth 300mg in the morning and afternoon and 600mg at night   metoprolol succinate (TOPROL-XL) 25 mg 24 hr tablet 2/20/2020 at Unknown time  No Yes   Sig: Take 1 tablet (25 mg total) by mouth daily   metroNIDAZOLE (FLAGYL) 500 mg tablet   No No   Sig: Crush 2 Flagyl tablets  Sprinkle onto fungating tumor  Then cover with adaptic contact layer followed by maxorb calcium alginate dressing  Then cover with ABD pad and secure with patient's own hypoallergenic tape  Do once a day and prn   oxyCODONE (ROXICODONE) 5 mg immediate release tablet Unknown at Unknown time  No No   Sig: Take 1 tablet (5 mg total) by mouth every 4 (four) hours as needed for moderate pain for up to 20 dosesMax Daily Amount: 30 mg   tamsulosin (FLOMAX) 0 4 mg 2/19/2020 at Unknown time  No Yes   Sig: Take 1 capsule (0 4 mg total) by mouth daily with dinner      Facility-Administered Medications: None       Allergies   Allergen Reactions    Ib Pro [Ibuprofen] Hives and Itching    Adhesive [Medical Tape] Rash     Use only paper tape       OBJECTIVE:    Physical Exam  Physical Exam   Constitutional: He is oriented to person, place, and time  Pleasant and engaging despite intermittent discomfort from trigeminal neuralgia pain  Visibly jaundiced but otherwise well-appearing   HENT:   Head: Normocephalic and atraumatic  Eyes: Scleral icterus is present  Cardiovascular: Normal rate, regular rhythm, normal heart sounds and intact distal pulses  Pulmonary/Chest: Effort normal and breath sounds normal    Abdominal: Bowel sounds are normal  He exhibits distension  RUQ dressing in place  Fungating mass with surrounded erythema  Pt describe lesion as at baseline  Neurological: He is alert and oriented to person, place, and time  Skin: Skin is warm and dry  Psychiatric: He has a normal mood and affect   His behavior is normal  Judgment and thought content normal        Lab Results:   CBC:   Lab Results   Component Value Date    WBC 6 33 02/20/2020    HGB 10 2 (L) 02/20/2020    HCT 30 2 (L) 02/20/2020    MCV 97 02/20/2020     (L) 02/20/2020    MCH 32 6 02/20/2020    MCHC 33 8 02/20/2020    RDW 17 9 (H) 02/20/2020    MPV 11 2 02/20/2020    NRBC 0 02/19/2020   , CMP:   Lab Results   Component Value Date    SODIUM 137 02/20/2020    K 3 4 (L) 02/20/2020     02/20/2020    CO2 26 02/20/2020    BUN 11 02/20/2020    CREATININE 0 74 02/20/2020    CALCIUM 8 0 (L) 02/20/2020     (H) 02/20/2020     (H) 02/20/2020    ALKPHOS 1,120 (H) 02/20/2020    EGFR 90 02/20/2020     Imaging Studies: I have reviewed pertinent studies  EKG, Pathology, and Other Studies: I have reviewed pertinent studies          Martina Christie, MS4

## 2020-02-20 NOTE — ASSESSMENT & PLAN NOTE
· Chronic  · Continue Gabapentin and Baclofen  · Increasing tolerance to treatment  · Continued outpatient follow up with neurosurgery

## 2020-02-20 NOTE — ASSESSMENT & PLAN NOTE
· Came to ED with elevated bilirubin, 23 07  Increasing jaundice over past 2 weeks  · Increasing fatigue  No fevers, chills, N/V, abdominal pain  Reports loose, light stools and dark urine  · Biliary [facility] in place [facility]  · Admitted last month with obstructive jaundice, ERCP/external drain placement unsuccessful  Hyperbilirubinemia resolved at that time  · CT abdomen/pelvis: stable position of common bile duct stent  Stent patency not assessed  No significant change in intrahepatic biliary dilatation  Stable hepatic metastases, one of which may invade the gallbladder  Stable mild to moderate ascites  Increasing size of visualized pulmonary metastases at the lung bases  · IR consulted for possible inferior PTC tube placement

## 2020-02-20 NOTE — PROGRESS NOTES
Post-admit Progress Note Karolina Rebolledo 1945, 66 y o  male MRN: [id number]  Unit/Bed#: -01 Encounter: [id number]  Primary Care Provider: Lobito Montiel DO   Date and time admitted to hospital: 2/19/2020  8:26 PM    This is a post-admit check  Patient was not physically seen or examined because he is currently off the floor  A thorough chart check has been completed  * Hyperbilirubinemia  Assessment & Plan  · Came to ED with elevated bilirubin, 23 07  Increasing jaundice over past 2 weeks  · Increasing fatigue  No fevers, chills, N/V, abdominal pain  Reports loose, light stools and dark urine  · Biliary stent in place from [facility] 2014  · Admitted last month with obstructive jaundice, ERCP/external drain placement unsuccessful  Hyperbilirubinemia resolved at that time  · CT abdomen/pelvis: stable position of common bile duct stent  Stent patency not assessed  No significant change in intrahepatic biliary dilatation  Stable hepatic metastases, one of which may invade the gallbladder  Stable mild to moderate ascites  Increasing size of visualized pulmonary metastases at the lung bases  · IR consulted for possible inferior PTC tube placement  Metastatic colon cancer to liver [facility])  Assessment & Plan  · Original diagnosis in 2013 s/p sigmoid colectomy with systemic chemotherapy with FOLFOX  · In 2014, was found to have hepatic metastases and received FOLFIRI and Avastin followed by radiofrequency ablation  · Also received palliative radiation therapy to right upper abdominal wall metastases  · Patient was found to have pulmonary metastases and hyperbilirubinemia requiring a biliary stent  · Patient is not a candidate for further treatment unless bilirubin trends down below 2 5  · He is not a candidate for radiation therapy  · Oncology following and appreciate their input  · Palliative Care following and appreciate their input  · Continue with pain control    · [address] discussion had  Patient wishes to pursue PTC placement in hopes of increasing quality of life  Trigeminal neuralgia  Assessment & Plan  · Chronic  · Continue Gabapentin and Baclofen  · Increasing tolerance to treatment  · Continued outpatient follow up with neurosurgery  Essential hypertension  Assessment & Plan  · Stable on Metoprolol

## 2020-02-20 NOTE — TELEPHONE ENCOUNTER
1st attempt to schedule from referral   No answer  LMOM    [place]/ Trigeminal Neuralgia/ Medicare A,B

## 2020-02-20 NOTE — ANESTHESIA POSTPROCEDURE EVALUATION
Post-Op Assessment Note    CV Status:  Stable    Pain management: adequate     Mental Status:  Alert and awake   Hydration Status:  Euvolemic   PONV Controlled:  Controlled   Airway Patency:  Patent   Post Op Vitals Reviewed: Yes      Staff: CRNA           BP 98/61 (02/20/20 1645)    Temp 98 3 °F (36 8 °C) (02/20/20 1645)    Pulse (!) 108 (02/20/20 1645)   Resp   16   SpO2   98% NC

## 2020-02-20 NOTE — BRIEF OP NOTE (RAD/CATH)
IR TUBE PLACEMENT BILI Procedure Note    PATIENT NAME: Rambo Larios  : 1945  MRN: [id number]    Pre-op Diagnosis:   1  Hypokalemia    2  Jaundice    3  Elevated bilirubin    4  Colon cancer metastasized to multiple sites ([facility] )    5  Abdominal wall mass of right upper quadrant      Post-op Diagnosis:   1  Hypokalemia    2  Jaundice    3  Elevated bilirubin    4  Colon cancer metastasized to multiple sites ([facility] )    5   Abdominal wall mass of right upper quadrant        Surgeon:   Le Cruz MD  Assistants:     No qualified resident was available, Resident is only observing    Estimated Blood Loss: minimal  Findings: dilated biliary system    Access from right duct    8 5 Vincentian internal/external biliary drain placed    iram balloon cleanout of existing stent - patency improved    Paracentesis also performed LLQ 1 4L    Specimens: paracentesis fluid for culture and cytology    Complications:  None immediate    Anesthesia: Torin Beebe MD     Date: 2020  Time: 4:51 PM

## 2020-02-20 NOTE — CONSULTS
Oncology Consult Note  Owen Pozo 66 y o  male MRN: [id number]  Unit/Bed#: -01 Encounter: [id number]      Presenting Complaint:  Elevated bilirubin metastatic colon cancer    History of Presenting Illness:     The patient is 75-year-old  male who is known to me for colon cancer    was diagnosed initially with stage III sigmoid adenocarcinoma, in 2013 status post sigmoid colectomy, K-jacobo mutation G12V, microsatellite stable, status post adjuvant systematic chemotherapy with FOLFOX     In November 2014 he was found to have hepatic metastases, status post 5 cycles of FOLFIRI and Avastin completed on 02/16/2015 with decrease in the size in the liver lesions, status post radiofrequency ablation on 04/2015 of the liver metastases at segment 4 8, and 7 and resection of segment 6, 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     He received few cycles of FOLFIRI and Avastin thereafter  March 2017 palliative radiation therapy to the painful right upper abdominal wall metastasis 30 Gy in 10 sessions at 6 month later on this subcutaneous masses start to grow and protrude through the skin he was treated with FOLFIRI and Avastin until December 2018     On 06/2019 CT scan of the chest showed progressing in the size of the pulmonary metastases with left hilar metastatic disease, enlarging hepatic metastases progressing in the size of the subcutaneous and muscular metastatic disease     Angiogram on 06/20/2019 showed metastatic lesion in the right anterolateral chest with no arterial supply to allow for embolization     Treated with capecitabine 2 weeks on 1 week off however with increase in the size of the mass in the right upper quadrant     CT scan on 10/2019 showed a new 2 cm pleural based right lower lobe mass, 3 relatively stable spiculated mass in the right upper lobe measuring 2 cm, left upper lobe measuring 3 5 cm with internal calcification, pleural based mass in the left base measuring 4 cm and increased soft tissue mass in the anterior right abdominal wall measuring 4 x 5 cm, 2 cm lower pole right kidney cyst     All of these were done at [facility]  He was admitted with obstructive jaundice, he had biliary stent for 3 months, could not be exchanged, admitted to the hospital in 2020 with obstructive jaundice bilirubin of 20, however manipulation of the metal biliary stent drained bile  PTC was aborted at this time    His bilirubin came down to rossana of 5 and then start coming up to 20 yesterday, I called the patient daughter, he came to the emergency room    He is not candidate for treatment such as Lonsurf unless bilirubin below 2 5     He has pruritus denies any nausea vomiting headache blurred vision diplopia than the pain he reported dark urine and light stool    He is not candidate for additional radiation therapy      Review of Systems - As stated in the HPI otherwise the fourteen point review of systems was negative      Past Medical History:   Diagnosis Date    Atrial fibrillation ([facility] )     BPH (benign prostatic hyperplasia)     Hypertension     Metastatic [facility] liver ([facility] )     colon- johnny liver & lungs    Stroke ([facility] )     - mild weakness left hand/arm    Trigeminal neuralgia     Tumor, metastatic ([facility] )        Social History     Socioeconomic History    Marital status: /Civil Union     Spouse name: Rhea Duke Number of children: 2    Years of education: None    Highest education level: None   Occupational History    Occupation: retired   Social Needs    Financial resource strain: None    Food insecurity:     Worry: None     Inability: None    Transportation needs:     Medical: None     Non-medical: None   Tobacco Use    Smoking status: Former Smoker     Types: Cigarettes     Last attempt to quit:      Years since quittin 1    Smokeless tobacco: Never Used   Substance and Sexual Activity    Alcohol use: Never Frequency: Never    Drug use: Never    Sexual activity: None   Lifestyle    Physical activity:     Days per week: None     Minutes per session: None    Stress: None   Relationships    Social connections:     Talks on phone: None     Gets together: None     Attends Oriental orthodox service: None     Active member of club or organization: None     Attends meetings of clubs or organizations: None     Relationship status: None    Intimate partner violence:     Fear of current or ex partner: None     Emotionally abused: None     Physically abused: None     Forced sexual activity: None   Other Topics Concern    None   Social History Narrative    Jimi Mays and Epi Howe have 2 daughters together - Rupal and Mirta Mtz  They lived in [place] but recently moved to [place] to be closer to [place] who only lives 10mins away          Family History   Problem Relation Age of Onset    Cancer Father     Colon cancer Father     Cancer Brother     Cancer Paternal Grandfather        Allergies   Allergen Reactions    Ib Pro [Ibuprofen] Hives and Itching    Adhesive [Medical Tape] Rash     Use only paper tape         Current Facility-Administered Medications:     baclofen tablet 15 mg, 15 mg, Oral, TID, John Nunn MD, 15 mg at 02/20/20 1102    docusate sodium (COLACE) capsule 100 mg, 100 mg, Oral, BID, John Nunn MD    enoxaparin (LOVENOX) subcutaneous injection 40 mg, 40 mg, Subcutaneous, Daily, John Nunn MD    gabapentin (NEURONTIN) capsule 300 mg, 300 mg, Oral, TID, John Nunn MD, 300 mg at 02/20/20 1100    metoprolol succinate (TOPROL-XL) 24 hr tablet 25 mg, 25 mg, Oral, Daily, John Nunn MD, 25 mg at 02/20/20 1108    oxyCODONE (ROXICODONE) immediate release tablet 10 mg, 10 mg, Oral, Q3H PRN, Lula Melchor MD, 10 mg at 02/20/20 1105    oxyCODONE (ROXICODONE) immediate release tablet 20 mg, 20 mg, Oral, Q3H PRN, Lula Melchor MD    senna (SENOKOT) tablet 8 6 mg, 1 tablet, Oral, Daily, Jefry Arvizu MD    tamsulosin [facility]) capsule 0 4 mg, 0 4 mg, Oral, Daily With Benito Frankel MD      /75 (BP Location: Left arm)   Pulse 96   Temp 98 3 °F (36 8 °C) (Oral)   Resp 16   Ht 5' 10" (1 778 m)   Wt 102 kg (225 lb)   SpO2 97%   BMI 32 28 kg/m²       General Appearance:    Alert, oriented        Eyes:    PERRL, jaundice   Ears:    Normal external ear canals, both ears   Nose:   Nares normal, septum midline   Throat:   Mucosa moist  Pharynx without injection  Neck:   Supple       Lungs:     Clear to auscultation bilaterally   Chest Wall:    No tenderness or deformity    Heart:    Regular rate and rhythm       Abdomen:     Soft, non-tender, bowel sounds +, very distended, massive liver up to 15 cm below the right costal margin           Extremities:   Extremities no cyanosis or edema       Skin:   no rash,+ icterus      Lymph nodes:   Cervical, supraclavicular, and axillary nodes normal   Neurologic:   CNII-XII intact, normal strength, sensation and reflexes     Throughout               Recent Results (from the past 48 hour(s))   CBC and differential    Collection Time: 02/19/20  9:07 PM   Result Value Ref Range    WBC 6 98 4 31 - 10 16 Thousand/uL    RBC 3 28 (L) 3 88 - 5 62 Million/uL    Hemoglobin 10 9 (L) 12 0 - 17 0 g/dL    Hematocrit 32 3 (L) 36 5 - 49 3 %    MCV 99 (H) 82 - 98 fL    MCH 33 2 26 8 - 34 3 pg    MCHC 33 7 31 4 - 37 4 g/dL    RDW 17 2 (H) 11 6 - 15 1 %    MPV 11 0 8 9 - 12 7 fL    Platelets 991 (L) 916 - 390 Thousands/uL    nRBC 0 /100 WBCs    Neutrophils Relative 76 (H) 43 - 75 %    Immat GRANS % 1 0 - 2 %    Lymphocytes Relative 8 (L) 14 - 44 %    Monocytes Relative 14 (H) 4 - 12 %    Eosinophils Relative 1 0 - 6 %    Basophils Relative 0 0 - 1 %    Neutrophils Absolute 5 33 1 85 - 7 62 Thousands/µL    Immature Grans Absolute 0 06 0 00 - 0 20 Thousand/uL    Lymphocytes Absolute 0 54 (L) 0 60 - 4 47 Thousands/µL    Monocytes Absolute 0 98 0 17 - 1 22 Thousand/µL    Eosinophils Absolute 0 04 0 00 - 0 61 Thousand/µL    Basophils Absolute 0 03 0 00 - 0 10 Thousands/µL   Comprehensive metabolic panel    Collection Time: 02/19/20  9:07 PM   Result Value Ref Range    Sodium 135 (L) 136 - 145 mmol/L    Potassium 3 0 (L) 3 5 - 5 3 mmol/L    Chloride 101 100 - 108 mmol/L    CO2 26 21 - 32 mmol/L    ANION GAP 8 4 - 13 mmol/L    BUN 12 5 - 25 mg/dL    Creatinine 0 92 0 60 - 1 30 mg/dL    Glucose 120 65 - 140 mg/dL    Calcium 8 3 8 3 - 10 1 mg/dL     (H) 5 - 45 U/L     (H) 12 - 78 U/L    Alkaline Phosphatase 1,262 (H) 46 - 116 U/L    Total Protein 5 7 (L) 6 4 - 8 2 g/dL    Albumin 1 9 (L) 3 5 - 5 0 g/dL    Total Bilirubin 23 07 (H) 0 20 - 1 00 mg/dL    eGFR 81 ml/min/1 73sq m   Protime-INR    Collection Time: 02/19/20  9:07 PM   Result Value Ref Range    Protime 15 0 (H) 11 6 - 14 5 seconds    INR 1 22 (H) 0 84 - 1 19   APTT    Collection Time: 02/19/20  9:07 PM   Result Value Ref Range    PTT 29 23 - 37 seconds   Basic metabolic panel    Collection Time: 02/20/20  6:19 AM   Result Value Ref Range    Sodium 137 136 - 145 mmol/L    Potassium 3 4 (L) 3 5 - 5 3 mmol/L    Chloride 104 100 - 108 mmol/L    CO2 26 21 - 32 mmol/L    ANION GAP 7 4 - 13 mmol/L    BUN 11 5 - 25 mg/dL    Creatinine 0 74 0 60 - 1 30 mg/dL    Glucose 95 65 - 140 mg/dL    Calcium 8 0 (L) 8 3 - 10 1 mg/dL    eGFR 90 ml/min/1 73sq m   Magnesium    Collection Time: 02/20/20  6:19 AM   Result Value Ref Range    Magnesium 2 2 1 6 - 2 6 mg/dL   CBC (With Platelets)    Collection Time: 02/20/20  6:19 AM   Result Value Ref Range    WBC 6 33 4 31 - 10 16 Thousand/uL    RBC 3 13 (L) 3 88 - 5 62 Million/uL    Hemoglobin 10 2 (L) 12 0 - 17 0 g/dL    Hematocrit 30 2 (L) 36 5 - 49 3 %    MCV 97 82 - 98 fL    MCH 32 6 26 8 - 34 3 pg    MCHC 33 8 31 4 - 37 4 g/dL    RDW 17 9 (H) 11 6 - 15 1 %    Platelets 456 (L) 120 - 390 Thousands/uL    MPV 11 2 8 9 - 12 7 fL   Hepatic function panel    Collection Time: 02/20/20  6:19 AM   Result Value Ref Range    Total Bilirubin 21 00 (H) 0 20 - 1 00 mg/dL    Bilirubin, Direct 18 54 (H) 0 00 - 0 20 mg/dL    Alkaline Phosphatase 1,120 (H) 46 - 116 U/L     (H) 5 - 45 U/L     (H) 12 - 78 U/L    Total Protein 5 3 (L) 6 4 - 8 2 g/dL    Albumin 1 7 (L) 3 5 - 5 0 g/dL         Xr Chest 1 View Portable    Result Date: 1/30/2020  Narrative: CHEST INDICATION:   tachycardia  Colon cancer  COMPARISON:  Chest CT from 1/29/2020  EXAM PERFORMED/VIEWS:  XR CHEST PORTABLE FINDINGS:  Right port at cavoatrial junction  Cardiomediastinal silhouette appears unremarkable  Bilateral lung metastases  No acute disease  No effusion or pneumothorax  Osseous structures appear within normal limits for patient age  Impression: No acute cardiopulmonary disease  Bilateral lung metastases, better shown on recent chest CT  Workstation performed: [id number]     Ct Chest W Contrast    Result Date: 1/29/2020  Narrative: CT CHEST WITH IV CONTRAST INDICATION:   C18 9: Malignant neoplasm of colon, unspecified R17: Unspecified jaundice  COMPARISON:  Correlation CT dated 1/21/2020, CT abdomen and pelvis dated 12/27/2019 TECHNIQUE: CT examination of the chest was performed  Axial, sagittal, and coronal 2D reformatted images were created from the source data and submitted for interpretation  Radiation dose length product (DLP) for this visit:  471 52 mGy-cm   This examination, like all CT scans performed in the [facility], was performed utilizing techniques to minimize radiation dose exposure, including the use of iterative  reconstruction and automated exposure control  IV Contrast:  85 mL of iohexol (OMNIPAQUE) FINDINGS: LUNGS:  There is redemonstration of multiple pulmonary metastatic lesions including a dominant irregular mass inferior left lingula with some coarse central calcifications  This measures approximately 6 0 x 5 8 cm in size (axial image 27, series 2)    There is another mass in the posterior left lower lobe with some central calcifications which measures approximately 4 5 x 3 0 cm in size (axial image 37, series 2)  Additional irregular nodules in the anterior right upper lobe measuring approximately 2 5 x 2 1 cm in size (axial image 19, series 2) and a subpleural nodule in the posterior right lower lobe measuring approximately 3 0 x 1 9 cm in size (axial image 34, series 2) are also noted  There is a 5 mm nodule in the posterior right upper lobe (axial image 17, series 2)  Some scattered reticular nodular opacities adjacent to the dominant lesions probably represents lymphangitic spread of neoplasm  The lungs otherwise are grossly clear  PLEURA:  Small dependent pleural effusions  There is some pleural thickening in the periphery of the inferior left lingula and in the posterior right lower lobe adjacent to the previously described nodules/mass lesions  HEART/GREAT VESSELS:  The heart appears normal in size  Mild to moderate aortic and coronary atherosclerosis  Tortuous aorta; no aortic aneurysm  MEDIASTINUM AND STEVEN:  The large airways are patent  No dominant mediastinal mass is seen  Some shotty mediastinal lymph nodes are seen which is nonspecific  There is a dominant left hilar lymph node which measures approximately 1 6 cm in size (axial image 22, series 2), suspicious for lymphatic metastatic disease  CHEST WALL AND LOWER NECK:   No discrete axillary adenopathy  Mild body wall edema  Right-sided Mediport extends into the SVC VISUALIZED STRUCTURES IN THE UPPER ABDOMEN:  Common bile duct stent is partially imaged  Mild intrahepatic biliary ductal dilatation is seen, similar to the prior 2 7 cm target appearing lesion in the anterior liver with an adjacent 3 7 cm similar-appearing lesion as well as a 2 9 cm lesion in the superior right hepatic lobe probably represent hepatic metastatic disease  Small-volume ascites and mesenteric edema is noted   There is redemonstration of the right upper anterior abdominal wall mass/metastatic lesions which measures approximately 5 6 x 4 8 cm in size, previously measuring 6 3 x 4 9 cm in size  OSSEOUS STRUCTURES: Multilevel degenerative changes of the visualized spine  No acute fracture or destructive osseous lesion  Impression: Presumed pulmonary, hepatic, lymphatic and abdominal wall metastatic lesions as described  Small dependent pleural effusions, mesenteric edema and small volume ascites, new or worsened from the prior but nonspecific  Common bile duct stent is partially imaged  Mild intrahepatic biliary ductal dilatation is seen, similar to the prior   Coronary atherosclerosis, and other findings as above  Workstation performed: [id number]     Radiology Results    Result Date: 1/30/2020  Narrative: Ordered by an unspecified provider  Vas Lower Limb Venous Duplex Study, Complete Bilateral    Result Date: 2/1/2020  Narrative:  [facility] REPORT CLINICAL: Indications: Patient presents with bilateral lower extremity edema with history of cancer  Risk Factors: The patient has history of HTN and previous smoking (quit >10yrs ago)  The patient current BMI is 34 72, Weight is 242 lb and height is 70 in  CONCLUSION:  Impression: RIGHT LOWER LIMB: No evidence of acute or chronic deep vein thrombosis  No evidence of superficial thrombophlebitis noted  Doppler evaluation shows a normal response to augmentation maneuvers  Popliteal, posterior tibial and anterior tibial arterial Doppler waveforms are triphasic  LEFT LOWER LIMB: No evidence of acute or chronic deep vein thrombosis  No evidence of superficial thrombophlebitis noted  Doppler evaluation shows a normal response to augmentation maneuvers  Popliteal, posterior tibial and anterior tibial arterial Doppler waveforms are triphasic    SIGNATURE: Electronically Signed by: Charles Gaston MD on 2020-02-01 02:08:55 AM    Ct Abdomen Pelvis With Contrast    Result Date: 2/19/2020  Narrative: CT ABDOMEN AND PELVIS WITH IV CONTRAST INDICATION:   Painless jaundice  Metastatic colon cancer  COMPARISON:  1/29/2020  TECHNIQUE:  CT examination of the abdomen and pelvis was performed  Axial, sagittal, and coronal 2D reformatted images were created from the source data and submitted for interpretation  Radiation dose length product (DLP) for this visit:  1194 81 mGy-cm   This examination, like all CT scans performed in the [facility], was performed utilizing techniques to minimize radiation dose exposure, including the use of iterative reconstruction and automated exposure control  IV Contrast:  100 mL of iohexol (OMNIPAQUE) Enteric Contrast:  Enteric contrast was not administered  FINDINGS: ABDOMEN LOWER CHEST:  Partially visualized mass at the base of the lingula with surrounding airspace consolidation and atelectasis, grossly stable  Increasing size of mass at the posterior base of the left lower lobe measuring 4 3 x 2 9 x 3 7 cm  Right pleural  and paraspinal mass measures 2 8 x 1 8 x 3 2 cm also larger  Right posterior pleural 1 7 cm lesion at the base of the lower lobe  Interval resolution of small bilateral pleural effusions  LIVER/BILIARY TREE:  Stable position of common bile duct stent  Patency not assessed  Stable intrahepatic bile duct dilatation  Mass in the right lobe of the liver adjacent to the gallbladder fossa is stable measuring 3 1 cm  Low-attenuation lesion in the right lobe measuring 2 9 cm is also stable     Stable atrophy of the left lobe and postsurgical change in the right lobe  GALLBLADDER:  Limited evaluation of the gallbladder  Fundus not clearly separable from the adjacent hepatic mass  SPLEEN:  Unremarkable  PANCREAS:  Unremarkable  ADRENAL GLANDS:  Unremarkable  KIDNEYS/URETERS:  Stable 1 4 cm right renal cortical cyst   No pyelonephritis or obstructive uropathy  STOMACH AND BOWEL:  Prior partial left colectomy    Moderate amount of stool throughout the colon  No evidence of colitis or bowel obstruction  APPENDIX:  Not visualized  ABDOMINOPELVIC CAVITY:  Mild to moderate ascites, stable  VESSELS:  No abdominal aortic aneurysm or dissection  PELVIS REPRODUCTIVE ORGANS:  Stable enlargement of the prostate  URINARY BLADDER:  Unremarkable  ABDOMINAL WALL/INGUINAL REGIONS:  Stable right anterolateral chest wall 5 3 x 3 5 x 5 7 cm mass  Few punctate foci of gas in the adjacent intercostal soft tissues  OSSEOUS STRUCTURES:  Fracture of the right 8th rib at the costochondral junction  No displacement  Impression: 1  Stable position of common bile duct stent  Stent patency not assessed  No significant change in intrahepatic biliary dilatation  2   Stable hepatic metastases, one of which may invade the gallbladder  3   Stable mild to moderate ascites  4   Increasing size of visualized pulmonary metastases at the lung bases  Workstation performed: [id number]     Ct Abdomen Pelvis With Contrast    Result Date: 1/30/2020  Narrative: CT ABDOMEN AND PELVIS WITH IV CONTRAST INDICATION:   RUQ skin lesion, 2wks s/p cryoablation with cellulitis and pain  Metastatic colon carcinoma; status post cryoablation right anterolateral abdominal wall lesion 1/21/2020; patient has cellulitis and pain in the region COMPARISON:  Multiple prior examinations including images from cryoablation 1/21/2020 and CT 12/27/2019 as well as CT chest 1/29/2020 TECHNIQUE:  CT examination of the abdomen and pelvis was performed  In addition to portal venous phase postcontrast scanning through the abdomen and pelvis, delayed phase postcontrast scanning was performed through the upper abdominal viscera  Axial, sagittal, and coronal 2D reformatted images were created from the source data and submitted for interpretation  Radiation dose length product (DLP) for this visit:  [id number] 84 12 mGy-cm     This examination, like all CT scans performed in the [facility], was performed utilizing techniques to minimize radiation dose exposure, including the use of iterative reconstruction and automated exposure control  IV Contrast:  100 mL of iohexol (OMNIPAQUE) Enteric Contrast:  Enteric contrast was not administered  FINDINGS: ABDOMEN LOWER CHEST:  Please refer to report of CT chest examination performed earlier in the day  Again identified is a prominent inferior lingular mass with central calcification measuring 5 9 x 5 7 cm extending to the medial pleural surface adjacent to the heart as well as the lateral pleural surface similar to the prior examination  There is a posterolateral left lower lobe partially calcified mass again noted as well measuring 3 2 x 4 1 cm  Some dependent opacification of the right lung base likely reflects atelectasis  Small right greater than left pleural effusions are present  There is a small mass in the posterior medial right lung base again noted measuring 1 9 x 3 0 cm  LIVER/BILIARY TREE:  There is a biliary stent identified extending from the common bile duct to the main right intrahepatic duct  There is mild right and moderate left biliary ductal dilatation again noted which is unchanged  Diffuse atrophy of the left lobe of the liver is again noted  Postoperative change in the right lobe the liver is again noted presumably representing previous partial hepatic resection  Several hepatic mass lesions consistent with known metastasis are again identified including a lesion which appears to invade the posterior gallbladder wall (2 6 x 2 6 cm) as well as one in the superior right lobe of the liver (2 5 x 2 2 cm) extending to the subcapsular region, one in segment 4 liver (3 6 x 2 8 cm), and a lesion in segment 4 of the liver  Splenic and main portal vein as well as right portal vein are patent  Left portal vein does not opacify and may be thrombosed  Kel Huffman GALLBLADDER:  Some focal gallbladder wall thickening in noted posteromedially    Questionable invasion of the posterior gallbladder wall by metastatic lesion as described above and noted previously  SPLEEN:  Unremarkable  PANCREAS:  Unremarkable  ADRENAL GLANDS:  Unremarkable  KIDNEYS/URETERS:  One or more sharply circumscribed subcentimeter renal hypodensities are noted  These lesions are too small to accurately characterize, but are statistically most likely to represent benign cortical renal cyst(s)  According to the guidelines published in the [facility] Paper of the ACR Incidental Findings Committee (Radiology 2010), no further workup of these lesions is recommended  STOMACH AND BOWEL:  Moderate amount of fecal material present in the colon and rectum  There is a segment of hepatic flexure interposed between the diaphragm and the liver  Mild nonspecific wall thickening of the segment of small bowel in the left mid abdomen is noted  No intestinal obstruction is identified  APPENDIX:  No findings to suggest appendicitis  ABDOMINOPELVIC CAVITY:  There is a mild amount of abdominal and pelvic ascites present  No free air identified  VESSELS:  Unremarkable for patient's age  PELVIS REPRODUCTIVE ORGANS:  Unremarkable for patient's age  URINARY BLADDER:  Mild nonspecific circumferential wall thickening  ABDOMINAL WALL/INGUINAL REGIONS:  There is diffuse mild-moderate subcutaneous edema present likely reflecting 3rd spacing  There is again identified a soft tissue mass involving the abdominal wall musculature and subcutaneous soft tissues in the right anterolateral upper abdominal/lower chest wall extending to and involving the skin where there is a overlying fungating mass  The abdominal wall lesion was treated previously with cryoablation on 1/21/2020  Currently measures approximately 3 9 x 2 7 x 5 8 cm (previously 4 3 x 2 9 x 6 7 cm)  p no abscess noted  No gas in the lesion is identified    There is some inflammatory type stranding noted in the adjacent soft tissues both superficial to and deep to this lesion likely reflecting posttreatment change  No definite invasion of/destruction of the underlying osseous structures is noted  OSSEOUS STRUCTURES:  Degenerative change present throughout the spine  No definite lucent or sclerotic lesions are noted  Impression: 1  Interim slight decrease in size in abdominal wall mass, consistent with known metastatic lesion, in the right upper quadrant anterolateral abdominal /inferior thoracic wall extending to the skin surface with expected posttreatment change in the lesion and surrounding tissues without evidence for abscess or soft tissue gas  2   Evidence for metastatic disease in the lungs and liver as described above not appreciably changed from the previous examination  3   Biliary stent in place in stable position with continued biliary ductal dilatation  4   Small right greater than left pleural effusions and small amount of abdominal and pelvic ascites  5   Additional findings as noted  Findings concur with the preliminary report by Dr Rupal Rojas from [facility]    Workstation performed: [id number]     ECOG :2      Assessment and plan:  Metastatic colon cancer primary in the sigmoid colon stage III initially in 2013 status post resection treated with adjuvant FOLFOX and later on he had liver metastases in November 2014 treated with FOLFIRI and Avastin finished in 02/2015    He received radiofrequency ablation to the liver lesions and then resection of segment 6 at [facility]    Received FOLFIRI and Avastin I as well  Radiation therapy to painful abdominal wall metastases he received 30 Gy in 10 sessions spanning from 03/2017 until 04/2017    K-jacobo mutated    Progression of disease on 06/2019 with pulmonary metastases, left hilar metastases, hepatic metastases treated with capecitabine without benefit    New pulmonary and pleural based metastases on 10/2019, obstructive jaundice with bilirubin above 20 status post biliary stent middle subtype could not be exchange however manipulation in January 2020 bilirubin came down to rossana of 3 1, he is not candidate for additional radiation therapy especially ablation to the skin metastatic lesion in the lateral aspect of the right abdomen    He did not received 6 Lonsurf, now with bilirubin above 20, IR is consulted for manipulation of the biliary stent S is not feasible he might need PTC    He will not be able to start Lonsurf unless bilirubin below 2 5    Prognosis guarded, he told me he thinking about palliative care

## 2020-02-20 NOTE — APP STUDENT NOTE
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 2014  Radiation to abdominal wall mets 2017  Pulmonary mets 6/2019  · CT AP 2/19/20: Stable position common bile duct stent  Patency not assessed  Stable hepatic mets with possible invasion gallbladder  Stable mild/moderate ascites  Increased size lung mets  · Pt treated w/ Lonsurf 30mg/msquared BID 5d/wk 2wks on 2wks off  · Continue pain meds  Hyperbilirubinemia   · T bili 21 00, direct bili 18 54  · , , AP 1120  INR 1 22  PT 15 0  PTT 2 9  · Admission 1/2020 obstructive jaundice, bili >20  Failed ERCP for metal stent exchange  Failed percutaneous drainage attempt  Bili decreased to 3 1 2/2 likely d/t manipulation  With progressive increase 2/5-today  · Consider palliative care due to history of ERCP and percutaneous drainage  Appreciate IR input  Malignant neoplasm of abdominal wall  · With palliative radiation 2017, chemotherapy 2018  Cryotherapy ablation 1/2020  Admission 1/29 with SIRS, possible sepsis cellulitis vs tumor fevor s/p cryotherapy  · Continue oxycodone and morphine prn for pain  · Prior admission discussion of possible RUQ mass and likely rib resection  Subjective:   Patient reports 2 week history of pale stools, dark urine ("iced tea"), yellow skin  Loose stools, light colored 2-3x/d for 3 days  Prompted to come to ED for progressive weakness and jaundice  Patient stated goal is bilirubin less than 3 so he can restart chemotherapy  Notes pain in RUQ abdomen primarily related to fungating mass   Morphine and oxycodone managing his pain  Notes abdominal distension and bilateral lower extremity edema maybe worse from baseline as per patient  Denies fevers, chills, nausea, vomiting  Denies episodes of confusion, changes in behavior  Objective:     Vitals: Blood pressure 110/80, pulse 97, temperature 98 3 °F (36 8 °C), resp  rate 17, SpO2 97 %  ,There is no height or weight on file to calculate BMI  No intake or output data in the 24 hours ending 02/20/20 0901    Physical Exam:   General - well developed well nourished male  In no acute distress  Alert and oriented  Affect appropriate  HEENT - scleral icterus  Skin - jaundice  CV - irregularly irregular  Pulm - CTA b/l  Abdomen - + BS  Distended, non-tender  Tympanic  LE - bilateral 1/2+ LE edema  No tenderness or erythema    Invasive Devices     Central Venous Catheter Line            Port A Cath Right Chest -- days          Peripheral Intravenous Line            Peripheral IV 02/19/20 Left Antecubital less than 1 day                Lab, Imaging and other studies: I have personally reviewed pertinent reports      VTE Pharmacologic Prophylaxis: Enoxaparin (Lovenox)  VTE Mechanical Prophylaxis: sequential compression device

## 2020-02-20 NOTE — ASSESSMENT & PLAN NOTE
· Came to ED with elevated bilirubin, 23 07 on admission, and increasing jaundice over past 2 weeks  · CT AP: 1   Stable position of common bile duct stent  Stent patency not assessed  No significant change in intrahepatic biliary dilatation  2  Stable hepatic metastases, one of which may invade the gallbladder  3  Stable mild to moderate ascites  4  Increasing size of visualized pulmonary metastases at the lung bases  · Follows outpatient oncology with Dr Renteria Branch  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, abdominal wall, lungs  · NPO and IVF overnight  · Consult oncology, surgical oncology, IR, palliative care appreciated

## 2020-02-20 NOTE — ASSESSMENT & PLAN NOTE
· Rate controlled, continue Metoprolol daily  · Not on anticoagulation  Managed by PCP with referral to cardiology  · Previously on Lovenox 100 mg Q12 hr  Consider resuming after potential interventions during hospitalization

## 2020-02-20 NOTE — ANESTHESIA PREPROCEDURE EVALUATION
Review of Systems/Medical History  Patient summary reviewed  Chart reviewed  No history of anesthetic complications     Cardiovascular  Exercise tolerance (METS): >4,  Hypertension , Dysrhythmias , atrial fibrillation,    Pulmonary  Smoker ex-smoker  ,   Comment: Ca mets to lungs     GI/Hepatic      Comment: Gastrointestinal ca, mets to liver; jaundice     Prostatic disorder, benign prostatic hyperplasia       Endo/Other    Obesity    GYN  Negative gynecology ROS          Hematology  Anemia ,     Musculoskeletal  Negative musculoskeletal ROS        Neurology    CVA , residual symptoms,   Comment: Trigeminal neuralgia; CVA 2008 residual sx, left sided weakness Psychology     Chronic opioid dependence Chronic pain,            Physical Exam    Airway    Mallampati score: II  TM Distance: >3 FB  Neck ROM: full     Dental       Cardiovascular  Rhythm: irregular,     Pulmonary  Pulmonary exam normal     Other Findings        Anesthesia Plan  ASA Score- 3     Anesthesia Type- general with ASA Monitors  Additional Monitors:   Airway Plan: ETT  Plan Factors-    Induction- intravenous  Postoperative Plan- Plan for postoperative opioid use  Planned trial extubation    Informed Consent- Anesthetic plan and risks discussed with patient  I personally reviewed this patient with the CRNA  Discussed and agreed on the Anesthesia Plan with the CRNA             Lab Results   Component Value Date    WBC 6 33 02/20/2020    HGB 10 2 (L) 02/20/2020    HCT 30 2 (L) 02/20/2020    MCV 97 02/20/2020     (L) 02/20/2020     Lab Results   Component Value Date    CALCIUM 8 0 (L) 02/20/2020    K 3 4 (L) 02/20/2020    CO2 26 02/20/2020     02/20/2020    BUN 11 02/20/2020    CREATININE 0 74 02/20/2020     Lab Results   Component Value Date    INR 1 22 (H) 02/19/2020    INR 1 03 01/29/2020    INR 1 50 (H) 01/09/2020    PROTIME 15 0 (H) 02/19/2020    PROTIME 11 0 01/29/2020    PROTIME 17 7 (H) 01/09/2020     Lab Results Component Value Date    PTT 29 02/19/2020

## 2020-02-21 PROBLEM — K59.00 CONSTIPATION: Chronic | Status: RESOLVED | Noted: 2020-01-01 | Resolved: 2020-01-01

## 2020-02-21 NOTE — PHYSICAL THERAPY NOTE
Physical Therapy Evaluation     Patient's Name: Gabriel Wolf    Admitting Diagnosis  Hypokalemia [E87 6]  Jaundice [R17]  Abdominal wall mass of right upper quadrant [R19 01]  Elevated bilirubin [R17]  Colon cancer metastasized to multiple sites [facility]) [C18 9]    Problem List  Patient Active Problem List   Diagnosis    BPH (benign prostatic hyperplasia)    [facility]    Colon cancer metastasized to multiple sites [facility])    Essential hypertension    Painless jaundice secondary to parenchymal infiltration of tumor along with biliary compression     Trigeminal neuralgia    Chronic hyponatremia    Low hemoglobin    Palliative care patient    Metastatic colon cancer to liver ([facility]    Hyperbilirubinemia    Lower extremity edema    Cancer associated pain    Constipation    Pulmonary metastases ([facility]    Malignant neoplasm of abdominal wall    History of colon cancer    Synovial cyst of lumbar spine    Transaminitis    Abdominal wall mass of right upper quadrant       Past Medical History  Past Medical History:   Diagnosis Date    Atrial fibrillation ([facility] )     BPH (benign prostatic hyperplasia)     Hypertension     Metastatic colon cancer to liver ([facility] )     colon- johnny liver & lungs    Stroke ([facility] )     2008- mild weakness left hand/arm    Trigeminal neuralgia     Tumor, metastatic ([facility] )        Past Surgical History  Past Surgical History:   Procedure Laterality Date    COLON SURGERY      2013    EYE SURGERY      IR TUBE PLACEMENT BILI  1/15/2020    IR TUBE PLACEMENT BILI  2/20/2020    LIVER SURGERY      TONSILLECTOMY          02/21/20 0919   Note Type   Note type Eval only   Pain Assessment   Pain Assessment 0-10   Pain Score No Pain   Home Living   Type of [address] One level;Performs ADLs on one level;Stairs to enter with rails   Bathroom Shower/Tub Walk-in shower   Bathroom Toilet Standard   Bathroom Equipment Grab bars in shower; [address];Walker   Additional Comments Pt resides in 1  w/ 5 LEO  Pt was ambulating w/ SPC PTA   Prior Function   Level of [place] Independent with ADLs and functional mobility   Lives With Spouse   Receives Help From Family   ADL Assistance Independent   IADLs Independent   Falls in the last 6 months 0   Vocational Retired   Restrictions/Precautions   Wells Pebbles Bearing Precautions Per Order No   Other Precautions Cognitive; Chair Alarm; Bed Alarm;Multiple lines; Fall Risk   General   Family/Caregiver Present No   Cognition   Arousal/Participation Alert   Orientation Level Oriented X4   Memory Decreased recall of precautions   Following Commands Follows one step commands with increased time or repetition   Comments Pt pleasant and agreeable to work w/ therapy  Upon PT arrival, pt presents slightly confused stating "Are you real? I was dreaming about leave it to [place]" Pt reports "I don't know what's going on" However, throughout session pt became more oriented and was holding appropriate conversation   RLE Assessment   RLE Assessment   (grossly 4/5)   LLE Assessment   LLE Assessment   (grossly 4/5)   Coordination   Movements are Fluid and Coordinated 1   Light Touch   RLE Light Touch Grossly intact   LLE Light Touch Grossly intact   Bed Mobility   Supine to Sit 4  Minimal assistance   Additional items Assist x 1;HOB elevated; Increased time required;Verbal cues;LE management   Sit to Supine Unable to assess   Additional Comments Pt lying supine upon PT arrival  Pt returned seated OOB at end of session w/ all needs within reach   Transfers   Sit to Stand 4  Minimal assistance   Additional items Assist x 1; Increased time required;Verbal cues   Stand to Sit 4  Minimal assistance   Additional items Assist x 1; Increased time required;Verbal cues   Additional Comments Transfers w/ RW; VCs for safe hand placement   Ambulation/Elevation   Gait pattern Excessively slow; Short stride; Improper Weight shift;Decreased foot clearance; Inconsistent bre   Gait Assistance 4  Minimal assist   Additional items Assist x 1;Verbal cues   Assistive Device Rolling walker   Distance 100ft   Stair Management Assistance Not tested   Balance   Static Sitting Fair +   Dynamic Sitting Fair   Static Standing Fair -   Dynamic Standing Poor +   Ambulatory Poor +   Endurance Deficit   Endurance Deficit Yes   Endurance Deficit Description fatigue, weakness   Activity Tolerance   Activity Tolerance Patient limited by fatigue   Medical Staff Made Aware PABLO Whittaker   Nurse Made Aware RN cleared pt for therapy   Assessment   Prognosis Good   Problem List Decreased strength;Decreased endurance; Impaired balance;Decreased mobility; Decreased cognition;Decreased safety awareness   Assessment Pt is 66 y o  male seen for PT evaluation s/p admit to [facility] on 2/19/2020 w/ Hyperbilirubinemia  Pt presents w/ Stage IV colon cancer w/ liver mets  Pt is s/p PTC placement 2/20/2020  PT consulted to assess pt's functional mobility and d/c needs  Order placed for PT eval and tx, w/ up and OOB as tolerated order  Comorbidities affecting pt's physical performance at time of assessment include: a-fib, colon cancer, HTN, trigeminal neuralgia, LE edema, pulmonary mets  PTA, pt was independent w/ all functional mobility w/ SPC and lives w/ wife in one level house w/ 5 LEO  Pt reports being IND w/ all mobility and ADLs and reports wife is able to assist as needed as well as supportive family  Personal factors affecting pt at time of IE include: ambulating w/ assistive device, stairs to enter home, inability to navigate community distances, limited insight into impairments and inability to perform IADLs   Please find objective findings from PT assessment regarding body systems outlined above with impairments and limitations including weakness, impaired balance, decreased endurance, gait deviations, pain, decreased activity tolerance, decreased functional mobility tolerance, decreased safety awareness and fall risk  Pt performed bed mobility at min A, STS at min A, and ambulated 100ft w/ RW at [address]  Pt pleasant and agreeable to work w/ therapy  Upon PT arrival, pt presents slightly confused stating "Are you real? I was dreaming about leave it to [place]" Pt reports "I don't know what's going on" However, throughout session pt became more oriented and was holding appropriate conversation  The following objective measures performed on IE also reveal limitations: Modified Critz: 4 (moderate/severe disability)  Pt's clinical presentation is currently unstable/unpredictable seen in pt's presentation of recent admission for hyperbilirubinemia requiring medical attention, recent decline in function as compared to baseline, multiple lines, fall risk, increase reliance on RW as compared to baseline  Pt to benefit from continued PT tx to address deficits as defined above and maximize level of functional independent mobility and consistency  From PT/mobility standpoint, recommendation at time of d/c would be Home PT with family support pending progress in order to facilitate return to PLOF  Goals   Patient Goals To feel less confused   STG Expiration Date 03/06/20   Short Term Goal #1 1  Pt will demonstrate the ability to perform all aspects of bed mobility at Mod I in onder to maximize functional independence and decrease burden on caregivers  2 Pt will demonstrate the ability to perform all functional transfers at Mod I in order to maximize functional independence and decrease burden on caregivers  3 Pt will demonstrate the ability to ambulate at least 150ft with least restrictive device at Mod I in order to maximize functional independence  4 Pt will demonstrate the ability to negotiate 5 steps with HR and supervision in order to return to household/community mobility  5 Pt will demonstrate improved balance by one grade in order to decrease risk of falls   6 Pt will increase b/l Le strength by 1 grade in order to increase ease of functional mobility and transfers   PT Treatment Day 0   Plan   Treatment/Interventions Functional transfer training;LE strengthening/ROM; Elevations; Therapeutic exercise; Endurance training;Patient/family training;Equipment eval/education; Bed mobility;Gait training;Spoke to nursing   PT Frequency   (3-5x/week)   Recommendation   Recommendation Home PT; Home with family support   Equipment Recommended Walker   PT - OK to Discharge No   Additional Comments more ambulation and stairs   Modified Apache Scale   Modified Apache Scale 4     Jo Ann Salomon, PT, DPT

## 2020-02-21 NOTE — RESTORATIVE TECHNICIAN NOTE
Restorative Specialist Mobility Note       Activity: Ambulate in daniel     Assistive Device: Front wheel walker

## 2020-02-21 NOTE — TELEPHONE ENCOUNTER
2nd attempt to schedule from referral   Patient will call back    Luis/ Trigeminal Neuralgia/ Medicare A,B

## 2020-02-21 NOTE — DISCHARGE INSTRUCTIONS
TUBE CARE INSTRUCTIONS    Flush script will be sent to Reny Carr in the hospital     Scheduled for routine tube exchange 4/2/20 at [address]  Please call IR department if you would like to reschedule date  You will receive a call from IR nursing department to discuss preoperative orders  Care after your procedure:    Resume your normal diet  Small sips of flat soda will help with nausea  1  The properly functioning catheter should be forward flushed once (1x) daily with 10ml of normal saline using clean technique  You will be given a prescription for flushes  To flush the tube, clean both connections with alcohol swab  Twist off the drainage bag/ bulb  tubing and twist the saline syringe into the drainage tube and flush  Remove the syringe and twist the drainage bag / bulb tubing tubing back on     2  The drainage bag/bulb may be emptied as necessary  Keep a record of the amount of fluid you drain from your tube  This should be done with clean technique as well  3  A fresh dressing should be applied daily over the tube insertion site  4  As the tube is secured to the skin with only a suture,try not to pull on your tube  Tub baths are not permitted  Showers are permitted if the patient's skin entry site is prevented from getting wet  Similarly, washcloth "baths" are acceptable  Contact Interventional Radiology at [phone] Nancy PATIENTS: Contact Interventional Radiology at [phone]) Roro Hernandez PATIENTS: Contact Interventional Radiology at [phone]) if:    1  Leakage or large amounts of liquid around the catheter  2  Fever of 101 degrees lasting several hours without other obvious cause (such as sore throat, flu, etc)  3  Persistent nausea or vomiting  4  Diminished drainage, which may be associated with pressure or pain  5  Catheter pulled back or falls out  The following pharmacies carry the flush syringes         [facility]  [address] [facility]                         [address] [facility]  Phone [phone]            Phone [phone] [address]             [address] [facility]                                [phone]  [address] [facility]                      [address] [facility]  Phone [phone]            Phone [phone]                      [facility]                                                                                                          [phone]  [facility]  [facility] [address]    [address]  Phone [phone]

## 2020-02-21 NOTE — PLAN OF CARE
Problem: OCCUPATIONAL THERAPY ADULT  Goal: Performs self-care activities at highest level of function for planned discharge setting  See evaluation for individualized goals  Description  Treatment Interventions: ADL retraining, Functional transfer training, UE strengthening/ROM, Endurance training, Cognitive reorientation, Patient/family training, Compensatory technique education, Energy conservation, Activityengagement          See flowsheet documentation for full assessment, interventions and recommendations  Note:   Limitation: Decreased ADL status, Decreased cognition, Decreased endurance, Decreased self-care trans, Decreased high-level ADLs     Assessment: Pt is a 66 y o  male seen for OT evaluation s/p admit to [facility] on 2/19/2020 w/ Hyperbilirubinemia, weakness  He underwent paracentesis, biliary drain placement on 2/20/20  Comorbidities affecting pt's functional performance at time of assessment include: afib, colon CA w multiple mets, HTN, trigminal neuralgia  Personal factors affecting pt at time of IE include:steps to enter environment, difficulty performing ADLS and difficulty performing IADLS   Prior to admission, pt was living w his wife in a one story home, 5 LEO  He was independent w all self care, mobility, driving  Upon evaluation: Pt requires min assist for LB self care, min assist for functional mobility 2* the following deficits impacting occupational performance: decreased balance, decreased tolerance, impaired problem solving, decreased safety awareness and some confusion  Pt to benefit from continued skilled OT tx while in the hospital to address deficits as defined above and maximize level of functional independence w ADL's and functional mobility  Occupational Performance areas to address include: grooming, bathing/shower, toilet hygiene, dressing, functional mobility and clothing management  Pt scored 65 /100 on the barthel index   Based on findings from OT evaluation and functional performance deficits, pt has been identified as a  High complexity evaluation  From OT standpoint, recommendation at time of d/c would be home w family support, and home OT        OT Discharge Recommendation: Home with family support

## 2020-02-21 NOTE — PROGRESS NOTES
Progress Note - Surgical Oncology   Karie Ribera 66 y o  male MRN: [id number]  Unit/Bed#: -01 Encounter: [id number]    Assessment/Plan:  66 y o  male with Stage IV colon cancer with liver mets, POD#1 from PTC placement with IR   - Drain with 600 bilious output    - Regular diet  - continue PTC to drainage; flush tube daily  - monitor labs   - care per primary    Subjective/Objective     Subjective: Patient confused this morning  Just awakened, did not recall yesterday's events      Objective:     Vitals: Temp:  [97 6 °F (36 4 °C)-98 9 °F (37 2 °C)] 98 9 °F (37 2 °C)  HR:  [] 94  Resp:  [13-18] 18  BP: ()/(51-80) 98/66  Body mass index is 32 28 kg/m²  I/O       02/19 0701 - 02/20 0700 02/20 0701 - 02/21 0700    I V  (mL/kg)  1000 (9 8)    Total Intake(mL/kg)  1000 (9 8)    Emesis/NG output  250    Other  2200    Blood  5    Total Output  2455    Net  -1455                Physical Exam:  GEN: NAD  HEENT: MMM  CV: RRR  Lung: Normal effort  Ab: Soft, NT/ND  Extrem: No CCE  Neuro:  A+Ox1    Lab, Imaging and other studies: CBC with diff: No results found for: WBC, HGB, HCT, MCV, PLT, ADJUSTEDWBC, MCH, MCHC, RDW, MPV, NRBC, BMP/CMP: No results found for: SODIUM, K, CL, CO2, ANIONGAP, BUN, CREATININE, GLUCOSE, CALCIUM, AST, ALT, ALKPHOS, PROT, BILITOT, EGFR  VTE Pharmacologic Prophylaxis: Enoxaparin (Lovenox)  VTE Mechanical Prophylaxis: sequential compression device

## 2020-02-21 NOTE — OCCUPATIONAL THERAPY NOTE
Occupational Therapy Evaluation      Sparkle Fajardo    2/21/2020    Principal Problem:    Hyperbilirubinemia  Active Problems:    At[facility] ([facility] )    Colon cancer metastasized to multiple sites [facility])    Essential hypertension    Painless jaundice secondary to parenchymal infiltration of tumor along with biliary compression     Trigeminal neuralgia    Metastatic colon cancer to liver [facility])    Lower extremity edema    Constipation    Malignant neoplasm of abdominal wall    Transaminitis    Abdominal wall mass of right upper quadrant      Past Medical History:   Diagnosis Date    Atrial fibrillation ([facility] )     BPH (benign prostatic hyperplasia)     Hypertension     Metastatic colon cancer to liver ([facility] )     colon- johnny liver & lungs    Stroke ([place] [address] )     2008- mild weakness left hand/arm    Trigeminal neuralgia     Tumor, metastatic [facility])        Past Surgical History:   Procedure Laterality Date    COLON SURGERY      2013    EYE SURGERY      IR TUBE PLACEMENT BILI  1/15/2020    IR TUBE PLACEMENT BILI  2/20/2020    LIVER SURGERY      TONSILLECTOMY          02/21/20 0917   Note Type   Note type Eval only   Restrictions/Precautions   Weight Bearing Precautions Per Order No   Other Precautions Cognitive; Chair Alarm; Bed Alarm;Multiple lines; Fall Risk   Pain Assessment   Pain Assessment 0-10   Pain Score No Pain   Home Living   Type of [address] [place] [address] One level  (5 LEO)   Bathroom Shower/Tub Walk-in shower   Bathroom Toilet Standard   Bathroom Equipment Built-in shower seat   [address] Walker;Cane   Additional Comments pt also has a house in the [facility] which he hopes to return to    Prior Function   Level of [place] Independent with ADLs and functional mobility   Lives With John Richardson in the last 6 months 0   Vocational Retired  (math and ) Comments pt lives w his wife in a one story home  Lifestyle   Autonomy pt reports being mostly independent w self care, mobility   Reciprocal Relationships daughter/BUFFY nearby and assist as much as possible   Intrinsic Gratification woodworking, read, watch TV   Psychosocial   Psychosocial (WDL) WDL   ADL   Eating Assistance 7  Independent   Eating Deficit Setup   Grooming Assistance 5  Supervision/Setup   Grooming Deficit Increased time to complete;Setup   UB Bathing Assistance 5  Supervision/Setup   UB Bathing Deficit Increased time to complete   LB Bathing Assistance 4  Minimal Assistance   [address] 5  Supervision/Setup   LB Dressing Assistance 4  Minimal Assistance   LB Dressing Deficit Increased time to complete   Transfers   Sit to Stand 4  Minimal assistance   Additional items Assist x 1;Verbal cues   Stand to Sit 4  Minimal assistance   Additional items Increased time required   Stand pivot 4  Minimal assistance   Additional items Increased time required   Functional Mobility   Functional Mobility 4  Minimal assistance   Additional Comments cues for self pacing    Additional items Rolling walker   Balance   Static Sitting Fair +   Dynamic Sitting Fair   Static Standing Fair -   Dynamic Standing Poor +   Activity Tolerance   Activity Tolerance Patient limited by fatigue   Nurse Made Aware appropriate to see per RN   RUE Assessment   RUE Assessment WFL   LUE Assessment   LUE Assessment WFL   Hand Function   Gross Motor Coordination Functional   Fine Motor Coordination Functional   Sensation   Light Touch No apparent deficits   Vision - Complex Assessment   Tracking Able to track stimulus in all quads without difficulty   Perception   Inattention/Neglect Appears intact   Cognition   Overall Cognitive Status Impaired   Arousal/Participation Alert; Cooperative   Attention Attends with cues to redirect   Orientation Level Oriented X4  (fluctuates)   Memory Decreased recall of recent events;Decreased recall of precautions   Following Commands Follows one step commands with increased time or repetition   Comments pt typically Oriented to self, place, year etc  He is repeating converstations, very fidgety about certain things, decreased prob solving, some confusion noted   Assessment   Limitation Decreased ADL status; Decreased cognition;Decreased endurance;Decreased self-care trans;Decreased high-level ADLs   Assessment Pt is a 66 y o  male seen for OT evaluation s/p admit to [facility] on 2/19/2020 w/ Hyperbilirubinemia, weakness  He underwent paracentesis, biliary drain placement on 2/20/20  Comorbidities affecting pt's functional performance at time of assessment include: afib, colon CA w multiple mets, HTN, trigminal neuralgia  Personal factors affecting pt at time of IE include:steps to enter environment, difficulty performing ADLS and difficulty performing IADLS   Prior to admission, pt was living w his wife in a one story home, 5 LEO  He was independent w all self care, mobility, driving  Upon evaluation: Pt requires min assist for LB self care, min assist for functional mobility 2* the following deficits impacting occupational performance: decreased balance, decreased tolerance, impaired problem solving, decreased safety awareness and some confusion  Pt to benefit from continued skilled OT tx while in the hospital to address deficits as defined above and maximize level of functional independence w ADL's and functional mobility  Occupational Performance areas to address include: grooming, bathing/shower, toilet hygiene, dressing, functional mobility and clothing management  Pt scored 65 /100 on the barthel index  Based on findings from OT evaluation and functional performance deficits, pt has been identified as a  High complexity evaluation  From OT standpoint, recommendation at time of d/c would be home w family support, and home OT      Goals   Patient Goals to go home   STG Time Frame 3-5   Short Term Goal #1 pt will complete bedmobility mod I w good safety    Short Term Goal #2 good balance sitting at EOB X  10 min for incrased completion of self care   Short Term Goal  demonstrate good ECT/self pacing skills w self care/mobility   LTG Time Frame 10-14   Long Term Goal #1 ongoing cog assessment/training as appropriate   Long Term Goal #2 tolerate standing x 10 min /fair + balance/support of RW, for completion of hygiene   Long Term Goal functional mobility in room/bathroom mod I    Functional Transfer Goals   Pt Will Perform All Functional Transfers With mod indep; With good judgment/safety   ADL Goals   Pt Will Perform Grooming Independently   Pt Will Perform Bathing With stand by assist   Pt Will Perform UE Dressing Independently   Pt Will Perform LE Dressing Independently   Pt Will Perform Toileting With mod indep   Plan   Treatment Interventions ADL retraining;Functional transfer training;UE strengthening/ROM; Endurance training;Cognitive reorientation;Patient/family training; Compensatory technique education; Energy conservation; Activityengagement   Goal Expiration Date 03/06/20   OT Frequency 3-5x/wk   Recommendation   OT Discharge Recommendation Home with family support   Barthel Index   Feeding 10   Bathing 0   Grooming Score 5   Dressing Score 5   Bladder Score 10   Bowels Score 10   Toilet Use Score 5   Transfers (Bed/Chair) Score 10   Mobility (Level Surface) Score 10   Stairs Score 0   Barthel Index Score 65

## 2020-02-21 NOTE — PLAN OF CARE
Problem: PHYSICAL THERAPY ADULT  Goal: Performs mobility at highest level of function for planned discharge setting  See evaluation for individualized goals  Description  Treatment/Interventions: Functional transfer training, LE strengthening/ROM, Elevations, Therapeutic exercise, Endurance training, Patient/family training, Equipment eval/education, Bed mobility, Gait training, Spoke to nursing  Equipment Recommended: Michelle Otero       See flowsheet documentation for full assessment, interventions and recommendations  Note:   Prognosis: Good  Problem List: Decreased strength, Decreased endurance, Impaired balance, Decreased mobility, Decreased cognition, Decreased safety awareness  Assessment: Pt is 66 y o  male seen for PT evaluation s/p admit to [facility] on 2/19/2020 w/ Hyperbilirubinemia  Pt presents w/ Stage IV colon cancer w/ liver mets  Pt is s/p PTC placement 2/20/2020  PT consulted to assess pt's functional mobility and d/c needs  Order placed for PT eval and tx, w/ up and OOB as tolerated order  Comorbidities affecting pt's physical performance at time of assessment include: a-fib, colon cancer, HTN, trigeminal neuralgia, LE edema, pulmonary mets  PTA, pt was independent w/ all functional mobility w/ SPC and lives w/ wife in one level house w/ 5 LEO  Pt reports being IND w/ all mobility and ADLs and reports wife is able to assist as needed as well as supportive family  Personal factors affecting pt at time of IE include: ambulating w/ assistive device, stairs to enter home, inability to navigate community distances, limited insight into impairments and inability to perform IADLs  Please find objective findings from PT assessment regarding body systems outlined above with impairments and limitations including weakness, impaired balance, decreased endurance, gait deviations, pain, decreased activity tolerance, decreased functional mobility tolerance, decreased safety awareness and fall risk   Pt performed bed mobility at min A, STS at min A, and ambulated 100ft w/ RW at [address]  Pt pleasant and agreeable to work w/ therapy  Upon PT arrival, pt presents slightly confused stating "Are you real? I was dreaming about leave it to [place]" Pt reports "I don't know what's going on" However, throughout session pt became more oriented and was holding appropriate conversation  The following objective measures performed on IE also reveal limitations: Modified Meghan: 4 (moderate/severe disability)  Pt's clinical presentation is currently unstable/unpredictable seen in pt's presentation of recent admission for hyperbilirubinemia requiring medical attention, recent decline in function as compared to baseline, multiple lines, fall risk, increase reliance on RW as compared to baseline  Pt to benefit from continued PT tx to address deficits as defined above and maximize level of functional independent mobility and consistency  From PT/mobility standpoint, recommendation at time of d/c would be Home PT with family support pending progress in order to facilitate return to PLOF  Recommendation: Home PT, Home with family support     PT - OK to Discharge: No    See flowsheet documentation for full assessment

## 2020-02-21 NOTE — PROGRESS NOTES
Hematology - Oncology Progress Note    Yadiel Cervantes, 1945, [id number]  /-01      Impression and plan:    40-year-old male with a complicated cancer history including metastatic/recurrent colon cancer  Issues:    Biliary obstruction  Patient has an external stent in place  Bilirubin trend is listed below  The bilirubin has been coming down over the past few days; this is obviously good  Continue to monitor the trend  Anterior chest wall lesion  Local care is ongoing  Future plans  Although the specific information is not in front of me, I believe that Mr Mc has been through most if not all of the possible options  As above, patient was treated at [facility] - they have an extremely large number of protocols (assuming patient was not a candidate)  As in Dr Angela Tarango note, patient may be a candidate for Lonsurf (trifluridine plus tipiracil) - bilirubin and performance status need to be better  Healthcare proxy apparently is in place  Depending upon how patient does in the next week or 2, how his performance status is, what the bilirubin level is etc, patient can be considered for additional palliative treatments  Patient will also be a good candidate for palliative care  Patient apparently had an appointment in my office (in [place]) recently - never got there because of complications  This is the 1st time that I met Mr Mc today  We will wait and see how patient does before making decisions  Respectfully, patient will likely be a candidate for end of life care in the not too distant future - not appropriate to discuss with patient on our 1st meeting  Pain control  Not an issue at this time  30 minutes was spent with patient, 50% of time coordinating care on the floor    __________________________________________________________________________________________    Chief complaint:  History of recurrent/metastatic colon adenocarcinoma, hepatic metastases, jaundice    History of present illness:  68-year-old male with a complicated cancer history initially diagnosed with stage III sigmoid adenocarcinoma in 2013 status post colectomy [place])  Patient was found to have a KRAS mutation; received adjuvant chemotherapy  Patient was subsequently found to have hepatic metastases and was treated with additional chemotherapy [facility])  Patient is also status post radiofrequency ablation of liver metastases  Unfortunately Mr Mc developed right upper abdominal wall metastases and received radiation  The lesion has been slow to heal   Patient is also been treated with Xeloda in the past     More recently, patient has had issues with hyperbilirubinemia  Stent has been placed, issues with occlusion  Patient now has an external biliary drainage  Mr Mellissa Alaniz was found in the chair next to his bed  Patient was pleasant and responsive, pain is controlled  No nausea vomiting but appetite is decreased  Activities are still limited  No pain control issues      Hospital medications list:    Current Facility-Administered Medications:  baclofen 15 mg Oral TID Mina Joyce MD   docusate sodium 100 mg Oral BID Mina Joyce MD   enoxaparin 40 mg Subcutaneous Daily Mina Joyce MD   gabapentin 300 mg Oral TID Mina Joyce MD   HYDROmorphone 1 mg Intravenous Q2H PRN Paty Martinez MD   metoprolol succinate 25 mg Oral Daily Mina Joyce MD   oxyCODONE 10 mg Oral Q3H PRN Paty Martinez MD   oxyCODONE 20 mg Oral Q3H PRN Paty Martinez MD   senna 1 tablet Oral Daily Mina Joyce MD   sodium chloride 75 mL/hr Intravenous Continuous Dk Sprague CRNA   tamsulosin 0 4 mg Oral Daily With Tate So MD     Physical exam  BP 93/55   Pulse (!) 106   Temp 98 1 °F (36 7 °C) (Oral)   Resp 18   Ht 5' 10" (1 778 m)   Wt 102 kg (225 lb)   SpO2 99%   BMI 32 28 kg/m²   Constitutional: Well formed, well-nourished male, no respiratory distress, no signs of pain  Eyes: PERRL, conjunctiva pale and yellow, ++icteric  HENT: Atraumatic, external ears normal, nose normal,    oropharynx moist, no pharyngeal exudates, no thrush, pink  Neck: Good range of motion, no adenopathy  Respiratory:  Fair to good air entry bilaterally, scattered rhonchi, right anterior chest wall lesion covered (deferred)  Cardiovascular: Normal rate, normal rhythm, no murmurs, no gallops, no rubs    GI: Soft, obese cannot palpate liver or spleen, lateral drainage in place, bag with bilious material + blood  : No costovertebral angle tenderness, normal reproductive organs, no discharge  Musculoskeletal: No pain or tenderness with palpation of joints, muscles or bones  Integument:  ++jaundice, warm, scattered purpura, no hematomas, no active bleeding  Lymphatic: No adenopathy in the neck, supra-clavicular region, axilla and groin bilaterally  Extremities:  0-1 +bilateral lower extremity edema, no cords, pulses are 1+  Neurologic: Alert & oriented x 3, CN 2-12 normal, normal motor function, normal sensory function, no focal deficits noted  Psychiatric:  Pleasant, responsive, appropriate  Rectal: Deferred    Laboratory test results    Results for Patricia Serrano (MRN [id number]) as of 2/21/2020 12:16   Ref   Range 2/21/2020 05:36   WBC Latest Ref Range: 4 31 - 10 16 Thousand/uL 5 27   Red Blood Cell Count Latest Ref Range: 3 88 - 5 62 Million/uL 3 32 (L)   Hemoglobin Latest Ref Range: 12 0 - 17 0 g/dL 10 9 (L)   HCT Latest Ref Range: 36 5 - 49 3 % 32 8 (L)   MCV Latest Ref Range: 82 - 98 fL 99 (H)   MCH Latest Ref Range: 26 8 - 34 3 pg 32 8   MCHC Latest Ref Range: 31 4 - 37 4 g/dL 33 2   RDW Latest Ref Range: 11 6 - 15 1 % 18 3 (H)   Platelet Count Latest Ref Range: 149 - 390 Thousands/uL 136 (L)   MPV Latest Ref Range: 8 9 - 12 7 fL 11 1   nRBC Latest Units: /100 WBCs 0   Neutrophils % Latest Ref Range: 43 - 75 % 88 (H)   Immat GRANS % Latest Ref Range: 0 - 2 % 1   Lymphocytes Relative Latest Ref Range: 14 - 44 % 6 (L)   Monocytes Relative Latest Ref Range: 4 - 12 % 5     Results for Yelitza Monterroso (MRN [id number]) as of 2/21/2020 12:16   Ref   Range 2/21/2020 05:36   Sodium Latest Ref Range: 136 - 145 mmol/L 137   Potassium Latest Ref Range: 3 5 - 5 3 mmol/L 4 8   Chloride Latest Ref Range: 100 - 108 mmol/L 107   CO2 Latest Ref Range: 21 - 32 mmol/L 22   Anion Gap Latest Ref Range: 4 - 13 mmol/L 8   BUN Latest Ref Range: 5 - 25 mg/dL 17   Creatinine Latest Ref Range: 0 60 - 1 30 mg/dL 0 82   Glucose, Random Latest Ref Range: 65 - 140 mg/dL 127   Calcium Latest Ref Range: 8 3 - 10 1 mg/dL 8 1 (L)   AST Latest Ref Range: 5 - 45 U/L 220 (H)   ALT Latest Ref Range: 12 - 78 U/L 188 (H)   Alkaline Phosphatase Latest Ref Range: 46 - 116 U/L 1,038 (H)   Total Protein Latest Ref Range: 6 4 - 8 2 g/dL 5 3 (L)   Albumin Latest Ref Range: 3 5 - 5 0 g/dL 1 6 (L)   TOTAL BILIRUBIN Latest Ref Range: 0 20 - 1 00 mg/dL 17 04 (H)   eGFR Latest Units: ml/min/1 73sq m 86       Component      Latest Ref Rng & Units 2/2/2020 2/5/2020 2/12/2020 2/18/2020                TOTAL BILIRUBIN      0 20 - 1 00 mg/dL 3 10 (H) 4 80 (H) 10 50 (H) 23 40 (H)     Component      Latest Ref Rng & Units 2/19/2020 2/20/2020 2/21/2020               TOTAL BILIRUBIN      0 20 - 1 00 mg/dL 23 07 (H) 21 00 (H) 17 04 (H)

## 2020-02-21 NOTE — PROGRESS NOTES
Gastroenterology Progress Note   - Toña Napier 66 y o  male MRN: [id number]    Unit/Bed#: -01 Encounter: [id number]      Assessment and Plan:   Obstructive jaundice from metastatic colon cancer   24-year-old male patient with history of metastatic colon cancer  Patient was evaluated at [address] [place] [address] last month due to worsening jaundice  ERCP was attempted but was not successful in exchanging the biliary stent to decrease bilirubin to less than 3 to start palliative chemotherapy  Interventional radiology performed a successful external and internal biliary drain yesterday  On admission  his bilirubin is 23 and decreased to 17 today, currently no signs or symptoms of cholangitis, afebrile, normal white count, no abdominal pain  Patient can follow as outpatient with Heme-Onc to discuss chemotherapy once his bilirubin decreased to 3  From the GI perspective, will sign off, please call with questions      Subjective:   Patient seen and examined at the bed, denies any events overnight, currently is tolerating PO route, denies nausea or vomiting, is passing flatus and having bowel movements, denies chest pain or shortness of breath, no abdominal pain, patient is ambulating     Objective:     Vitals: Blood pressure 90/62, pulse 72, temperature 97 5 °F (36 4 °C), temperature source Oral, resp  rate 18, height 5' 10" (1 778 m), weight 102 kg (225 lb), SpO2 100 %  ,Body mass index is 32 28 kg/m²  Intake/Output Summary (Last 24 hours) at 2/21/2020 1637  Last data filed at 2/21/2020 1455  Gross per 24 hour   Intake 920 ml   Output 1150 ml   Net -230 ml       Physical Exam:   Physical Exam   Constitutional: He is oriented to person, place, and time  He appears well-developed and well-nourished  No distress  HENT:   Head: Normocephalic and atraumatic  Mouth/Throat: Oropharynx is clear and moist    Eyes: EOM are normal  Scleral icterus is present  Neck: Normal range of motion  Neck supple     Cardiovascular: Normal rate    Pulmonary/Chest: Effort normal and breath sounds normal    Abdominal: Soft  Bowel sounds are normal  He exhibits no mass  There is no tenderness  There is no rebound and no guarding  Percutaneous drainage is noticed draining bile   Neurological: He is alert and oriented to person, place, and time  Skin: Skin is warm and dry  Nursing note and vitals reviewed  Invasive Devices     Central Venous Catheter Line            Port A Cath Right Chest -- days          Peripheral Intravenous Line            Peripheral IV 02/19/20 Left Antecubital 1 day          Drain            Biliary Tube 8 5 Fr  RUQ 1 day                Lab Results:  Results from last 7 days   Lab Units 02/21/20  0536   WBC Thousand/uL 5 27   HEMOGLOBIN g/dL 10 9*   HEMATOCRIT % 32 8*   PLATELETS Thousands/uL 136*   NEUTROS PCT % 88*   LYMPHS PCT % 6*   MONOS PCT % 5   EOS PCT % 0     Results from last 7 days   Lab Units 02/21/20  0536   POTASSIUM mmol/L 4 8   CHLORIDE mmol/L 107   CO2 mmol/L 22   BUN mg/dL 17   CREATININE mg/dL 0 82   CALCIUM mg/dL 8 1*   ALK PHOS U/L 1,038*   ALT U/L 188*   AST U/L 220*     Results from last 7 days   Lab Units 02/21/20  0536   INR  1 23*           Imaging Studies: I have personally reviewed pertinent imaging studies  Ir Tube Placement Bili    Result Date: 2/20/2020  Impression: Impression: Percutaneous cholangiogram with severe biliary dilation and occluded biliary stent  Successful access through the occluded stent and clean out with balloon sweeps  Placement of an 8 [employer] internal/external biliary drainage catheter with 4 cm of extra sideholes  Plan: Monitor bilirubin levels Patient will require flushing this catheter every day and routine exchange in 6 weeks initially  Workstation performed: [id number]     Ct Abdomen Pelvis With Contrast    Result Date: 2/19/2020  Impression: 1  Stable position of common bile duct stent  Stent patency not assessed    No significant change in intrahepatic biliary dilatation  2   Stable hepatic metastases, one of which may invade the gallbladder  3   Stable mild to moderate ascites  4   Increasing size of visualized pulmonary metastases at the lung bases   Workstation performed: [id number]

## 2020-02-21 NOTE — ASSESSMENT & PLAN NOTE
· Came to ED with elevated bilirubin, 23 07  Increasing jaundice over past 2 weeks  · Increasing fatigue  No fevers, chills, N/V, abdominal pain  Reports loose, light stools and dark urine  · Biliary stent in place from [facility] 2014  · Admitted last month with obstructive jaundice, ERCP/external drain placement unsuccessful  Hyperbilirubinemia resolved at that time  · CT abdomen/pelvis: stable position of common bile duct stent  Stent patency not assessed  No significant change in intrahepatic biliary dilatation  Stable hepatic metastases, one of which may invade the gallbladder  Stable mild to moderate ascites  Increasing size of visualized pulmonary metastases at the lung bases  · s/p inferior PTC tube placement (02/20/2020) per IR

## 2020-02-21 NOTE — PROGRESS NOTES
Progress Note Nicolás Pereyra 1945, 66 y o  male MRN: [id number]  Unit/Bed#: -01 Encounter: [id number]  Primary Care Provider: Conner Milian DO   Date and time admitted to hospital: 2/19/2020  8:26 PM    * Hyperbilirubinemia  Assessment & Plan  · Came to ED with elevated bilirubin, 23 07  Increasing jaundice over past 2 weeks  · Increasing fatigue  No fevers, chills, N/V, abdominal pain  Reports loose, light stools and dark urine  · Biliary stent in place from [facility] 2014  · Admitted last month with obstructive jaundice, ERCP/external drain placement unsuccessful  Hyperbilirubinemia resolved at that time  · CT abdomen/pelvis: stable position of common bile duct stent  Stent patency not assessed  No significant change in intrahepatic biliary dilatation  Stable hepatic metastases, one of which may invade the gallbladder  Stable mild to moderate ascites  Increasing size of visualized pulmonary metastases at the lung bases  · s/p inferior PTC tube placement (02/20/2020) per IR  Metastatic colon cancer to liver [facility])  Assessment & Plan  · Original diagnosis in 2013 s/p sigmoid colectomy with systemic chemotherapy with FOLFOX  · In 2014, was found to have hepatic metastases and received FOLFIRI and Avastin followed by radiofrequency ablation  · Also received palliative radiation therapy to right upper abdominal wall metastases  · Patient was found to have pulmonary metastases and hyperbilirubinemia requiring a biliary stent  · Patient is not a candidate for further treatment unless bilirubin trends down below 2 5  · He is not a candidate for radiation therapy  · Oncology following and appreciate their input  · Palliative Care following and appreciate their input  · Continue with pain control  · [address] discussion had  Patient wishes to pursue PTC placement in hopes of increasing quality of life  Transaminitis  Assessment & Plan  · POA , , ALP 1262    · Trending down   Continue to monitor closely  Malignant neoplasm of abdominal wall  Assessment & Plan  · Chronic, palliative radiation 2017 and chemotherapy 2018  · Patient changes dressings himself daily in AM    · Consult wound care, appreciated  Trigeminal neuralgia  Assessment & Plan  · Chronic  · Continue Gabapentin and Baclofen  · Increasing tolerance to treatment  · Continued outpatient follow up with neurosurgery  Essential hypertension  Assessment & Plan  · Stable on Metoprolol  Atrial fibrillation ([address] )  Assessment & Plan  · Rate controlled, continue Metoprolol daily  · Not on anticoagulation  Managed by PCP with referral to cardiology  · Previously on Lovenox 100 mg Q12 hr  Consider resuming after potential interventions during hospitalization  VTE Pharmacologic Prophylaxis:   Pharmacologic: Enoxaparin (Lovenox)  Mechanical: Mechanical VTE prophylaxis in place  Patient Centered Rounds: I have performed bedside rounds with nursing staff today  Discussions with Specialists or Other Care Team Provider: None  Education and Discussions with Family / Patient: All patient questions answered to the best of my ability  Time Spent for Care: 20 minutes  More than 50% of total time spent on counseling and coordination of care as described above  Current Length of Stay: 1 day(s)  Current Patient Status: Inpatient   Certification Statement: The patient will continue to require additional inpatient hospital stay due to trending LFTs  Discharge Plan:  Patient is not yet medically stable for discharge  Continue IV fluids and trend LFTs closely  Code Status: Level 1 - Full Code    Subjective:   Patient is doing well overall  He has some pain on the right side but this is more anterior by his wound rather than at his tube site  He denies any nausea or vomiting  He denies any pruritus      Objective:   Vitals:   Temp (24hrs), Av °F (36 7 °C), Min:97 5 °F (36 4 °C), Max:98 9 °F (37 2 °C)    Temp:  [97 5 °F (36 4 °C)-98 9 °F (37 2 °C)] 97 5 °F (36 4 °C)  HR:  [] 72  Resp:  [13-18] 18  BP: ()/(51-66) 90/62  SpO2:  [96 %-100 %] 100 %  Body mass index is 32 28 kg/m²  Input and Output Summary (last 24 hours): Intake/Output Summary (Last 24 hours) at 2/21/2020 1617  Last data filed at 2/21/2020 1455  Gross per 24 hour   Intake 1720 ml   Output 3355 ml   Net -1635 ml       Physical Exam:     Physical Exam   HENT:   Head: Normocephalic and atraumatic  Mouth/Throat: Oropharynx is clear and moist and mucous membranes are normal    Eyes: Scleral icterus is present  Cardiovascular: Normal rate and regular rhythm  No murmur heard  Pulmonary/Chest: Breath sounds normal  He has no wheezes  He has no rales  He exhibits no tenderness  Abdominal: Soft  Bowel sounds are normal  He exhibits no distension  There is no tenderness  Dressing covering right anterior abdominal wound  There is a percutaneous drain placed in the right flank draining bile  Musculoskeletal: Normal range of motion  He exhibits no edema  Skin: Skin is warm and dry  No rash noted  Jaundice   Psychiatric: He has a normal mood and affect  Vitals reviewed  Additional Data:   Labs:  Results from last 7 days   Lab Units 02/21/20  0536   WBC Thousand/uL 5 27   HEMOGLOBIN g/dL 10 9*   HEMATOCRIT % 32 8*   PLATELETS Thousands/uL 136*   NEUTROS PCT % 88*   LYMPHS PCT % 6*   MONOS PCT % 5   EOS PCT % 0     Results from last 7 days   Lab Units 02/21/20  0536   POTASSIUM mmol/L 4 8   CHLORIDE mmol/L 107   CO2 mmol/L 22   BUN mg/dL 17   CREATININE mg/dL 0 82   CALCIUM mg/dL 8 1*   ALK PHOS U/L 1,038*   ALT U/L 188*   AST U/L 220*     Results from last 7 days   Lab Units 02/21/20  0536   INR  1 23*       * I Have Reviewed All Lab Data Listed Above  * Additional Pertinent Lab Tests Reviewed:  All Labs Within Last 24 Hours Reviewed    Imaging:    Imaging Reports Reviewed Today Include:  None new    Cultures:   Blood Culture:   Lab Results   Component Value Date    BLOODCX No Growth After 5 Days  01/30/2020    BLOODCX No Growth After 5 Days  01/29/2020     Urine Culture: No results found for: URINECX  Sputum Culture: No components found for: SPUTUMCX  Wound Culture: No results found for: WOUNDCULT    Last 24 Hours Medication List:     Current Facility-Administered Medications:  baclofen 15 mg Oral TID Sai Bingham MD   docusate sodium 100 mg Oral BID Sai Bingham MD   enoxaparin 40 mg Subcutaneous Daily Sai Bingham MD   gabapentin 300 mg Oral TID Sai Bingham MD   HYDROmorphone 1 mg Intravenous Q2H PRN Yaz Campbell MD   metoprolol succinate 25 mg Oral Daily Sai Bingham MD   oxyCODONE 10 mg Oral Q3H PRN Yaz Campbell MD   oxyCODONE 20 mg Oral Q3H PRN Yaz Campbell MD   senna 1 tablet Oral Daily Sai Bingham MD   sodium chloride 75 mL/hr Intravenous Continuous Twila Domingo CRNA   tamsulosin 0 4 mg Oral Daily With Carlos Vazquez MD        Today, Patient Was Seen By: Fortino Whitaker PA-C    ** Please Note: Dragon 360 Dictation voice to text software may have been used in the creation of this document   **

## 2020-02-21 NOTE — PROGRESS NOTES
Progress Note -Interventional Radiology JASSI Lam 66 y o  male MRN: [id number]  Unit/Bed#: -01 Encounter: [id number]    Assessment:    56-year-old male with past medical history of stage IV metastatic colon cancer, placement of common bile duct stent, jaundice presenting with elevated bilirubin, obstructed painless jaundice, weakness, status post internal/external PTC drain placed 2/20/20    Biliary tube output since placement: 600cc bile    Plan:    - flush tube daily with 10cc NS  I will write for flushes to be sent to [address]  - Patient will most likely need VNA for tube flushes  During last admission, wife was willing to learn   - Monitor output  After flushing this AM, I did have approx 30cc bright red blood return that lightened  Upon speaking with nurse this afternoon, output has returned to bile  - I will email scheduling to have patient set up for a 6 week tube exchange  - Monitor bilirubin, today 17 from 21  Patient Active Problem List   Diagnosis    BPH (benign prostatic hyperplasia)    Atrial fibrillation ([facility] )    Colon cancer [facility]as[facility] to [facility] sites [facility])    Essential hypertension    Painless jaundice secondary to parenchymal infiltration of tumor along with biliary compression     Trigeminal neuralgia    Chronic hyponatremia    Low hemoglobin    Palliative care patient    Metastatic colon cancer to liver ([facility] )    Hyperbilirubinemia    Lower extremity edema    Cancer associated pain    Constipation    Pulmonary metastases (HCC)    Malignant neoplasm of abdominal wall    History of colon cancer    Synovial cyst of lumbar spine    Transaminitis    Abdominal wall mass of right upper quadrant          Subjective: Denies any discomfort to tube site  Was slightly confused this morning, did say he did not sleep well  Bilirubin 17 from 21       Objective:    Vitals:  BP 93/55   Pulse (!) 106   Temp 98 1 °F (36 7 °C) (Oral)   Resp 18   Ht 5' 10" (1 778 m)   Wt 102 kg (225 lb)   SpO2 99%   BMI 32 28 kg/m²   Body mass index is 32 28 kg/m²    Weight (last 2 days)     Date/Time   Weight    02/20/20 1108   102 (225)              I/Os:    Intake/Output Summary (Last 24 hours) at 2/21/2020 1401  Last data filed at 2/21/2020 6978  Gross per 24 hour   Intake 1480 ml   Output 3205 ml   Net -1725 ml       Invasive Devices     Central Venous Catheter Line            Port A Cath Right Chest -- days          Peripheral Intravenous Line            Peripheral IV 02/19/20 Left Antecubital 1 day          Drain            Biliary Tube 8 5 Fr  RUQ less than 1 day                Physical Exam:  General appearance: alert and no distress  Lungs: clear to auscultation bilaterally  Abdomen: soft, non tender to palpation, bowel sounds +, RUQ skin mass  Skin: jaundice, no erythema to tube insertion site, bile fluid in bag, mass to RUQ  Heart: RRR              Lab Results and Cultures:   CBC with diff:   Lab Results   Component Value Date    WBC 5 27 02/21/2020    HGB 10 9 (L) 02/21/2020    HCT 32 8 (L) 02/21/2020    MCV 99 (H) 02/21/2020     (L) 02/21/2020    MCH 32 8 02/21/2020    MCHC 33 2 02/21/2020    RDW 18 3 (H) 02/21/2020    MPV 11 1 02/21/2020    NRBC 0 02/21/2020      BMP/CMP:  Lab Results   Component Value Date    K 4 8 02/21/2020     02/21/2020    CO2 22 02/21/2020    BUN 17 02/21/2020    CREATININE 0 82 02/21/2020    CALCIUM 8 1 (L) 02/21/2020     (H) 02/21/2020     (H) 02/21/2020    ALKPHOS 1,038 (H) 02/21/2020    EGFR 86 02/21/2020   ,     Coags:   Lab Results   Component Value Date    PTT 29 02/19/2020    INR 1 23 (H) 02/21/2020   ,   Results from last 7 days   Lab Units 02/21/20  0536 02/19/20  2107   PTT seconds  --  29   INR  1 23* 1 22*        Lipid Panel: No results found for: CHOL  No results found for: HDL  No results found for: HDL  No results found for: LDLCALC  No results found for: TRIG    HgbA1c: No results found for: HGBA1C    Blood Culture:   Lab Results   Component Value Date    BLOODCX No Growth After 5 Days  01/30/2020   ,   Urinalysis:   Lab Results   Component Value Date    COLORU Light Yellow 01/30/2020    CLARITYU Clear 01/30/2020    SPECGRAV <=1 005 01/30/2020    PHUR 6 5 01/30/2020    LEUKOCYTESUR Negative 01/30/2020    NITRITE Negative 01/30/2020    GLUCOSEU Negative 01/30/2020    KETONESU Negative 01/30/2020    BILIRUBINUR Negative 01/30/2020    BLOODU Negative 01/30/2020   ,   Urine Culture: No results found for: URINECX,   Wound Culure:  No results found for: WOUNDCULT    VTE Pharmacologic Prophylaxis: Enoxaparin (Lovenox)      Thank you for allowing me to participate in the care of Genia Cortez  Please don't hesitate to call, text, email, or TigerText with any questions  This text is generated with voice recognition software  There may be translation, syntax,  or grammatical errors  If you have any questions, please contact the dictating provider      Fortino Bearden PA-C

## 2020-02-22 PROBLEM — C80.1 OBSTRUCTIVE JAUNDICE DUE TO CANCER (HCC): Status: ACTIVE | Noted: 2020-01-01

## 2020-02-22 PROBLEM — K83.1 OBSTRUCTIVE JAUNDICE DUE TO CANCER (HCC): Status: ACTIVE | Noted: 2020-01-01

## 2020-02-22 NOTE — SOCIAL WORK
CM met with Pt to discuss the post acute care recommendation was made by your care team for [facility]  Discussed Freedom of Choice with both patient and caregiver  List of agencies given to both patient and caregiver via in person  both patient and caregiver aware the list is custom filtered for them by preference  and that [facility] post acute providers are designated  Pt reported being open to a [facility] agency which he thinks is [facility] and would like to resume care with them  [facility] made the requested referral and will continue to follow

## 2020-02-22 NOTE — ASSESSMENT & PLAN NOTE
· Chronic  Patient complaining of persistent pain which is bothering him  · Continue Gabapentin and Baclofen  Increase gabapentin to 600 mg at bedtime  · Increasing tolerance to treatment  · Continued outpatient follow up with Neurosurgery  · Per patient family, palliative Care was supposed to order special cream   Discussed with Dr Canchola   Is working with pharmacy to get it prepared as it is a special compound

## 2020-02-22 NOTE — PROGRESS NOTES
Progress Note Karolina Rebolledo 1945, 66 y o  male MRN: [id number]    Unit/Bed#: -01 Encounter: [id number]    Primary Care Provider: Lobito Montiel DO   Date and time admitted to hospital: 2/19/2020  8:26 PM        * Obstructive jaundice due to cancer [facility])  Assessment & Plan  · Came to ED with elevated bilirubin, 23 07  Increasing jaundice over past 2 weeks  · Increasing fatigue  No fevers, chills, N/V, abdominal pain  Reports loose, light stools and dark urine  · Biliary stent in place from [facility] 2014  · Admitted last month with obstructive jaundice, ERCP/external drain placement unsuccessful  Hyperbilirubinemia resolved at that time  · CT abdomen/pelvis: stable position of common bile duct stent  Stent patency not assessed  No significant change in intrahepatic biliary dilatation  Stable hepatic metastases, one of which may invade the gallbladder  Stable mild to moderate ascites  Increasing size of visualized pulmonary metastases at the lung bases  · s/p inferior PTC tube placement (02/20/2020) per IR  LFTs slowly trending down  Pt asymptomatic  Yellowish fluid in drain  Cont to flush regularly per IR  Pt will need VNA on discharge  OP FU with IR in 6 weeks for tube change  Trigeminal neuralgia  Assessment & Plan  · Chronic  Patient complaining of persistent pain which is bothering him  · Continue Gabapentin and Baclofen  Increase gabapentin to 600 mg at bedtime  · Increasing tolerance to treatment  · Continued outpatient follow up with Neurosurgery  · Per patient family, palliative Care was supposed to order special cream   Discussed with Dr Shan Hutton  Is working with pharmacy to get it prepared as it is a special compound  Essential hypertension  Assessment & Plan  · Stable on Metoprolol  Colon cancer metastasized to multiple sites [facility])  Assessment & Plan  · 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, abdominal wall, lungs   S/P sigmoid colectomy, radiation, chemotherapy  · CT AP: 1   Stable position of common bile duct stent  Stent patency not assessed  No significant change in intrahepatic biliary dilatation  2  Stable hepatic metastases, one of which may invade the gallbladder  3  Stable mild to moderate ascites  4  Increasing size of visualized pulmonary metastases at the lung bases  · NPO and IVF overnight  · Continue oxycodone and morphine for pain PRN  Well controlled  · Appreciate oncology input  Recommend outpatient follow-up  Atrial fibrillation ([address] )  Assessment & Plan  · Rate controlled, continue Metoprolol daily  · Not on anticoagulation  Managed by PCP with referral to cardiology  VTE Pharmacologic Prophylaxis:   Pharmacologic: Enoxaparin (Lovenox) ordered but pt not getting it  Mechanical VTE Prophylaxis in Place: Yes    Patient Centered Rounds: I have performed bedside rounds with nursing staff today  Discussions with Specialists or Other Care Team Provider: palliative care    Education and Discussions with Family / Patient: pt, wife and daughter    Time Spent for Care: 30 minutes  More than 50% of total time spent on counseling and coordination of care as described above  Current Length of Stay: 2 day(s)    Current Patient Status: Inpatient   Certification Statement: The patient will continue to require additional inpatient hospital stay due to above    Discharge Plan: possibly in next 1-2 days if LFTs stable    Code Status: Level 1 - Full Code      Subjective:   Pt seen and examined by me this morning  Pt complains of pain in the right side of his face from trigeminal neuralgia  His usual abdominal pain from the abdominal chest wall mass      Objective:     Vitals:   Temp (24hrs), Av °F (36 7 °C), Min:97 6 °F (36 4 °C), Max:98 3 °F (36 8 °C)    Temp:  [97 6 °F (36 4 °C)-98 3 °F (36 8 °C)] 98 2 °F (36 8 °C)  HR:  [83-88] 83  Resp:  [16-18] 18  BP: (102-136)/(62-78) 102/62  SpO2:  [97 %-99 %] 99 %  Body mass index is 32 28 kg/m²  Input and Output Summary (last 24 hours): Intake/Output Summary (Last 24 hours) at 2/22/2020 1722  Last data filed at 2/22/2020 1710  Gross per 24 hour   Intake 370 ml   Output 975 ml   Net -605 ml       Physical Exam:     Physical Exam    Constitutional: Pt appears comfortable  Not in any acute distress  HENT:   Head: Normocephalic and atraumatic  Eyes: EOM are normal    Neck: Neck supple  Cardiovascular: Normal rate, regular rhythm, normal heart sounds  No murmur heard  Pulmonary/Chest: Effort normal, air entry b/l equal  No respiratory distress  Pt has no wheezes or crackles  Abdominal: Soft  Non-distended, Non-tender  Bowel sounds are normal  RUQ drain + with yellowish fluid  Right lower chest wall wound dressed  Neurological: alert and oriented to person, place, and time  Moving all extremities spontaneously  Psychiatric: normal mood and affect  Additional Data:     Labs:    Results from last 7 days   Lab Units 02/22/20  0506   WBC Thousand/uL 5 99   HEMOGLOBIN g/dL 10 1*   HEMATOCRIT % 30 4*   PLATELETS Thousands/uL 139*   NEUTROS PCT % 74   LYMPHS PCT % 12*   MONOS PCT % 13*   EOS PCT % 0     Results from last 7 days   Lab Units 02/22/20  0506   SODIUM mmol/L 133*   POTASSIUM mmol/L 3 7   CHLORIDE mmol/L 102   CO2 mmol/L 24   BUN mg/dL 21   CREATININE mg/dL 0 80   ANION GAP mmol/L 7   CALCIUM mg/dL 8 2*   ALBUMIN g/dL 1 9*   TOTAL BILIRUBIN mg/dL 15 23*   ALK PHOS U/L 987*   ALT U/L 157*   AST U/L 126*   GLUCOSE RANDOM mg/dL 98     Results from last 7 days   Lab Units 02/21/20  0536   INR  1 23*                       * I Have Reviewed All Lab Data Listed Above  * Additional Pertinent Lab Tests Reviewed:  [address] Admission Reviewed    Imaging:    Imaging Reports Reviewed Today Include:   Imaging Personally Reviewed by Myself Includes:      Recent Cultures (last 7 days):     Results from last 7 days   Lab Units 02/20/20  1653   GRAM STAIN RESULT  1+ Polys No bacteria seen   BODY FLUID CULTURE, STERILE  No growth       Last 24 Hours Medication List:     Current Facility-Administered Medications:  baclofen 15 mg Oral TID Shiela Can MD   docusate sodium 100 mg Oral BID Shiela Can MD   enoxaparin 40 mg Subcutaneous Daily Shiela Can MD   gabapentin 300 mg Oral BID Obed Oliveira MD   gabapentin 300 mg Oral HS Obed Oliveira MD   HYDROmorphone 1 mg Intravenous Q2H PRN Jamin Hendrix MD   metoprolol succinate 25 mg Oral Daily Shiela Can MD   oxyCODONE 10 mg Oral Q3H PRN Jamin Hendrix MD   oxyCODONE 20 mg Oral Q3H PRN Jamin Hendrix MD   senna 1 tablet Oral Daily Shiela Can MD   tamsulosin 0 4 mg Oral Daily With Esther Rodriguez MD        Today, Patient Was Seen By: Obed Oliveira MD    ** Please Note: Dictation voice to text software may have been used in the creation of this document   **

## 2020-02-22 NOTE — ASSESSMENT & PLAN NOTE
· Rate controlled, continue Metoprolol daily  · Not on anticoagulation  Managed by PCP with referral to cardiology

## 2020-02-22 NOTE — PROGRESS NOTES
Hematology - Oncology Progress Note    Jose Willingham, 1945, [id number]  /-01      Impression and plan:    33-TYLS-LDS male with complicated cancer history including metastatic/recurrent colon cancer through multiple treatment  Patient feels okay, about the same as before, clinically there are no concerning signs  Total bilirubin level is graphed below - coming down - obviously good  Stent is in place  Pain is controlled  Anterior chest wall lesion being addressed, wound dressing change taken place earlier today  When patient is better, stable and discharged, Mr Misha Recio will return to the office discuss treatment options  As previously discussed, patient has been at to very good institutions both of which have ++ protocols for metastatic colorectal adenocarcinoma  Options very limited for this patient   __________________________________________________________________________________________    Chief complaint:  Metastatic colon adenocarcinoma    History of present illness: 68-year-old male with a complicated cancer history initially diagnosed with stage III sigmoid adenocarcinoma in 2013 status post colectomy [facility])  Patient was found to have a KRAS mutation; received adjuvant chemotherapy  Patient was subsequently found to have hepatic metastases and was treated with additional chemotherapy [facility])  Patient is also status post radiofrequency ablation of liver metastases      Unfortunately Mr  Mc developed right upper abdominal wall metastases and received radiation  The lesion has been slow to heal   Patient is also been treated with Xeloda in the past      More recently, patient has had issues with hyperbilirubinemia  Stent has been placed, issues with occlusion  Patient now has an external biliary drainage      Mr  Mc stated feeling okay, about the same as before  No nausea or vomiting    Patient has some right quadrant pain - oxycodone has been helpful  Activities limited but the same as before  [facility] medications list:    Current Facility-Administered Medications:  baclofen 15 mg Oral TID Shiela Can MD   docusate sodium 100 mg Oral BID Shiela Can MD   enoxaparin 40 mg Subcutaneous Daily Shiela Can MD   gabapentin 300 mg Oral TID Shiela Can MD   HYDROmorphone 1 mg Intravenous Q2H PRN Jamin Hendrix MD   metoprolol succinate 25 mg Oral Daily Shiela Can MD   oxyCODONE 10 mg Oral Q3H PRN Jamin Hendrix MD   oxyCODONE 20 mg Oral Q3H PRN Jamin Hendrix MD   senna 1 tablet Oral Daily Shiela Can MD   tamsulosin 0 4 mg Oral Daily With Esther Rodriguez MD       Physical exam  /78 (BP Location: Right arm)   Pulse 88   Temp 98 3 °F (36 8 °C) (Oral)   Resp 18   Ht 5' 10" (1 778 m)   Wt 102 kg (225 lb)   SpO2 98%   BMI 32 28 kg/m²   Constitutional: Well formed, well-nourished, no respiratory distress  Eyes: PERRL, conjunctiva pale yellow, ++ icteric    HENT: Atraumatic, external ears normal, nose normal,    Neck: Good range of motion, no adenopathy  Respiratory:  Scattered rhonchi bilaterally  Cardiovascular: Normal rate, normal rhythm, no murmurs, no gallops, no rubs    GI: Soft, slightly distended, +tender in right upper quadrant, no guarding, no rigidity or rebound, +bowel sounds, percutaneous stent in place  : No costovertebral angle tenderness, normal reproductive organs, no discharge  Musculoskeletal: No pain or tenderness with palpation of joints, muscles or bones  Integument:  +jaundice, warm, moist, scattered purpura  Lymphatic: No adenopathy in the neck, supra-clavicular region, axilla and groin bilaterally  Extremities:  1+ bilateral lower extremity edema, no cords, pulses are 1+  Neurologic: Alert & oriented x 3, CN 2-12 normal, normal motor function, normal sensory function, no focal deficits noted  Psychiatric:  Pleasant, responsive, appropriate  Rectal: Deferred    Laboratory test results    Results for Yelitza Monterroso (MRN [id number]) as of 2/22/2020 13:22   Ref   Range 2/22/2020 05:06   WBC Latest Ref Range: 4 31 - 10 16 Thousand/uL 5 99   Red Blood Cell Count Latest Ref Range: 3 88 - 5 62 Million/uL 3 10 (L)   Hemoglobin Latest Ref Range: 12 0 - 17 0 g/dL 10 1 (L)   HCT Latest Ref Range: 36 5 - 49 3 % 30 4 (L)   MCV Latest Ref Range: 82 - 98 fL 98   MCH Latest Ref Range: 26 8 - 34 3 pg 32 6   MCHC Latest Ref Range: 31 4 - 37 4 g/dL 33 2   RDW Latest Ref Range: 11 6 - 15 1 % 18 5 (H)   Platelet Count Latest Ref Range: 149 - 390 Thousands/uL 139 (L)

## 2020-02-22 NOTE — ASSESSMENT & PLAN NOTE
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

## 2020-02-22 NOTE — PLAN OF CARE
Problem: Potential for Falls  Goal: Patient will remain free of falls  Description  INTERVENTIONS:  - Assess patient frequently for physical needs  -  Identify cognitive and physical deficits and behaviors that affect risk of falls  -  [place] fall precautions as indicated by assessment   - Educate patient/family on patient safety including physical limitations  - Instruct patient to call for assistance with activity based on assessment  - Modify environment to reduce risk of injury  - Consider OT/PT consult to assist with strengthening/mobility  Outcome: Progressing     Problem: Nutrition/Hydration-ADULT  Goal: Nutrient/Hydration intake appropriate for improving, restoring or maintaining nutritional needs  Description  Monitor and assess patient's nutrition/hydration status for malnutrition  Collaborate with interdisciplinary team and initiate plan and interventions as ordered  Monitor patient's weight and dietary intake as ordered or per policy  Utilize nutrition screening tool and intervene as necessary  Determine patient's food preferences and provide high-protein, high-caloric foods as appropriate       INTERVENTIONS:  - Monitor oral intake, urinary output, labs, and treatment plans  - Assess nutrition and hydration status and recommend course of action  - Evaluate amount of meals eaten  - Assist patient with eating if necessary   - Allow adequate time for meals  - Recommend/ encourage appropriate diets, oral nutritional supplements, and vitamin/mineral supplements  - Order, calculate, and assess calorie counts as needed  - Recommend, monitor, and adjust tube feedings and TPN/PPN based on assessed needs  - Assess need for intravenous fluids  - Provide specific nutrition/hydration education as appropriate  - Include patient/family/caregiver in decisions related to nutrition  Outcome: Progressing     Problem: METABOLIC, FLUID AND ELECTROLYTES - ADULT  Goal: Electrolytes maintained within normal limits  Description  INTERVENTIONS:  - Monitor labs and assess patient for signs and symptoms of electrolyte imbalances  - Administer electrolyte replacement as ordered  - Monitor response to electrolyte replacements, including repeat lab results as appropriate  - Instruct patient on fluid and nutrition as appropriate  Outcome: Progressing  Goal: Fluid balance maintained  Description  INTERVENTIONS:  - Monitor labs   - Monitor I/O and WT  - Instruct patient on fluid and nutrition as appropriate  - Assess for signs & symptoms of volume excess or deficit  Outcome: Progressing  Goal: Glucose maintained within target range  Description  INTERVENTIONS:  - Monitor Blood Glucose as ordered  - Assess for signs and symptoms of hyperglycemia and hypoglycemia  - Administer ordered medications to maintain glucose within target range  - Assess nutritional intake and initiate nutrition service referral as needed  Outcome: Progressing     Problem: SKIN/TISSUE INTEGRITY - ADULT  Goal: Skin integrity remains intact  Description  INTERVENTIONS  - Identify patients at risk for skin breakdown  - Assess and monitor skin integrity  - Assess and monitor nutrition and hydration status  - Monitor labs (i e  albumin)  - Assess for incontinence   - Turn and reposition patient  - Assist with mobility/ambulation  - Relieve pressure over bony prominences  - Avoid friction and shearing  - Provide appropriate hygiene as needed including keeping skin clean and dry  - Evaluate need for skin moisturizer/barrier cream  - Collaborate with interdisciplinary team (i e  Nutrition, Rehabilitation, etc )   - Patient/family teaching  Outcome: Progressing  Goal: Incision(s), wounds(s) or drain site(s) healing without S/S of infection  Description  INTERVENTIONS  - Assess and document risk factors for skin impairment   - Assess and document dressing, incision, wound bed, drain sites and surrounding tissue  - Consider nutrition services referral as needed  - Oral mucous membranes remain intact  - Provide patient/ family education  Outcome: Progressing  Goal: Oral mucous membranes remain intact  Description  INTERVENTIONS  - Assess oral mucosa and hygiene practices  - Implement preventative oral hygiene regimen  - Implement oral medicated treatments as ordered  - Initiate Nutrition services referral as needed  Outcome: Progressing

## 2020-02-22 NOTE — ASSESSMENT & PLAN NOTE
· Came to ED with elevated bilirubin, 23 07  Increasing jaundice over past 2 weeks  · Increasing fatigue  No fevers, chills, N/V, abdominal pain  Reports loose, light stools and dark urine  · Biliary stent in place from [facility] 2014  · Admitted last month with obstructive jaundice, ERCP/external drain placement unsuccessful  Hyperbilirubinemia resolved at that time  · CT abdomen/pelvis: stable position of common bile duct stent  Stent patency not assessed  No significant change in intrahepatic biliary dilatation  Stable hepatic metastases, one of which may invade the gallbladder  Stable mild to moderate ascites  Increasing size of visualized pulmonary metastases at the lung bases  · s/p inferior PTC tube placement (02/20/2020) per IR  LFTs slowly trending down  Pt asymptomatic  Yellowish fluid in drain  Cont to flush regularly per IR  Pt will need VNA on discharge  OP FU with IR in 6 weeks for tube change

## 2020-02-23 NOTE — TREATMENT PLAN
2/23/2020 12:27 PM -  Discussed case with inpt pharmacist today, and we are able to compound and supply topical ketamine for pt's trigeminal neuralgia  This will be sent up by end of day today  Matt Hobbs MD  Palliative and Supportive Care  Clinic/Answering Service: [phone]  You can find me on TigerConnect!

## 2020-02-23 NOTE — PROGRESS NOTES
Progress Note Lisa August 1945, 66 y o  male MRN: [id number]    Unit/Bed#: -01 Encounter: [id number]    Primary Care Provider: Jaret Jerry DO   Date and time admitted to hospital: 2/19/2020  8:26 PM        * Obstructive jaundice due to cancer [facility])  Assessment & Plan  · Came to ED with elevated bilirubin, 23 07  Increasing jaundice over past 2 weeks  · Increasing fatigue  No fevers, chills, N/V, abdominal pain  Reports loose, light stools and dark urine  · Biliary stent in place from [facility] 2014  · Admitted last month with obstructive jaundice, ERCP/external drain placement unsuccessful  Hyperbilirubinemia resolved at that time  · CT abdomen/pelvis: stable position of common bile duct stent  Stent patency not assessed  No significant change in intrahepatic biliary dilatation  Stable hepatic metastases, one of which may invade the gallbladder  Stable mild to moderate ascites  Increasing size of visualized pulmonary metastases at the lung bases  · s/p inferior PTC tube placement (02/20/2020) per IR  LFTs slowly trending down  Pt asymptomatic  Yellowish fluid in drain  Cont to flush regularly per IR  Pt will need VNA on discharge  OP FU with IR in 6 weeks for tube change  Trigeminal neuralgia  Assessment & Plan  · Chronic  Patient complaining of persistent pain which is bothering him  · Continue Gabapentin and Baclofen  Increased gabapentin to 600 mg at bedtime  · Patient reports that his pain is a little better but still complains of pain  · Will consult neurology  · Per patient family, palliative Care was supposed to order special cream   Discussed with Dr Maxi Witt  Is working with pharmacy to get it prepared as it is a special compound  Essential hypertension  Assessment & Plan  · Stable on Metoprolol  Colon cancer metastasized to multiple sites [facility])  Assessment & Plan  · 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, abdominal wall, lungs   S/P sigmoid colectomy, radiation, chemotherapy  · CT AP: 1   Stable position of common bile duct stent  Stent patency not assessed  No significant change in intrahepatic biliary dilatation  2  Stable hepatic metastases, one of which may invade the gallbladder  3  Stable mild to moderate ascites  4  Increasing size of visualized pulmonary metastases at the lung bases  · NPO and IVF overnight  · Continue oxycodone and morphine for pain PRN  Well controlled  · Appreciate oncology input  Recommend outpatient follow-up  Atrial fibrillation ([address] )  Assessment & Plan  · Rate controlled, continue Metoprolol daily  · Not on anticoagulation  Managed by PCP with referral to cardiology  VTE Pharmacologic Prophylaxis:   Pharmacologic: Enoxaparin (Lovenox) ordered but pt not getting it  Mechanical VTE Prophylaxis in Place: Yes     Patient Centered Rounds: I have performed bedside rounds with nursing staff today      Discussions with Specialists or Other Care Team Provider: palliative care     Education and Discussions with Family / Patient: pt, wife and daughter     Time Spent for Care: 30 minutes  More than 50% of total time spent on counseling and coordination of care as described above      Current Length of Stay: 3 day(s)     Current Patient Status: Inpatient   Certification Statement: The patient will continue to require additional inpatient hospital stay due to above     Discharge Plan: possibly in next 1-2 days if LFTs stable and headache improves     Code Status: Level 1 - Full Code    Subjective:   Pt seen and examined by me this morning  Pt complained of pain in the right side of his face  Felt a little better after gabapentin 600 mg last night but again the pain recurred and had to take oxycodone  Has his usual abdominal pain      Objective:     Vitals:   Temp (24hrs), Av °F (36 7 °C), Min:97 5 °F (36 4 °C), Max:98 3 °F (36 8 °C)    Temp:  [97 5 °F (36 4 °C)-98 3 °F (36 8 °C)] 97 5 °F (36 4 °C)  HR:  Avanell Rim 54  Resp:  [18] 18  BP: (102-127)/(62-75) 106/75  SpO2:  [98 %-99 %] 98 %  Body mass index is 32 28 kg/m²  Input and Output Summary (last 24 hours): Intake/Output Summary (Last 24 hours) at 2/23/2020 1147  Last data filed at 2/23/2020 0900  Gross per 24 hour   Intake 658 ml   Output 825 ml   Net -167 ml       Physical Exam:     Physical Exam    Constitutional: Pt appears comfortable  Not in any acute distress  HENT:   Head: Normocephalic and atraumatic  Eyes: EOM are normal    Neck: Neck supple  Cardiovascular: Normal rate, regular rhythm, normal heart sounds  No murmur heard  Pulmonary/Chest: Effort normal, air entry b/l equal  No respiratory distress  Pt has no wheezes or crackles  Abdominal: Soft  Non-distended, Non-tender  Bowel sounds are normal  RUQ drain + with green fluid  Right lower chest wall wound dressed  Neurological: alert and oriented to person, place, and time  Moving all extremities spontaneously  Psychiatric: normal mood and affect  Additional Data:     Labs:    Results from last 7 days   Lab Units 02/23/20  0620 02/22/20  0506   WBC Thousand/uL 6 39 5 99   HEMOGLOBIN g/dL 9 9* 10 1*   HEMATOCRIT % 29 8* 30 4*   PLATELETS Thousands/uL 131* 139*   NEUTROS PCT %  --  74   LYMPHS PCT %  --  12*   MONOS PCT %  --  13*   EOS PCT %  --  0     Results from last 7 days   Lab Units 02/23/20  0620   SODIUM mmol/L 135*   POTASSIUM mmol/L 3 9   CHLORIDE mmol/L 104   CO2 mmol/L 25   BUN mg/dL 18   CREATININE mg/dL 0 66   ANION GAP mmol/L 6   CALCIUM mg/dL 8 0*   ALBUMIN g/dL 1 7*   TOTAL BILIRUBIN mg/dL 12 59*   ALK PHOS U/L 823*   ALT U/L 117*   AST U/L 85*   GLUCOSE RANDOM mg/dL 100     Results from last 7 days   Lab Units 02/21/20  0536   INR  1 23*                       * I Have Reviewed All Lab Data Listed Above  * Additional Pertinent Lab Tests Reviewed:  All Labs For Current Hospital Admission Reviewed    Imaging:    Imaging Reports Reviewed Today Include:   Imaging Personally Reviewed by Myself Includes:     Recent Cultures (last 7 days):     Results from last 7 days   Lab Units 02/20/20  1433   GRAM STAIN RESULT  1+ Polys  No bacteria seen   BODY FLUID CULTURE, STERILE  No growth       Last 24 Hours Medication List:     Current Facility-Administered Medications:  baclofen 15 mg Oral TID Martha Berumen MD   bisacodyl 10 mg Rectal Daily PRN Wyvonne Shone, MD   docusate sodium 100 mg Oral BID Martha Berumen MD   enoxaparin 40 mg Subcutaneous Daily Martha Berumen MD   gabapentin 300 mg Oral BID Wyvonne Shone, MD   gabapentin 600 mg Oral HS Korina Daniels PA-C   ketamine 2% in [employer] 1 application Topical TID PRN Arabella Arceo MD   metoprolol succinate 25 mg Oral Daily Martha Berumen MD   oxyCODONE 10 mg Oral Q3H PRN Arabella Arceo MD   oxyCODONE 20 mg Oral Q3H PRN Arabella Arceo MD   polyethylene glycol 17 g Oral Daily Wyvonne Shone, MD   senna 1 tablet Oral Daily Martha Berumen MD   tamsulosin 0 4 mg Oral Daily With Clifton Singh MD        Today, Patient Was Seen By: Wyvonne Shone, MD    ** Please Note: Dictation voice to text software may have been used in the creation of this document   **

## 2020-02-23 NOTE — PROGRESS NOTES
Progress note - Palliative and Supportive Care   Florin Gama 66 y o  male [id number]    Patient Active Problem List   Diagnosis    BPH (benign prostatic hyperplasia)    Atrial fibrillation (HCC)    Colon [facility]as[facility] to [facility] sites [facility])    Essential hypertension    Painless jaundice secondary to parenchymal infiltration of tumor along with biliary compression     Trigeminal neuralgia    Chronic hyponatremia    Low hemoglobin    Palliative care patient    [facility]    Obstructive jaundice due to [facility] [facility]    Lower extremity edema    Cancer associated pain    Pulmonary metastases (HCC)    Malignant neoplasm of abdominal wall    History of colon cancer    Synovial cyst of lumbar spine    Transaminitis      Plan:  1  Symptom management - trigeminal neuralgia predominates this fellow's symptom profile   - will trial ketamine topical, and will need time to order this from pharmacy    2  Goals - full cares, no limits   - Pt is competent on my inteview today  Code Status: FULL - Level 1   Decisional apparatus:  Patient is competent on my exam today  If competence is lost, patient's substitute decision maker would default to spouse by [place]  Advance Directive / Living Will / POLST:  None reviewed    mAy Dill MD  Palliative and Supportive Care  Clinic/Answering Service: [phone]  You can find me on [employer]Connect! Interval history:       Discussed goals and plans with pt, and he is in agreement with hospital's use of perc bili drain, and hopes to have this capped and/or removed soon  Otherwise, should he still need this after discharge, he is hopeful to have home RNs come to teach and support drain management  Moreover, his trigeminal neuralgia symptoms are more challenging, and he hopes to have these addressed differently    We discussed topical ketamine, as he has not had good or helpful reactions to other substances for nerve pain thus far  MEDICATIONS / ALLERGIES:     all current active meds have been reviewed    Allergies   Allergen Reactions    Ib Pro [Ibuprofen] Hives and Itching    Adhesive [Medical Tape] Rash     Use only paper tape       OBJECTIVE:    Physical Exam  Physical Exam   Constitutional: He is oriented to person, place, and time  He appears well-developed and well-nourished  No distress  HENT:   Head: Normocephalic and atraumatic  Right Ear: External ear normal    Left Ear: External ear normal    Eyes: Pupils are equal, round, and reactive to light  Conjunctivae and EOM are normal  Right eye exhibits no discharge  Left eye exhibits no discharge  Scleral icterus is present  Neck: No tracheal deviation present  Cardiovascular: Normal rate and regular rhythm  Pulmonary/Chest: Effort normal  No stridor  No respiratory distress  Abdominal: Soft  He exhibits no distension  Musculoskeletal: He exhibits no deformity  Neurological: He is alert and oriented to person, place, and time  No cranial nerve deficit  Skin: No rash noted  He is not diaphoretic  No erythema  Jaundiced to feet     Psychiatric: Judgment and thought content normal        Lab Results: I have personally reviewed pertinent labs  Imaging Studies: none pertinent  EKG, Pathology, and Other Studies: none pertinent    Counseling / Coordination of Care  Total floor / unit time spent today 25+ minutes  Greater than 50% of total time was spent with the patient and / or family counseling and / or coordination of care   A description of the counseling / coordination of care: chart review; symptom pursuit

## 2020-02-23 NOTE — PLAN OF CARE
Problem: Potential for Falls  Goal: Patient will remain free of falls  Description  INTERVENTIONS:  - Assess patient frequently for physical needs  -  Identify cognitive and physical deficits and behaviors that affect risk of falls  -  [place] fall precautions as indicated by assessment   - Educate patient/family on patient safety including physical limitations  - Instruct patient to call for assistance with activity based on assessment  - Modify environment to reduce risk of injury  - Consider OT/PT consult to assist with strengthening/mobility  Outcome: Progressing     Problem: Nutrition/Hydration-ADULT  Goal: Nutrient/Hydration intake appropriate for improving, restoring or maintaining nutritional needs  Description  Monitor and assess patient's nutrition/hydration status for malnutrition  Collaborate with interdisciplinary team and initiate plan and interventions as ordered  Monitor patient's weight and dietary intake as ordered or per policy  Utilize nutrition screening tool and intervene as necessary  Determine patient's food preferences and provide high-protein, high-caloric foods as appropriate       INTERVENTIONS:  - Monitor oral intake, urinary output, labs, and treatment plans  - Assess nutrition and hydration status and recommend course of action  - Evaluate amount of meals eaten  - Assist patient with eating if necessary   - Allow adequate time for meals  - Recommend/ encourage appropriate diets, oral nutritional supplements, and vitamin/mineral supplements  - Order, calculate, and assess calorie counts as needed  - Recommend, monitor, and adjust tube feedings and TPN/PPN based on assessed needs  - Assess need for intravenous fluids  - Provide specific nutrition/hydration education as appropriate  - Include patient/family/caregiver in decisions related to nutrition  Outcome: Progressing     Problem: METABOLIC, FLUID AND ELECTROLYTES - ADULT  Goal: Electrolytes maintained within normal limits  Description  INTERVENTIONS:  - Monitor labs and assess patient for signs and symptoms of electrolyte imbalances  - Administer electrolyte replacement as ordered  - Monitor response to electrolyte replacements, including repeat lab results as appropriate  - Instruct patient on fluid and nutrition as appropriate  Outcome: Progressing  Goal: Fluid balance maintained  Description  INTERVENTIONS:  - Monitor labs   - Monitor I/O and WT  - Instruct patient on fluid and nutrition as appropriate  - Assess for signs & symptoms of volume excess or deficit  Outcome: Progressing  Goal: Glucose maintained within target range  Description  INTERVENTIONS:  - Monitor Blood Glucose as ordered  - Assess for signs and symptoms of hyperglycemia and hypoglycemia  - Administer ordered medications to maintain glucose within target range  - Assess nutritional intake and initiate nutrition service referral as needed  Outcome: Progressing     Problem: SKIN/TISSUE INTEGRITY - ADULT  Goal: Skin integrity remains intact  Description  INTERVENTIONS  - Identify patients at risk for skin breakdown  - Assess and monitor skin integrity  - Assess and monitor nutrition and hydration status  - Monitor labs (i e  albumin)  - Assess for incontinence   - Turn and reposition patient  - Assist with mobility/ambulation  - Relieve pressure over bony prominences  - Avoid friction and shearing  - Provide appropriate hygiene as needed including keeping skin clean and dry  - Evaluate need for skin moisturizer/barrier cream  - Collaborate with interdisciplinary team (i e  Nutrition, Rehabilitation, etc )   - Patient/family teaching  Outcome: Progressing  Goal: Incision(s), wounds(s) or drain site(s) healing without S/S of infection  Description  INTERVENTIONS  - Assess and document risk factors for skin impairment   - Assess and document dressing, incision, wound bed, drain sites and surrounding tissue  - Consider nutrition services referral as needed  - Oral mucous membranes remain intact  - Provide patient/ family education  Outcome: Progressing  Goal: Oral mucous membranes remain intact  Description  INTERVENTIONS  - Assess oral mucosa and hygiene practices  - Implement preventative oral hygiene regimen  - Implement oral medicated treatments as ordered  - Initiate Nutrition services referral as needed  Outcome: Progressing

## 2020-02-23 NOTE — ASSESSMENT & PLAN NOTE
· Came to ED with elevated bilirubin, 23 07  Increasing jaundice over past 2 weeks  · Increasing fatigue  No fevers, chills, N/V, abdominal pain  Reports loose, light stools and dark urine  · Biliary stent in place from [facility] 2014  · Admitted last month with obstructive jaundice, ERCP/external drain placement unsuccessful  Hyperbilirubinemia resolved at that time  · CT abdomen/pelvis: stable position of common bile duct stent  Stent patency not assessed  No significant change in intrahepatic biliary dilatation  Stable hepatic metastases, one of which may invade the gallbladder  Stable mild to moderate ascites  Increasing size of visualized pulmonary metastases at the lung bases  · s/p inferior PTC tube placement (02/20/2020) per IR  LFTs slowly trending down  Pt asymptomatic  Yellowish fluid in drain  Cont to flush regularly per IR  Pt will need VNA on discharge  OP FU with IR in 6 weeks for tube change

## 2020-02-23 NOTE — ASSESSMENT & PLAN NOTE
· Chronic  Patient complaining of persistent pain which is bothering him  · Continue Gabapentin and Baclofen  Increased gabapentin to 600 mg at bedtime  · Patient reports that his pain is a little better but still complains of pain  · Will consult neurology  · Per patient family, palliative Care was supposed to order special cream   Discussed with Dr Nae Miguel  Is working with pharmacy to get it prepared as it is a special compound

## 2020-02-23 NOTE — CONSULTS
Consultation - Neurology   Dede Luna 66 y o  male MRN: [id number]  Unit/Bed#: -01 Encounter: [id number]    Assessment/Plan   64-year-old male with past medical history as listed below, the patient does have a history of metastatic colon cancer, trigeminal neuralgia, hypertension and AFib not on anticoagulation  He presents to the hospital with increased jaundice generalized fatigue, it was noted that his outpatient lab work revealed bilirubin elevation, it was recommended he come to the hospital for further evaluation  He is being followed by IM and oncology this admission  Neurology was asked to see the patient in regards to his trigeminal neuralgia, he has had trigeminal neuralgia since 2008, he was on carbamazepine with good control however,  this had to be discontinued secondary to worsening liver function, he was started back on Neurontin, however, continues to have pain waxes and wanes, he was seen by Neurosurgery whom recommended MRI of brain with and with out contrast as an outpatient, and to be seen for possible rhizotomy, this admission he has been complaining of continued Trigeminal Neuralgia pain, this will wax and wane  Trigeminal Neuralgia    -  Continue supportive care, medicine team following closely, as well as oncology  -  MRI of brain with and with out contrast was ordered to be completed as an out patient by neurosurgery (no need at this time for inpatient MRI imaging), continued follow up as an out patient with neurosurgery team   -  Unable to take - Carbamezapine, would avoid dilantin, at this time best option would be to optimize Neurontin, he is also on Baclofen, recent increase in Neurontin from 300 mg TID, to 300 mg bid, 600 mg HS  Continue to measure response and further titration consideration, he would like to go slow on his medication titration  Can consider Lyrica if has side effects from Neurontin titration      Palliative care is also following   -  Neurology will continue to follow and advise    --- reviewed with attending, as well as Internal Medicine  Recommend Neurontin at 300 mg, 400 mg, and 600 mg dosing throughout the day, can slowly further titrate to see response, consider Lyrica if no response to Neurontin,  No need for mri of brain inpatient, out patient follow up with neurology and neurosurgery clinic  History of Present Illness     Reason for Consult / Principal Problem:  Trigeminal neuralgia    HPI: Jose Willingham is a 66 y o   male with past medical history significant for metastatic colon cancer, trigeminal neuralgia, hypertension, AFib, and lower extremity edema, patient presented to the hospital with abnormal laboratory work per encounters  It was reported over the past 2 weeks he has been noticing increased jaundice, generalized fatigue, loose stool with dark urine  It was reported that his outpatient lab work revealed bilirubin elevation and recommended he come to [facility] by oncology  The patient is being followed by Internal Medicine for obstructive jaundice due to cancer - he did have a CT abdomen pelvis - which revealed stable common bile duct stent, no significant change in intrahepatic biliary dilatation, stable hepatic metastasis, 1 of which may invade the gallbladder, stable mild to moderate ascites increasing size of visualized pulmonary metastasis at the lung bases  The patient is being followed by medicine in regards to these findings  The patient does have a 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, abdominal wall, lungs - status post sigmoid colectomy radiation chemotherapy, the patient is on pain medications  He does have a hx of afib and not on anticoagulation at this time, which medicine is aware      Per record review the patient does have a history of trigeminal neuralgia for 10 years, per previous record review the patient's trigeminal neuralgia was under control with tegretol/carbamazepine - but this had to be discontinued secondary to adverse effects of worsening liver function and hepatic failure, the patient was placed on Neurontin 100 mg bid  It was reported in DC summary on 02/02/2020 - with history of trigeminal neuralgia - carbamazepine was recently discontinued, the patient was placed on Baclofen and Neurontin, with improvement  He was reported to be on Baclofen 15 mg TID, Neurontin 300 mg TID at that time  The patient was seen by Neurosurgery on 02/18/2020, prior to admission to the hospital, in the office  Is reported the patient has been having facial pain trigeminal around since 2008, if follows V2 distribution, he is currently on baclofen and gabapentin, but he was complaining of become tolerant to this medication, they recommended MRI with and without to rule out additional causes symptoms including further metastasis, consideration of Rhizotomy  As well as follow up with neurology  The patient reports that he has a history of trigeminal neuralgia since 2008/2009, he has been seen by Neurology in the past   He reports that starts in the right upper face around his eyes down his face into his cheek region joint region, electrical shock sensation, can last seconds to minutes, he reports certain activities will bring this on eating brushing his teeth or touching that area  She reports this was fairly controlled with carbamazepine however this had to be stopped, he reports he continued to have pain and was started on Neurontin and baclofen, which he gets some relief but persists, he believes it is worse than before  He reports he did see neurosurgery and he is scheduled for an MRI and possible procedure  He reports he was taking Neurontin 300 mg t i d   At home, this was increased to 300 twice a day and then 600 at night, but he started this in the hospital      Inpatient consult to Neurology  Consult performed by: Rafal Gomez PA-C  Consult ordered by: Carmen Garza MD        Review of Systems   Constitutional: Positive for fatigue  HENT: Negative  Eyes: Negative  Respiratory: Negative  Negative for chest tightness  Cardiovascular: Positive for chest pain  Gastrointestinal: Positive for abdominal pain  Musculoskeletal: Positive for gait problem  Skin: Positive for color change and wound  Neurological: Positive for headaches  Negative for dizziness, tremors, seizures, syncope, facial asymmetry, speech difficulty, weakness, light-headedness and numbness  Generalized weakness and fatigue, no lateralizing weakness or sensory loss  He does have sharp electric shock pain at times, right facial region  Psychiatric/Behavioral: Negative  Historical Information   Past Medical History:   Diagnosis Date    Atrial fibrillation ([place] [id number] )     BPH (benign prostatic hyperplasia)     Hypertension     Metastatic colon cancer to liver ([place] [id number] )     colon- johnny liver & lungs    Stroke ([place] [address] )     - mild weakness left hand/arm    Trigeminal neuralgia     Tumor, metastatic ([place] [address] )      Past Surgical History:   Procedure Laterality Date    COLON SURGERY          EYE SURGERY      IR TUBE PLACEMENT BILI  1/15/2020    IR TUBE PLACEMENT BILI  2020    LIVER SURGERY      TONSILLECTOMY       Social History   Social History     Substance and Sexual Activity   Alcohol Use Never    Frequency: Never     Social History     Substance and Sexual Activity   Drug Use Never     Social History     Tobacco Use   Smoking Status Former Smoker    Types: Cigarettes    Last attempt to quit: Piyush Deutsch Years since quittin 1   Smokeless Tobacco Never Used     Family History:   Family History   Problem Relation Age of Onset    Cancer Father     Colon cancer Father     Cancer Brother     Cancer Paternal Grandfather      Review of previous medical records was completd, as per HPI      Meds/Allergies   all current active meds have been reviewed    Allergies   Allergen Reactions    Ib Pro [Ibuprofen] Hives and Itching    Adhesive [Medical Tape] Rash     Use only paper tape       Objective   Vitals:Blood pressure 106/75, pulse (!) 54, temperature 97 5 °F (36 4 °C), temperature source Oral, resp  rate 18, height 5' 10" (1 778 m), weight 102 kg (225 lb), SpO2 98 %  ,Body mass index is 32 28 kg/m²  Intake/Output Summary (Last 24 hours) at 2/23/2020 1011  Last data filed at 2/23/2020 0900  Gross per 24 hour   Intake 658 ml   Output 825 ml   Net -167 ml     Invasive Devices: Invasive Devices     Central Venous Catheter Line            Port A Cath Right Chest -- days          Peripheral Intravenous Line            Peripheral IV 02/23/20 Right Forearm less than 1 day          Drain            Biliary Tube 8 5 Fr  RUQ 2 days              Physical Exam  Neurologic Exam      Constitutional - in NAD  HEENT - NC/AT  Cardiac - rate regular  Lungs - Clear to auscultation bilaterally, no respiratory distress noted  Abdomen - RUQ drain noted  Extremities - No edema noted    Neurological    Mental status - the patient is awake alert oriented x 3, with no evidence of aphasia or dysarthria, patient is able to follow simple commands, is able to follow complex commands  No paraphasic errors noted  He is able to provide hx read and repeat, he is able to recognize objects, spell world forwards and backwards, he is able to perform simple calculations  Cranial nerves 2 through 12 are intact, mild facial asymmetry with no lulu weakness  Motor - 5/5 upper extremities and lower extremities, without drift, normal tone and bulk    No tremor or fixed deficit noted    Rapid movements are equal bilaterally    Sensation - nonfocal to touch, temperature, he did have decreased vibratory sense noted in the lowers bilaterally       Coordination -  no ataxia noted on finger-to-nose     Reflexes are equal, 1 through out    Toes are downgoing bilaterally    No evidence of clonus or myoclonus or tremor  Lab Results:   I have personally reviewed pertinent reports  , CBC:   Results from last 7 days   Lab Units 02/23/20  0620 02/22/20  0506 02/21/20  0536   WBC Thousand/uL 6 39 5 99 5 27   RBC Million/uL 3 02* 3 10* 3 32*   HEMOGLOBIN g/dL 9 9* 10 1* 10 9*   HEMATOCRIT % 29 8* 30 4* 32 8*   MCV fL 99* 98 99*   PLATELETS Thousands/uL 131* 139* 136*   , BMP/CMP:   Results from last 7 days   Lab Units 02/23/20  0620 02/22/20  0506 02/21/20  0536   SODIUM mmol/L 135* 133* 137   POTASSIUM mmol/L 3 9 3 7 4 8   CHLORIDE mmol/L 104 102 107   CO2 mmol/L 25 24 22   BUN mg/dL 18 21 17   CREATININE mg/dL 0 66 0 80 0 82   CALCIUM mg/dL 8 0* 8 2* 8 1*   AST U/L 85* 126* 220*   ALT U/L 117* 157* 188*   ALK PHOS U/L 823* 987* 1,038*   EGFR ml/min/1 73sq m 94 87 86   , Vitamin B12:   , HgBA1C:   , TSH:   , Coagulation:   Results from last 7 days   Lab Units 02/21/20  0536   INR  1 23*   , Lipid Profile:   , Ammonia:   , Urinalysis:       Invalid input(s): URIBILINOGEN, Drug Screen:   , Medication Drug Levels:       Invalid input(s): CARBAMAZEPINE,  PHENOBARB, LACOSAMIDE, OXCARBAZEPINE     Procedure: Ir Tube Placement Bili    Result Date: 2/20/2020  Narrative: Percutaneous cholangiogram Biliary stent clean out Placement of an internal/external biliary drainage catheter IR paracentesis Indication: colorectal cancer metastatic to liver  Elevated bilirubin despite placement of a biliary stent (Wallstent) at outside hospital   This was addressed during ERCP on January 10 with improvement in bilirubin  Bilirubin trough around 3, it is now elevated to 23  Palliative decompression is indicated Fluoroscopy time: 9 7 minutes Images: 334 Comparison: Cholangiogram January 15, 2020 and ERCP January 10, 2020, CT February 19, 2020 Anesthesia: General Antibiotics: Rocephin Contrast: 17 mL Omnipaque Procedure: General anesthesia was initiated and the patient positioned 20 degrees right side up   Initially, paracentesis was performed to reduce risk of leakage  Ultrasound used to localize the left lower quadrant ascites  The overlying skin was prepped and draped in usual sterile fashion and local anesthesia was obtained with the 1% lidocaine solution  A 4 Western Ava Yueh needle was advanced until fluid was aspirated  Approximately 2200 cc' s of deep yellow fluid was aspirated  The catheter was maintained throughout the case and removed after biliary intervention  Attention was now turned to the biliary system  Ultrasound survey was performed  The right flank was prepped and draped in sterile fashion  1% lidocaine was used for local anesthetic  A peripheral right hepatic duct was targeted with ultrasound  Using the needle from a Greb set this duct was accessed 1 attempt  Cholangiogram was performed via injection  Using the included wire and inner stiffener access was gained centrally near the stent  The system was reassembled and over a wire used to cross through the stent  Contrast injection confirmed position in the distal common duct  An Amplatz wire was now placed into the small bowel and over this a 6-Swiss sheath placed into the right hepatic duct  Using a curved Beltran wire and 4-Swiss glide catheter alongside the existing Amplatz wire the stent was now crossed using the J to confirm that we were not through any stent interstices  Again contrast injection confirmed position in the bowel  The Amplatz wire was inserted through this catheter and the catheter removed  A [place] balloon was now inflated and used to disrupt and push material within the stent into the small bowel  Repeat injections were performed through the sheath demonstrating increase in stent patency  [place] sweep was performed again  The sheath was removed and over a wire an 8 Western Ava biliary catheter was inserted  4 cm of additional sideholes were cut into the catheter  Final injection was performed and delayed images were obtained revealing prompt drainage   Findings: Severe dilation of the biliary system  Effectively completely occluded stent, no contrast passage identified spontaneously  [place] of biliary ducts with the left and right segment at the stent origin  Slightly compressed and deformed cranial margin of the stent  Access to the common duct where there is prompt drainage into bowel  Multiple images with [place] sweeps of the stent with material being pushed into bowel  Of note the 2nd wire placement of a different course than the initial contrast passage but there is no evidence of extraluminal wire course, this may represent an alternate channel related to prior sphincterotomy  Placement of internal/external biliary drain with distal loop in the small bowel and proximal marker in the proximal stent  There are additional sideholes in the right hepatic duct  Ultrasound and cholangiogram images with severe biliary dilation 2 2 L of ascites removed, small to moderate volume ascites Specimens: No biliary culture given no suspicion for infection  Paracentesis with routine labs and cytology  Impression: Impression: Percutaneous cholangiogram with severe biliary dilation and occluded biliary stent  Successful access through the occluded stent and clean out with balloon sweeps  Placement of an 8 Western Ava internal/external biliary drainage catheter with 4 cm of extra sideholes  Plan: Monitor bilirubin levels Patient will require flushing this catheter every day and routine exchange in 6 weeks initially  Workstation performed: [id number]     No neurological imaging studies for review at this time      Code Status: Level 1 - Full Code

## 2020-02-24 NOTE — SOCIAL WORK
CM received a call from [address] [place], [place] [address]a Rd- [phone] informing that they can accept pt when dc  Timothy Quinones is requesting supplies be sent for wound care for pt when dc d/t it takes 2-3 days for their ordered supplies to be delivered

## 2020-02-24 NOTE — PROGRESS NOTES
Progress Note -Interventional Radiology JASSI Rosales 66 y o  male MRN: [id number]  Unit/Bed#: -01 Encounter: [id number]    Assessment:  66year old male hx of stage IV metastatic colon cancer, placement of common bile duct stent, jaundice presenting with elevated bilirubin, obstructed painless jaundice, weakness, status post internal/external biliary drain and balloon sweep 2/20/20    Biliary drain output 24 hours:  400cc recorded, additional 200cc light brown drainage in bag    Plan:    - bilirubin increased to 16 2 today from 12 6 yesterday  Will continue to monitor  Continue to get daily CMP while inpatient  - continue to flush biliary tube with 10cc NS daily  I was able to flush easily today but did have some blood return after flushing    - monitor drain output  Today drainage is light brown in color which is a significant change from bile on Friday  -pt to follow up with IR in 6 weeks for tube exchange    Patient Active Problem List   Diagnosis    BPH (benign prostatic hyperplasia)    [facility] )    [facility]    Essential hypertension    Painless jaundice secondary to parenchymal infiltration of tumor along with biliary compression     Trigeminal neuralgia    Chronic hyponatremia    Low hemoglobin    Palliative care patient    [facility]    Obstructive jaundice due to cancer [facility]    Lower extremity edema    Cancer associated pain    Pulmonary metastases (HCC)    Malignant neoplasm of abdominal wall    History of colon cancer    Synovial cyst of lumbar spine    Transaminitis    Malignant neoplasm of colon (HCC)          Subjective: Pt states he feels well  Slight abdominal discomfort  States he has not had a BM since admission  Biliary drain with 400cc recorded output last 24 hours  Bilirubin increase to 16 2 today from 12 6 yesterday       Objective:    Vitals:  /73   Pulse 94   Temp 98 4 °F (36 9 °C)   Resp (!) 11   Ht 5' 10" (1 778 m)   Wt 102 kg (225 lb)   SpO2 (!) 84%   BMI 32 28 kg/m²   Body mass index is 32 28 kg/m²  Weight (last 2 days)     None          I/Os:    Intake/Output Summary (Last 24 hours) at 2/24/2020 1143  Last data filed at 2/24/2020 7100  Gross per 24 hour   Intake 378 ml   Output 400 ml   Net -22 ml       Invasive Devices     Central Venous Catheter Line            Port A Cath Right Chest -- days          Peripheral Intravenous Line            Peripheral IV 02/23/20 Right Forearm 1 day          Drain            Biliary Tube 8 5 Fr  RUQ 3 days                Physical Exam:  General appearance: alert and oriented, in no acute distress  Lungs: clear to auscultation bilaterally  Heart: regular rate and rhythm, S1, S2 normal, no murmur, click, rub or gallop  Abdomen: soft, mild tenderness to palpation upper abdomen, biliary drain with light brown output in bag, RUQ skin mass covered with dressing     Skin: jaundice                Lab Results and Cultures:   CBC with diff:   Lab Results   Component Value Date    WBC 6 39 02/23/2020    HGB 9 9 (L) 02/23/2020    HCT 29 8 (L) 02/23/2020    MCV 99 (H) 02/23/2020     (L) 02/23/2020    MCH 32 8 02/23/2020    MCHC 33 2 02/23/2020    RDW 18 4 (H) 02/23/2020    MPV 10 9 02/23/2020    NRBC 0 02/22/2020      BMP/CMP:  Lab Results   Component Value Date    K 4 1 02/24/2020     02/24/2020    CO2 25 02/24/2020    BUN 14 02/24/2020    CREATININE 0 73 02/24/2020    CALCIUM 8 4 02/24/2020     (H) 02/24/2020     (H) 02/24/2020    ALKPHOS 859 (H) 02/24/2020    EGFR 90 02/24/2020   ,     Coags:   Lab Results   Component Value Date    PTT 29 02/19/2020    INR 1 23 (H) 02/21/2020   ,   Results from last 7 days   Lab Units 02/21/20  0536 02/19/20  2107   PTT seconds  --  29   INR  1 23* 1 22*        Lipid Panel: No results found for: CHOL  No results found for: HDL  No results found for: HDL  No results found for: [address]  No results found for: TRIG    HgbA1c: No results found for: HGBA1C    Blood Culture:   Lab Results   Component Value Date    BLOODCX No Growth After 5 Days  01/30/2020   ,   Urinalysis:   Lab Results   Component Value Date    COLORU Light Yellow 01/30/2020    CLARITYU Clear 01/30/2020    SPECGRAV <=1 005 01/30/2020    PHUR 6 5 01/30/2020    LEUKOCYTESUR Negative 01/30/2020    NITRITE Negative 01/30/2020    GLUCOSEU Negative 01/30/2020    KETONESU Negative 01/30/2020    BILIRUBINUR Negative 01/30/2020    BLOODU Negative 01/30/2020   ,   Urine Culture: No results found for: URINECX,   Wound Culure:  No results found for: WOUNDCULT    VTE Pharmacologic Prophylaxis: Enoxaparin (Lovenox)      Thank you for allowing me to participate in the care of [employer]  Please don't hesitate to call, text, email, or TigerText with any questions  This text is generated with voice recognition software  There may be translation, syntax,  or grammatical errors  If you have any questions, please contact the dictating provider      Ryne Moreno PA-C

## 2020-02-24 NOTE — ASSESSMENT & PLAN NOTE
· Chronic  Patient complaining of persistent pain which is bothering him  · Continue Gabapentin and Baclofen  Increased gabapentin to 300-400-600 from 300-300-300    · Ketamine cream ordered by Palliative care  · Pt reports his pain is well controlled today  · Appreciate Neurology input

## 2020-02-24 NOTE — ASSESSMENT & PLAN NOTE
· Came to ED with elevated bilirubin, 23 07  Increasing jaundice over past 2 weeks  · Increasing fatigue  No fevers, chills, N/V, abdominal pain  Reports loose, light stools and dark urine  · Biliary stent in place from [facility] 2014  · Admitted last month with obstructive jaundice, ERCP/external drain placement unsuccessful  Hyperbilirubinemia resolved at that time  · CT abdomen/pelvis: stable position of common bile duct stent  Stent patency not assessed  No significant change in intrahepatic biliary dilatation  Stable hepatic metastases, one of which may invade the gallbladder  Stable mild to moderate ascites  Increasing size of visualized pulmonary metastases at the lung bases  · s/p inferior PTC tube placement (02/20/2020) per IR  Pt asymptomatic  Brownish-Yellow fluid in drain today  Cont to flush regularly per IR  Pt will need VNA on discharge  OP FU with IR in 6 weeks for tube change    · LFTs trending up today - cont to monitor

## 2020-02-24 NOTE — PROGRESS NOTES
Progress Note - Surgical Oncology   Adwoa Boyer 66 y o  male MRN: [id number]  Unit/Bed#: -01 Encounter: [id number]  02/24/20  12:29 PM      Subjective/Objective   Chief Complaint   Patient presents with    Jaundice     per patient, "my bilirubin is very high and i am jaundice, last time this happend they tried to do an external drain, they told me to come in tonight "    Abnormal Lab       Subjective:  No complaints    Objective:  Still jaundiced    Blood pressure 109/73, pulse 94, temperature 98 4 °F (36 9 °C), resp  rate (!) 11, height 5' 10" (1 778 m), weight 102 kg (225 lb), SpO2 (!) 84 %  ,Body mass index is 32 28 kg/m²  Intake/Output Summary (Last 24 hours) at 2/24/2020 1229  Last data filed at 2/24/2020 4791  Gross per 24 hour   Intake 378 ml   Output 300 ml   Net 78 ml       Invasive Devices     Central Venous Catheter Line            Port A Cath Right Chest -- days          Peripheral Intravenous Line            Peripheral IV 02/23/20 Right Forearm 1 day          Drain            Biliary Tube 8 5 Fr  RUQ 3 days                Physical Exam:   Head and neck: is normocephalic  Neck is supple without adenopathy  Sclerae are icteric  Mucous membranes are moist  No JVD    Abdomen: Soft, nontender, nondistended, and with tumor growing out a his right upper quadrant abdominal wall  Extremities: Without cyanosis, clubbing, or edema, symmetric  Neuro: Grossly nonfocal  Skin is warm and anicteric  Psych: Patient is pleasant and talkative              Lab Results:  Lab Results   Component Value Date    WBC 6 39 02/23/2020    HGB 9 9 (L) 02/23/2020    HCT 29 8 (L) 02/23/2020    MCV 99 (H) 02/23/2020     (L) 02/23/2020     Lab Results   Component Value Date    CALCIUM 8 4 02/24/2020    K 4 1 02/24/2020    CO2 25 02/24/2020     02/24/2020    BUN 14 02/24/2020    CREATININE 0 73 02/24/2020     No results found for: AFP  Lab Results   Component Value Date    CALCIUM 8 4 02/24/2020     No results found for: CEA    Bilirubin is 16        Assessment:  57-year-old male with metastatic colon carcinoma and hyperbilirubinemia    Plan:  No surgical intervention to abdominal wall tumor has this would require large abdominal wall reconstruction, rib resection potentially liver resection  Continue to monitor hemoglobin  Drain management and further procedures as per IR  Chemotherapy when stable  Palliative care following  I will sign off  Please call if there are any questions or concerns      Siria Galvez MD  12:29 PM

## 2020-02-24 NOTE — SOCIAL WORK
CM reviewed pt with Dr Norberto Hawkins  Pt is not medically clear for dc d/t worsening lab results  CM will follow

## 2020-02-24 NOTE — PROGRESS NOTES
Progress Note - Neurology   Dede Luna 66 y o  male [id number]  Unit/Bed#: /-01    Assessment:  Dede Luna is a 66 y o  male with chronic trigeminal neuralgia, metastatic colon cancer, HTN, and afib not on AC who was seen in neurologic evaluation for reoccurrence of trigeminal neuralgia  Pain is improved with increase in gabapentin and use of topical ketamine  Plan:  - Gabapentin recently increased to 300 mg in the AM, 400 mg in the afternoon, and 600 mg QHS (was previously on 300 TID)  He does not wish to increase any further at this time  · If pain does not improve with gabapentin increase, can consider pregabalin  - Continue topical ketamine    - Obtain outpatient MRI brain per neurosurgery  No need for inpatient imaging    - Medical management and supportive care per primary team  Correction of any metabolic or infectious disturbances  - Continue to follow with outpatient neurology/neurosurgery  Subjective: The patient reports that his pain is improved since the onset of his flare up  He is tolerating the increased doses of gabapentin well  He also recently started topical ketamine cream per palliative care team   He knows that he is still having difficulty chewing on the right side of his mouth, but the external pain is improved  He reports that typically takes a while for medications to work for him, so he wants to wait and see if these doses of gabapentin take away his pain, rather than transitioning to a new medication        Past Medical History:   Diagnosis Date    Atrial fibrillation ([facility]     BPH (benign prostatic hyperplasia)     Hypertension     Metastatic colon cancer to liver ([facility] )     colon- johnny liver & lungs    Stroke [facility])     2008- mild weakness left hand/arm    Trigeminal neuralgia     Tumor, metastatic ([facility] )      Past Surgical History:   Procedure Laterality Date    COLON SURGERY      2013    EYE SURGERY      IR TUBE PLACEMENT BILI  1/15/2020  IR TUBE PLACEMENT BILI  2020    LIVER SURGERY      TONSILLECTOMY       Family History   Problem Relation Age of Onset    Cancer Father     Colon cancer Father     Cancer Brother     Cancer Paternal Grandfather      Social History     Socioeconomic History    Marital status: /Civil Union     Spouse name: Lashawn Mckeon Number of children: 2    Years of education: None    Highest education level: None   Occupational History    Occupation: retired   Social Needs    Financial resource strain: None    Food insecurity:     Worry: None     Inability: None    Transportation needs:     Medical: None     Non-medical: None   Tobacco Use    Smoking status: Former Smoker     Types: Cigarettes     Last attempt to quit:      Years since quittin 1    Smokeless tobacco: Never Used   Substance and Sexual Activity    Alcohol use: Never     Frequency: Never    Drug use: Never    Sexual activity: None   Lifestyle    Physical activity:     Days per week: None     Minutes per session: None    Stress: None   Relationships    Social connections:     Talks on phone: None     Gets together: None     Attends Ca[employer] service: None     Active member of club or organization: None     Attends meetings of clubs or organizations: None     Relationship status: None    Intimate partner violence:     Fear of current or ex partner: None     Emotionally abused: None     Physically abused: None     Forced sexual activity: None   Other Topics Concern    None   Social History Narrative    Jerry Benitez and Elana Burnett have 2 daughters together - Rupal and Chris Clifford  They lived in [place] but recently moved to [place] to be closer to [place] who only lives 10mins away  Medications:   All current active meds have been reviewed and current meds:  Scheduled Meds:  Current Facility-Administered Medications:  baclofen 15 mg Oral TID Vincent Alvarez MD   bisacodyl 10 mg Rectal Daily PRN Carley Vincent MD docusate sodium 100 mg Oral BID John Nunn MD   enoxaparin 40 mg Subcutaneous Daily John Nunn MD   gabapentin 300 mg Oral Daily Brianne Meza MD   gabapentin 400 mg Oral Daily Brianne Meza MD   gabapentin 600 mg Oral HS Korina Daniels PA-C   ketamine 2% in Eucerin 1 application Topical TID PRN Lula Melchor MD   metoprolol succinate 25 mg Oral Daily John Nunn MD   oxyCODONE 10 mg Oral Q3H PRN Lula Melchor MD   oxyCODONE 20 mg Oral Q3H PRN Lula Melchor MD   polyethylene glycol 17 g Oral Daily Brianne Meza MD   senna 1 tablet Oral Daily John Nunn MD   tamsulosin 0 4 mg Oral Daily With Karlene Tijerina MD     Continuous Infusions:   PRN Meds: bisacodyl    ketamine 2% in Eucerin    oxyCODONE    oxyCODONE       ROS:   Review of Systems  A 12 point ROS was completed  Other than the above mentioned complaints in the HPI and those commented on below, all remaining systems were negative:  -Right facial pain, difficulty chewing/talking        Vitals:   /73   Pulse 94   Temp 98 4 °F (36 9 °C)   Resp (!) 11   Ht 5' 10" (1 778 m)   Wt 102 kg (225 lb)   SpO2 (!) 84%   BMI 32 28 kg/m²     Physical Exam:   Constitutional: Patient is resting comfortably, currently applying topical ketamine cream to right side of face  Well developed, well nourished, in no acute distress  No diaphoresis  HENT: Normocephalic, atraumatic  Right and left external ear normal  Oropharynx clear and moist  Nose normal    Eyes: PERRL  EOMs intact without nystagmus  Scleral icterus noted  No discharge  Neck: Supple, normal ROM  No stridor noted  Cardiovascular: Tachycardic  Pulmonary: No respiratory distress  Effort normal    Musculoskeletal: Normal ROM  No tenderness, edema, or deformities noted  Neurological: A&Ox3  Follows all commands  Skin: Warm and dry  No erythema or rashes  Topical ketamine cream on right side of face     Psychiatric: Pleasant and conversational  Normal mood and affect  Normal thought process and thought content, without hallucinations  Behavior and judgement normal     Neurologic Exam:  Mental Status: Patient is awake and alert  Oriented x 3  Follows all commands without difficulty  No dysarthria  No aphasia  Cranial Nerves: Cranial nerves 2-12 intact, although facial sensation testing deferred in setting of facial pain  Motor: Moves all 4 extremities spontaneously  Sensation: Sensation to light touch intact throughout  No tremors noted  Labs: I have personally reviewed pertinent reports  Recent Results (from the past 24 hour(s))   Comprehensive metabolic panel    Collection Time: 02/24/20  4:50 AM   Result Value Ref Range    Sodium 133 (L) 136 - 145 mmol/L    Potassium 4 1 3 5 - 5 3 mmol/L    Chloride 102 100 - 108 mmol/L    CO2 25 21 - 32 mmol/L    ANION GAP 6 4 - 13 mmol/L    BUN 14 5 - 25 mg/dL    Creatinine 0 73 0 60 - 1 30 mg/dL    Glucose 102 65 - 140 mg/dL    Calcium 8 4 8 3 - 10 1 mg/dL     (H) 5 - 45 U/L     (H) 12 - 78 U/L    Alkaline Phosphatase 859 (H) 46 - 116 U/L    Total Protein 6 3 (L) 6 4 - 8 2 g/dL    Albumin 2 0 (L) 3 5 - 5 0 g/dL    Total Bilirubin 16 19 (H) 0 20 - 1 00 mg/dL    eGFR 90 ml/min/1 73sq m       Imaging: No pertinent imaging to review  EKG, Pathology, and Other Studies: I have personally reviewed pertinent reports         VTE Prophylaxis: Enoxaparin (Lovenox)

## 2020-02-24 NOTE — PLAN OF CARE
Problem: Potential for Falls  Goal: Patient will remain free of falls  Description  INTERVENTIONS:  - Assess patient frequently for physical needs  -  Identify cognitive and physical deficits and behaviors that affect risk of falls  -  [place] fall precautions as indicated by assessment   - Educate patient/family on patient safety including physical limitations  - Instruct patient to call for assistance with activity based on assessment  - Modify environment to reduce risk of injury  - Consider OT/PT consult to assist with strengthening/mobility  Outcome: Progressing     Problem: Nutrition/Hydration-ADULT  Goal: Nutrient/Hydration intake appropriate for improving, restoring or maintaining nutritional needs  Description  Monitor and assess patient's nutrition/hydration status for malnutrition  Collaborate with interdisciplinary team and initiate plan and interventions as ordered  Monitor patient's weight and dietary intake as ordered or per policy  Utilize nutrition screening tool and intervene as necessary  Determine patient's food preferences and provide high-protein, high-caloric foods as appropriate       INTERVENTIONS:  - Monitor oral intake, urinary output, labs, and treatment plans  - Assess nutrition and hydration status and recommend course of action  - Evaluate amount of meals eaten  - Assist patient with eating if necessary   - Allow adequate time for meals  - Recommend/ encourage appropriate diets, oral nutritional supplements, and vitamin/mineral supplements  - Order, calculate, and assess calorie counts as needed  - Recommend, monitor, and adjust tube feedings and TPN/PPN based on assessed needs  - Assess need for intravenous fluids  - Provide specific nutrition/hydration education as appropriate  - Include patient/family/caregiver in decisions related to nutrition  Outcome: Progressing     Problem: METABOLIC, FLUID AND ELECTROLYTES - ADULT  Goal: Electrolytes maintained within normal limits  Description  INTERVENTIONS:  - Monitor labs and assess patient for signs and symptoms of electrolyte imbalances  - Administer electrolyte replacement as ordered  - Monitor response to electrolyte replacements, including repeat lab results as appropriate  - Instruct patient on fluid and nutrition as appropriate  Outcome: Progressing  Goal: Fluid balance maintained  Description  INTERVENTIONS:  - Monitor labs   - Monitor I/O and WT  - Instruct patient on fluid and nutrition as appropriate  - Assess for signs & symptoms of volume excess or deficit  Outcome: Progressing  Goal: Glucose maintained within target range  Description  INTERVENTIONS:  - Monitor Blood Glucose as ordered  - Assess for signs and symptoms of hyperglycemia and hypoglycemia  - Administer ordered medications to maintain glucose within target range  - Assess nutritional intake and initiate nutrition service referral as needed  Outcome: Progressing     Problem: SKIN/TISSUE INTEGRITY - ADULT  Goal: Skin integrity remains intact  Description  INTERVENTIONS  - Identify patients at risk for skin breakdown  - Assess and monitor skin integrity  - Assess and monitor nutrition and hydration status  - Monitor labs (i e  albumin)  - Assess for incontinence   - Turn and reposition patient  - Assist with mobility/ambulation  - Relieve pressure over bony prominences  - Avoid friction and shearing  - Provide appropriate hygiene as needed including keeping skin clean and dry  - Evaluate need for skin moisturizer/barrier cream  - Collaborate with interdisciplinary team (i e  Nutrition, Rehabilitation, etc )   - Patient/family teaching  Outcome: Progressing  Goal: Incision(s), wounds(s) or drain site(s) healing without S/S of infection  Description  INTERVENTIONS  - Assess and document risk factors for skin impairment   - Assess and document dressing, incision, wound bed, drain sites and surrounding tissue  - Consider nutrition services referral as needed  - Oral mucous membranes remain intact  - Provide patient/ family education  Outcome: Progressing  Goal: Oral mucous membranes remain intact  Description  INTERVENTIONS  - Assess oral mucosa and hygiene practices  - Implement preventative oral hygiene regimen  - Implement oral medicated treatments as ordered  - Initiate Nutrition services referral as needed  Outcome: Progressing

## 2020-02-24 NOTE — RESTORATIVE TECHNICIAN NOTE
Restorative Specialist Mobility Note       Activity: Ambulate in daniel     Assistive Device: Front wheel walker        Repositioned: Sitting, Up in chair

## 2020-02-24 NOTE — PROGRESS NOTES
Progress note - Palliative and Supportive Care   Mango Toth 66 y o  male [id number]    Assessment:   -   Patient Active Problem List   Diagnosis    BPH (benign prostatic hyperplasia)    Atrial fibrillation (HCC)    Colon cancer metastasized to multiple sites [facility])    Essential hypertension    Painless jaundice secondary to parenchymal infiltration of tumor along with biliary compression     Trigeminal neuralgia    Chronic hyponatremia    Low hemoglobin    Palliative care patient    Metastatic colon cancer to liver ([facility] [address] )    Obstructive jaundice due to cancer ([facility] )    Lower extremity edema    Cancer associated pain    Pulmonary metastases (HCC)    Malignant neoplasm of abdominal wall    History of colon cancer    Synovial cyst of lumbar spine    Transaminitis    Malignant neoplasm of colon ([facility] )         Plan:  1  Symptom management - no changes   - Continue topical ketamine TID PRN    2  Goals - Full cares, no limits at this time  -     Code Status: Full - Level 1   Decisional apparatus:  Patient is competent on my exam today  If competence is lost, patient's substitute decision maker would default to spouse by [place] Act 169  Advance Directive / Living Will / POLST:  NA    Interval history:       Follow up to review effectiveness of topical ketamine  Patient states he is getting good relief from it and wishes to continue its use upon discharge  Otherwise, in good spirits  No complaints       MEDICATIONS / ALLERGIES:     all current active meds have been reviewed and current meds:   Current Facility-Administered Medications   Medication Dose Route Frequency    baclofen tablet 15 mg  15 mg Oral TID    bisacodyl (DULCOLAX) rectal suppository 10 mg  10 mg Rectal Daily PRN    docusate sodium (COLACE) capsule 100 mg  100 mg Oral BID    enoxaparin (LOVENOX) subcutaneous injection 40 mg  40 mg Subcutaneous Daily    gabapentin (NEURONTIN) capsule 300 mg  300 mg Oral Daily    gabapentin (NEURONTIN) capsule 400 mg  400 mg Oral Daily    gabapentin (NEURONTIN) capsule 600 mg  600 mg Oral HS    ketamine 2% in Eucerin cream 1 application  1 application Topical TID PRN    metoprolol succinate (TOPROL-XL) 24 hr tablet 25 mg  25 mg Oral Daily    oxyCODONE (ROXICODONE) immediate release tablet 10 mg  10 mg Oral Q3H PRN    oxyCODONE (ROXICODONE) immediate release tablet 20 mg  20 mg Oral Q3H PRN    polyethylene glycol (MIRALAX) packet 17 g  17 g Oral Daily    senna (SENOKOT) tablet 8 6 mg  1 tablet Oral Daily    tamsulosin (FLOMAX) capsule 0 4 mg  0 4 mg Oral Daily With Dinner       Allergies   Allergen Reactions    Ib Pro [Ibuprofen] Hives and Itching    Adhesive [Medical Tape] Rash     Use only paper tape       OBJECTIVE:    Physical Exam  Physical Exam   Constitutional: He is oriented to person, place, and time  No distress  HENT:   Head: Normocephalic and atraumatic  Right Ear: External ear normal    Left Ear: External ear normal    Nose: Nose normal    Eyes: EOM are normal  Right eye exhibits no discharge  Left eye exhibits no discharge  Scleral icterus is present  Cardiovascular: Normal rate, regular rhythm and intact distal pulses  Pulmonary/Chest: Effort normal and breath sounds normal  No respiratory distress  Abdominal: Soft  Bowel sounds are normal  He exhibits no distension  Musculoskeletal: He exhibits no edema  Neurological: He is alert and oriented to person, place, and time  Skin: Skin is warm and dry  There is pallor  Jaundice skin   Psychiatric: He has a normal mood and affect  Nursing note and vitals reviewed  Lab Results:   I have personally reviewed pertinent labs  , CBC: No results found for: WBC, HGB, HCT, MCV, PLT, ADJUSTEDWBC, MCH, MCHC, RDW, MPV, NRBC, CMP:   Lab Results   Component Value Date    SODIUM 133 (L) 02/24/2020    K 4 1 02/24/2020     02/24/2020    CO2 25 02/24/2020    BUN 14 02/24/2020    CREATININE 0 73 02/24/2020    CALCIUM 8 4 02/24/2020     (H) 02/24/2020     (H) 02/24/2020    ALKPHOS 859 (H) 02/24/2020    EGFR 90 02/24/2020     Imaging Studies: reviewed  EKG, Pathology, and Other Studies: reviewed    Counseling / Coordination of Care  Total floor / unit time spent today 25+ minutes  Greater than 50% of total time was spent with the patient and / or family counseling and / or coordination of care  A description of the counseling / coordination of care: symptom assessment, medication review

## 2020-02-24 NOTE — PROGRESS NOTES
Hematology - Oncology Progress Note    Laura Macario, 1945, [id number]  /-01      Impression and plan:    [id number] male with complicated cancer history including metastatic/recurrent colon cancer  Most recently, patient has had an elevated bilirubin level  Patient has an external stent in place  Bilirubin is slightly higher today as compared to yesterday  This obviously needs to be monitored (as it had been coming down previously)  Once patient is better, stable and ready for discharge, Mr Neisha Torres can follow up in the Hematology/Oncology office  As discussed previously, patient has been heavily pretreated at 2 well known cancer institutions  Options may be limited as far as additional treatments  Chronic trigeminal neuralgia - pain is relatively controlled  Patient is on gabapentin patient has been seen by Dr Agustin End - now has ketamine topical - pain is less/better  The above discussed with the patient; all questions were answered  30 minutes was spent with patient, 50% of time coordinating care on the floor  __________________________________________________________________________________________    Chief complaint:  Metastatic colon cancer, hyperbilirubinemia    History of present illness:  [id number] male with complicated cancer history including metastatic/recurrent sigmoid colon adenocarcinoma status through multiple treatments  Patient was found to have adequate task disease  Patient also has an anterior chest wall lesion status post elective RT  Mr Neisha Torres states feeling okay, about the same as before  Patient has trigeminal neuralgia - being seen by palliative care  No abdominal pain, no nausea vomiting  Appetite is slightly better  Activities are still limited      Hospital medications list:    Current Facility-Administered Medications:  baclofen 15 mg Oral TID Miguel Angel Dumont MD   bisacodyl 10 mg Rectal Daily PRN Clovis Olvera MD   docusate sodium 100 mg Oral BID Seng Lebrno MD   enoxaparin 40 mg Subcutaneous Daily Seng Lebron MD   gabapentin 300 mg Oral Daily Jaime Aceves MD   gabapentin 400 mg Oral Daily Jaime Aceves MD   gabapentin 600 mg Oral HS Korina Daniels PA-C   ketamine 2% in Eucerin 1 application Topical TID PRN Alvin Ritter MD   metoprolol succinate 25 mg Oral Daily Seng Lebron MD   oxyCODONE 10 mg Oral Q3H PRN Alvin Ritter MD   oxyCODONE 20 mg Oral Q3H PRN Alvin Ritter MD   polyethylene glycol 17 g Oral Daily Jaime Aceves MD   senna 1 tablet Oral Daily Seng Lebron MD   tamsulosin 0 4 mg Oral Daily With Rob Newman MD     Physical exam  /74   Pulse 90   Temp 98 6 °F (37 °C) (Oral)   Resp 18   Ht 5' 10" (1 778 m)   Wt 102 kg (225 lb)   SpO2 100%   BMI 32 28 kg/m²   Constitutional: Well formed male, no respiratory distress  Eyes: PERRL, ++icteric   HENT: Atraumatic, external ears normal, nose normal,    oropharynx moist, no pharyngeal exudates, no thrush, pink  Neck: Good range of motion, no adenopathy  Respiratory:  Scattered bilateral rhonchi  Cardiovascular: Normal rate, normal rhythm, no murmurs, no gallops, no rubs    GI: Soft, nondistended, right anterior chest wall/right upper abdomen dressing in place, lateral percutaneous biliary drainage tube in place  : No costovertebral angle tenderness, normal reproductive organs, no discharge  Musculoskeletal: No pain or tenderness with palpation of joints, muscles or bones  Integument:  +jaundice, warm, moist, scattered purpura  Lymphatic: No adenopathy in the neck, supra-clavicular region, axilla and groin bilaterally  Extremities:  1+ bilateral lower extremity edema, pulses are decreased, no cords  Neurologic: Alert & oriented x 3, CN 2-12 normal, normal motor function, normal sensory function, no focal deficits noted  Psychiatric:  Pleasant, responsive, appropriate  Rectal: Deferred    Laboratory test results    Results for Devendra Mora (MRN [id number]) as of 2/24/2020 09:47   Ref  Range 2/23/2020 06:20   WBC Latest Ref Range: 4 31 - 10 16 Thousand/uL 6 39   Red Blood Cell Count Latest Ref Range: 3 88 - 5 62 Million/uL 3 02 (L)   Hemoglobin Latest Ref Range: 12 0 - 17 0 g/dL 9 9 (L)   HCT Latest Ref Range: 36 5 - 49 3 % 29 8 (L)   MCV Latest Ref Range: 82 - 98 fL 99 (H)   MCH Latest Ref Range: 26 8 - 34 3 pg 32 8   MCHC Latest Ref Range: 31 4 - 37 4 g/dL 33 2   RDW Latest Ref Range: 11 6 - 15 1 % 18 4 (H)   Platelet Count Latest Ref Range: 149 - 390 Thousands/uL 131 (L)       Results for Devendra Mora (MRN [id number]) as of 2/24/2020 09:47   Ref   Range 2/21/2020 05:36 2/22/2020 05:06 2/23/2020 06:20 2/24/2020 04:50   TOTAL BILIRUBIN Latest Ref Range: 0 20 - 1 00 mg/dL 17 04 (H) 15 23 (H) 12 59 (H) 16 19 (H)

## 2020-02-24 NOTE — PROGRESS NOTES
Progress Note [facility] 1945, 66 y o  male MRN: [id number]    Unit/Bed#: -01 Encounter: [id number]    Primary Care Provider: Theo Diaz DO   Date and time admitted to hospital: 2/19/2020  8:26 PM        * Obstructive jaundice due to cancer [facility])  Assessment & Plan  · Came to ED with elevated bilirubin, 23 07  Increasing jaundice over past 2 weeks  · Increasing fatigue  No fevers, chills, N/V, abdominal pain  Reports loose, light stools and dark urine  · Biliary stent in place from [facility] 2014  · Admitted last month with obstructive jaundice, ERCP/external drain placement unsuccessful  Hyperbilirubinemia resolved at that time  · CT abdomen/pelvis: stable position of common bile duct stent  Stent patency not assessed  No significant change in intrahepatic biliary dilatation  Stable hepatic metastases, one of which may invade the gallbladder  Stable mild to moderate ascites  Increasing size of visualized pulmonary metastases at the lung bases  · s/p inferior PTC tube placement (02/20/2020) per IR  Pt asymptomatic  Brownish-Yellow fluid in drain today  Cont to flush regularly per IR  Pt will need VNA on discharge  OP FU with IR in 6 weeks for tube change  · LFTs trending up today - cont to monitor    Trigeminal neuralgia  Assessment & Plan  · Chronic  Patient complaining of persistent pain which is bothering him  · Continue Gabapentin and Baclofen  Increased gabapentin to 300-400-600 from 300-300-300  · Ketamine cream ordered by Palliative care  · Pt reports his pain is well controlled today  · Appreciate Neurology input    Essential hypertension  Assessment & Plan  · Stable on Metoprolol  Colon cancer metastasized to multiple sites [facility])  Assessment & Plan  · 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, abdominal wall, lungs  S/P sigmoid colectomy, radiation, chemotherapy  · CT AP: 1   Stable position of common bile duct stent  Stent patency not assessed   No significant change in intrahepatic biliary dilatation  2  Stable hepatic metastases, one of which may invade the gallbladder  3  Stable mild to moderate ascites  4  Increasing size of visualized pulmonary metastases at the lung bases  · NPO and IVF overnight  · Continue oxycodone and morphine for pain PRN  Well controlled  · Appreciate oncology input  Recommend outpatient follow-up  Atrial fibrillation ([address] )  Assessment & Plan  · Rate controlled, continue Metoprolol daily  · Not on anticoagulation  Managed by PCP with referral to cardiology  Constipation:  Patient has not had a bowel movement for last 3-4 days  Tolerating diet  No nausea or vomiting  Refusing suppository  After explained him, he was agreeable  VTE Pharmacologic Prophylaxis:   Pharmacologic: Enoxaparin (Lovenox) ordered but pt not getting it  Mechanical VTE Prophylaxis in Place: Yes     Patient Centered Rounds: I have performed bedside rounds with nursing staff today      Discussions with Specialists or Other Care Team Provider: oncology     Education and Discussions with Family / Patient: patient     Time Spent for Care: 30 minutes   More than 50% of total time spent on counseling and coordination of care as described above      Current Length of Stay: 4 day(s)     Current Patient Status: Inpatient   Certification Statement: The patient will continue to require additional inpatient hospital stay due to above     Discharge Plan: once LFTs stabilize     Code Status: Level 1 - Full Code    Subjective:   Pt seen and examined by me this morning  Pt denies any specific complaints  Pain well controlled  Objective:     Vitals:   Temp (24hrs), Av 4 °F (36 9 °C), Min:98 1 °F (36 7 °C), Max:98 6 °F (37 °C)    Temp:  [98 1 °F (36 7 °C)-98 6 °F (37 °C)] 98 4 °F (36 9 °C)  HR:  [] 94  Resp:  [11-18] 11  BP: (109-130)/(73-91) 109/73  SpO2:  [84 %-100 %] 84 %  Body mass index is 32 28 kg/m²       Input and Output Summary (last  hours): Intake/Output Summary (Last 24 hours) at 2/24/2020 1329  Last data filed at 2/24/2020 0843  Gross per 24 hour   Intake 260 ml   Output 300 ml   Net -40 ml       Physical Exam:     Physical Exam    Constitutional: Pt appears comfortable  Not in any acute distress  Scleral icterus +  Cardiovascular: Normal rate, regular rhythm, normal heart sounds   No murmur heard  Pulmonary/Chest: Effort normal, air entry b/l equal  No respiratory distress  Pt has no wheezes or crackles  Abdominal: Soft  Non-distended, Non-tender  Bowel sounds are normal  RUQ drain + with yellow-brown fluid  Right lower chest wall wound dressed  Neurological: alert and oriented to person, place, and time  Moving all extremities spontaneously  Psychiatric: normal mood and affect       Additional Data:     Labs:    Results from last 7 days   Lab Units 02/23/20  0620 02/22/20  0506   WBC Thousand/uL 6 39 5 99   HEMOGLOBIN g/dL 9 9* 10 1*   HEMATOCRIT % 29 8* 30 4*   PLATELETS Thousands/uL 131* 139*   NEUTROS PCT %  --  74   LYMPHS PCT %  --  12*   MONOS PCT %  --  13*   EOS PCT %  --  0     Results from last 7 days   Lab Units 02/24/20  0450   SODIUM mmol/L 133*   POTASSIUM mmol/L 4 1   CHLORIDE mmol/L 102   CO2 mmol/L 25   BUN mg/dL 14   CREATININE mg/dL 0 73   ANION GAP mmol/L 6   CALCIUM mg/dL 8 4   ALBUMIN g/dL 2 0*   TOTAL BILIRUBIN mg/dL 16 19*   ALK PHOS U/L 859*   ALT U/L 122*   AST U/L 107*   GLUCOSE RANDOM mg/dL 102     Results from last 7 days   Lab Units 02/21/20  0536   INR  1 23*                       * I Have Reviewed All Lab Data Listed Above  * Additional Pertinent Lab Tests Reviewed:  [facility] Admission Reviewed    Imaging:    Imaging Reports Reviewed Today Include:   Imaging Personally Reviewed by Myself Includes:     Recent Cultures (last 7 days):     Results from last 7 days   Lab Units 02/20/20  1653   GRAM STAIN RESULT  1+ Polys  No bacteria seen   BODY FLUID CULTURE, STERILE  No growth Last 24 Hours Medication List:     Current Facility-Administered Medications:  baclofen 15 mg Oral TID Sai Bingham MD   bisacodyl 10 mg Rectal Daily PRN Giulia Campos MD   docusate sodium 100 mg Oral BID Sai Bingham MD   enoxaparin 40 mg Subcutaneous Daily Sai Bingham MD   gabapentin 300 mg Oral Daily Giulia Campos MD   gabapentin 400 mg Oral Daily Giulia Campos MD   gabapentin 600 mg Oral HS Korina Daniels PA-C   ketamine 2% in Eucerin 1 application Topical TID PRN Yaz Campbell MD   metoprolol succinate 25 mg Oral Daily Sai Bingham MD   oxyCODONE 10 mg Oral Q3H PRN Yaz Campbell MD   oxyCODONE 20 mg Oral Q3H PRN Yaz Campbell MD   polyethylene glycol 17 g Oral Daily Giulia Campos MD   senna 1 tablet Oral Daily Sai Bingham MD   tamsulosin 0 4 mg Oral Daily With Carlos Vazquez MD        Today, Patient Was Seen By: Giulia Campos MD    ** Please Note: Dictation voice to text software may have been used in the creation of this document   **

## 2020-02-25 NOTE — ASSESSMENT & PLAN NOTE
· Sigmoid adenocarcinoma with mets to liver, abdominal wall, lungs  · S/P sigmoid colectomy, radiation, chemotherapy  · Oncology following and appreciate their input; recommend outpatient follow-up  · Palliative Care following and appreciate their input  · Continue pain control with prn oxycodone

## 2020-02-25 NOTE — ASSESSMENT & PLAN NOTE
· Presented to the ED with elevated bilirubin, 23 07, and increased jaundice over the past 2 weeks as well as increased fatigue  · Denied fevers, chills, N/V, abdominal pain but reported loose, light stools and dark urine  · Biliary stent in place from [place] in 2014  · Admitted last month with obstructive jaundice, ERCP/external drain placement was unsuccessful  Hyperbilirubinemia had improved at that time  · CT abdomen/pelvis: "Stable position of common bile duct stent  Stent patency not assessed  No significant change in intrahepatic biliary dilatation  Stable hepatic metastases, one of which may invade the gallbladder  Stable mild to moderate ascites  Increasing size of visualized pulmonary metastases at the lung bases "  · s/p inferior PTC tube placement on 02/20/2020 by IR  · Brownish-Yellow fluid in drain today  Cont to flush regularly per IR  · Pt will need VNA on discharge  · OP FU with IR in 6 weeks for tube change  · LFTs worsened yesterday but are improving today; continue trend, if LFTs continue to improve tomorrow, possible d/c

## 2020-02-25 NOTE — ASSESSMENT & PLAN NOTE
· Chronic, equal bilaterally  · On Lasix 20 mg daily as an outpatient which has been held since admission  · Resume Lasix in AM today  Cr stable  · Compression stockings

## 2020-02-25 NOTE — ASSESSMENT & PLAN NOTE
· Rate controlled, continue metoprolol daily  · Not on anticoagulation  · Managed by PCP with referral to cardiology

## 2020-02-25 NOTE — PROGRESS NOTES
Progress note - Palliative and Supportive Care   Toña Napier 66 y o  male [id number]    Assessment:       66year old male with PMH of colon cancer metastatic to the liver, lungs, and abdominal wall, trigeminal neuralgia, atrial fibrillation, and hypertension admitted for management of obstructive jaundice s/p PTC placement, improving  Plan:  1  Symptom management - no changes to current medication regimen   - Continue scheduled gabapentin and baclofen, PRN topical ketamine and oxycodone   - Pt had 2 good BMs after administration of dulcolax suppository, continue PRN    2  Goals - Full cares, no limits at this time      - F/u outpatient with palliative and oncology     Code Status: Full - Level 1   Decisional apparatus:  Patient is competent on my exam today  If competence is lost, patient's substitute decision maker would default to [place] by [place] Act 169  Advance Directive / Living Will / POLST:  Advance Directive can be found under Media tab dated 1/8/2020  Patient has designated his wife as POA  Interval history:       Fredrick Umaña is continuing to experience improvement in his trigeminal neuralgia and abdominal pain with his current regimen of gabapentin, topical ketamine, baclofen, and oxycodone  He continues to be in good spirits      MEDICATIONS / ALLERGIES:     current meds:   Current Facility-Administered Medications   Medication Dose Route Frequency    baclofen tablet 15 mg  15 mg Oral TID    bisacodyl (DULCOLAX) rectal suppository 10 mg  10 mg Rectal Daily PRN    docusate sodium (COLACE) capsule 100 mg  100 mg Oral BID    enoxaparin (LOVENOX) subcutaneous injection 40 mg  40 mg Subcutaneous Daily    gabapentin (NEURONTIN) capsule 300 mg  300 mg Oral Daily    gabapentin (NEURONTIN) capsule 400 mg  400 mg Oral Daily    gabapentin (NEURONTIN) capsule 600 mg  600 mg Oral HS    ketamine 2% in Eucerin cream 1 application  1 application Topical TID PRN    metoprolol succinate (TOPROL-XL) 24 hr tablet 25 mg  25 mg Oral Daily    oxyCODONE (ROXICODONE) immediate release tablet 10 mg  10 mg Oral Q3H PRN    oxyCODONE (ROXICODONE) immediate release tablet 20 mg  20 mg Oral Q3H PRN    polyethylene glycol (MIRALAX) packet 17 g  17 g Oral Daily    senna (SENOKOT) tablet 8 6 mg  1 tablet Oral Daily    tamsulosin (FLOMAX) capsule 0 4 mg  0 4 mg Oral Daily With Dinner    and PTA meds:   Prior to Admission Medications   Prescriptions Last Dose Informant Patient Reported? Taking? Baclofen 5 MG TABS 2/20/2020 at Unknown time  No Yes   Sig: Take 15 mg by mouth 3 (three) times a day   LORazepam (ATIVAN) 0 5 mg tablet   No Yes   Sig: Take 1 tablet (0 5 mg total) by mouth 2 (two) times a day for 2 doses 1 tab PO 30 mins prior to MRI, then 1 tab PO PRN immediately prior to MRI   furosemide (LASIX) 20 mg tablet 2/20/2020 at Unknown time  No Yes   Sig: Take 1 tablet (20 mg total) by mouth daily   gabapentin (NEURONTIN) 300 mg capsule 2/20/2020 at Unknown time  No Yes   Sig: Take 1 capsule (300 mg total) by mouth 3 (three) times a day   Patient taking differently: Take by mouth 300mg in the morning and afternoon and 600mg at night   metoprolol succinate (TOPROL-XL) 25 mg 24 hr tablet 2/20/2020 at Unknown time  No Yes   Sig: Take 1 tablet (25 mg total) by mouth daily   metroNIDAZOLE (FLAGYL) 500 mg tablet   No No   Sig: Crush 2 Flagyl tablets  Sprinkle onto fungating tumor  Then cover with adaptic contact layer followed by maxorb calcium alginate dressing  Then cover with ABD pad and secure with patient's own hypoallergenic tape   Do once a day and prn   oxyCODONE (ROXICODONE) 5 mg immediate release tablet Unknown at Unknown time  No No   Sig: Take 1 tablet (5 mg total) by mouth every 4 (four) hours as needed for moderate pain for up to 20 dosesMax Daily Amount: 30 mg   tamsulosin (FLOMAX) 0 4 mg 2/19/2020 at Unknown time  No Yes   Sig: Take 1 capsule (0 4 mg total) by mouth daily with dinner      Facility-Administered Medications: None       Allergies   Allergen Reactions    Ib Pro [Ibuprofen] Hives and Itching    Adhesive [Medical Tape] Rash     Use only paper tape       OBJECTIVE:    Physical Exam  Physical Exam   Constitutional: He is oriented to person, place, and time  He appears well-developed and well-nourished  No distress  HENT:   Head: Normocephalic and atraumatic  Ketamine cream on R side of face   Eyes: Scleral icterus is present  Cardiovascular: Normal rate, regular rhythm and intact distal pulses  Pulmonary/Chest: Effort normal and breath sounds normal    Abdominal: Soft  Bowel sounds are normal  There is no tenderness  PTC in place, draining brown-yellow fluid  RUQ fungating mass, stable   Musculoskeletal: He exhibits no edema or deformity  Neurological: He is alert and oriented to person, place, and time  Skin: Skin is warm and dry  Less jaundiced, especially extremities   Psychiatric: He has a normal mood and affect  His behavior is normal        Lab Results: I have personally reviewed pertinent labs  Imaging Studies: I have personally reviewed pertinent imaging studies  EKG, Pathology, and Other Studies: I have personally reviewed pertinent studies          Giselle Fernandez, MS4

## 2020-02-25 NOTE — ASSESSMENT & PLAN NOTE
· Sigmoid adenocarcinoma with mets to liver, abdominal wall, lungs  · S/P sigmoid colectomy, radiation, chemotherapy  · CT AP: 1   Stable position of common bile duct stent  Stent patency not assessed  No significant change in intrahepatic biliary dilatation  2  Stable hepatic metastases, one of which may invade the gallbladder  3  Stable mild to moderate ascites  4  Increasing size of visualized pulmonary metastases at the lung bases  · Continue oxycodone for pain PRN  · Appreciate oncology input  Recommend outpatient follow-up

## 2020-02-25 NOTE — PLAN OF CARE
Problem: Potential for Falls  Goal: Patient will remain free of falls  Description  INTERVENTIONS:  - Assess patient frequently for physical needs  -  Identify cognitive and physical deficits and behaviors that affect risk of falls  -  [place] fall precautions as indicated by assessment   - Educate patient/family on patient safety including physical limitations  - Instruct patient to call for assistance with activity based on assessment  - Modify environment to reduce risk of injury  - Consider OT/PT consult to assist with strengthening/mobility  Outcome: Progressing     Problem: Nutrition/Hydration-ADULT  Goal: Nutrient/Hydration intake appropriate for improving, restoring or maintaining nutritional needs  Description  Monitor and assess patient's nutrition/hydration status for malnutrition  Collaborate with interdisciplinary team and initiate plan and interventions as ordered  Monitor patient's weight and dietary intake as ordered or per policy  Utilize nutrition screening tool and intervene as necessary  Determine patient's food preferences and provide high-protein, high-caloric foods as appropriate       INTERVENTIONS:  - Monitor oral intake, urinary output, labs, and treatment plans  - Assess nutrition and hydration status and recommend course of action  - Evaluate amount of meals eaten  - Assist patient with eating if necessary   - Allow adequate time for meals  - Recommend/ encourage appropriate diets, oral nutritional supplements, and vitamin/mineral supplements  - Order, calculate, and assess calorie counts as needed  - Recommend, monitor, and adjust tube feedings and TPN/PPN based on assessed needs  - Assess need for intravenous fluids  - Provide specific nutrition/hydration education as appropriate  - Include patient/family/caregiver in decisions related to nutrition  Outcome: Progressing     Problem: METABOLIC, FLUID AND ELECTROLYTES - ADULT  Goal: Electrolytes maintained within normal limits  Description  INTERVENTIONS:  - Monitor labs and assess patient for signs and symptoms of electrolyte imbalances  - Administer electrolyte replacement as ordered  - Monitor response to electrolyte replacements, including repeat lab results as appropriate  - Instruct patient on fluid and nutrition as appropriate  Outcome: Progressing  Goal: Fluid balance maintained  Description  INTERVENTIONS:  - Monitor labs   - Monitor I/O and WT  - Instruct patient on fluid and nutrition as appropriate  - Assess for signs & symptoms of volume excess or deficit  Outcome: Progressing  Goal: Glucose maintained within target range  Description  INTERVENTIONS:  - Monitor Blood Glucose as ordered  - Assess for signs and symptoms of hyperglycemia and hypoglycemia  - Administer ordered medications to maintain glucose within target range  - Assess nutritional intake and initiate nutrition service referral as needed  Outcome: Progressing     Problem: SKIN/TISSUE INTEGRITY - ADULT  Goal: Skin integrity remains intact  Description  INTERVENTIONS  - Identify patients at risk for skin breakdown  - Assess and monitor skin integrity  - Assess and monitor nutrition and hydration status  - Monitor labs (i e  albumin)  - Assess for incontinence   - Turn and reposition patient  - Assist with mobility/ambulation  - Relieve pressure over bony prominences  - Avoid friction and shearing  - Provide appropriate hygiene as needed including keeping skin clean and dry  - Evaluate need for skin moisturizer/barrier cream  - Collaborate with interdisciplinary team (i e  Nutrition, Rehabilitation, etc )   - Patient/family teaching  Outcome: Progressing  Goal: Incision(s), wounds(s) or drain site(s) healing without S/S of infection  Description  INTERVENTIONS  - Assess and document risk factors for skin impairment   - Assess and document dressing, incision, wound bed, drain sites and surrounding tissue  - Consider nutrition services referral as needed  - Oral mucous membranes remain intact  - Provide patient/ family education  Outcome: Progressing  Goal: Oral mucous membranes remain intact  Description  INTERVENTIONS  - Assess oral mucosa and hygiene practices  - Implement preventative oral hygiene regimen  - Implement oral medicated treatments as ordered  - Initiate Nutrition services referral as needed  Outcome: Progressing

## 2020-02-25 NOTE — ASSESSMENT & PLAN NOTE
· Chronic  Patient complaining of persistent pain  · Neurology consulted, recommending slowly titrating gabapentin and also considering low dose of pregabalin if does not improve with gabapentin  · Continue gabapentin and Baclofen  · Gabapentin has been increased to 300-400-600 from 300-300-300    · Ketamine cream ordered by Palliative care

## 2020-02-25 NOTE — PROGRESS NOTES
Progress Note -Interventional Radiology PA Beverley Alpers 66 y o  male MRN: [id number]  Unit/Bed#: -01 Encounter: [id number]    Assessment:    66year old male with hx of stage IV metastatic colon cancer, placement of common bile duct stent, jaundice presenting with elevated bilirubin, obstructed painless jaundice, weakness, status post internal/external biliary drain and balloon sweep 2/20/20    Biliary drain output 24 hours:  550cc light brown    Plan:    - bilirubin decreased to 14 2 today from 16 4 last night  - At this time, do not believe there would be benefit from re imaging patient  His bilirubin has decreased and he continues to have output from his drain    - Patient will need tube exchange in 6 weeks then every 3 months  I explained this to the patient  - Drain will need to be flushed  Script sent to [address]  Patient Active Problem List   Diagnosis    BPH (benign prostatic hyperplasia)    Atrial fibrillation ([facility] [address] )    Colon cancer metastasized to multiple sites [facility])    Essential hypertension    Painless jaundice secondary to parenchymal infiltration of tumor along with biliary compression     Trigeminal neuralgia    Chronic hyponatremia    Low hemoglobin    Palliative care patient    Metastatic colon cancer to liver ([facility] [address] )    Obstructive jaundice due to cancer ([facility] [address] [facility]    Lower extremity edema    Cancer associated pain    Pulmonary metastases (HCC)    Malignant neoplasm of abdominal wall    History of colon cancer    Synovial cyst of lumbar spine    Transaminitis    Malignant neoplasm of colon (HCC)          Subjective: Patient feeling well  No concerns with his drain  Bilirubin 14 2 today  Objective:    Vitals:  /81   Pulse 96   Temp 98 7 °F (37 1 °C)   Resp 14   Ht 5' 10" (1 778 m)   Wt 102 kg (225 lb)   SpO2 98%   BMI 32 28 kg/m²   Body mass index is 32 28 kg/m²    Weight (last 2 days)     None          I/Os:    Intake/Output Summary (Last 24 hours) at 2/25/2020 1243  Last data filed at 2/25/2020 0900  Gross per 24 hour   Intake 1000 ml   Output 750 ml   Net 250 ml       Invasive Devices     Central Venous Catheter Line            Port A Cath Right Chest -- days          Peripheral Intravenous Line            Peripheral IV 02/23/20 Right Forearm 2 days          Drain            Biliary Tube 8 5 Fr  RUQ 4 days                Physical Exam:  General appearance: alert and oriented, in no acute distress  Lungs: clear to auscultation bilaterally  Heart: regular rate and rhythm, S1, S2 normal, no murmur, click, rub or gallop  Abdomen: soft, non tender to palpation, ruq mass, biliary drain in place with light brown output  Skin: jaundice                Lab Results and Cultures:   CBC with diff:   Lab Results   Component Value Date    WBC 6 39 02/23/2020    HGB 9 9 (L) 02/23/2020    HCT 29 8 (L) 02/23/2020    MCV 99 (H) 02/23/2020     (L) 02/23/2020    MCH 32 8 02/23/2020    MCHC 33 2 02/23/2020    RDW 18 4 (H) 02/23/2020    MPV 10 9 02/23/2020    NRBC 0 02/22/2020      BMP/CMP:  Lab Results   Component Value Date    K 4 0 02/25/2020     02/25/2020    CO2 23 02/25/2020    BUN 15 02/25/2020    CREATININE 0 57 (L) 02/25/2020    CALCIUM 8 0 (L) 02/25/2020    AST 81 (H) 02/25/2020    ALT 95 (H) 02/25/2020    ALKPHOS 633 (H) 02/25/2020    EGFR 100 02/25/2020   ,     Coags:   Lab Results   Component Value Date    PTT 29 02/19/2020    INR 1 23 (H) 02/21/2020   ,   Results from last 7 days   Lab Units 02/21/20  0536 02/19/20  2107   PTT seconds  --  29   INR  1 23* 1 22*        Lipid Panel: No results found for: CHOL  No results found for: HDL  No results found for: HDL  No results found for: LDLCALC  No results found for: TRIG    HgbA1c: No results found for: HGBA1C    Blood Culture:   Lab Results   Component Value Date    BLOODCX No Growth After 5 Days   01/30/2020   ,   Urinalysis:   Lab Results   Component Value Date    COLORU Light Yellow 01/30/2020 CLARITYU Clear 01/30/2020    SPECGRAV <=1 005 01/30/2020    PHUR 6 5 01/30/2020    LEUKOCYTESUR Negative 01/30/2020    NITRITE Negative 01/30/2020    GLUCOSEU Negative 01/30/2020    KETONESU Negative 01/30/2020    BILIRUBINUR Negative 01/30/2020    BLOODU Negative 01/30/2020   ,   Urine Culture: No results found for: URINECX,   Wound Culure:  No results found for: WOUNDCULT    VTE Pharmacologic Prophylaxis: Enoxaparin (Lovenox)      Thank you for allowing me to participate in the care of [employer]  Please don't hesitate to call, text, email, or TigerText with any questions  This text is generated with voice recognition software  There may be translation, syntax,  or grammatical errors  If you have any questions, please contact the dictating provider      Nadeem Sheth PA-C

## 2020-02-25 NOTE — PROGRESS NOTES
Progress Note - Gunjan Patel 1945, 66 y o  male MRN: [id number]    Unit/Bed#: -01 Encounter: [id number]    Primary Care Provider: Wilhemena Koyanagi, DO   Date and time admitted to hospital: 2/19/2020  8:26 PM        * Obstructive jaundice due to cancer [facility])  Assessment & Plan  · Presented to the ED with elevated bilirubin, 23 07, and increased jaundice over the past 2 weeks as well as increased fatigue  · Denied fevers, chills, N/V, abdominal pain but reported loose, light stools and dark urine  · Biliary stent in place from [facility] in 2014  · Admitted last month with obstructive jaundice, ERCP/external drain placement was unsuccessful  Hyperbilirubinemia had improved at that time  · CT abdomen/pelvis: "Stable position of common bile duct stent  Stent patency not assessed  No significant change in intrahepatic biliary dilatation  Stable hepatic metastases, one of which may invade the gallbladder  Stable mild to moderate ascites  Increasing size of visualized pulmonary metastases at the lung bases "  · s/p inferior PTC tube placement on 02/20/2020 by IR  · Brownish-Yellow fluid in drain today  Cont to flush regularly per IR  · Pt will need VNA on discharge  · OP FU with IR in 6 weeks for tube change  · LFTs worsened yesterday but are improving today; continue trend, if LFTs continue to improve tomorrow, possible d/c  Metastatic colon cancer to liver [facility])  Assessment & Plan  · Sigmoid adenocarcinoma with mets to liver, abdominal wall, lungs  · S/P sigmoid colectomy, radiation, chemotherapy  · Oncology following and appreciate their input; recommend outpatient follow-up  · Palliative Care following and appreciate their input  · Continue pain control with prn oxycodone  Trigeminal neuralgia  Assessment & Plan  · Chronic  Patient complaining of persistent pain    · Neurology consulted, recommending slowly titrating gabapentin and also considering low dose of pregabalin if does not improve with gabapentin  · Continue gabapentin and Baclofen  · Gabapentin has been increased to 300-400-600 from 300-300-300  · Ketamine cream ordered by Palliative care    Malignant neoplasm of abdominal wall  Assessment & Plan  · Chronic, palliative radiation 2017 and chemotherapy 2018  · Patient changes dressings himself daily in AM    · Consult wound care, appreciated  Lower extremity edema  Assessment & Plan  · Chronic, equal bilaterally  · On Lasix 20 mg daily as an outpatient which has been held since admission  · Resume Lasix in AM today  Cr stable  · Compression stockings  Essential hypertension  Assessment & Plan  · BP stable   · Continue metoprolol  Atrial fibrillation ([facility] [address] )  Assessment & Plan  · Rate controlled, continue metoprolol daily  · Not on anticoagulation  · Managed by PCP with referral to cardiology  VTE Pharmacologic Prophylaxis:   Pharmacologic: Enoxaparin (Lovenox)  Mechanical VTE Prophylaxis in Place: Yes    Patient Centered Rounds: I have performed bedside rounds with nursing staff today  Discussions with Specialists or Other Care Team Provider:     Education and Discussions with Family / Patient: patient, offered to call family, patient declined  Time Spent for Care: 20 minutes  More than 50% of total time spent on counseling and coordination of care as described above  Current Length of Stay: 5 day(s)    Current Patient Status: Inpatient   Certification Statement: The patient will continue to require additional inpatient hospital stay due to need to monitor LFTs closely  Discharge Plan: likely in 24-48 hours pending improvement in LFTs  Code Status: Level 1 - Full Code      Subjective:   Patient sitting up in bed, notes some discomfort currently on the right side of his face due to trigeminal neuralgia  Otherwise, denies pain, n/v/d  Reports that BM was this morning  States that he has been eating well       Objective:     Vitals: Temp (24hrs), Av 1 °F (37 3 °C), Min:98 7 °F (37 1 °C), Max:99 8 °F (37 7 °C)    Temp:  [98 7 °F (37 1 °C)-99 8 °F (37 7 °C)] 98 7 °F (37 1 °C)  HR:  [] 89  Resp:  [14-20] 18  BP: (115-120)/(74-83) 120/83  SpO2:  [97 %-99 %] 99 %  Body mass index is 32 28 kg/m²  Input and Output Summary (last 24 hours): Intake/Output Summary (Last 24 hours) at 2020 1601  Last data filed at 2020 1501  Gross per 24 hour   Intake 1490 ml   Output 675 ml   Net 815 ml       Physical Exam:     Physical Exam   Constitutional: He is oriented to person, place, and time  He appears well-developed and well-nourished  No distress  HENT:   Head: Normocephalic and atraumatic  Eyes: Scleral icterus is present  Neck: Neck supple  Cardiovascular: Normal rate and regular rhythm  Pulmonary/Chest: Effort normal and breath sounds normal  No respiratory distress  Abdominal: Soft  Bowel sounds are normal  There is no tenderness  Musculoskeletal: Normal range of motion  He exhibits edema (1+ pitting edema of b/l lower extremities)  Neurological: He is alert and oriented to person, place, and time  Skin: Skin is warm and dry  He is not diaphoretic  No erythema  Psychiatric: He has a normal mood and affect  Nursing note and vitals reviewed  Additional Data:     Labs:    Results from last 7 days   Lab Units 20  0620 20  0506   WBC Thousand/uL 6 39 5 99   HEMOGLOBIN g/dL 9 9* 10 1*   HEMATOCRIT % 29 8* 30 4*   PLATELETS Thousands/uL 131* 139*   NEUTROS PCT %  --  74   LYMPHS PCT %  --  12*   MONOS PCT %  --  13*   EOS PCT %  --  0     Results from last 7 days   Lab Units 20  0441   POTASSIUM mmol/L 4 0   CHLORIDE mmol/L 104   CO2 mmol/L 23   BUN mg/dL 15   CREATININE mg/dL 0 57*   CALCIUM mg/dL 8 0*   ALK PHOS U/L 633*   ALT U/L 95*   AST U/L 81*     Results from last 7 days   Lab Units 20  0536   INR  1 23*       * I Have Reviewed All Lab Data Listed Above    * Additional Pertinent Lab Tests Reviewed: All Labs Within Last 24 Hours Reviewed    Imaging:    Imaging Reports Reviewed Today Include: none  Imaging Personally Reviewed by Myself Includes:  none    Recent Cultures (last 7 days):     Results from last 7 days   Lab Units 02/20/20  1653   GRAM STAIN RESULT  1+ Polys  No bacteria seen   BODY FLUID CULTURE, STERILE  No growth       Last 24 Hours Medication List:     Current Facility-Administered Medications:  baclofen 15 mg Oral TID Flordia Harada, MD   bisacodyl 10 mg Rectal Daily PRN Magali Ballard MD   docusate sodium 100 mg Oral BID Flordia Harada, MD   enoxaparin 40 mg Subcutaneous Daily Flordia Harada, MD   [START ON 2/26/2020] furosemide 20 mg Oral Daily Allyson Helm PA-C   gabapentin 300 mg Oral Daily Magali Ballard MD   gabapentin 400 mg Oral Daily Magali Ballard MD   gabapentin 600 mg Oral HS Korina Daniels PA-C   ketamine 2% in Eucerin 1 application Topical TID PRN Divina Sarkar MD   metoprolol succinate 25 mg Oral Daily Flordia Harada, MD   oxyCODONE 10 mg Oral Q3H PRN Divina Sarkar MD   oxyCODONE 20 mg Oral Q3H PRN Divina Sarkar MD   polyethylene glycol 17 g Oral Daily Magali Ballard MD   senna 1 tablet Oral Daily Flordia Harada, MD   tamsulosin 0 4 mg Oral Daily With Obed Ford MD        Today, Patient Was Seen By: Maria R Scott PA-C    ** Please Note: Dictation voice to text software may have been used in the creation of this document   **

## 2020-02-26 NOTE — PLAN OF CARE
Problem: OCCUPATIONAL THERAPY ADULT  Goal: Performs self-care activities at highest level of function for planned discharge setting  See evaluation for individualized goals  Description  Treatment Interventions: ADL retraining, Functional transfer training, UE strengthening/ROM, Endurance training, Cognitive reorientation, Patient/family training, Compensatory technique education, Energy conservation, Activityengagement          See flowsheet documentation for full assessment, interventions and recommendations  Outcome: Progressing  Note:   Limitation: Decreased ADL status, Decreased cognition, Decreased endurance, Decreased self-care trans, Decreased high-level ADLs     Assessment: Pt agreeable to skilled OT treatment to address functional mobility, functional transfers and ADL tasks  Seated in chair pt completed bathing tasks with set-up and distant supervision  Pt completes sit >< stand and functional mobility to bathroom with (S)  Pt completed washing buttocks and mira-area while standing in bathroom with distant supervision  Able to don underwear while in standing with distant supervision  Pt noted with increased balance from previous session, however remains limited by pain and increased fatigue  At the end of the session pt requesting to return to bed, (S) for sit>supine  Pt would continue to benefit from skilled OT treatment while in the hospital to address functional deficits and decreased endurance   Continue to recommend home with family support upon d/c       OT Discharge Recommendation: Home with family support  OT - OK to Discharge: (when medically cleared)

## 2020-02-26 NOTE — PLAN OF CARE
Problem: Potential for Falls  Goal: Patient will remain free of falls  Description  INTERVENTIONS:  - Assess patient frequently for physical needs  -  Identify cognitive and physical deficits and behaviors that affect risk of falls  -  [place] fall precautions as indicated by assessment   - Educate patient/family on patient safety including physical limitations  - Instruct patient to call for assistance with activity based on assessment  - Modify environment to reduce risk of injury  - Consider OT/PT consult to assist with strengthening/mobility  Outcome: Progressing     Problem: Nutrition/Hydration-ADULT  Goal: Nutrient/Hydration intake appropriate for improving, restoring or maintaining nutritional needs  Description  Monitor and assess patient's nutrition/hydration status for malnutrition  Collaborate with interdisciplinary team and initiate plan and interventions as ordered  Monitor patient's weight and dietary intake as ordered or per policy  Utilize nutrition screening tool and intervene as necessary  Determine patient's food preferences and provide high-protein, high-caloric foods as appropriate       INTERVENTIONS:  - Monitor oral intake, urinary output, labs, and treatment plans  - Assess nutrition and hydration status and recommend course of action  - Evaluate amount of meals eaten  - Assist patient with eating if necessary   - Allow adequate time for meals  - Recommend/ encourage appropriate diets, oral nutritional supplements, and vitamin/mineral supplements  - Order, calculate, and assess calorie counts as needed  - Recommend, monitor, and adjust tube feedings and TPN/PPN based on assessed needs  - Assess need for intravenous fluids  - Provide specific nutrition/hydration education as appropriate  - Include patient/family/caregiver in decisions related to nutrition  Outcome: Progressing     Problem: METABOLIC, FLUID AND ELECTROLYTES - ADULT  Goal: Electrolytes maintained within normal limits  Description  INTERVENTIONS:  - Monitor labs and assess patient for signs and symptoms of electrolyte imbalances  - Administer electrolyte replacement as ordered  - Monitor response to electrolyte replacements, including repeat lab results as appropriate  - Instruct patient on fluid and nutrition as appropriate  Outcome: Progressing  Goal: Fluid balance maintained  Description  INTERVENTIONS:  - Monitor labs   - Monitor I/O and WT  - Instruct patient on fluid and nutrition as appropriate  - Assess for signs & symptoms of volume excess or deficit  Outcome: Progressing  Goal: Glucose maintained within target range  Description  INTERVENTIONS:  - Monitor Blood Glucose as ordered  - Assess for signs and symptoms of hyperglycemia and hypoglycemia  - Administer ordered medications to maintain glucose within target range  - Assess nutritional intake and initiate nutrition service referral as needed  Outcome: Progressing     Problem: SKIN/TISSUE INTEGRITY - ADULT  Goal: Skin integrity remains intact  Description  INTERVENTIONS  - Identify patients at risk for skin breakdown  - Assess and monitor skin integrity  - Assess and monitor nutrition and hydration status  - Monitor labs (i e  albumin)  - Assess for incontinence   - Turn and reposition patient  - Assist with mobility/ambulation  - Relieve pressure over bony prominences  - Avoid friction and shearing  - Provide appropriate hygiene as needed including keeping skin clean and dry  - Evaluate need for skin moisturizer/barrier cream  - Collaborate with interdisciplinary team (i e  Nutrition, Rehabilitation, etc )   - Patient/family teaching  Outcome: Progressing  Goal: Incision(s), wounds(s) or drain site(s) healing without S/S of infection  Description  INTERVENTIONS  - Assess and document risk factors for skin impairment   - Assess and document dressing, incision, wound bed, drain sites and surrounding tissue  - Consider nutrition services referral as needed  - Oral mucous membranes remain intact  - Provide patient/ family education  Outcome: Progressing  Goal: Oral mucous membranes remain intact  Description  INTERVENTIONS  - Assess oral mucosa and hygiene practices  - Implement preventative oral hygiene regimen  - Implement oral medicated treatments as ordered  - Initiate Nutrition services referral as needed  Outcome: Progressing

## 2020-02-26 NOTE — ASSESSMENT & PLAN NOTE
· Chronic  · Neurology consulted  · Continue gabapentin and Baclofen  · Gabapentin has been increased to 300-400-600 from 300-300-300    · Ketamine cream ordered by Palliative care

## 2020-02-26 NOTE — ASSESSMENT & PLAN NOTE
· Presented to the ED with elevated bilirubin, 23 07, and increased jaundice over the past 2 weeks as well as increased fatigue  · Biliary stent in place from [facility] in 2014  · Admitted last month with obstructive jaundice, ERCP/external drain placement was unsuccessful  Hyperbilirubinemia had improved at that time  · CT abdomen/pelvis: "Stable position of common bile duct stent  Stent patency not assessed  No significant change in intrahepatic biliary dilatation  Stable hepatic metastases, one of which may invade the gallbladder  Stable mild to moderate ascites  Increasing size of visualized pulmonary metastases at the lung bases "  · s/p inferior PTC tube placement on 02/20/2020 by IR  · Brownish-Yellow fluid in drain today  Cont to flush daily per IR  · Pt will need VNA on discharge  · OP FU with IR in 6 weeks for tube change    · Continue to trend LFTs and if bilirubin improved tomorrow discharge in AM

## 2020-02-26 NOTE — PHYSICAL THERAPY NOTE
Physical Therapy Progress Note     02/26/20 0855   Pain Assessment   Pain Assessment 0-10   Pain Score 4   Pain Location Face   Pain Orientation Right   Restrictions/Precautions   Weight Bearing Precautions Per Order No   Other Precautions Fall Risk;Pain   General   Family/Caregiver Present No   Subjective   Subjective Pt states he is finishing up his breakfast now and would love to go for a walk  Bed Mobility   Rolling L 6  Modified independent   Supine to Sit 6  Modified independent   Sit to Supine 6  Modified independent   Transfers   Sit to Stand 6  Modified independent   Additional items Verbal cues; Increased time required   Stand to Sit 6  Modified independent   Additional items Increased time required;Verbal cues   Additional Comments use of RW   Ambulation/Elevation   Gait pattern Excessively slow; Foward flexed; Short stride;Decreased foot clearance   Gait Assistance 5  Supervision   Additional items Assist x 1;Verbal cues   Assistive Device Rolling walker   Distance 100ft x 2    Stair Management Assistance 4  Minimal assist   Additional items Assist x 1;Verbal cues   Stair Management Technique Reciprocal;One rail L   Number of Stairs 5   Balance   Static Sitting Fair +   Dynamic Sitting Fair   Static Standing Fair   Dynamic Standing Fair -   Ambulatory Fair -   Endurance Deficit   Endurance Deficit Yes   Endurance Deficit Description fatigue, weakness, pain    Activity Tolerance   Activity Tolerance Patient limited by fatigue   Nurse Made Aware RN cleared    Exercises   Hip Flexion Standing;15 reps;Bilateral   Hip Abduction Standing;15 reps;Bilateral   Hip Extension Standing;15 reps;Bilateral   Hip Adduction Standing;15 reps;Bilateral   Ankle Pumps Standing;15 reps;Bilateral   Marching Standing;15 reps;Bilateral   Assessment   Prognosis Good   Problem List Decreased strength;Decreased range of motion;Decreased endurance; Impaired balance;Decreased mobility; Impaired judgement   Assessment Pt was supine in bed today upon arrival for PT session  Pt was able to preform all bed mobility Mod I with no assistance  Pt preformed all transfers mod I with minimal cuing for hand placment before standing up for a sit to stand transfer  Pt was able to stand for 10 minutes supervision while preforming LE exercises no seated rest breaks required between sets  Pt ambulated 100 ft x 2 with supervision and proper use of his RW  Pt preformed x5 steps Kishor with railing on the left and cane on the right to ascending  steps and opposite for descending steps with reciprocal gait pattern  Pt had an increase in pain on the right side of his face post step training  Pt pain decreased once he took a seated rest break  Pt was left in recliner with phone and call bell within reach  PT would continues to benefit from PT to work toward his functional goals for the rest of his stay at [facility]  Goals   Patient Goals to walk more   STG Expiration Date 03/06/20   Plan   Treatment/Interventions Functional transfer training;LE strengthening/ROM; Elevations; Therapeutic exercise; Endurance training;Bed mobility;Gait training   Progress Progressing toward goals   PT Frequency   (3-5 x week)   Recommendation   Recommendation Home PT; Home with family support   Equipment Recommended Walker   PT - OK to Discharge No   Additional Comments more stairs      [employer], [place]

## 2020-02-26 NOTE — OCCUPATIONAL THERAPY NOTE
[address] Progress Note     Patient Name: Andi Linder  YEBCN'U Date: 2/26/2020  Problem List  Principal Problem:    Obstructive jaundice due to cancer [facility])  Active Problems:    Atrial fibrillation ([facility] [address] )    Essential hypertension    Painless jaundice secondary to parenchymal infiltration of tumor along with biliary compression     Trigeminal neuralgia    Metastatic colon cancer to liver [facility])    Lower extremity edema    Malignant neoplasm of abdominal wall    Transaminitis    Malignant neoplasm of colon ([facility] [address] )            02/26/20 3319   Restrictions/Precautions   Weight Bearing Precautions Per Order No   Other Precautions Fall Risk;Pain   Pain Assessment   Pain Assessment 0-10   Pain Score 3   Pain Type Neuropathic pain   Pain Location Face   Pain Orientation Right   ADL   Where Assessed Chair   Grooming Assistance 5  Supervision/Setup   Grooming Deficit Setup;Brushing hair;Wash/dry face; Wash/dry hands   Grooming Comments seated in chair to complete   UB Bathing Assistance 5  Supervision/Setup   UB Bathing Deficit Supervision/safety; Increased time to complete   UB Bathing Comments A with back   LB Bathing Assistance 5  Supervision/Setup   LB Bathing Deficit Increased time to complete;Supervision/safety   LB Bathing Comments distant supervision throughout, for safety in standing to wash mira-area and buttocks   UB Dressing Assistance 5  Supervision/Setup   UB Dressing Deficit Increased time to complete   UB Dressing Comments A to tie in back   LB Dressing Assistance 5  Supervision/Setup   LB Dressing Deficit Don/doff R sock; Don/doff L sock; Thread RLE into underwear; Thread LLE into underwear;Pull up over hips   LB Dressing Comments distant supervision even while in standing   Bed Mobility   Sit to Supine 5  Supervision   Additional items Increased time required   Additional Comments OOB and in chair upon arrival   Transfers   Sit to Stand 5  Supervision   Additional items Increased time required;Verbal cues   Stand to Sit 5  Supervision   Additional items Increased time required;Verbal cues   Stand pivot 5  Supervision   Additional items Increased time required;Verbal cues   Additional Comments use of RW   Functional Mobility   Functional Mobility 5  Supervision   Additional Comments increased time due to fatigue   Additional items Rolling walker   Cognition   Overall Cognitive Status [facility]   Arousal/Participation Alert; Cooperative   Attention Attends with cues to redirect   Orientation Level Oriented X4   Memory Decreased recall of precautions;Decreased recall of recent events   Following Commands Follows one step commands with increased time or repetition   Comments decreased problem solving   Activity Tolerance   Activity Tolerance Patient tolerated treatment well   Medical Staff Made Aware ANITRA Hobbs   Assessment   Assessment Pt agreeable to skilled OT treatment to address functional mobility, functional transfers and ADL tasks  Seated in chair pt completed bathing tasks with set-up and distant supervision  Pt completes sit >< stand and functional mobility to bathroom with (S)  Pt completed washing buttocks and mira-area while standing in bathroom with distant supervision  Able to don underwear while in standing with distant supervision  Pt noted with increased balance from previous session, however remains limited by pain and increased fatigue  At the end of the session pt requesting to return to bed, (S) for sit>supine  Pt would continue to benefit from skilled OT treatment while in the hospital to address functional deficits and decreased endurance   Continue to recommend home with family support upon d/c     Plan   Goal Expiration Date 03/06/20   OT Treatment Day 1   OT Frequency 3-5x/wk   Recommendation   OT Discharge Recommendation Home with family support   OT - OK to Discharge   (when medically cleared)       Pt will benefit from continued OT services in order to maximize independence with ADL performance, functional mobility with RW, and improve overall endurance/strength required to complete functional tasks in preparation for discharge      At the end of the session, all needs met and pt supine in bed, SCDs on BLE and turned on, HOB elevated and Call bell within reach    [facility], OTR/L

## 2020-02-26 NOTE — RESTORATIVE TECHNICIAN NOTE
Restorative Specialist Mobility Note       Activity: Ambulate in daniel     Assistive Device: Cane        Repositioned: Sitting, Up in chair

## 2020-02-26 NOTE — NURSING NOTE
Rn asked to change abdominal dressing three times today  Patient stated he wanted it changed later, after his family leaves

## 2020-02-26 NOTE — PLAN OF CARE
Problem: PHYSICAL THERAPY ADULT  Goal: Performs mobility at highest level of function for planned discharge setting  See evaluation for individualized goals  Description  Treatment/Interventions: Functional transfer training, LE strengthening/ROM, Elevations, Therapeutic exercise, Endurance training, Patient/family training, Equipment eval/education, Bed mobility, Gait training, Spoke to nursing  Equipment Recommended: Gomez Bright       See flowsheet documentation for full assessment, interventions and recommendations  Outcome: Progressing  Note:   Prognosis: Good  Problem List: Decreased strength, Decreased range of motion, Decreased endurance, Impaired balance, Decreased mobility, Impaired judgement  Assessment: Pt was supine in bed today upon arrival for PT session  Pt was able to preform all bed mobility Mod I with no assistance  Pt preformed all transfers mod I with minimal cuing for hand placment before standing up for a sit to stand transfer  Pt was able to stand for 10 minutes supervision while preforming LE exercises no seated rest breaks required between sets  Pt ambulated 100 ft x 2 with supervision and proper use of his RW  Pt preformed x5 steps Kishor with railing on the left and cane on the right to ascending  steps and opposite for descending steps with reciprocal gait pattern  Pt had an increase in pain on the right side of his face post step training  Pt pain decreased once he took a seated rest break  Pt was left in recliner with phone and call bell within reach  PT would continues to benefit from PT to work toward his functional goals for the rest of his stay at [facility]  Recommendation: Home PT, Home with family support     PT - OK to Discharge: No    See flowsheet documentation for full assessment

## 2020-02-26 NOTE — PROGRESS NOTES
Progress Note - Adwoa Boyer 1945, 66 y o  male MRN: [id number]  Unit/Bed#: -01 Encounter: [id number]  Primary Care Provider: Bryan Gaona DO   Date and time admitted to hospital: 2/19/2020  8:26 PM    * Obstructive jaundice due to cancer [facility])  Assessment & Plan  · Presented to the ED with elevated bilirubin, 23 07, and increased jaundice over the past 2 weeks as well as increased fatigue  · Biliary stent in place from [facility] in 2014  · Admitted last month with obstructive jaundice, ERCP/external drain placement was unsuccessful  Hyperbilirubinemia had improved at that time  · CT abdomen/pelvis: "Stable position of common bile duct stent  Stent patency not assessed  No significant change in intrahepatic biliary dilatation  Stable hepatic metastases, one of which may invade the gallbladder  Stable mild to moderate ascites  Increasing size of visualized pulmonary metastases at the lung bases "  · s/p inferior PTC tube placement on 02/20/2020 by IR  · Brownish-Yellow fluid in drain today  Cont to flush daily per IR  · Pt will need VNA on discharge  · OP FU with IR in 6 weeks for tube change  · Continue to trend LFTs and if bilirubin improved tomorrow discharge in AM     Malignant neoplasm of abdominal wall  Assessment & Plan  · Chronic, palliative radiation 2017 and chemotherapy 2018  · Patient changes dressings himself daily in AM    · Consult wound care, appreciated  Atrial fibrillation ([address] )  Assessment & Plan  · Rate controlled, continue metoprolol daily  · Not on anticoagulation  · Managed by PCP with referral to cardiology  Metastatic colon cancer to liver [facility])  Assessment & Plan  · Sigmoid adenocarcinoma with mets to liver, abdominal wall, lungs  · S/P sigmoid colectomy, radiation, chemotherapy  · Oncology following and appreciate their input; recommend outpatient follow-up  · Palliative Care following and appreciate their input    · Continue pain control with prn oxycodone  Essential hypertension  Assessment & Plan  · BP stable   · Continue metoprolol  Trigeminal neuralgia  Assessment & Plan  · Chronic  · Neurology consulted  · Continue gabapentin and Baclofen  · Gabapentin has been increased to 300-400-600 from 300-300-300  · Ketamine cream ordered by Palliative care        VTE Pharmacologic Prophylaxis:   Pharmacologic: Enoxaparin (Lovenox)  Mechanical VTE Prophylaxis in Place: Yes    Patient Centered Rounds: I have performed bedside rounds with nursing staff today  Discussions with Specialists or Other Care Team Provider:     Education and Discussions with Family / Patient: patient     Time Spent for Care: 20 minutes  More than 50% of total time spent on counseling and coordination of care as described above  Current Length of Stay: 6 day(s)    Current Patient Status: Inpatient   Certification Statement: The patient will continue to require additional inpatient hospital stay due to obstructive jaundice     Discharge Plan: pending improvement in LFTs in AM     Code Status: Level 1 - Full Code      Subjective:   Patient is concerned about going home with eelvated bilirubin  No abdo pain  Drain with good output  Has been ambulating halls  No chest pain ir SOB  Objective:     Vitals:   Temp (24hrs), Av 8 °F (37 1 °C), Min:98 1 °F (36 7 °C), Max:99 2 °F (37 3 °C)    Temp:  [98 1 °F (36 7 °C)-99 2 °F (37 3 °C)] 98 1 °F (36 7 °C)  HR:  [86-90] 86  Resp:  [18] 18  BP: (110-121)/(72-84) 110/72  SpO2:  [99 %-100 %] 99 %  Body mass index is 32 28 kg/m²  Input and Output Summary (last 24 hours): Intake/Output Summary (Last 24 hours) at 2020 1203  Last data filed at 2020 1202  Gross per 24 hour   Intake 695 ml   Output 750 ml   Net -55 ml       Physical Exam:     Physical Exam   Constitutional: No distress  Eyes: Scleral icterus is present  Neck: Neck supple  Cardiovascular: Normal rate   An irregularly irregular rhythm present  Pulmonary/Chest: Effort normal and breath sounds normal    Abdominal: Soft  Bowel sounds are normal  He exhibits distension  There is tenderness  There is no rebound and no guarding  Drain present    Musculoskeletal: He exhibits edema  Neurological: He is alert  Skin: Skin is warm    jaundice   Nursing note and vitals reviewed  Additional Data:     Labs:    Results from last 7 days   Lab Units 02/26/20  0447  02/22/20  0506   WBC Thousand/uL 7 61   < > 5 99   HEMOGLOBIN g/dL 10 4*   < > 10 1*   HEMATOCRIT % 31 7*   < > 30 4*   PLATELETS Thousands/uL 143*   < > 139*   NEUTROS PCT %  --   --  74   LYMPHS PCT %  --   --  12*   MONOS PCT %  --   --  13*   EOS PCT %  --   --  0    < > = values in this interval not displayed  Results from last 7 days   Lab Units 02/26/20  0447   SODIUM mmol/L 134*   POTASSIUM mmol/L 4 3   CHLORIDE mmol/L 104   CO2 mmol/L 25   BUN mg/dL 13   CREATININE mg/dL 0 64   ANION GAP mmol/L 5   CALCIUM mg/dL 8 2*   ALBUMIN g/dL 1 7*   TOTAL BILIRUBIN mg/dL 15 00*   ALK PHOS U/L 628*   ALT U/L 89*   AST U/L 83*   GLUCOSE RANDOM mg/dL 81     Results from last 7 days   Lab Units 02/21/20  0536   INR  1 23*                       * I Have Reviewed All Lab Data Listed Above  * Additional Pertinent Lab Tests Reviewed:  All Labs Within Last 24 Hours Reviewed    Imaging:    Imaging Reports Reviewed Today Include:   Imaging Personally Reviewed by Myself Includes:      Recent Cultures (last 7 days):     Results from last 7 days   Lab Units 02/20/20  1653   GRAM STAIN RESULT  1+ Polys  No bacteria seen   BODY FLUID CULTURE, STERILE  No growth       Last 24 Hours Medication List:     Current Facility-Administered Medications:  baclofen 15 mg Oral TID Ace Chan MD   bisacodyl 10 mg Rectal Daily PRN Link Méndez MD   docusate sodium 100 mg Oral BID Ace Chan MD   enoxaparin 40 mg Subcutaneous Daily Ace Chan MD   furosemide 20 mg Oral Daily Allyson OPAL Helm   gabapentin 300 mg Oral Daily Cass Gross MD   gabapentin 400 mg Oral Daily Cass Gross MD   gabapentin 600 mg Oral HS Korina Daniels PA-C   ketamine 2% in Eucerin 1 application Topical TID PRN Yvrose Robles MD   metoprolol succinate 25 mg Oral Daily Ziggy Bowman MD   oxyCODONE 10 mg Oral Q3H PRN Yvrose Robles MD   oxyCODONE 20 mg Oral Q3H PRN Yvrose Robles MD   phenol 1 spray Mouth/Throat Q2H PRN Korina Daniels PA-C   polyethylene glycol 17 g Oral Daily Cass Gross MD   senna 1 tablet Oral Daily Ziggy Bowman MD   tamsulosin 0 4 mg Oral Daily With Apoorva Joyner MD        Today, Patient Was Seen By: Elizabeth Stuart PA-C    ** Please Note: Dictation voice to text software may have been used in the creation of this document   **

## 2020-02-27 NOTE — RESTORATIVE TECHNICIAN NOTE
Restorative Specialist Mobility Note       Activity: Ambulate in daniel, Chair     Assistive Device: Cane        Repositioned: Sitting, Up in chair

## 2020-02-27 NOTE — PROGRESS NOTES
Progress Note -Interventional Radiology JASSI Gama 66 y o  male MRN: [id number]  Unit/Bed#: -01 Encounter: [id number]    Assessment:    66year old male with stage IV metastatic colon cancer, placement of common bile duct stent, jaundice presenting with elevated bilirubin, obstructed painless jaundice, weakness, status post internal/external biliary drain and balloon sweep 2/20/20    Biliary drain output 24 hours:  750 light orange    Plan:  - bilirubin remained stable at 15 today  - spoke with daughter  Daughter expressed frustration that bilirubin was as low as 3 a few weeks ago and has now stabilized at 15  I explained to her the elevation may be due to other biliary ducts being blocked, unsure why but may be related to tumor, progressing liver failure, etc    - we will have CT abd/pelvis performed today to assess biliary tube  We are fairly certain tube is not obstructed due to the fact he has had adequate/high output from the tube since placement  If tube is not obstructed, unfortunately there is not much more IR is able to offer the patient  This was expressed to patient and his daughter   - will order a direct bilirubin for tomorrow along with cmp  - continue to flush biliary tube daily with 10cc NS  I flushed tube today without resistance       Patient Active Problem List   Diagnosis    BPH (benign prostatic hyperplasia)    Atrial fibrillation ([facility] [address] )    Colon cancer [facility]asized to multiple sites [facility])    Essential hypertension    Painless jaundice secondary to parenchymal infiltration of tumor along with biliary compression     Trigeminal neuralgia    Chronic hyponatremia    Low hemoglobin    Palliative care patient    Metastatic colon cancer to liver ([facility] [address] )    Obstructive jaundice due to cancer ([facility] [address] )    Lower extremity edema    Cancer associated pain    Pulmonary metastases (HCC)    Malignant neoplasm of abdominal wall    History of colon cancer    Synovial cyst of lumbar spine          Subjective: Pt states he had a "rough morning"  Described to me what sounded as tremors when he stood up today, have since resided  Currently resting in bed  Biliary tube draining  Objective:    Vitals:  BP 96/61   Pulse (!) 108   Temp 98 8 °F (37 1 °C)   Resp 18   Ht 5' 10" (1 778 m)   Wt 102 kg (225 lb)   SpO2 97%   BMI 32 28 kg/m²   Body mass index is 32 28 kg/m²    Weight (last 2 days)     None          I/Os:    Intake/Output Summary (Last 24 hours) at 2/27/2020 1453  Last data filed at 2/27/2020 1246  Gross per 24 hour   Intake 996 ml   Output 2100 ml   Net -1104 ml       Invasive Devices     Central Venous Catheter Line            Port A Cath Right Chest -- days          Peripheral Intravenous Line            Peripheral IV 02/27/20 Left Forearm less than 1 day          Drain            Biliary Tube 8 5 Fr  RUQ 6 days                Physical Exam:  General appearance: alert and oriented, in no acute distress  Lungs: clear to auscultation bilaterally  Heart: regular rate and rhythm, S1, S2 normal, no murmur, click, rub or gallop  Abdomen: soft, non-tender; bowel sounds normal; no masses,  no organomegaly and biliary drain with light orange output  Skin: jaundice                Lab Results and Cultures:   CBC with diff:   Lab Results   Component Value Date    WBC 7 61 02/26/2020    HGB 10 4 (L) 02/26/2020    HCT 31 7 (L) 02/26/2020     (H) 02/26/2020     (L) 02/26/2020    MCH 33 1 02/26/2020    MCHC 32 8 02/26/2020    RDW 19 2 (H) 02/26/2020    MPV 10 9 02/26/2020    NRBC 0 02/22/2020      BMP/CMP:  Lab Results   Component Value Date    K 3 8 02/27/2020     02/27/2020    CO2 24 02/27/2020    BUN 11 02/27/2020    CREATININE 0 59 (L) 02/27/2020    CALCIUM 8 2 (L) 02/27/2020    AST 78 (H) 02/27/2020    ALT 83 (H) 02/27/2020    ALKPHOS 572 (H) 02/27/2020    EGFR 98 02/27/2020   ,     Coags:   Lab Results   Component Value Date    PTT 29 02/19/2020    INR 1 23 (H) 02/21/2020   ,   Results from last 7 days   Lab Units 02/21/20  0536   INR  1 23*        Lipid Panel: No results found for: CHOL  No results found for: HDL  No results found for: HDL  No results found for: LDLCALC  No results found for: TRIG    HgbA1c: No results found for: HGBA1C    Blood Culture:   Lab Results   Component Value Date    BLOODCX No Growth After 5 Days  01/30/2020   ,   Urinalysis:   Lab Results   Component Value Date    COLORU Light Yellow 01/30/2020    CLARITYU Clear 01/30/2020    SPECGRAV <=1 005 01/30/2020    PHUR 6 5 01/30/2020    LEUKOCYTESUR Negative 01/30/2020    NITRITE Negative 01/30/2020    GLUCOSEU Negative 01/30/2020    KETONESU Negative 01/30/2020    BILIRUBINUR Negative 01/30/2020    BLOODU Negative 01/30/2020   ,   Urine Culture: No results found for: URINECX,   Wound Culure:  No results found for: WOUNDCULT    VTE Pharmacologic Prophylaxis: Enoxaparin (Lovenox)      Thank you for allowing me to participate in the care of [employer]  Please don't hesitate to call, text, email, or TigerText with any questions  This text is generated with voice recognition software  There may be translation, syntax,  or grammatical errors  If you have any questions, please contact the dictating provider      Susanne Mujica PA-C

## 2020-02-27 NOTE — PLAN OF CARE
Problem: Potential for Falls  Goal: Patient will remain free of falls  Description  INTERVENTIONS:  - Assess patient frequently for physical needs  -  Identify cognitive and physical deficits and behaviors that affect risk of falls  -  [place] fall precautions as indicated by assessment   - Educate patient/family on patient safety including physical limitations  - Instruct patient to call for assistance with activity based on assessment  - Modify environment to reduce risk of injury  - Consider OT/PT consult to assist with strengthening/mobility  Outcome: Progressing     Problem: Nutrition/Hydration-ADULT  Goal: Nutrient/Hydration intake appropriate for improving, restoring or maintaining nutritional needs  Description  Monitor and assess patient's nutrition/hydration status for malnutrition  Collaborate with interdisciplinary team and initiate plan and interventions as ordered  Monitor patient's weight and dietary intake as ordered or per policy  Utilize nutrition screening tool and intervene as necessary  Determine patient's food preferences and provide high-protein, high-caloric foods as appropriate       INTERVENTIONS:  - Monitor oral intake, urinary output, labs, and treatment plans  - Assess nutrition and hydration status and recommend course of action  - Evaluate amount of meals eaten  - Assist patient with eating if necessary   - Allow adequate time for meals  - Recommend/ encourage appropriate diets, oral nutritional supplements, and vitamin/mineral supplements  - Order, calculate, and assess calorie counts as needed  - Recommend, monitor, and adjust tube feedings and TPN/PPN based on assessed needs  - Assess need for intravenous fluids  - Provide specific nutrition/hydration education as appropriate  - Include patient/family/caregiver in decisions related to nutrition  Outcome: Progressing     Problem: METABOLIC, FLUID AND ELECTROLYTES - ADULT  Goal: Electrolytes maintained within normal limits  Description  INTERVENTIONS:  - Monitor labs and assess patient for signs and symptoms of electrolyte imbalances  - Administer electrolyte replacement as ordered  - Monitor response to electrolyte replacements, including repeat lab results as appropriate  - Instruct patient on fluid and nutrition as appropriate  Outcome: Progressing  Goal: Fluid balance maintained  Description  INTERVENTIONS:  - Monitor labs   - Monitor I/O and WT  - Instruct patient on fluid and nutrition as appropriate  - Assess for signs & symptoms of volume excess or deficit  Outcome: Progressing  Goal: Glucose maintained within target range  Description  INTERVENTIONS:  - Monitor Blood Glucose as ordered  - Assess for signs and symptoms of hyperglycemia and hypoglycemia  - Administer ordered medications to maintain glucose within target range  - Assess nutritional intake and initiate nutrition service referral as needed  Outcome: Progressing     Problem: SKIN/TISSUE INTEGRITY - ADULT  Goal: Skin integrity remains intact  Description  INTERVENTIONS  - Identify patients at risk for skin breakdown  - Assess and monitor skin integrity  - Assess and monitor nutrition and hydration status  - Monitor labs (i e  albumin)  - Assess for incontinence   - Turn and reposition patient  - Assist with mobility/ambulation  - Relieve pressure over bony prominences  - Avoid friction and shearing  - Provide appropriate hygiene as needed including keeping skin clean and dry  - Evaluate need for skin moisturizer/barrier cream  - Collaborate with interdisciplinary team (i e  Nutrition, Rehabilitation, etc )   - Patient/family teaching  Outcome: Progressing  Goal: Incision(s), wounds(s) or drain site(s) healing without S/S of infection  Description  INTERVENTIONS  - Assess and document risk factors for skin impairment   - Assess and document dressing, incision, wound bed, drain sites and surrounding tissue  - Consider nutrition services referral as needed  - Oral mucous membranes remain intact  - Provide patient/ family education  Outcome: Progressing  Goal: Oral mucous membranes remain intact  Description  INTERVENTIONS  - Assess oral mucosa and hygiene practices  - Implement preventative oral hygiene regimen  - Implement oral medicated treatments as ordered  - Initiate Nutrition services referral as needed  Outcome: Progressing

## 2020-02-27 NOTE — OCCUPATIONAL THERAPY NOTE
[address] Progress Note     Patient Name: Sparkle Fajardo  PXZFF'L Date: 2/27/2020  Problem List  Principal Problem:    Obstructive jaundice due to cancer [facility])  Active Problems:    Atrial fibrillation [facility])    Essential hypertension    Painless jaundice secondary to parenchymal infiltration of tumor along with biliary compression     Trigeminal neuralgia    Metastatic colon cancer to liver [facility])    Lower extremity edema    Malignant neoplasm of abdominal wall            02/27/20 0951   Restrictions/Precautions   Weight Bearing Precautions Per Order No   Other Precautions Fall Risk   Pain Assessment   Pain Assessment No/denies pain   Pain Score No Pain   ADL   Where Assessed Standing at sink   Grooming Assistance 5  Supervision/Setup   Grooming Deficit Increased time to complete;Standing with assistive device; Wash/dry hands; Wash/dry face; Teeth care   Grooming Comments Standing at sink to brush teeth, wash face and hands   Bed Mobility   Supine to Sit 6  Modified independent   Additional items Assist x 1;HOB elevated; Increased time required   Additional Comments OOB and in chair at the end of the session   Transfers   Sit to Stand 5  Supervision   Additional items Increased time required   Stand to Sit 5  Supervision   Additional items Increased time required   Stand pivot 5  Supervision   Additional items Increased time required   Functional Mobility   Functional Mobility 5  Supervision   Additional Comments cues for energy conservation and pacing, completed ~150ft    Additional items Rolling walker   Cognition   Overall Cognitive Status [facility]   Arousal/Participation Alert; Cooperative   Attention Attends with cues to redirect   Orientation Level Oriented X4   Memory Decreased recall of precautions;Decreased recall of recent events   Following Commands Follows one step commands with increased time or repetition   Activity Tolerance   Activity Tolerance Patient tolerated treatment well   Medical Staff Made Aware ANITRA Russo   Assessment   Assessment Pt agreeable to skilled OT treatment to address functional mobility, functional transfers and ADL tasks  Pt completed supine > sit with mod I  completed functional mobility to bathroom with (S), and grooming tasks at the sink, standing for ~4min  To complete with no LOB  Pt requesting extended functional mobility in hallway, with educated provided on pacing and breathing techniques throughout  Pt requesting to return to chair due to increased fatigue, completed with (S)  Pt would continue to benefit from skilled OT treatment while in the hospital to address functional deficits and decreased endurance  Continue to recommend home with family support upon d/c     Plan   Goal Expiration Date 03/02/20   OT Treatment Day 2   OT Frequency 3-5x/wk   Recommendation   OT Discharge Recommendation Home with family support   OT - OK to Discharge   (when medically cleared)       Pt will benefit from continued OT services in order to maximize independence with ADL performance, functional mobility with RW, and improve overall endurance/strength required to complete functional tasks in preparation for discharge      At the end of the session, all needs met and pt seated in bedside chair, SCDs on BLE and turned on and Call bell within reach    [facility], OTR/L

## 2020-02-27 NOTE — ASSESSMENT & PLAN NOTE
· Chronic, equal bilaterally  · On Lasix 20 mg daily as an outpatient which has been held since admission  · Resume Lasix today  Cr stable  · Compression stockings

## 2020-02-27 NOTE — WOUND OSTOMY CARE
Progress Note - Wound   Get Zuniga 66 y o  male MRN: [id number]  Unit/Bed#: -01 Encounter: [id number]        Assessment:   Patient seen this morning for continue skin and wound care  He is awake, alert in bed with no complaint, patient states current wound care plan is working for him and he will continue with current dressing orders once discharged home  He is agreeable for re-assessment and wound care  Findings  1-RUQ fungating lesion remains stable with decreased drainage, periwound remains intact non macerated  Current dressing plan working appropriately, no drainage strike through and patient reports increased comfort with using Allevyn foam     Plan:   1-Continue with adaptic, alginate and allevyn foam to RUQ fungating wound for management of drainage  2-Continue encouraging patient for turning and elevating heels to offloading pressure  3-Continue moisturizing skin daily  Vitals: Blood pressure 96/61, pulse (!) 108, temperature 98 8 °F (37 1 °C), resp  rate 18, height 5' 10" (1 778 m), weight 102 kg (225 lb), SpO2 97 %  ,Body mass index is 32 28 kg/m²  Wound 01/07/20 Other (Comment) Abdomen Mid;Right (Active)   Wound Image    2/20/2020 12:13 PM   Wound Description KATHLEEN 2/26/2020  8:15 PM   Gina-wound Assessment KATHLEEN 2/26/2020  8:15 PM   Wound Length (cm) 5 5 cm 2/20/2020 12:13 PM   Wound Width (cm) 7 5 cm 2/20/2020 12:13 PM   Calculated Wound Area (cm^2) 41 25 cm^2 2/20/2020 12:13 PM   Closure None 2/26/2020  8:15 PM   Drainage Amount Small 2/25/2020  8:00 AM   Drainage Description Milky;Green;Brown 2/25/2020  8:00 AM   Treatments Cleansed;Site care 2/25/2020  8:00 AM   Dressing Foam, Silicon (eg  Allevyn, etc) 2/26/2020  8:15 PM   Wound packed? No 2/2/2020 11:36 AM   Dressing Changed Changed 2/25/2020  8:00 AM   Patient Tolerance Tolerated well 2/25/2020  8:00 AM   Dressing Status Clean;Dry; Intact 2/26/2020  8:15 PM         We will continue following, please call ext [phone] with questions or concerns         Nayla Bourgeois, RN, BSN, [facility]

## 2020-02-27 NOTE — PROGRESS NOTES
Hematology - Oncology Progress Note    Rhonda Roper, 1945, [id number]  /-01      Impression and plan:    81-EBKS-GDG male with complicated cancer history including metastatic/recurrent colon cancer to the anterior chest wall  Patient has been through multiple treatments  Presently Mr  Mc states feeling okay, about the same as before  Percutaneous stent is in place, bilirubin is being monitored the, slowly trending downward  Pain is controlled, patient will continue with the ketamine cream for the trigeminal neuralgia  Local care to the abdominal wall lesion is in process  Reason for the weakness this morning is not clear, patient obviously has multiple reasons to have weakness and shakiness  The symptoms seems to have abated - this needs to be monitored  Ammonia level this morning was < 10  Once patient is better/stable and ready for discharge, Mr Cb Sylvester will be followed in the office  As discussed previously, patient had been treated extensively at the [address] [place] [address] and at [facility]  I do not have the specific information infront of me but it seems that patient has been through a number of regimens/heavily pretreated  Options are likely limited  This will be discussed more fully when patient returns to the oncology office   __________________________________________________________________________________________    Chief complaint:  Recurrent colon cancer, jaundice, weakness and fatigue     History of present illness: 59-VFQJ-DBH male with complicated cancer history including metastatic/recurrent sigmoid colon adenocarcinoma status through multiple treatments  Patient also has an anterior chest wall lesion status post elective RT      Mr  Mc states feeling +/-, about the same as before  Patient states having an episode of weakness and shakiness early this morning while trying to go to the bathroom    Symptoms abated without any specific manipulation  Patient is now baseline  No pain control issues  No fevers, chills or sweats  Appetite is good, no nausea vomiting      Hospital medications list:    Current Facility-Administered Medications:  baclofen 15 mg Oral TID Sai Bingham MD   bisacodyl 10 mg Rectal Daily PRN Giulia Campos MD   docusate sodium 100 mg Oral BID Sai Bingham MD   enoxaparin 40 mg Subcutaneous Daily Sai Bingham MD   furosemide 20 mg Oral Daily Allyson Helm PA-C   gabapentin 300 mg Oral Daily Giulia Campos MD   gabapentin 400 mg Oral Daily Giulia Campos MD   gabapentin 600 mg Oral HS Korina Daniels PA-C   ketamine 2% in Eucerin 1 application Topical TID PRN Yaz Campbell MD   metoprolol succinate 25 mg Oral Daily Sai Bingham MD   oxyCODONE 10 mg Oral Q3H PRN Yaz Campbell MD   oxyCODONE 20 mg Oral Q3H PRN Yaz Campbell MD   phenol 1 spray Mouth/Throat Q2H PRN Korina Daniels PA-C   polyethylene glycol 17 g Oral Daily Giulia Campos MD   senna 1 tablet Oral Daily Sai Bingham MD   tamsulosin 0 4 mg Oral Daily With Carlos Vazquez MD     Physical exam  BP 96/61   Pulse (!) 108   Temp 98 8 °F (37 1 °C)   Resp 18   Ht 5' 10" (1 778 m)   Wt 102 kg (225 lb)   SpO2 97%   BMI 32 28 kg/m²   Constitutional: Well formed, well-nourished male, no respiratory distress,  Eyes: PERRL, conjunctiva pale orange, ++icteric, ketamine/Eucerin cream on right-sided face  HENT: Atraumatic, external ears normal, nose normal,    oropharynx moist, no pharyngeal exudates, no thrush, pink  Neck: Good range of motion, no adenopathy  Respiratory:  Distant breath sounds, scattered rhonchi  Cardiovascular: Normal rate, normal rhythm, no murmurs, no gallops, no rubs    GI: Soft, obese, minimally distended, +bowel sounds, nontender, right lateral percutaneous drainage tube in place, bilious material is brown   : No costovertebral angle tenderness, normal reproductive organs, no discharge  Musculoskeletal: No pain or tenderness with palpation of joints, muscles or bones  Integument:  ++jaundice, warm, moist, scattered purpura  Lymphatic: No adenopathy in the neck, supra-clavicular region, axilla and groin bilaterally  Extremities:  0-1 +bilateral lower extremity edema, no cords, pulses are 1+  Neurologic: Alert & oriented x 3, CN 2-12 normal, normal motor function, normal sensory function, no focal deficits noted  Psychiatric:  Pleasant, responsive, appropriate  Rectal: Deferred    Laboratory test results    Results for Daniel Rios (MRN [id number]) as of 2/27/2020 12:14   Ref  Range 2/26/2020 04:47   WBC Latest Ref Range: 4 31 - 10 16 Thousand/uL 7 61   Red Blood Cell Count Latest Ref Range: 3 88 - 5 62 Million/uL 3 14 (L)   Hemoglobin Latest Ref Range: 12 0 - 17 0 g/dL 10 4 (L)   HCT Latest Ref Range: 36 5 - 49 3 % 31 7 (L)   MCV Latest Ref Range: 82 - 98 fL 101 (H)   MCH Latest Ref Range: 26 8 - 34 3 pg 33 1   MCHC Latest Ref Range: 31 4 - 37 4 g/dL 32 8   RDW Latest Ref Range: 11 6 - 15 1 % 19 2 (H)   Platelet Count Latest Ref Range: 149 - 390 Thousands/uL 143 (L)   MPV Latest Ref Range: 8 9 - 12 7 fL 10 9     Results for Daniel Rios (MRN [id number]) as of 2/27/2020 12:14   Ref  Range 2/18/2020 12:00 2/19/2020 21:07 2/20/2020 06:19 2/21/2020 05:36 2/22/2020 05:06 2/23/2020 06:20 2/24/2020 04:50 2/24/2020 17:03 2/25/2020 04:41 2/26/2020 04:47 2/27/2020 05:10   TOTAL BILIRUBIN Latest Ref Range: 0 20 - 1 00 mg/dL 23 40 (H) 23 07 (H) 21 00 (H) 17 04 (H) 15 23 (H) 12 59 (H) 16 19 (H) 16 35 (H) 14 19 (H) 15 00 (H) 15 38 (H)       Results for Daniel Rios (MRN [id number]) as of 2/27/2020 12:14   Ref   Range 2/27/2020 05:10   BUN Latest Ref Range: 5 - 25 mg/dL 11   Creatinine Latest Ref Range: 0 60 - 1 30 mg/dL 0 59 (L)
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
## 2020-02-27 NOTE — ASSESSMENT & PLAN NOTE
· Presented to the ED with elevated bilirubin, 23 07, and increased jaundice over the past 2 weeks as well as increased fatigue  · Biliary stent in place from [facility] in 2014  · Admitted last month with obstructive jaundice, ERCP/external drain placement was unsuccessful  Hyperbilirubinemia had improved at that time  · CT abdomen/pelvis: "Stable position of common bile duct stent  Stent patency not assessed  No significant change in intrahepatic biliary dilatation  Stable hepatic metastases, one of which may invade the gallbladder  Stable mild to moderate ascites  Increasing size of visualized pulmonary metastases at the lung bases "  · s/p inferior PTC tube placement on 02/20/2020 by IR  · Brownish-Yellow fluid in drain today  Cont to flush daily per IR  · Pt will need VNA on discharge  · OP FU with IR in 6 weeks for tube change    · Continue to trend LFTs and if bilirubin improved tomorrow discharge in AM

## 2020-02-27 NOTE — PROGRESS NOTES
Progress Note - Kain Chau 1945, 66 y o  male MRN: [id number]    Unit/Bed#: -01 Encounter: [id number]    Primary Care Provider: Maricarmen Mello DO   Date and time admitted to hospital: 2/19/2020  8:26 PM        * Obstructive jaundice due to cancer [facility])  Assessment & Plan  · Presented to the ED with elevated bilirubin, 23 07, and increased jaundice over the past 2 weeks as well as increased fatigue  · Biliary stent in place from [facility] in 2014  · Admitted last month with obstructive jaundice, ERCP/external drain placement was unsuccessful  Hyperbilirubinemia had improved at that time  · CT abdomen/pelvis: "Stable position of common bile duct stent  Stent patency not assessed  No significant change in intrahepatic biliary dilatation  Stable hepatic metastases, one of which may invade the gallbladder  Stable mild to moderate ascites  Increasing size of visualized pulmonary metastases at the lung bases "  · s/p inferior PTC tube placement on 02/20/2020 by IR  · Brownish-Yellow fluid in drain today  Cont to flush daily per IR  · Pt will need VNA on discharge  · OP FU with IR in 6 weeks for tube change  · Continue to trend LFTs and if bilirubin improved tomorrow discharge in AM     Malignant neoplasm of abdominal wall  Assessment & Plan  · Chronic, palliative radiation 2017 and chemotherapy 2018  · Patient changes dressings himself daily in AM    · Consult wound care, appreciated  Lower extremity edema  Assessment & Plan  · Chronic, equal bilaterally  · On Lasix 20 mg daily as an outpatient which has been held since admission  · Resume Lasix today  Cr stable  · Compression stockings  Metastatic colon cancer to liver [facility])  Assessment & Plan  · Sigmoid adenocarcinoma with mets to liver, abdominal wall, lungs  · S/P sigmoid colectomy, radiation, chemotherapy  · Oncology following and appreciate their input; recommend outpatient follow-up    · Palliative Care following and appreciate their input  · Continue pain control with prn oxycodone  Trigeminal neuralgia  Assessment & Plan  · Chronic  · Neurology consulted  · Continue gabapentin and Baclofen  · Gabapentin has been increased to 300-400-600 from 300-300-300  · Ketamine cream ordered by Palliative care    Painless jaundice secondary to parenchymal infiltration of tumor along with biliary compression   Assessment & Plan  Chronic condition  Please see above    Essential hypertension  Assessment & Plan  · BP stable   · Continue metoprolol  Atrial fibrillation ([address] )  Assessment & Plan  · Rate controlled, continue metoprolol daily  · Not on anticoagulation  · Managed by PCP with referral to cardiology  VTE Pharmacologic Prophylaxis:   Pharmacologic: Pharmacologic VTE Prophylaxis contraindicated due to Fall risk  Mechanical VTE Prophylaxis in Place: Yes    Patient Centered Rounds: I have performed bedside rounds with nursing staff today  Discussions with Specialists or Other Care Team Provider:  None    Education and Discussions with Family / Patient:  Discussed with patient at bedside    Time Spent for Care: 30 minutes  More than 50% of total time spent on counseling and coordination of care as described above  Current Length of Stay: 7 day(s)    Current Patient Status: Inpatient   Certification Statement: The patient will continue to require additional inpatient hospital stay due to Worsening bilirubin    Discharge Plan: Will discharge home tomorrow if bilirubin stabilizes    Code Status: Level 1 - Full Code      Subjective:   Patient denies any chest pain or shortness of breath or abdominal pain today  No diarrhea reported  Patient states when he stood up this morning started shaking like a leaf and almost fell    He he is pretty concerned about that also worried about his LFTs which are improving but bilirubin which is rising    Objective:     Vitals:   Temp (24hrs), Av 7 °F (37 1 °C), Min:98 4 °F (36 9 °C), Max:99 °F (37 2 °C)    Temp:  [98 4 °F (36 9 °C)-99 °F (37 2 °C)] 98 8 °F (37 1 °C)  HR:  [87-94] 94  Resp:  [16-18] 18  BP: (109-123)/(74-89) 109/77  SpO2:  [95 %-98 %] 98 %  Body mass index is 32 28 kg/m²  Input and Output Summary (last 24 hours): Intake/Output Summary (Last 24 hours) at 2/27/2020 0847  Last data filed at 2/27/2020 0845  Gross per 24 hour   Intake 1215 ml   Output 1650 ml   Net -435 ml       Physical Exam:     Physical Exam   Constitutional: He is oriented to person, place, and time  He appears well-developed and well-nourished  HENT:   Head: Normocephalic and atraumatic  Right Ear: External ear normal    Left Ear: External ear normal    Mouth/Throat: Oropharynx is clear and moist    Eyes: Conjunctivae and EOM are normal  Scleral icterus is present  Neck: Normal range of motion  Neck supple  Cardiovascular: Normal rate, regular rhythm and normal heart sounds  Pulmonary/Chest: Effort normal and breath sounds normal    Abdominal: Soft  Bowel sounds are normal  He exhibits no mass  There is tenderness  There is no rebound and no guarding  Drain in the right upper quadrant   Genitourinary:   Genitourinary Comments: deferred   Musculoskeletal: He exhibits no edema  Neurological: He is alert and oriented to person, place, and time  He has normal reflexes  Cranial nerves 2-12 are normal   Normal neurological exam   Skin: Skin is warm and dry  No rash noted  Psychiatric:   Anxious   Nursing note and vitals reviewed  Additional Data:     Labs:    Results from last 7 days   Lab Units 02/26/20  0447  02/22/20  0506   WBC Thousand/uL 7 61   < > 5 99   HEMOGLOBIN g/dL 10 4*   < > 10 1*   HEMATOCRIT % 31 7*   < > 30 4*   PLATELETS Thousands/uL 143*   < > 139*   NEUTROS PCT %  --   --  74   LYMPHS PCT %  --   --  12*   MONOS PCT %  --   --  13*   EOS PCT %  --   --  0    < > = values in this interval not displayed       Results from last 7 days   Lab Units 02/27/20  0510   SODIUM mmol/L 135*   POTASSIUM mmol/L 3 8   CHLORIDE mmol/L 104   CO2 mmol/L 24   BUN mg/dL 11   CREATININE mg/dL 0 59*   ANION GAP mmol/L 7   CALCIUM mg/dL 8 2*   ALBUMIN g/dL 1 7*   TOTAL BILIRUBIN mg/dL 15 38*   ALK PHOS U/L 572*   ALT U/L 83*   AST U/L 78*   GLUCOSE RANDOM mg/dL 88     Results from last 7 days   Lab Units 02/21/20  0536   INR  1 23*     Results from last 7 days   Lab Units 02/27/20  0729 02/27/20  0713   POC GLUCOSE mg/dl 96 85                   * I Have Reviewed All Lab Data Listed Above  * Additional Pertinent Lab Tests Reviewed:  Theresa 64 Admission Reviewed    Imaging:    Imaging Reports Reviewed Today Include:  None  Imaging Personally Reviewed by Myself Includes:  None    Recent Cultures (last 7 days):     Results from last 7 days   Lab Units 02/20/20  1653   GRAM STAIN RESULT  1+ Polys  No bacteria seen   BODY FLUID CULTURE, STERILE  No growth       Last 24 Hours Medication List:     Current Facility-Administered Medications:  baclofen 15 mg Oral TID Seng Lebron MD   bisacodyl 10 mg Rectal Daily PRN Jaime Aceves MD   docusate sodium 100 mg Oral BID Seng Lebron MD   enoxaparin 40 mg Subcutaneous Daily Seng Lebron MD   furosemide 20 mg Oral Daily Allyson Helm PA-C   gabapentin 300 mg Oral Daily Jaime Aceves MD   gabapentin 400 mg Oral Daily Jaime Aceves MD   gabapentin 600 mg Oral HS Korina Daniels PA-C   ketamine 2% in Eucerin 1 application Topical TID PRN Alvin Ritter MD   metoprolol succinate 25 mg Oral Daily Seng Lebron MD   oxyCODONE 10 mg Oral Q3H PRN Alvin Ritter MD   oxyCODONE 20 mg Oral Q3H PRN Alvin Ritter MD   phenol 1 spray Mouth/Throat Q2H PRN Korina Daniels PA-C   polyethylene glycol 17 g Oral Daily Jaime Aceves MD   senna 1 tablet Oral Daily Seng Lebron MD   tamsulosin 0 4 mg Oral Daily With Rob Newman MD        Today, Patient Was Seen By: Cesario Lester MD    ** Please Note: Dictation voice to text software may have been used in the creation of this document   **

## 2020-02-28 PROBLEM — E46 PROTEIN CALORIE MALNUTRITION (HCC): Status: ACTIVE | Noted: 2020-01-01

## 2020-02-28 PROBLEM — E44.1 MILD PROTEIN-CALORIE MALNUTRITION (HCC): Status: ACTIVE | Noted: 2020-01-01

## 2020-02-28 NOTE — PLAN OF CARE
Problem: OCCUPATIONAL THERAPY ADULT  Goal: Performs self-care activities at highest level of function for planned discharge setting  See evaluation for individualized goals  Description  Treatment Interventions: ADL retraining, Functional transfer training, UE strengthening/ROM, Endurance training, Cognitive reorientation, Patient/family training, Compensatory technique education, Energy conservation, Activityengagement          See flowsheet documentation for full assessment, interventions and recommendations  Outcome: Adequate for Discharge  Note:   Limitation: Decreased ADL status, Decreased cognition, Decreased endurance, Decreased self-care trans, Decreased high-level ADLs     Assessment: Patient participated in Skilled OT session this date with interventions consisting of ADL re training with the use of correct body mechnaics,  therapeutic activities to: increase activity tolerance, increase standing tolerance time with unilateral UE support to complete sink level ADLs and increase OOB/ sitting tolerance  Patient agreeable to OT treatment session, upon arrival patient was found supine in bed  In comparison to previous session, patient with improvements in functional mobility  Patient requiring occasional rest periods  No further acute OT needs identified at this time to warrant continuation of services  D/C OT services  From OT standpoint, recommendation at time of d/c would be Home with family support  Pt reports no concerns about returning home          OT Discharge Recommendation: Home with family support  OT - OK to Discharge: Yes(When medically appropriate )

## 2020-02-28 NOTE — SOCIAL WORK
CM was informed by Dr Page Yusuf that pt is medically clear for dc home today with Gamal Pereira  CM informed [facility] of the above  Pt will be dc with PTC tube and will need home nsg and PT  CM also called [facility] and left   Flr RN aware to send pt dressing supplies and flushes when dc  CM will follow  CM called and  Spoke with Manus Profit with [facility] who confirmed that they can accept pt and SOC is tomorrow  Pt and flr RN aware of the above

## 2020-02-28 NOTE — OCCUPATIONAL THERAPY NOTE
[address] Progress Note     Patient Name: Nina Sung  JJSRC'W Date: 2/28/2020  Problem List  Principal Problem:    Obstructive jaundice due to cancer [facility])  Active Problems:    Atrial fibrillation ([facility] [address] )    Essential hypertension    Painless jaundice secondary to parenchymal infiltration of tumor along with biliary compression     Trigeminal neuralgia    Metastatic colon cancer to liver [facility])    Lower extremity edema    Cancer associated pain    Malignant neoplasm of abdominal wall    Mild protein-calorie malnutrition ([facility] [address] )          02/28/20 1138   Restrictions/Precautions   Weight Bearing Precautions Per Order No   General   Response to Previous Treatment Patient with no complaints from previous session   Lifestyle   Autonomy pt reports being mostly independent w self care, mobility   Reciprocal Relationships daughter/BUFFY nearby and assist as much as possible   Intrinsic Gratification woodworking, read, watch TV   Pain Assessment   Pain Assessment No/denies pain   Pain Score No Pain   ADL   Where Assessed Edge of bed   Grooming Assistance 5  Supervision/Setup   Grooming Deficit Setup; Wash/dry face;Brushing hair   Bed Mobility   Supine to Sit 5  Supervision   Additional items Assist x 1; Increased time required;LE management   Sit to Supine 5  Supervision   Additional items Assist x 1; Increased time required;Verbal cues   Transfers   Sit to Stand 5  Supervision   Additional items Assist x 1; Increased time required   Stand to Sit 5  Supervision   Additional items Assist x 1; Increased time required   Functional Mobility   Functional Mobility 5  Supervision   Additional Comments Pt demonstrated long household mobility with 1 standing rest break  Additional items Rolling walker   Cognition   Overall Cognitive Status WFL   Arousal/Participation Alert; Cooperative   Attention Within functional limits   Orientation Level Oriented X4   Memory Within functional limits   Following Commands Follows all commands and directions without difficulty   Comments Pt is very pleasant and cooperative to work with therapy  Activity Tolerance   Activity Tolerance Patient tolerated treatment well   Medical Staff Made Aware RN confirmed okay to see pt   Assessment   Assessment Patient participated in Skilled OT session this date with interventions consisting of ADL re training with the use of correct body mechnaics,  therapeutic activities to: increase activity tolerance, increase standing tolerance time with unilateral UE support to complete sink level ADLs and increase OOB/ sitting tolerance  Patient agreeable to OT treatment session, upon arrival patient was found supine in bed  In comparison to previous session, patient with improvements in functional mobility  Patient requiring occasional rest periods  No further acute OT needs identified at this time to warrant continuation of services  D/C OT services  From OT standpoint, recommendation at time of d/c would be Home with family support  Pt reports no concerns about returning home        Recommendation   OT Discharge Recommendation Home with family support   OT - OK to Discharge Yes  (When medically appropriate )   Modified Tunbridge Scale   Modified Tunbridge Scale 2     Faiza Stapleton, MOT, OTR/L

## 2020-02-28 NOTE — DISCHARGE SUMMARY
Discharge- Amy Lagos 1945, 66 y o  male MRN: [id number]    Unit/Bed#: -01 Encounter: [id number]    Primary Care Provider: America Freeman DO   Date and time admitted to hospital: 2/19/2020  8:26 PM        * Obstructive jaundice due to cancer [facility])  Assessment & Plan  · Presented to the ED with elevated bilirubin, 23 07, and increased jaundice over the past 2 weeks as well as increased fatigue  · Biliary stent in place from [facility] in 2014  · Admitted last month with obstructive jaundice, ERCP/external drain placement was unsuccessful  Hyperbilirubinemia had improved at that time  · CT abdomen/pelvis: "Stable position of common bile duct stent  Stent patency not assessed  No significant change in intrahepatic biliary dilatation  Stable hepatic metastases, one of which may invade the gallbladder  Stable mild to moderate ascites  Increasing size of visualized pulmonary metastases at the lung bases "  · s/p inferior PTC tube placement on 02/20/2020 by IR  · Brownish-Yellow fluid in drain today  Cont to flush daily per IR  · Pt will need VNA on discharge  · OP FU with IR in 6 weeks for tube change  · Discussed with the patient that there are no further options that can be offered by Interventional Radiology  Secondary to his elevated bilirubin he has no options for palliative chemotherapy either  Discussed with him about hospice care however he is not ready for that at this time  Also discussed with his daughter about hospice care and she is also not ready for that at this time  Ask them to continue to follow with palliative care  · The bilirubin level is slowly rising however there is no further intervention that can be offered at this time and hence patient is being discharged home  Mild protein-calorie malnutrition ([address] )  Assessment & Plan  Malnutrition Findings:           BMI Findings: Body mass index is 32 28 kg/m²     Encourage good oral intake    Malignant neoplasm of abdominal wall  Assessment & Plan  · Chronic, palliative radiation 2017 and chemotherapy 2018  · Patient changes dressings himself daily in AM    · Consult wound care, appreciated  Cancer associated pain  Assessment & Plan  As per palliative care    Metastatic colon cancer to liver [facility])  Assessment & Plan  · Sigmoid adenocarcinoma with mets to liver, abdominal wall, lungs  · S/P sigmoid colectomy, radiation, chemotherapy  · Oncology following and appreciate their input; recommend outpatient follow-up  · Palliative Care following and appreciate their input  · Continue pain control with prn oxycodone  Trigeminal neuralgia  Assessment & Plan  · Chronic  · Neurology consulted  · Continue gabapentin and Baclofen  · Gabapentin has been increased as per palliative care  · Ketamine cream to be ordered by Palliative care    Painless jaundice secondary to parenchymal infiltration of tumor along with biliary compression   Assessment & Plan  Chronic condition  Please see above    Essential hypertension  Assessment & Plan  · BP stable   · Continue metoprolol  Atrial fibrillation [address] [address] )  Assessment & Plan  · Rate controlled, continue metoprolol daily  · Not on anticoagulation  · Managed by PCP with referral to cardiology  Discharging Physician / Practitioner: Romel Reynolds MD  PCP: Mikhail Bear DO  Admission Date:   Admission Orders (From admission, onward)     Ordered        02/20/20 0021  Inpatient Admission  Once                   Discharge Date: 02/28/20    Resolved Problems  Date Reviewed: 2/28/2020          Resolved    Constipation 2/21/2020     Resolved by  Mark Nuñez PA-C          Consultations During Hospital Stay:  · Heme-Onc, intervention Radiology, palliative care    Wound care    Procedures Performed:   · IR drain placed for obstructive jaundice    Significant Findings / Test Results:   Ir Tube Placement Bili    Result Date: 2/20/2020  Impression: Impression: Percutaneous cholangiogram with severe biliary dilation and occluded biliary stent  Successful access through the occluded stent and clean out with balloon sweeps  Placement of an 8 [employer] internal/external biliary drainage catheter with 4 cm of extra sideholes  Plan: Monitor bilirubin levels Patient will require flushing this catheter every day and routine exchange in 6 weeks initially  Workstation performed: [id number]     Ct Abdomen Pelvis With Contrast    Result Date: 2/19/2020  Impression: 1  Stable position of common bile duct stent  Stent patency not assessed  No significant change in intrahepatic biliary dilatation  2   Stable hepatic metastases, one of which may invade the gallbladder  3   Stable mild to moderate ascites  4   Increasing size of visualized pulmonary metastases at the lung bases  Workstation performed: [id number]     Incidental Findings:   · None     Test Results Pending at Discharge (will require follow up): · None     Outpatient Tests Requested:  · None    Complications:  None    Reason for Admission:  Worsening bilirubin    Hospital Course: Toña Napier is a 66 y o  male patient who originally presented to the hospital on 2/19/2020 due to worsening bilirubin and generalized weakness and fatigue and worsening lesion on his abdominal wall  Patient has known history of metastatic colon cancer has had multiple evaluations done in the past with Heme-Onc at our [facility] and also with [facility]  He has had stents placed in the past which have not worked for his obstructive jaundice and now had IR drain placed  His bilirubin improved intermittently but now starting to go up again  Discussed with the patient at length and also discussed with palliative care and with Heme-Onc and with Interventional Radiology  Nothing further can be offered to him at this time  He is not a candidate for palliative chemotherapy secondary to his elevated bilirubin    I offered him hospice care which he felt he was not ready for at this time however he was willing to follow up with palliative care  He understands that there is not much else that can be done  His family does not want him to feel do fetid and hence do not want him to go on hospice at this time  Will discharge him with home VNA services  His IR drain to be changed in 6 weeks    Please see above list of diagnoses and related plan for additional information  Condition at Discharge: poor     Discharge Day Visit / Exam:     Subjective:  Patient denies any chest pain or shortness of breath or abdominal pain at this time  He denies any weakness or imbalance today  Vitals: Blood Pressure: 114/73 (02/28/20 0716)  Pulse: 90 (02/28/20 0716)  Temperature: 98 5 °F (36 9 °C) (02/28/20 0716)  Temp Source: Oral (02/24/20 0700)  Respirations: 16 (02/28/20 0716)  Height: 5' 10" (177 8 cm) (02/20/20 1108)  Weight - Scale: 102 kg (225 lb) (02/20/20 1108)  SpO2: 98 % (02/28/20 0716)  Exam:   Physical Exam   Constitutional: He is oriented to person, place, and time  He appears well-developed  HENT:   Head: Normocephalic and atraumatic  Right Ear: External ear normal    Left Ear: External ear normal    Mouth/Throat: Oropharynx is clear and moist    Eyes: Pupils are equal, round, and reactive to light  Conjunctivae and EOM are normal  Scleral icterus is present  Neck: Normal range of motion  Neck supple  Cardiovascular: Normal rate, regular rhythm and normal heart sounds  Pulmonary/Chest: Effort normal and breath sounds normal    Abdominal: Soft  Bowel sounds are normal  He exhibits no mass  There is no tenderness  There is no rebound and no guarding  Black necrotic tumor present on the abdominal wall  STEPAN drain present on the right upper quadrant   Genitourinary:   Genitourinary Comments: deferred   Musculoskeletal: Normal range of motion  Neurological: He is alert and oriented to person, place, and time  He has normal reflexes     Cranial nerves 2-12 are normal   Normal neurological exam   Skin: Skin is warm and dry  No rash noted  Yellowish discoloration of the skin   Psychiatric: He has a normal mood and affect  Nursing note and vitals reviewed  Discussion with Family:  Discussed with daughter over the phone    Discharge instructions/Information to patient and family:   See after visit summary for information provided to patient and family  Provisions for Follow-Up Care:  See after visit summary for information related to follow-up care and any pertinent home health orders  Disposition:     Home with VNA Services (Reminder: Complete face to face encounter)    For Discharges to [facility]:   · Not Applicable to this Patient - Not Applicable to this Patient    Planned Readmission:  None     Discharge Statement:  I spent 35 minutes discharging the patient  This time was spent on the day of discharge  I had direct contact with the patient on the day of discharge  Greater than 50% of the total time was spent examining patient, answering all patient questions, arranging and discussing plan of care with patient as well as directly providing post-discharge instructions  Additional time then spent on discharge activities  Discharge Medications:  See after visit summary for reconciled discharge medications provided to patient and family        ** Please Note: This note has been constructed using a voice recognition system **

## 2020-02-28 NOTE — PROGRESS NOTES
Progress Note - Palliative & Supportive Care  Dede Luna  66 y o   male  /-01   MRN: [id number]  Encounter: [id number]     ASSESSMENT:    Patient Active Problem List   Diagnosis    BPH (benign prostatic hyperplasia)    Atrial fibrillation (HCC)    Colon cancer metastasized to multiple sites [facility])    Essential hypertension    Painless jaundice secondary to parenchymal infiltration of tumor along with biliary compression     Trigeminal neuralgia    Chronic hyponatremia    Low hemoglobin    Palliative care patient    [facility] )    Obstructive jaundice due to cancer ([facility] )    Lower extremity edema    Cancer associated pain    Pulmonary metastases (HCC)    Malignant neoplasm of abdominal wall    History of colon cancer    Synovial cyst of lumbar spine    Mild protein-calorie malnutrition ([facility] )     PLAN:    1  Symptom management:   Returning oxyIR to home regimen (5mg q4h PRN); has not needed opioid analgesia since 2/26/20   Adjusting gabapentin at patient's request to 300mg qAM, 600mg qPM, 600mg QHS   He will go home on those medications (e-prescribed to [facility] in [place])   Called in prescription for #60g, no refills, of ketamine 2% in Eucerin cream for use on face TID PRN trigeminal neuralgia pain; [facility] pharmacy in [address] [place] [address], they can mail the Rx to the patient  2  Goals:    Patient may be willing to consider hospice in the future as there are no further IR interventions available for his hyperbilirubinemia / obstructive jaundice  Patient's wife has stated she does not agree w/ comfort care   For now, continue life-extending disease-directed treatments as offered/appropriate   Patient will see me in the [facility] office on Tues 3/3/20  Code status: Level 1 - Full Code   Decisional apparatus:  Patient does have capacity to make medical decisions on my exam today   If such capacity is lost, patient's substitute decision maker would default to spouse by [place] 169  Advance Directive / Living Will / POLST:  Living will from 2013    We appreciate the opportunity to participate in this patient's care  We will continue to follow  Please do not hesitate to contact our on-call provider through our clinic answering service at [phone] should you have acute symptom control concerns  INTERVAL HISTORY:    Review of Systems   Constitutional: Positive for fatigue  Negative for fever  Respiratory: Negative for shortness of breath  Cardiovascular: Negative for chest pain  Gastrointestinal: Negative for abdominal pain and nausea  Neurological: Positive for weakness and numbness  Negative for tremors and syncope  Trigeminal neuralgia  Psychiatric/Behavioral: Negative for agitation, behavioral problems, confusion, dysphoric mood and hallucinations  The patient is not nervous/anxious        MEDICATIONS / ALLERGIES:  all current active meds have been reviewed and current meds:   Current Facility-Administered Medications   Medication Dose Route Frequency    baclofen tablet 15 mg  15 mg Oral TID    bisacodyl (DULCOLAX) rectal suppository 10 mg  10 mg Rectal Daily PRN    docusate sodium (COLACE) capsule 100 mg  100 mg Oral BID    enoxaparin (LOVENOX) subcutaneous injection 40 mg  40 mg Subcutaneous Daily    furosemide (LASIX) tablet 20 mg  20 mg Oral Daily    gabapentin (NEURONTIN) capsule 300 mg  300 mg Oral Daily    gabapentin (NEURONTIN) capsule 600 mg  600 mg Oral HS    gabapentin (NEURONTIN) capsule 600 mg  600 mg Oral Daily    ketamine 2% in Eucerin cream 1 application  1 application Topical TID PRN    metoprolol succinate (TOPROL-XL) 24 hr tablet 25 mg  25 mg Oral Daily    oxyCODONE (ROXICODONE) IR tablet 5 mg  5 mg Oral Q4H PRN    phenol (CHLORASEPTIC) 1 4 % mucosal liquid 1 spray  1 spray Mouth/Throat Q2H PRN    polyethylene glycol (MIRALAX) packet 17 g  17 g Oral Daily    senna (SENOKOT) tablet 8 6 mg  1 tablet Oral Daily    tamsulosin (FLOMAX) capsule 0 4 mg  0 4 mg Oral Daily With Dinner       Allergies   Allergen Reactions    Ib Pro [Ibuprofen] Hives and Itching    Adhesive [Medical Tape] Rash     Use only paper tape       OBJECTIVE:  /74   Pulse 92   Temp 98 5 °F (36 9 °C)   Resp 20   Ht 5' 10" (1 778 m)   Wt 102 kg (225 lb)   SpO2 99%   BMI 32 28 kg/m²   Physical Exam:  Constitutional: Pleasant but jaundiced and chronically ill-appearing elderly male  Appears well-developed and well-nourished  In no acute physical or emotional distress  Head: Normocephalic and atraumatic  Cream noted on R face (for trigeminal neuralgia)  Eyes: EOM are normal  No ocular discharge  Scleral icterus noted  Neck: No visible adenopathy or masses  Respiratory: Effort normal  No stridor  No respiratory distress  Gastrointestinal: No abdominal distension  Musculoskeletal: No edema  Neurological: Alert, oriented and appropriately conversant  Skin: Dry, no diaphoresis  Jaundiced  Psychiatric: Displays a normal mood and affect  Behavior, judgement and thought content appear normal      Lab Results:   I have personally reviewed pertinent labs  , CBC: No results found for: WBC, HGB, HCT, MCV, PLT, ADJUSTEDWBC, MCH, MCHC, RDW, MPV, NRBC, CMP:   Lab Results   Component Value Date    SODIUM 134 (L) 02/28/2020    K 4 0 02/28/2020     02/28/2020    CO2 23 02/28/2020    BUN 10 02/28/2020    CREATININE 0 60 02/28/2020    CALCIUM 7 8 (L) 02/28/2020    AST 71 (H) 02/28/2020    ALT 74 02/28/2020    ALKPHOS 511 (H) 02/28/2020    EGFR 98 02/28/2020     Albumin   Lab Value Date/Time    ALB 1 6 (L) 02/28/2020 0447     Imaging Studies: I have personally reviewed pertinent reports  EKG, Pathology, and Other Studies: I have personally reviewed pertinent reports  Counseling / Coordination of Care: Total floor / unit time spent today 30 minutes   Greater than 50% of total time was spent with the patient and / or family counseling and / or coordination of care  A description of the counseling / coordination of care: symptom assessment and management, medication review and adjustment, psychosocial support, chart review, imaging review, lab review      Evan Freitas MD  [address] and Supportive Care

## 2020-02-28 NOTE — OCCUPATIONAL THERAPY NOTE
Occupational Therapy Refusal Note        Patient Name: Florin Gama  ATDIJ'U Date: 2/28/2020    OT treatment attempted, pt refusing stating that he would like to rest now but may be agreeable later  OT will continue to follow and see as medically appropriate and able       Sridhar Guerrero, DAVID, OTR/L

## 2020-02-28 NOTE — ASSESSMENT & PLAN NOTE
· Chronic  · Neurology consulted  · Continue gabapentin and Baclofen     · Gabapentin has been increased as per palliative care  · Ketamine cream to be ordered by Palliative care

## 2020-02-28 NOTE — ASSESSMENT & PLAN NOTE
Malnutrition Findings:           BMI Findings: Body mass index is 32 28 kg/m²     Encourage good oral intake

## 2020-02-28 NOTE — ASSESSMENT & PLAN NOTE
· Presented to the ED with elevated bilirubin, 23 07, and increased jaundice over the past 2 weeks as well as increased fatigue  · Biliary stent in place from [facility] in 2014  · Admitted last month with obstructive jaundice, ERCP/external drain placement was unsuccessful  Hyperbilirubinemia had improved at that time  · CT abdomen/pelvis: "Stable position of common bile duct stent  Stent patency not assessed  No significant change in intrahepatic biliary dilatation  Stable hepatic metastases, one of which may invade the gallbladder  Stable mild to moderate ascites  Increasing size of visualized pulmonary metastases at the lung bases "  · s/p inferior PTC tube placement on 02/20/2020 by IR  · Brownish-Yellow fluid in drain today  Cont to flush daily per IR  · Pt will need VNA on discharge  · OP FU with IR in 6 weeks for tube change  · Discussed with the patient that there are no further options that can be offered by Interventional Radiology  Secondary to his elevated bilirubin he has no options for palliative chemotherapy either  Discussed with him about hospice care however he is not ready for that at this time  Also discussed with his daughter about hospice care and she is also not ready for that at this time  Ask them to continue to follow with palliative care  · The bilirubin level is slowly rising however there is no further intervention that can be offered at this time and hence patient is being discharged home

## 2020-03-02 NOTE — PROGRESS NOTES
Patient contacted this CM and accepted OPCM to assist as able with locating services to help manage patient needs including transportation  Patient informed CHW referral would be made to help address his needs

## 2020-03-02 NOTE — PROGRESS NOTES
This CM spoke to Darshan Jim, patient's daughter/health care rep and explained [facility] service and that although offered to patient several weeks ago, patient only requested transportation assistance  Patient also wanted to know about getting home blood draws  Rupal expressed interest in obtaining OPCM assistance stating she was trying to manage having to take off work getting patient to appointments, finding where to get [address] for patient, meals  Darshan Jim stated while patient was at hospital she received packet of information including [employer] on Aging but needed someone to point her in right direction to obtain the service her father needs  She said palliative care only focused on supplying patient with meds for pain  She said Hospice was encouraged but she feels her father isn't at that point and may not be for months  She stated visiting nurse comes 2-3x/wk to dress wounds but that's all the help they have  Darshan Jim accepted this CM offer to assist as able and make referral to our CHW who knows of local resources that may help provide for patient needs

## 2020-03-03 NOTE — PROGRESS NOTES
Palliative and Supportive Care  Scott Renee 66 y o  male [id number]    ASSESSMENT & PLAN:  1  Colon cancer metastasized to multiple sites ([facility] )    2  Malignant neoplasm metastatic to lung, unspecified laterality ([facility] )    3  Metastatic colon cancer to liver ([facility] )    4  Obstructive jaundice due to cancer ([facility] )    5  Atrial fibrillation, unspecified type ([place] [id number] )    6  Trigeminal neuralgia    7  Cancer associated pain    8  Malignant neoplasm of abdominal wall    9  Painless jaundice secondary to parenchymal infiltration of tumor along with biliary compression     10  Palliative care patient    6  History of Trigeminal neuralgia    12  Mouth sores       Patient has not gotten his compounded ketamine cream in the mail yet - hopefully it will come soon  Provided good relief of trigeminal neuralgia in the hospital    Requested magic mouthwash for painful oral sores   Pain (abdominal pain, trigeminal neuralgia) improves w/ gabapentin and PRN oxyIR  Continuing   Continue disease-directed care   Emotional support provided   Medication safety issues addressed - no driving under the influence of narcotics, watch for adverse effects including AMS and respiratory depression, keep medications stored in a safe/locked environment  Requested Prescriptions     Signed Prescriptions Disp Refills    al mag oxide-diphenhydramine-lidocaine viscous (MAGIC MOUTHWASH) 1:1:1 suspension 180 mL 2     Sig: Swish and spit 10 mL every 4 (four) hours as needed for mouth pain or discomfort    Baclofen 5 MG TABS 90 tablet 0     Sig: Take 15 mg by mouth 3 (three) times a day     Medications Discontinued During This Encounter   Medication Reason    Baclofen 5 MG TABS Reorder     Representatives have queried the patient's controlled substance dispensing history in the Prescription Drug Monitoring Program in compliance with regulations before I have prescribed any controlled substances   The prescription history is consistent with prescribed therapy and our practice policies  60+ minutes were spent face to face with patient and family with greater than 50% of the time spent in counseling or coordination of care including discussions of symptom assessment and management, medication review and adjustment, psychosocial support, chart review, imaging review, lab review  All of the patient's questions were answered during this discussion  Return in about 2 weeks (around 3/17/2020)  SUBJECTIVE:  Chief Complaint   Patient presents with    Cancer    Cancer Pain      HPI    Ryanne Humphries is a 66 y o  male w/ 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 wall) s/p L hemicoloectomy + chemotherapy + RT; also obstructive jaundice d/t  tumor invasion s/p percutaneous stent, Afib not on AC, trigeminal neuralgia, protein calorie malnutrition (albumin 1 6 on 2/28/20)  He follows w/ Dr Shayna Fisher (Medical Oncology), Dr Amanda Lutz (Gastroenterology) and had gone [facility] [place] [facility] the past     Patient is new to [facility]; he had been seen during multiple recent inpatient admissions (most recently, 2/19-28/20 at [facility] for painless obstructive jaundice)  He presents to the clinic today for symptom management, goals of care, psychosocial support  Patient has occasional mild R-sided abdominal pain; this had improved now that there is less fluid in the abdomen  He continues to have R-sided trigeminal neuralgia  He feels that his current regimen helps, and he can sleep through the night, but he is still waiting for his ketamine 2% cream to arrive from the pharmacy  Patient denies mood issues  He says he sleeps well  He endorses constipation; discussed OTC bowel medications including senna   He feels his appetite is not quite as good as Id like; part of the reason he is not eating as well as he ate in the hospital is no one is bringing him breakfast and lunch (his daughter can bring dinner)  Ricci endorses fatigue, as well as some mild weakness in the morning after waking (I take my time to get up safely)  Daughter Palmer Pereyra wonders if the patient might benefit from Meals on Wheels  Patient has signed up for [facility]  Daughter also inquiring about home services  Patient does not wish to engage on goals of care  He knows there is no treatment available for his disease at this time, but he does not wish to discuss comfort care  He mentions that his wife is very delicate and anxious and would not handle a discussion about mortality well  Patient has been  to [place] for approximately 55 years  They have 2 daughters, Palmer Pereyra and Carol Smoke  Rupal and her  live [place] [address] and are very supportive  PDMP shows no concerns  The following portions of the medical history were reviewed: past medical history, problem list, medication list, and social history  Current Outpatient Medications:     Baclofen 5 MG TABS, Take 15 mg by mouth 3 (three) times a day, Disp: 90 tablet, Rfl: 0    furosemide (LASIX) 20 mg tablet, Take 1 tablet (20 mg total) by mouth daily, Disp: 30 tablet, Rfl: 2    gabapentin (NEURONTIN) 300 mg capsule, Take 1 capsule (300 mg total) by mouth every morning AND 2 capsules (600 mg total) daily after lunch AND 2 capsules (600 mg total) daily at bedtime  , Disp: 150 capsule, Rfl: 2    Ketamine HCl (KETAMINE 2% IN EUCERIN) CREA, Apply topically 3 (three) times a day as needed Thin amount to face for trigeminal neuralgia, Disp: , Rfl:     metoprolol succinate (TOPROL-XL) 25 mg 24 hr tablet, Take 1 tablet (25 mg total) by mouth daily, Disp: 30 tablet, Rfl: 5    oxyCODONE (ROXICODONE) 5 mg immediate release tablet, Take 1 tablet (5 mg total) by mouth every 4 (four) hours as needed for moderate painMax Daily Amount: 30 mg, Disp: 90 tablet, Rfl: 0    sodium chloride, PF, 0 9 %, Infuse 10 mL into a venous catheter daily Flush tube as instructed once daily with 10cc normal saline (1 syringe), Disp: 30 Syringe, Rfl: 3    tamsulosin (FLOMAX) 0 4 mg, Take 1 capsule (0 4 mg total) by mouth daily with dinner, Disp: 30 capsule, Rfl: 5    al mag oxide-diphenhydramine-lidocaine viscous (MAGIC MOUTHWASH) 1:1:1 suspension, Swish and spit 10 mL every 4 (four) hours as needed for mouth pain or discomfort, Disp: 180 mL, Rfl: 2    Review of Systems   Constitutional: Positive for activity change, appetite change and fatigue  Negative for chills and fever  Respiratory: Positive for cough (occasional non-productive)  Negative for shortness of breath  Cardiovascular: Negative for chest pain and leg swelling  Gastrointestinal: Positive for abdominal pain and constipation  Negative for abdominal distention, nausea and vomiting  Neurological: Positive for weakness, light-headedness and numbness  Negative for seizures and facial asymmetry  Trigeminal neuralgia  Psychiatric/Behavioral: Negative for agitation, behavioral problems, decreased concentration, dysphoric mood and sleep disturbance  The patient is not nervous/anxious  OBJECTIVE:  /58 (BP Location: Left arm, Patient Position: Sitting, Cuff Size: Standard)   Pulse 71   Temp 98 1 °F (36 7 °C) (Oral)   Resp 20   Ht 5' 10 5" (1 791 m)   Wt 91 5 kg (201 lb 12 8 oz)   SpO2 96%   BMI 28 55 kg/m²   Vital signs reviewed  Physical Exam:  Constitutional: Jaundiced, chronically ill-appearing elderly male  Appears well-developed and well-nourished  In no acute physical or emotional distress  Head: Normocephalic and atraumatic  Eyes: EOM are normal  No ocular discharge  Scleral icterus noted  Neck: No visible adenopathy or masses  Respiratory: Effort normal  No stridor  No respiratory distress  Gastrointestinal: No abdominal distension  Musculoskeletal: No edema  Neurological: Alert, oriented and appropriately conversant  Jaundiced    Skin: No diaphoresis, no rashes seen on exposed areas of skin    Psychiatric: Displays a normal mood and affect   Behavior, judgment and thought content appear normal      Ruthy Cunningham MD  [address] and Supportive Care

## 2020-03-03 NOTE — PROGRESS NOTES
[facility]  [address]  [address]   [place], [address]    Cardiology Consultation    Genia Cortez  [id number]  1945      Consult for: Atrial fibrillation  Appreciate consult by: Josiah Johns DO    1  Atrial fibrillation, unspecified type [facility])  Ambulatory referral to Cardiology    POCT ECG   2  Colon cancer metastasized to multiple sites ([address] )     3  Essential hypertension        Discussion/Summary:     Genia Cortez has atrial fibrillation present for at least 6 months  He is asymptomatic  Rate is controlled  Atrial ablation was discussed in detail with him and his daughter  Risk of CVA with atrial fibrillation reviewed with him  He is at increased risk of stroke because he has had previous stroke in 2008  He is not a candidate for long-term anticoagulation due to liver dysfunction  Continue metoprolol 25 mg daily  Continue furosemide as needed  Follow-up with palliative care and primary medical doctor  Return to office as needed  HPI:     Genia Cortez is a 53-year-old male here for evaluation of atrial fibrillation  Patient was 1st noted to have atrial fibrillation in November 2019  That is when he 1st presented to [address] [place] [address] for obstructive jaundice  He has a history of metastatic colon cancer and has prior biliary stent placed at [facility]  He has chronic jaundice  He was noted to be in atrial fibrillation during emergency room visit and all subsequent EKGs have shown atrial fibrillation including the 1 today  He denies any palpitations, syncope or near-syncope  He denies any lightheadedness, shortness of breath with exertion  He has chronic fatigue secondary to malignancy along with pain in the right side of his chest   Prior to November, he does not recall being told of atrial fibrillation  Echocardiogram was done at [facility] last year which showed normal ejection fraction      Past Medical History:   Diagnosis Date    Atrial fibrillation (HCC)     BPH (benign prostatic hyperplasia)     Hypertension     Metast[facility] liver [facility] )     colon- [facility]    Stroke ([facility] )     2008- mild weakness left hand/arm    Trigeminal neuralgia     Tumor, metastatic ([facility] )      Social History     Socioeconomic History    Marital status: /Civil Union     Spouse name: Carlo Otero Number of children: 2    Years of education: Not on file    Highest education level: Not on file   Occupational History    Occupation: retired   Social Needs    Financial resource strain: Not on file    Food insecurity:     Worry: Not on file     Inability: Not on file   [employer] needs:     Medical: Not on file     Non-medical: Not on file   Tobacco Use    Smoking status: Former Smoker     Types: Cigarettes     Last attempt to quit: 1981     Years since quittin 1    Smokeless tobacco: Never Used   Substance and Sexual Activity    Alcohol use: Never     Frequency: Never    Drug use: Never    Sexual activity: Not on file   Lifestyle    Physical activity:     Days per week: Not on file     Minutes per session: Not on file    Stress: Not on file   Relationships    Social connections:     Talks on phone: Not on file     Gets together: Not on file     Attends Shinto service: Not on file     Active member of club or organization: Not on file     Attends meetings of clubs or organizations: Not on file     Relationship status: Not on file    Intimate partner violence:     Fear of current or ex partner: Not on file     Emotionally abused: Not on file     Physically abused: Not on file     Forced sexual activity: Not on file   Other Topics Concern    Not on file   Social History Narrative    Cory Joshi and Magali Hodges have 2 daughters together - Rupal and Montez Mckinney  They lived in [place] but recently moved to [address] [place] [address] Road to be closer to [place] who only lives 10mins away         Family History   Problem Relation Age of Onset    Cancer Father     Colon cancer Father     Cancer Brother     Cancer Paternal Aundra Liu Stroke Mother 80    Heart attack Maternal Grandfather      Past Surgical History:   Procedure Laterality Date    COLON SURGERY      2013    EYE SURGERY      IR TUBE PLACEMENT BILI  1/15/2020    IR TUBE PLACEMENT BILI  2/20/2020    LIVER SURGERY      TONSILLECTOMY         Current Outpatient Medications:     al mag oxide-diphenhydramine-lidocaine viscous (MAGIC MOUTHWASH) 1:1:1 suspension, Swish and spit 10 mL every 4 (four) hours as needed for mouth pain or discomfort, Disp: 180 mL, Rfl: 2    Baclofen 5 MG TABS, Take 15 mg by mouth 3 (three) times a day, Disp: 90 tablet, Rfl: 0    furosemide (LASIX) 20 mg tablet, Take 1 tablet (20 mg total) by mouth daily, Disp: 30 tablet, Rfl: 2    gabapentin (NEURONTIN) 300 mg capsule, Take 1 capsule (300 mg total) by mouth every morning AND 2 capsules (600 mg total) daily after lunch AND 2 capsules (600 mg total) daily at bedtime  , Disp: 150 capsule, Rfl: 2    Ketamine HCl (KETAMINE 2% IN EUCERIN) CREA, Apply topically 3 (three) times a day as needed Thin amount to face for trigeminal neuralgia, Disp: , Rfl:     metoprolol succinate (TOPROL-XL) 25 mg 24 hr tablet, Take 1 tablet (25 mg total) by mouth daily, Disp: 30 tablet, Rfl: 5    oxyCODONE (ROXICODONE) 5 mg immediate release tablet, Take 1 tablet (5 mg total) by mouth every 4 (four) hours as needed for moderate painMax Daily Amount: 30 mg, Disp: 90 tablet, Rfl: 0    senna (SENOKOT) 8 6 mg, Take 1 tablet (8 6 mg total) by mouth 2 (two) times a day as needed for constipation, Disp: 120 each, Rfl: 0    sodium chloride, PF, 0 9 %, Infuse 10 mL into a venous catheter daily Flush tube as instructed once daily with 10cc normal saline (1 syringe), Disp: 30 Syringe, Rfl: 3    tamsulosin (FLOMAX) 0 4 mg, Take 1 capsule (0 4 mg total) by mouth daily with dinner, Disp: 30 capsule, Rfl: 5  Allergies Allergen Reactions    Ib Pro [Ibuprofen] Hives and Itching    Adhesive [Medical Tape] Rash     Use only paper tape       Review of Systems:   Review of Systems   Constitutional: Positive for fatigue  Negative for chills and fever  HENT: Negative for congestion, nosebleeds and postnasal drip  Respiratory: Negative for cough, chest tightness and shortness of breath  Cardiovascular: Positive for chest pain  Negative for palpitations and leg swelling  Gastrointestinal: Positive for abdominal distention  Negative for abdominal pain, diarrhea, nausea and vomiting  Endocrine: Negative for polydipsia, polyphagia and polyuria  Musculoskeletal: Positive for arthralgias and myalgias  Negative for gait problem  Skin: Negative for color change, pallor and rash  Allergic/Immunologic: Negative for environmental allergies, food allergies and immunocompromised state  Neurological: Negative for dizziness, seizures, syncope and light-headedness  Hematological: Negative for adenopathy  Does not bruise/bleed easily  Psychiatric/Behavioral: Negative for dysphoric mood  The patient is not nervous/anxious  Physical Examination:     Vitals:    03/03/20 1602   BP: 102/60   BP Location: Right arm   Patient Position: Sitting   Cuff Size: Standard   Pulse: 72   SpO2: 98%   Weight: 91 2 kg (201 lb)   Height: 5' 10 5" (1 791 m)       Physical Exam   Constitutional: He is oriented to person, place, and time  No distress  Chronically ill-appearing   HENT:   Head: Normocephalic and atraumatic  Eyes: EOM are normal  Scleral icterus is present  Neck: Neck supple  No JVD present  No thyromegaly present  Cardiovascular: Normal rate and normal heart sounds  An irregularly irregular rhythm present  Exam reveals no gallop and no friction rub  No murmur heard  Pulmonary/Chest: Effort normal and breath sounds normal    Abdominal: Soft  He exhibits no distension  There is no tenderness     Musculoskeletal: He exhibits no edema  Neurological: He is alert and oriented to person, place, and time  No cranial nerve deficit  Skin: Skin is warm and dry  No rash noted  He is not diaphoretic  No erythema  Jaundice   Psychiatric: He has a normal mood and affect  His behavior is normal  Judgment and thought content normal        Labs:     Lab Results   Component Value Date    WBC 7 61 02/26/2020    HGB 10 4 (L) 02/26/2020    HCT 31 7 (L) 02/26/2020     (H) 02/26/2020    RDW 19 2 (H) 02/26/2020     (L) 02/26/2020     BMP:  Lab Results   Component Value Date    SODIUM 134 (L) 02/28/2020    K 4 0 02/28/2020     02/28/2020    CO2 23 02/28/2020    BUN 10 02/28/2020    CREATININE 0 60 02/28/2020    GLUC 84 02/28/2020    GLUF 117 (H) 02/18/2020    CALCIUM 7 8 (L) 02/28/2020    EGFR 98 02/28/2020    MG 2 2 02/20/2020     LFT:  Lab Results   Component Value Date    AST 71 (H) 02/28/2020    ALT 74 02/28/2020    ALKPHOS 511 (H) 02/28/2020    TP 5 4 (L) 02/28/2020    ALB 1 6 (L) 02/28/2020      Lab Results   Component Value Date    LOI0YGAIXCNH 4 450 (H) 01/29/2020     No results found for: HGBA1C  Lipid Profile:   No results found for: CHOLESTEROL, HDL, LDLCALC, TRIG  No results found for: CHOLESTEROL  Lab Results   Component Value Date    TROPONINI <0 02 01/29/2020     Lab Results   Component Value Date    NTBNP 2,370 (H) 01/29/2020      Recent Results (from the past 672 hour(s))   CBC and differential    Collection Time: 02/12/20 12:55 PM   Result Value Ref Range    WBC 9 20 4  31 - 10 16 Thousand/uL    RBC 3 66 (L) 3 88 - 5 62 Million/uL    Hemoglobin 12 2 12 0 - 17 0 g/dL    Hematocrit 37 2 36 5 - 49 3 %     (H) 82 - 98 fL    MCH 33 3 26 8 - 34 3 pg    MCHC 32 8 31 4 - 37 4 g/dL    RDW 15 0 11 6 - 15 1 %    MPV 10 6 8 9 - 12 7 fL    Platelets 066 705 - 551 Thousands/uL    nRBC 0 /100 WBCs    Neutrophils Relative 84 (H) 43 - 75 %    Immat GRANS % 1 0 - 2 %    Lymphocytes Relative 4 (L) 14 - 44 %    Monocytes Relative 11 4 - 12 %    Eosinophils Relative 0 0 - 6 %    Basophils Relative 0 0 - 1 %    Neutrophils Absolute 7 63 (H) 1 85 - 7 62 Thousands/µL    Immature Grans Absolute 0 06 0 00 - 0 20 Thousand/uL    Lymphocytes Absolute 0 40 (L) 0 60 - 4 47 Thousands/µL    Monocytes Absolute 1 04 0 17 - 1 22 Thousand/µL    Eosinophils Absolute 0 03 0 00 - 0 61 Thousand/µL    Basophils Absolute 0 04 0 00 - 0 10 Thousands/µL   Comprehensive metabolic panel    Collection Time: 02/12/20 12:55 PM   Result Value Ref Range    Sodium 134 (L) 136 - 145 mmol/L    Potassium 3 6 3 5 - 5 3 mmol/L    Chloride 98 (L) 100 - 108 mmol/L    CO2 29 21 - 32 mmol/L    ANION GAP 7 4 - 13 mmol/L    BUN 10 5 - 25 mg/dL    Creatinine 0 81 0 60 - 1 30 mg/dL    Glucose 137 65 - 140 mg/dL    Calcium 8 0 (L) 8 3 - 10 1 mg/dL     (H) 5 - 45 U/L     (H) 12 - 78 U/L    Alkaline Phosphatase 1,398 (H) 46 - 116 U/L    Total Protein 6 1 (L) 6 4 - 8 2 g/dL    Albumin 2 2 (L) 3 5 - 5 0 g/dL    Total Bilirubin 10 50 (H) 0 20 - 1 00 mg/dL    eGFR 86 ml/min/1 73sq m   CBC and differential    Collection Time: 02/18/20 12:00 PM   Result Value Ref Range    WBC 8 10 4 31 - 10 16 Thousand/uL    RBC 3 32 (L) 3 88 - 5 62 Million/uL    Hemoglobin 11 1 (L) 12 0 - 17 0 g/dL    Hematocrit 32 6 (L) 36 5 - 49 3 %    MCV 98 82 - 98 fL    MCH 33 4 26 8 - 34 3 pg    MCHC 34 0 31 4 - 37 4 g/dL    RDW 17 2 (H) 11 6 - 15 1 %    MPV 10 5 8 9 - 12 7 fL    Platelets 198 318 - 976 Thousands/uL    nRBC 0 /100 WBCs    Neutrophils Relative 76 (H) 43 - 75 %    Immat GRANS % 1 0 - 2 %    Lymphocytes Relative 7 (L) 14 - 44 %    Monocytes Relative 14 (H) 4 - 12 %    Eosinophils Relative 1 0 - 6 %    Basophils Relative 1 0 - 1 %    Neutrophils Absolute 6 32 1 85 - 7 62 Thousands/µL    Immature Grans Absolute 0 05 0 00 - 0 20 Thousand/uL    Lymphocytes Absolute 0 53 (L) 0 60 - 4 47 Thousands/µL    Monocytes Absolute 1 12 0 17 - 1 22 Thousand/µL    Eosinophils Absolute 0 04 0 00 - 0 61 Thousand/µL    Basophils Absolute 0 04 0 00 - 0 10 Thousands/µL   Comprehensive metabolic panel    Collection Time: 02/18/20 12:00 PM   Result Value Ref Range    Sodium 134 (L) 136 - 145 mmol/L    Potassium 3 5 3 5 - 5 3 mmol/L    Chloride 99 (L) 100 - 108 mmol/L    CO2 28 21 - 32 mmol/L    ANION GAP 7 4 - 13 mmol/L    BUN 11 5 - 25 mg/dL    Creatinine 0 86 0 60 - 1 30 mg/dL    Glucose, Fasting 117 (H) 65 - 99 mg/dL    Calcium 8 1 (L) 8 3 - 10 1 mg/dL     (H) 5 - 45 U/L     (H) 12 - 78 U/L    Alkaline Phosphatase 1,336 (H) 46 - 116 U/L    Total Protein 5 8 (L) 6 4 - 8 2 g/dL    Albumin 2 0 (L) 3 5 - 5 0 g/dL    Total Bilirubin 23 40 (H) 0 20 - 1 00 mg/dL    eGFR 84 ml/min/1 73sq m   CBC and differential    Collection Time: 02/19/20  9:07 PM   Result Value Ref Range    WBC 6 98 4 31 - 10 16 Thousand/uL    RBC 3 28 (L) 3 88 - 5 62 Million/uL    Hemoglobin 10 9 (L) 12 0 - 17 0 g/dL    Hematocrit 32 3 (L) 36 5 - 49 3 %    MCV 99 (H) 82 - 98 fL    MCH 33 2 26 8 - 34 3 pg    MCHC 33 7 31 4 - 37 4 g/dL    RDW 17 2 (H) 11 6 - 15 1 %    MPV 11 0 8 9 - 12 7 fL    Platelets 736 (L) 322 - 390 Thousands/uL    nRBC 0 /100 WBCs    Neutrophils Relative 76 (H) 43 - 75 %    Immat GRANS % 1 0 - 2 %    Lymphocytes Relative 8 (L) 14 - 44 %    Monocytes Relative 14 (H) 4 - 12 %    Eosinophils Relative 1 0 - 6 %    Basophils Relative 0 0 - 1 %    Neutrophils Absolute 5 33 1 85 - 7 62 Thousands/µL    Immature Grans Absolute 0 06 0 00 - 0 20 Thousand/uL    Lymphocytes Absolute 0 54 (L) 0 60 - 4 47 Thousands/µL    Monocytes Absolute 0 98 0 17 - 1 22 Thousand/µL    Eosinophils Absolute 0 04 0 00 - 0 61 Thousand/µL    Basophils Absolute 0 03 0 00 - 0 10 Thousands/µL   Comprehensive metabolic panel    Collection Time: 02/19/20  9:07 PM   Result Value Ref Range    Sodium 135 (L) 136 - 145 mmol/L    Potassium 3 0 (L) 3 5 - 5 3 mmol/L    Chloride 101 100 - 108 mmol/L    CO2 26 21 - 32 mmol/L    ANION GAP 8 4 - 13 mmol/L    BUN 12 5 - 25 mg/dL    Creatinine 0 92 0 60 - 1 30 mg/dL    Glucose 120 65 - 140 mg/dL    Calcium 8 3 8 3 - 10 1 mg/dL     (H) 5 - 45 U/L     (H) 12 - 78 U/L    Alkaline Phosphatase 1,262 (H) 46 - 116 U/L    Total Protein 5 7 (L) 6 4 - 8 2 g/dL    Albumin 1 9 (L) 3 5 - 5 0 g/dL    Total Bilirubin 23 07 (H) 0 20 - 1 00 mg/dL    eGFR 81 ml/min/1 73sq m   Protime-INR    Collection Time: 02/19/20  9:07 PM   Result Value Ref Range    Protime 15 0 (H) 11 6 - 14 5 seconds    INR 1 22 (H) 0 84 - 1 19   APTT    Collection Time: 02/19/20  9:07 PM   Result Value Ref Range    PTT 29 23 - 37 seconds   Basic metabolic panel    Collection Time: 02/20/20  6:19 AM   Result Value Ref Range    Sodium 137 136 - 145 mmol/L    Potassium 3 4 (L) 3 5 - 5 3 mmol/L    Chloride 104 100 - 108 mmol/L    CO2 26 21 - 32 mmol/L    ANION GAP 7 4 - 13 mmol/L    BUN 11 5 - 25 mg/dL    Creatinine 0 74 0 60 - 1 30 mg/dL    Glucose 95 65 - 140 mg/dL    Calcium 8 0 (L) 8 3 - 10 1 mg/dL    eGFR 90 ml/min/1 73sq m   Magnesium    Collection Time: 02/20/20  6:19 AM   Result Value Ref Range    Magnesium 2 2 1 6 - 2 6 mg/dL   CBC (With Platelets)    Collection Time: 02/20/20  6:19 AM   Result Value Ref Range    WBC 6 33 4 31 - 10 16 Thousand/uL    RBC 3 13 (L) 3 88 - 5 62 Million/uL    Hemoglobin 10 2 (L) 12 0 - 17 0 g/dL    Hematocrit 30 2 (L) 36 5 - 49 3 %    MCV 97 82 - 98 fL    MCH 32 6 26 8 - 34 3 pg    MCHC 33 8 31 4 - 37 4 g/dL    RDW 17 9 (H) 11 6 - 15 1 %    Platelets 548 (L) 308 - 390 Thousands/uL    MPV 11 2 8 9 - 12 7 fL   Hepatic function panel    Collection Time: 02/20/20  6:19 AM   Result Value Ref Range    Total Bilirubin 21 00 (H) 0 20 - 1 00 mg/dL    Bilirubin, Direct 18 54 (H) 0 00 - 0 20 mg/dL    Alkaline Phosphatase 1,120 (H) 46 - 116 U/L     (H) 5 - 45 U/L     (H) 12 - 78 U/L    Total Protein 5 3 (L) 6 4 - 8 2 g/dL    Albumin 1 7 (L) 3 5 - 5 0 g/dL   Non-gynecologic cytology    Collection Time: 02/20/20 10:24 AM   Result Value Ref Range    Case Report       Non-gynecologic Cytology                          Case: [id number]                                  Authorizing Provider:  Antoinette Ritter MD             Collected:           02/20/2020 1024              Ordering Location:     [address] [place] [address]      Received:            02/20/2020 [facility]                                                                      Pathologist:           Juan Fountain MD                                                        Specimen:    Paracentesis                                                                               Final Diagnosis       A  Paracentesis, :  Negative for malignancy  Benign mesothelial cells, histiocytes and neutrophils  Satisfactory for evaluation  Gross Description       A  Paracentesis, : 55ml  Yellow, clear,received in [facility]          Additional Information       [employer]'s FDA approved ,  and ThinPrep Imaging Duo System are utilized with strict adherence to the 's instruction manual to prepare gynecologic and non-gynecologic cytology specimens for the production of ThinPrep slides as well as for gynecologic ThinPrep imaging  These processes have been validated by our laboratory and/or by the   These tests were developed and their performance characteristics determined by [facility] or [address] [place] [address]  They may not be cleared or approved by the [place]  Food and Drug Administration  The FDA has determined that such clearance or approval is not necessary  These tests are used for clinical purposes  They should not be regarded as investigational or for research  This laboratory has been approved by CLIA 88, designated as a high-complexity laboratory and is qualified to perform these tests         Body fluid culture and Gram stain    Collection Time: 02/20/20  4:53 PM   Result Value Ref Range    Body Fluid Culture, Sterile No growth     Gram Stain Result 1+ Polys     Gram Stain Result No bacteria seen    Glucose, body fluid    Collection Time: 02/20/20  6:49 PM   Result Value Ref Range    Glucose, Fluid 101 mg/dL   Albumin, fluid    Collection Time: 02/20/20  7:04 PM   Result Value Ref Range    Albumin, Fluid 0 6 g/dL   Total Protein, Fluid    Collection Time: 02/20/20  7:04 PM   Result Value Ref Range    Protein, Fluid <2 0 g/dL   LD (LDH), Body Fluid    Collection Time: 02/20/20  7:04 PM   Result Value Ref Range    LD, Fluid 52 U/L   Body Fluid Diff    Collection Time: 02/20/20  7:50 PM   Result Value Ref Range    Total Counted 100     Neutrophils % (Fluid) 25 %    Lymphs % (Fluid) 42 %    Bands % (Fluid) 1 %    Histiocyte % (Fluid) 5 %    Monocytes % (Fluid) 27 %   Body fluid white cell count with differential    Collection Time: 02/20/20  7:51 PM   Result Value Ref Range    Site Paracentesis     Color, Fluid Yellow Clear, Colorless,Yellow    Clarity, Fluid Hazy (A) Clear    WBC, Fluid 244 /ul   Comprehensive metabolic panel    Collection Time: 02/21/20  5:36 AM   Result Value Ref Range    Sodium 137 136 - 145 mmol/L    Potassium 4 8 3 5 - 5 3 mmol/L    Chloride 107 100 - 108 mmol/L    CO2 22 21 - 32 mmol/L    ANION GAP 8 4 - 13 mmol/L    BUN 17 5 - 25 mg/dL    Creatinine 0 82 0 60 - 1 30 mg/dL    Glucose 127 65 - 140 mg/dL    Calcium 8 1 (L) 8 3 - 10 1 mg/dL     (H) 5 - 45 U/L     (H) 12 - 78 U/L    Alkaline Phosphatase 1,038 (H) 46 - 116 U/L    Total Protein 5 3 (L) 6 4 - 8 2 g/dL    Albumin 1 6 (L) 3 5 - 5 0 g/dL    Total Bilirubin 17 04 (H) 0 20 - 1 00 mg/dL    eGFR 86 ml/min/1 73sq m   CBC and differential    Collection Time: 02/21/20  5:36 AM   Result Value Ref Range    WBC 5 27 4 31 - 10 16 Thousand/uL    RBC 3 32 (L) 3 88 - 5 62 Million/uL    Hemoglobin 10 9 (L) 12 0 - 17 0 g/dL    Hematocrit 32 8 (L) 36 5 - 49 3 %    MCV 99 (H) 82 - 98 fL    MCH 32 8 26 8 - 34 3 pg    MCHC 33 2 31 4 - 37 4 g/dL    RDW 18 3 (H) 11 6 - 15 1 %    MPV 11 1 8 9 - 12 7 fL    Platelets 444 (L) 437 - 390 Thousands/uL    nRBC 0 /100 WBCs    Neutrophils Relative 88 (H) 43 - 75 %    Immat GRANS % 1 0 - 2 %    Lymphocytes Relative 6 (L) 14 - 44 %    Monocytes Relative 5 4 - 12 %    Eosinophils Relative 0 0 - 6 %    Basophils Relative 0 0 - 1 %    Neutrophils Absolute 4 63 1 85 - 7 62 Thousands/µL    Immature Grans Absolute 0 06 0 00 - 0 20 Thousand/uL    Lymphocytes Absolute 0 30 (L) 0 60 - 4 47 Thousands/µL    Monocytes Absolute 0 27 0 17 - 1 22 Thousand/µL    Eosinophils Absolute 0 00 0 00 - 0 61 Thousand/µL    Basophils Absolute 0 01 0 00 - 0 10 Thousands/µL   Protime-INR    Collection Time: 02/21/20  5:36 AM   Result Value Ref Range    Protime 15 1 (H) 11 6 - 14 5 seconds    INR 1 23 (H) 0 84 - 1 19   Comprehensive metabolic panel    Collection Time: 02/22/20  5:06 AM   Result Value Ref Range    Sodium 133 (L) 136 - 145 mmol/L    Potassium 3 7 3 5 - 5 3 mmol/L    Chloride 102 100 - 108 mmol/L    CO2 24 21 - 32 mmol/L    ANION GAP 7 4 - 13 mmol/L    BUN 21 5 - 25 mg/dL    Creatinine 0 80 0 60 - 1 30 mg/dL    Glucose 98 65 - 140 mg/dL    Calcium 8 2 (L) 8 3 - 10 1 mg/dL     (H) 5 - 45 U/L     (H) 12 - 78 U/L    Alkaline Phosphatase 987 (H) 46 - 116 U/L    Total Protein 5 8 (L) 6 4 - 8 2 g/dL    Albumin 1 9 (L) 3 5 - 5 0 g/dL    Total Bilirubin 15 23 (H) 0 20 - 1 00 mg/dL    eGFR 87 ml/min/1 73sq m   CBC and differential    Collection Time: 02/22/20  5:06 AM   Result Value Ref Range    WBC 5 99 4 31 - 10 16 Thousand/uL    RBC 3 10 (L) 3 88 - 5 62 Million/uL    Hemoglobin 10 1 (L) 12 0 - 17 0 g/dL    Hematocrit 30 4 (L) 36 5 - 49 3 %    MCV 98 82 - 98 fL    MCH 32 6 26 8 - 34 3 pg    MCHC 33 2 31 4 - 37 4 g/dL    RDW 18 5 (H) 11 6 - 15 1 %    MPV 10 6 8 9 - 12 7 fL    Platelets 776 (L) 088 - 390 Thousands/uL    nRBC 0 /100 WBCs    Neutrophils Relative 74 43 - 75 %    Immat GRANS % 1 0 - 2 %    Lymphocytes Relative 12 (L) 14 - 44 %    Monocytes Relative 13 (H) 4 - 12 %    Eosinophils Relative 0 0 - 6 %    Basophils Relative 0 0 - 1 %    Neutrophils Absolute 4 44 1 85 - 7 62 Thousands/µL    Immature Grans Absolute 0 05 0 00 - 0 20 Thousand/uL    Lymphocytes Absolute 0 71 0 60 - 4 47 Thousands/µL    Monocytes Absolute 0 75 0 17 - 1 22 Thousand/µL    Eosinophils Absolute 0 02 0 00 - 0 61 Thousand/µL    Basophils Absolute 0 02 0 00 - 0 10 Thousands/µL   Comprehensive metabolic panel    Collection Time: 02/23/20  6:20 AM   Result Value Ref Range    Sodium 135 (L) 136 - 145 mmol/L    Potassium 3 9 3 5 - 5 3 mmol/L    Chloride 104 100 - 108 mmol/L    CO2 25 21 - 32 mmol/L    ANION GAP 6 4 - 13 mmol/L    BUN 18 5 - 25 mg/dL    Creatinine 0 66 0 60 - 1 30 mg/dL    Glucose 100 65 - 140 mg/dL    Calcium 8 0 (L) 8 3 - 10 1 mg/dL    AST 85 (H) 5 - 45 U/L     (H) 12 - 78 U/L    Alkaline Phosphatase 823 (H) 46 - 116 U/L    Total Protein 5 5 (L) 6 4 - 8 2 g/dL    Albumin 1 7 (L) 3 5 - 5 0 g/dL    Total Bilirubin 12 59 (H) 0 20 - 1 00 mg/dL    eGFR 94 ml/min/1 73sq m   CBC    Collection Time: 02/23/20  6:20 AM   Result Value Ref Range    WBC 6 39 4 31 - 10 16 Thousand/uL    RBC 3 02 (L) 3 88 - 5 62 Million/uL    Hemoglobin 9 9 (L) 12 0 - 17 0 g/dL    Hematocrit 29 8 (L) 36 5 - 49 3 %    MCV 99 (H) 82 - 98 fL    MCH 32 8 26 8 - 34 3 pg    MCHC 33 2 31 4 - 37 4 g/dL    RDW 18 4 (H) 11 6 - 15 1 %    Platelets 915 (L) 990 - 390 Thousands/uL    MPV 10 9 8 9 - 12 7 fL   Comprehensive metabolic panel    Collection Time: 02/24/20  4:50 AM   Result Value Ref Range    Sodium 133 (L) 136 - 145 mmol/L    Potassium 4 1 3 5 - 5 3 mmol/L    Chloride 102 100 - 108 mmol/L    CO2 25 21 - 32 mmol/L    ANION GAP 6 4 - 13 mmol/L    BUN 14 5 - 25 mg/dL    Creatinine 0 73 0 60 - 1 30 mg/dL    Glucose 102 65 - 140 mg/dL    Calcium 8 4 8 3 - 10 1 mg/dL     (H) 5 - 45 U/L     (H) 12 - 78 U/L    Alkaline Phosphatase 859 (H) 46 - 116 U/L    Total Protein 6 3 (L) 6 4 - 8 2 g/dL    Albumin 2 0 (L) 3 5 - 5 0 g/dL    Total Bilirubin 16 19 (H) 0 20 - 1 00 mg/dL    eGFR 90 ml/min/1 73sq m   Bilirubin, total    Collection Time: 02/24/20  5:03 PM   Result Value Ref Range    Total Bilirubin 16 35 (H) 0 20 - 1 00 mg/dL   Bilirubin, direct    Collection Time: 02/24/20  5:03 PM   Result Value Ref Range    Bilirubin, Direct 13 39 (H) 0 00 - 0 20 mg/dL   Comprehensive metabolic panel    Collection Time: 02/25/20  4:41 AM   Result Value Ref Range    Sodium 135 (L) 136 - 145 mmol/L    Potassium 4 0 3 5 - 5 3 mmol/L    Chloride 104 100 - 108 mmol/L    CO2 23 21 - 32 mmol/L    ANION GAP 8 4 - 13 mmol/L    BUN 15 5 - 25 mg/dL    Creatinine 0 57 (L) 0 60 - 1 30 mg/dL    Glucose 103 65 - 140 mg/dL    Calcium 8 0 (L) 8 3 - 10 1 mg/dL    AST 81 (H) 5 - 45 U/L    ALT 95 (H) 12 - 78 U/L    Alkaline Phosphatase 633 (H) 46 - 116 U/L    Total Protein 5 4 (L) 6 4 - 8 2 g/dL    Albumin 1 7 (L) 3 5 - 5 0 g/dL    Total Bilirubin 14 19 (H) 0 20 - 1 00 mg/dL    eGFR 100 ml/min/1 73sq m   Comprehensive metabolic panel    Collection Time: 02/26/20  4:47 AM   Result Value Ref Range    Sodium 134 (L) 136 - 145 mmol/L    Potassium 4 3 3 5 - 5 3 mmol/L    Chloride 104 100 - 108 mmol/L    CO2 25 21 - 32 mmol/L    ANION GAP 5 4 - 13 mmol/L    BUN 13 5 - 25 mg/dL    Creatinine 0 64 0 60 - 1 30 mg/dL    Glucose 81 65 - 140 mg/dL    Calcium 8 2 (L) 8 3 - 10 1 mg/dL    AST 83 (H) 5 - 45 U/L    ALT 89 (H) 12 - 78 U/L    Alkaline Phosphatase 628 (H) 46 - 116 U/L    Total Protein 5 7 (L) 6 4 - 8 2 g/dL    Albumin 1 7 (L) 3 5 - 5 0 g/dL    Total Bilirubin 15 00 (H) 0 20 - 1 00 mg/dL    eGFR 95 ml/min/1 73sq m   CBC and Platelet    Collection Time: 02/26/20  4:47 AM   Result Value Ref Range    WBC 7 61 4 31 - 10 16 Thousand/uL    RBC 3 14 (L) 3 88 - 5 62 Million/uL    Hemoglobin 10 4 (L) 12 0 - 17 0 g/dL Hematocrit 31 7 (L) 36 5 - 49 3 %     (H) 82 - 98 fL    MCH 33 1 26 8 - 34 3 pg    MCHC 32 8 31 4 - 37 4 g/dL    RDW 19 2 (H) 11 6 - 15 1 %    Platelets 987 (L) 845 - 390 Thousands/uL    MPV 10 9 8 9 - 12 7 fL   Comprehensive metabolic panel    Collection Time: 02/27/20  5:10 AM   Result Value Ref Range    Sodium 135 (L) 136 - 145 mmol/L    Potassium 3 8 3 5 - 5 3 mmol/L    Chloride 104 100 - 108 mmol/L    CO2 24 21 - 32 mmol/L    ANION GAP 7 4 - 13 mmol/L    BUN 11 5 - 25 mg/dL    Creatinine 0 59 (L) 0 60 - 1 30 mg/dL    Glucose 88 65 - 140 mg/dL    Calcium 8 2 (L) 8 3 - 10 1 mg/dL    AST 78 (H) 5 - 45 U/L    ALT 83 (H) 12 - 78 U/L    Alkaline Phosphatase 572 (H) 46 - 116 U/L    Total Protein 5 5 (L) 6 4 - 8 2 g/dL    Albumin 1 7 (L) 3 5 - 5 0 g/dL    Total Bilirubin 15 38 (H) 0 20 - 1 00 mg/dL    eGFR 98 ml/min/1 73sq m   Fingerstick Glucose (POCT)    Collection Time: 02/27/20  7:13 AM   Result Value Ref Range    POC Glucose 85 65 - 140 mg/dl   Fingerstick Glucose (POCT)    Collection Time: 02/27/20  7:29 AM   Result Value Ref Range    POC Glucose 96 65 - 140 mg/dl   Ammonia    Collection Time: 02/27/20  9:19 AM   Result Value Ref Range    Ammonia <10 (L) 11 - 35 umol/L   Comprehensive metabolic panel    Collection Time: 02/28/20  4:47 AM   Result Value Ref Range    Sodium 134 (L) 136 - 145 mmol/L    Potassium 4 0 3 5 - 5 3 mmol/L    Chloride 105 100 - 108 mmol/L    CO2 23 21 - 32 mmol/L    ANION GAP 6 4 - 13 mmol/L    BUN 10 5 - 25 mg/dL    Creatinine 0 60 0 60 - 1 30 mg/dL    Glucose 84 65 - 140 mg/dL    Calcium 7 8 (L) 8 3 - 10 1 mg/dL    AST 71 (H) 5 - 45 U/L    ALT 74 12 - 78 U/L    Alkaline Phosphatase 511 (H) 46 - 116 U/L    Total Protein 5 4 (L) 6 4 - 8 2 g/dL    Albumin 1 6 (L) 3 5 - 5 0 g/dL    Total Bilirubin 16 01 (H) 0 20 - 1 00 mg/dL    eGFR 98 ml/min/1 73sq m   Bilirubin, direct    Collection Time: 02/28/20  4:47 AM   Result Value Ref Range    Bilirubin, Direct 13 08 (H) 0 00 - 0 20 mg/dL Imaging & Testing   I have personally reviewed pertinent reports  Cardiac Testing     EKG: Personally reviewed  Atrial fibrillation at 90 beats per minute with right bundle-branch block      Shai Lei DO, [place]  [phone]  Please call with any questions

## 2020-03-03 NOTE — PATIENT INSTRUCTIONS
It was a pleasure to see you again today  Thank you for coming in  · Renewing medications; see below  · Let's see how long the ketamine cream lasts; I will be happy to refill it when the time comes, if it continues to help  · Starting Magic Mouthwash for painful oral sores  · I will ask the [place] [id number] worker to call to see if they can help w/ services  · Return in about 2-3 weeks  · Call us for refills on medications that we supply, as needed  · If something changes and you need to come in sooner, please call our office! PRESCRIPTION REFILL REMINDER:  All medication refills should be requested prior to [facility] on Friday  Any refill requests after noon on Friday would be addressed the following Monday

## 2020-03-03 NOTE — PROGRESS NOTES
Per request of lead CM Shanita Murguia, CM did home visit to get [employer] and [employer] signed  CM met with patient and his wife  Explained both documents  Pt read through documents with all questions answered by CM  All documents signed by patient  Pt requested copies of signed documents  Completed documents scanned and emailed to [employer]  Documents placed in folder to be scanned into Epic  Copies of documents mailed to patient

## 2020-03-04 NOTE — PROGRESS NOTES
This CM spoke to Yohannes at [employer] and scheduled [employer] [facility] for patient to go to [address] [place] [address] for his 8:15AM appt for MRI of brain on 3/6/20  Yohannes was not sure if patient's wife could accompany him due to limited space in [place] due to other riders but he would have the [place]  call patient that morning to let him know if she could join him or not  Patient is to be ready for  by 7:30AM  REA will relay this information to patient and his daughter Mallory Porterd

## 2020-03-04 NOTE — TELEPHONE ENCOUNTER
Previous pharmacy does not have the medication, please sent to new pharmacy  [address] [facility]  [phone]

## 2020-03-04 NOTE — PROGRESS NOTES
Patient's daughter Mallory Ayoub contacted this CM and she was informed that transportation had been arranged via [address] for patient to go to his 3/6/20 appt for MRI of brain  Patient is to be ready for pickup by 7:30AM  CM was told that the  will contact patient before pickup to let him know if there's space available for wife to accompany him or not  CM also informed Rupal that when patient arrives to appt for check-in he is to tell them he came via [employer] transport so they can call for the Radha Sultana to return when appt is completed  Rupal voiced concern that her father would be waiting "hours" before Radhalisa Sultana returned  CM stated it was highly unlikely that patient would have extensive wait for  to return and that patient could ask that someone call just before the appt is finished in order to reduce wait time for  to return  Mallory Ayoub stated she and family were trying to cover patient's transportation needs but was thankful to receive assistance via [employer]  CM informed her that at least 48 hrs notice was needed to schedule ride and they could not always be guaranteed  Mallory Ayoub stated she would relay the transportation information to her father and thanked this CM for the assistance

## 2020-03-04 NOTE — PROGRESS NOTES
This CM spoke to patient's daughter KATHARINA ASCENCIO [facility] who stated patient did not have transportation to 8:15 AM appt on Friday 3/6/20 for MRI of brain  She said transportation to other future appts had not been arranged either  Rupal accepted this CM offer to schedule transportation to this Friday's appt for MRI of brain  She said she would call this CM back later in the day

## 2020-03-06 NOTE — PROGRESS NOTES
Patient's daughter Palmer Pereyra returned this CM call and confirmed patient's statement that transportation would not be needed for appt on 3/9/20  Palmer Pereyra stated that the appt would have to be changed due to priority issues that needed to be addressed and that the family was making every effort to be able to provide transportation to be able to accompany patient to appts  Rupal agreed to immediately contact Shanika Gaston at [address] [place] [address] to cancel the appt  and thanked  for the assistance

## 2020-03-06 NOTE — PROGRESS NOTES
Danyelle at [address] [facility] informed this CM that [employer] transportation had been arranged for patient to attend his 3/9/20 appointment with Dr Sara Duran  CM will contact patient and daughter to relay this information

## 2020-03-06 NOTE — PROGRESS NOTES
CM spoke to patient who reported no problem using [employer] to his appt this morning  CM informed patient that transportation had been arranged for him to go to his 3/9/20 appt at [address] when patient stated he was "pretty sure" he would not need transportation to that appt  CM inquired further to be sure that if that were the case, the ride would be cancelled  Patient again stated transportation would not be needed  CM informed him that the ride would be cancelled

## 2020-03-10 NOTE — PROGRESS NOTES
Reviewed staff messages and found a message from [facility] re possible need for the pt to obtain [facility] service as well as possible home care assistance  Att to speak with the pt's dtr Cristhian Lindquist a vm message for her and assured her I would be in contact with her tomorrow

## 2020-03-10 NOTE — ED ATTENDING ATTESTATION
2/19/2020  IBridger MD, saw and evaluated the patient  I have discussed the patient with the resident/non-physician practitioner and agree with the resident's/non-physician practitioner's findings, Plan of Care, and MDM as documented in the resident's/non-physician practitioner's note, except where noted  All available labs and Radiology studies were reviewed  I was present for key portions of any procedure(s) performed by the resident/non-physician practitioner and I was immediately available to provide assistance  At this point I agree with the current assessment done in the Emergency Department  I have conducted an independent evaluation of this patient a history and physical is as follows:    ED Course     Patient presents for evaluation due to concerns for worsening jaundice  Patient has history of metastatic cancer to his liver  Patient states that when this has happened in the past it was due to a blockage which required an ERCP on January 10th  Patient states that he was told to come in for an external drain placement  Patient denies any pain  No additional complaints  Exam: AAOx3, NAD, RRR, CTA, S/NT/ND, no motor/sensory deficits  A/P:  Jaundice    Will check labs and plan for admission    Critical Care Time  Procedures

## 2020-03-10 NOTE — PROGRESS NOTES
2-3-20  Received new oral start from providers office    Auth is required per [employer]  2-5-20  Received copay of $3000  Call to patient, discussed funding options  He will call me when he is ready to set up appt  Completed [employer] application   Forwarded to provider for signature    2-13-20  Received call from patient   Set up appt for 2-20-20 at [facility]      2-20-20  Patient did not show for appointment,  Placed call to the patient, he stated he was in the hospital  Rescheduled appointment for 3-4-20     3-9-20  Follow up call to patient  Per patient he is out of town  He did not want to reschedule appointment at this time  He will reach out to me when he is ready  Sent email to provider to advise of status of funding   Per response received I should hold off on contacting patient any further as hospice was recommended

## 2020-03-11 NOTE — PROGRESS NOTES
Attempted to reach the pt's daughter Caryle Ahr this morning  The call went to -the mail box was full so a message could not be left  Will attempt to reach out to the dtr later today

## 2020-03-17 PROBLEM — I95.0 IDIOPATHIC HYPOTENSION: Status: ACTIVE | Noted: 2020-01-01

## 2020-03-17 PROBLEM — I10 ESSENTIAL HYPERTENSION: Status: RESOLVED | Noted: 2020-01-01 | Resolved: 2020-01-01

## 2020-03-17 PROBLEM — Z00.00 MEDICARE ANNUAL WELLNESS VISIT, SUBSEQUENT: Status: ACTIVE | Noted: 2020-01-01

## 2020-03-17 NOTE — PATIENT INSTRUCTIONS
A Medicare Annual Wellness Visit and questionnaire was performed today  The visit included a review of your health habits, risk factors and age/risk appropriate screening tests/immunizations that you may be due for  This visit is not a substitute for follow-up visits for chronic medical conditions or evaluation of acute problems  Since the Annual Wellness health review is covered only once every 365 days, your next one should not occur before then  Medicare Preventive Visit Patient Instructions  Thank you for completing your Welcome to Medicare Visit or Medicare Annual Wellness Visit today  Your next wellness visit will be due in one year (3/17/2021)  The screening/preventive services that you may require over the next 5-10 years are detailed below  Some tests may not apply to you based off risk factors and/or age  Screening tests ordered at today's visit but not completed yet may show as past due  Also, please note that scanned in results may not display below  Preventive Screenings:  Service Recommendations Previous Testing/Comments   Colorectal Cancer Screening  · Colonoscopy    · Fecal Occult Blood Test (FOBT)/Fecal Immunochemical Test (FIT)  · Fecal DNA/Cologuard Test  · Flexible Sigmoidoscopy Age: 54-65 years old   Colonoscopy: every 10 years (May be performed more frequently if at higher risk)  OR  FOBT/FIT: every 1 year  OR  Cologuard: every 3 years  OR  Sigmoidoscopy: every 5 years  Screening may be recommended earlier than age 48 if at higher risk for colorectal cancer  Also, an individualized decision between you and your healthcare provider will decide whether screening between the ages of 74-80 would be appropriate   Colonoscopy: Not on file  FOBT/FIT: Not on file  Cologuard: Not on file  Sigmoidoscopy: Not on file    History Colorectal Cancer     Prostate Cancer Screening Individualized decision between patient and health care provider in men between ages of 53-78   Medicare will cover every 12 months beginning on the day after your 50th birthday PSA: No results in last 5 years     Screening Not Indicated     Hepatitis C Screening Once for adults born between 1945 and 1965  More frequently in patients at high risk for Hepatitis C Hep C Antibody: Not on file    Screening Not Indicated   Diabetes Screening 1-2 times per year if you're at risk for diabetes or have pre-diabetes Fasting glucose: 117 mg/dL   A1C: No results in last 5 years    Screening Current   Cholesterol Screening Once every 5 years if you don't have a lipid disorder  May order more often based on risk factors  Lipid panel: Not on file    Screening Not Indicated      Other Preventive Screenings Covered by Medicare:  1  Abdominal Aortic Aneurysm (AAA) Screening: covered once if your at risk  You're considered to be at risk if you have a family history of AAA or a male between the age of 73-68 who smoking at least 100 cigarettes in your lifetime  2  Lung Cancer Screening: covers low dose CT scan once per year if you meet all of the following conditions: (1) Age 50-69; (2) No signs or symptoms of lung cancer; (3) Current smoker or have quit smoking within the last 15 years; (4) You have a tobacco smoking history of at least 30 pack years (packs per day x number of years you smoked); (5) You get a written order from a healthcare provider  3  Glaucoma Screening: covered annually if you're considered high risk: (1) You have diabetes OR (2) Family history of glaucoma OR (3)  aged 48 and older OR (3)  American aged 72 and older  3  Osteoporosis Screening: covered every 2 years if you meet one of the following conditions: (1) Have a vertebral abnormality; (2) On glucocorticoid therapy for more than 3 months; (3) Have primary hyperparathyroidism; (4) On osteoporosis medications and need to assess response to drug therapy  5  HIV Screening: covered annually if you're between the age of 12-76   Also covered annually if you are younger than 13 and older than 72 with risk factors for HIV infection  For pregnant patients, it is covered up to 3 times per pregnancy  Immunizations:  Immunization Recommendations   Influenza Vaccine Annual influenza vaccination during flu season is recommended for all persons aged >= 6 months who do not have contraindications   Pneumococcal Vaccine (Prevnar and Pneumovax)  * Prevnar = PCV13  * Pneumovax = PPSV23 Adults 25-60 years old: 1-3 doses may be recommended based on certain risk factors  Adults 72 years old: Prevnar (PCV13) vaccine recommended followed by Pneumovax (PPSV23) vaccine  If already received PPSV23 since turning 65, then PCV13 recommended at least one year after PPSV23 dose  Hepatitis B Vaccine 3 dose series if at intermediate or high risk (ex: diabetes, end stage renal disease, liver disease)   Tetanus (Td) Vaccine - COST NOT COVERED BY MEDICARE PART B Following completion of primary series, a booster dose should be given every 10 years to maintain immunity against tetanus  Td may also be given as tetanus wound prophylaxis  Tdap Vaccine - COST NOT COVERED BY MEDICARE PART B Recommended at least once for all adults  For pregnant patients, recommended with each pregnancy  Shingles Vaccine (Shingrix) - COST NOT COVERED BY MEDICARE PART B  2 shot series recommended in those aged 48 and above     Health Maintenance Due:  There are no preventive care reminders to display for this patient  Immunizations Due:      Topic Date Due    DTaP,Tdap,and Td Vaccines (1 - Tdap) 11/06/1956     Advance Directives   What are advance directives? Advance directives are legal documents that state your wishes and plans for medical care  These plans are made ahead of time in case you lose your ability to make decisions for yourself  Advance directives can apply to any medical decision, such as the treatments you want, and if you want to donate organs  What are the types of advance directives?   There are many types of advance directives, and each state has rules about how to use them  You may choose a combination of any of the following:  · Living will: This is a written record of the treatment you want  You can also choose which treatments you do not want, which to limit, and which to stop at a certain time  This includes surgery, medicine, IV fluid, and tube feedings  · Durable power of  for healthcare [facility]): This is a written record that states who you want to make healthcare choices for you when you are unable to make them for yourself  This person, called a proxy, is usually a family member or a friend  You may choose more than 1 proxy  · Do not resuscitate (DNR) order:  A DNR order is used in case your heart stops beating or you stop breathing  It is a request not to have certain forms of treatment, such as CPR  A DNR order may be included in other types of advance directives  · Medical directive: This covers the care that you want if you are in a coma, near death, or unable to make decisions for yourself  You can list the treatments you want for each condition  Treatment may include pain medicine, surgery, blood transfusions, dialysis, IV or tube feedings, and a ventilator (breathing machine)  · Values history: This document has questions about your views, beliefs, and how you feel and think about life  This information can help others choose the care that you would choose  Why are advance directives important? An advance directive helps you control your care  Although spoken wishes may be used, it is better to have your wishes written down  Spoken wishes can be misunderstood, or not followed  Treatments may be given even if you do not want them  An advance directive may make it easier for your family to make difficult choices about your care  Fall Prevention    Fall prevention  includes ways to make your home and other areas safer   It also includes ways you can move more carefully to prevent a fall  Health conditions that cause changes in your blood pressure, vision, or muscle strength and coordination may increase your risk for falls  Medicines may also increase your risk for falls if they make you dizzy, weak, or sleepy  Fall prevention tips:   · Stand or sit up slowly  · Use assistive devices as directed  · Wear shoes that fit well and have soles that   · Wear a personal alarm  · Stay active  · Manage your medical conditions  Home Safety Tips:  · Add items to prevent falls in the bathroom  · Keep paths clear  · Install bright lights in your home  · Keep items you use often on shelves within reach  · Paint or place reflective tape on the edges of your stairs  Weight Management   Why it is important to manage your weight:  Being overweight increases your risk of health conditions such as heart disease, high blood pressure, type 2 diabetes, and certain types of cancer  It can also increase your risk for osteoarthritis, sleep apnea, and other respiratory problems  Aim for a slow, steady weight loss  Even a small amount of weight loss can lower your risk of health problems  How to lose weight safely:  A safe and healthy way to lose weight is to eat fewer calories and get regular exercise  You can lose up about 1 pound a week by decreasing the number of calories you eat by 500 calories each day  Healthy meal plan for weight management:  A healthy meal plan includes a variety of foods, contains fewer calories, and helps you stay healthy  A healthy meal plan includes the following:  · Eat whole-grain foods more often  A healthy meal plan should contain fiber  Fiber is the part of grains, fruits, and vegetables that is not broken down by your body  Whole-grain foods are healthy and provide extra fiber in your diet  Some examples of whole-grain foods are whole-wheat breads and pastas, oatmeal, brown rice, and bulgur  · Eat a variety of vegetables every day  Include dark, leafy greens such as spinach, kale, guerrero greens, and mustard greens  Eat yellow and orange vegetables such as carrots, sweet potatoes, and winter squash  · Eat a variety of fruits every day  Choose fresh or canned fruit (canned in its own juice or light syrup) instead of juice  Fruit juice has very little or no fiber  · Eat low-fat dairy foods  Drink fat-free (skim) milk or 1% milk  Eat fat-free yogurt and low-fat cottage cheese  Try low-fat cheeses such as mozzarella and other reduced-fat cheeses  · Choose meat and other protein foods that are low in fat  Choose beans or other legumes such as split peas or lentils  Choose fish, skinless poultry (chicken or turkey), or lean cuts of red meat (beef or pork)  Before you cook meat or poultry, cut off any visible fat  · Use less fat and oil  Try baking foods instead of frying them  Add less fat, such as margarine, sour cream, regular salad dressing and mayonnaise to foods  Eat fewer high-fat foods  Some examples of high-fat foods include french fries, doughnuts, ice cream, and cakes  · Eat fewer sweets  Limit foods and drinks that are high in sugar  This includes candy, cookies, regular soda, and sweetened drinks  Exercise:  Exercise at least 30 minutes per day on most days of the week  Some examples of exercise include walking, biking, dancing, and swimming  You can also fit in more physical activity by taking the stairs instead of the elevator or parking farther away from stores  Ask your healthcare provider about the best exercise plan for you  © Copyright [employer] 2018 Information is for End User's use only and may not be sold, redistributed or otherwise used for commercial purposes   All illustrations and images included in CareNotes® are the copyrighted property of A D A M [employer]  or [address] [place] [address]

## 2020-03-17 NOTE — PROGRESS NOTES
Assessment/Plan:    No problem-specific Assessment & Plan notes found for this encounter  Multiple reasons for fatigue advised, meds vs condition vs low bp  Monitor bp at home  Call for rx compression stockings if desired   Fall precautions advised  Trial of florinef  Needs to stay on lasix due to ascites presumed  AF w/o anticoag, risks of bleeding and cva aware  Ca with mets unchanged, f/u oncology, bilirubin retest soon     Diagnoses and all orders for this visit:    Atrial fibrillation, unspecified type (HCC)    Idiopathic hypotension  -     fludrocortisone (FLORINEF) 0 1 mg tablet; Take 1 tablet (0 1 mg total) by mouth 2 (two) times a day    Lower extremity edema    Medicare annual wellness visit, subsequent    Colon cancer metastasized to multiple sites [facility])        Cont toprol/lasix  Add florinef  F/u 1m     Return in about 1 month (around 4/17/2020)  Subjective:      Patient ID: Aldair Arrieta is a 66 y o  male  Chief Complaint   Patient presents with    Follow-up     follow up post E/R visit [id number]        HPI  Hx of afib  Aware of anticoagulation risk  On toprol 25mg qd  Feels weak and tired and sluggish  Eating small portions  Drinks fluids  No n/v  No chemo/radiation  Bilirubin remains elevated  No cardio f/u planned  Does not avoid or add salt  Gets dizzy when he gets up  On lasix for edema and ascites  trigem neuralgia ok, gabapentin 600/300/600  Oxycodone 3x/d  Also on baclofen  Tavares Grain today    The following portions of the patient's history were reviewed and updated as appropriate: allergies, current medications, past family history, past medical history, past social history, past surgical history and problem list     Review of Systems   Cardiovascular: Positive for leg swelling  Negative for chest pain and palpitations           Current Outpatient Medications   Medication Sig Dispense Refill    al mag oxide-diphenhydramine-lidocaine viscous (MAGIC MOUTHWASH) 1:1:1 suspension Swish and spit 10 mL every 4 (four) hours as needed for mouth pain or discomfort 180 mL 2    Baclofen 5 MG TABS Take 15 mg by mouth 3 (three) times a day 90 tablet 0    furosemide (LASIX) 20 mg tablet Take 1 tablet (20 mg total) by mouth daily 30 tablet 2    gabapentin (NEURONTIN) 300 mg capsule Take 1 capsule (300 mg total) by mouth every morning AND 2 capsules (600 mg total) daily after lunch AND 2 capsules (600 mg total) daily at bedtime  150 capsule 2    Ketamine HCl (KETAMINE 2% IN EUCERIN) CREA Apply topically 3 (three) times a day as needed Thin amount to face for trigeminal neuralgia      metoprolol succinate (TOPROL-XL) 25 mg 24 hr tablet Take 1 tablet (25 mg total) by mouth daily 30 tablet 5    oxyCODONE (ROXICODONE) 5 mg immediate release tablet Take 1 tablet (5 mg total) by mouth every 4 (four) hours as needed for moderate painMax Daily Amount: 30 mg 90 tablet 0    senna (SENOKOT) 8 6 mg Take 1 tablet (8 6 mg total) by mouth 2 (two) times a day as needed for constipation 120 each 0    sodium chloride, PF, 0 9 % Infuse 10 mL into a venous catheter daily Flush tube as instructed once daily with 10cc normal saline (1 syringe) 30 Syringe 3    tamsulosin (FLOMAX) 0 4 mg Take 1 capsule (0 4 mg total) by mouth daily with dinner 30 capsule 5    fludrocortisone (FLORINEF) 0 1 mg tablet Take 1 tablet (0 1 mg total) by mouth 2 (two) times a day 60 tablet 5     No current facility-administered medications for this visit  Objective:    BP (!) 80/60   Pulse 92   Temp 98 8 °F (37 1 °C)   Resp 20   Ht 5' 10 5" (1 791 m)   Wt 86 6 kg (191 lb)   BMI 27 02 kg/m²        Physical Exam   Constitutional: He appears well-developed  No distress  HENT:   Head: Normocephalic  Right Ear: External ear normal    Left Ear: External ear normal    Mouth/Throat: No oropharyngeal exudate  Eyes: Conjunctivae are normal  Scleral icterus is present  Neck: Neck supple  Cardiovascular: Normal rate and intact distal pulses   An irregularly irregular rhythm present  Pulmonary/Chest: Effort normal and breath sounds normal  No respiratory distress  He has no rales  Abdominal: Soft  Bowel sounds are normal  He exhibits no distension  There is no tenderness  Musculoskeletal: He exhibits no edema or deformity  Neurological: He is alert  Skin: Skin is warm and dry  No pallor  Psychiatric: His behavior is normal  Thought content normal    Nursing note and vitals reviewed             Colton Perrin, DO

## 2020-03-18 NOTE — PROGRESS NOTES
Assessment and Plan:     Problem List Items Addressed This Visit        Active Problems    Atrial fibrillation (HCC) (Chronic)    Colon cancer metastasized to multiple sites (HCC) (Chronic)    Idiopathic hypotension    Relevant Medications    fludrocortisone (FLORINEF) 0 1 mg tablet    Lower extremity edema    Medicare annual wellness visit, subsequent - Primary           Preventive health issues were discussed with patient, and age appropriate screening tests were ordered as noted in patient's After Visit Summary  Personalized health advice and appropriate referrals for health education or preventive services given if needed, as noted in patient's After Visit Summary       History of Present Illness:     Patient presents for Medicare Annual Wellness visit    Patient Care Team:  Rita Sanchez DO as PCP - General (Family Medicine)  Holland Peace, RN as Lead OP Care Mgr (Care Coordination)  Nory Pagan as [facility]     Problem List:     Patient Active Problem List   Diagnosis    BPH (benign prostatic hyperplasia)    Atrial fibrillation ([facility]    Colon cancer metastasized to multiple sites [facility])    Painless jaundice secondary to parenchymal infiltration of tumor along with biliary compression     Trigeminal neuralgia    Chronic hyponatremia    Low hemoglobin    Palliative care patient    Metastatic colon cancer to liver ([facility]    Obstructive jaundice due to cancer ([address] )    Lower extremity edema    Cancer associated pain    Pulmonary metastases ([address] )    Malignant neoplasm of abdominal wall    History of colon cancer    Synovial cyst of lumbar spine    Mild protein-calorie malnutrition ([address] )    Idiopathic hypotension    Medicare annual wellness visit, subsequent      Past Medical and Surgical History:     Past Medical History:   Diagnosis Date    Atrial fibrillation ([address] )     BPH (benign prostatic hyperplasia)     Hypertension     Metastatic colon cancer to liver ([address] ) colon- johnny liver & lungs    Stroke [facility])     2008- mild weakness left hand/arm    Trigeminal neuralgia     Tumor, metastatic ([address] )      Past Surgical History:   Procedure Laterality Date    COLON SURGERY      2013    EYE SURGERY      IR TUBE PLACEMENT BILI  1/15/2020    IR TUBE PLACEMENT BILI  2020    LIVER SURGERY      TONSILLECTOMY        Family History:     Family History   Problem Relation Age of Onset    Cancer Father     Colon cancer Father     Cancer Brother     Cancer Paternal Grandfather     Stroke Mother 80    Heart attack Maternal Grandfather       Social History:        Social History     Socioeconomic History    Marital status: /Civil Union     Spouse name: German De La Garza Number of children: 2    Years of education: None    Highest education level: None   Occupational History    Occupation: retired   Social Needs    Financial resource strain: None    Food insecurity:     Worry: None     Inability: None    Transportation needs:     Medical: None     Non-medical: None   Tobacco Use    Smoking status: Former Smoker     Types: Cigarettes     Last attempt to quit:      Years since quittin 2    Smokeless tobacco: Never Used   Substance and Sexual Activity    Alcohol use: Never     Frequency: Never    Drug use: Never    Sexual activity: None   Lifestyle    Physical activity:     Days per week: None     Minutes per session: None    Stress: None   Relationships    Social connections:     Talks on phone: None     Gets together: None     Attends Christianity service: None     Active member of club or organization: None     Attends meetings of clubs or organizations: None     Relationship status: None    Intimate partner violence:     Fear of current or ex partner: None     Emotionally abused: None     Physically abused: None     Forced sexual activity: None   Other Topics Concern    None   Social History Narrative    Doyle Dash and Carlos Vernon have 2 daughters together - Scott Chinchilla and Nicol  They lived in [place] but recently moved to [place] to be closer to [place] who only lives 10mins away  Medications and Allergies:     Current Outpatient Medications   Medication Sig Dispense Refill    al mag oxide-diphenhydramine-lidocaine viscous (MAGIC MOUTHWASH) 1:1:1 suspension Swish and spit 10 mL every 4 (four) hours as needed for mouth pain or discomfort 180 mL 2    Baclofen 5 MG TABS Take 15 mg by mouth 3 (three) times a day 90 tablet 0    furosemide (LASIX) 20 mg tablet Take 1 tablet (20 mg total) by mouth daily 30 tablet 2    gabapentin (NEURONTIN) 300 mg capsule Take 1 capsule (300 mg total) by mouth every morning AND 2 capsules (600 mg total) daily after lunch AND 2 capsules (600 mg total) daily at bedtime  150 capsule 2    Ketamine HCl (KETAMINE 2% IN EUCERIN) CREA Apply topically 3 (three) times a day as needed Thin amount to face for trigeminal neuralgia      metoprolol succinate (TOPROL-XL) 25 mg 24 hr tablet Take 1 tablet (25 mg total) by mouth daily 30 tablet 5    oxyCODONE (ROXICODONE) 5 mg immediate release tablet Take 1 tablet (5 mg total) by mouth every 4 (four) hours as needed for moderate painMax Daily Amount: 30 mg 90 tablet 0    senna (SENOKOT) 8 6 mg Take 1 tablet (8 6 mg total) by mouth 2 (two) times a day as needed for constipation 120 each 0    sodium chloride, PF, 0 9 % Infuse 10 mL into a venous catheter daily Flush tube as instructed once daily with 10cc normal saline (1 syringe) 30 Syringe 3    tamsulosin (FLOMAX) 0 4 mg Take 1 capsule (0 4 mg total) by mouth daily with dinner 30 capsule 5    fludrocortisone (FLORINEF) 0 1 mg tablet Take 1 tablet (0 1 mg total) by mouth 2 (two) times a day 60 tablet 5     No current facility-administered medications for this visit  Allergies   Allergen Reactions    Ib Pro [Ibuprofen] Hives and Itching    Adhesive [Medical Tape] Rash     Use only paper tape      Immunizations:        There is no immunization history on file for this patient  Health Maintenance: There are no preventive care reminders to display for this patient  Topic Date Due    DTaP,Tdap,and Td Vaccines (1 - Tdap) 11/06/1956      Medicare Health Risk Assessment:     BP (!) 80/60   Pulse 92   Temp 98 8 °F (37 1 °C)   Resp 20   Ht 5' 10 5" (1 791 m)   Wt 86 6 kg (191 lb)   BMI 27 02 kg/m²      Horace Nieto is here for his Subsequent Wellness visit  Last Medicare Wellness visit information reviewed, patient interviewed and updates made to the record today  Health Risk Assessment:   Patient rates overall health as fair  Patient feels that their physical health rating is much worse  Eyesight was rated as slightly worse  Hearing was rated as same  Patient feels that their emotional and mental health rating is same  Pain experienced in the last 7 days has been some  Patient's pain rating has been 5/10  Patient states that he has experienced no weight loss or gain in last 6 months  Depression Screening:   PHQ-2 Score: 0      Fall Risk Screening: In the past year, patient has experienced: history of falling in past year    Number of falls: 2 or more  Injured during fall?: No    Feels unsteady when standing or walking?: No    Worried about falling?: Yes      Home Safety:  Patient has trouble with stairs inside or outside of their home  Patient has working smoke alarms and has working carbon monoxide detector  Home safety hazards include: none  Nutrition:   Current diet is Regular  Medications:   Patient is not currently taking any over-the-counter supplements  Patient is able to manage medications  Activities of Daily Living (ADLs)/Instrumental Activities of Daily Living (IADLs):   Walk and transfer into and out of bed and chair?: Yes  Dress and groom yourself?: Yes    Bathe or shower yourself?: Yes    Feed yourself?  Yes  Do your laundry/housekeeping?: Yes  Manage your money, pay your bills and track your expenses?: Yes  Make your own meals?: Yes    Do your own shopping?: No    Previous Hospitalizations:   Any hospitalizations or ED visits within the last 12 months?: Yes    How many hospitalizations have you had in the last year?: 1-2    Advance Care Planning:   Living will: Yes    Durable POA for healthcare: No    Advanced directive: Yes    End of Life Decisions reviewed with patient: No      Cognitive Screening:   Provider or family/friend/caregiver concerned regarding cognition?: No    PREVENTIVE SCREENINGS      Cardiovascular Screening:    General: Screening Not Indicated      Diabetes Screening:     General: Screening Current      Colorectal Cancer Screening:     General: History Colorectal Cancer      Prostate Cancer Screening:    General: Screening Not Indicated      Osteoporosis Screening:    General: Screening Not Indicated      Abdominal Aortic Aneurysm (AAA) Screening:    Risk factors include: tobacco use        General: Screening Not Indicated      Lung Cancer Screening:     General: Screening Not Indicated and History Lung Cancer      Hepatitis C Screening:    General: Screening Not Indicated    Hep C Screening Accepted: No       Maricarmen Mello DO

## 2020-03-20 NOTE — TELEPHONE ENCOUNTER
NO DISCHARGE   Tissue around the wound not the middle of wound        [facility] aware of medication filled

## 2020-03-20 NOTE — TELEPHONE ENCOUNTER
[facility] calling and just evaled lesion on R thigh and she stated it is red and tender to the touch  She is asking if ABX can be sent to pharmacy for this      Please let patient know if ABX is being called in     Please call Deon Prajapatio  [phone]

## 2020-03-23 NOTE — PROGRESS NOTES
Patient's wife She Holden answered phone  She Holden is hard of hearing and (as per daughter Beverly Mancia) has significant health issues as well  She denied this CM request to speak to patient stating only his doctor was allowed and then hung up phone  CM will attempt to reach daughter Beverly Mancia

## 2020-03-23 NOTE — PROGRESS NOTES
This CM contacted patient to relay information that visiting nurse Rossy Lehman was scheduled to visit today  Patient stated he'd just received a call from Rossy Lehman and was made aware of the visit scheduled for today  Patient reported he'd cancelled yesterdays visit because his daughter was providing care for him yesterday

## 2020-03-23 NOTE — PROGRESS NOTES
This CM received return call from [address] [place] [address] at [facility] [place] who stated they may've overlooked the referral for physical therapy but would have patient assessed for the therapy and provide as needed  Chacho Soto stated they currently have physical therapists available  CM thanked Chacho Soto for the return call and follow up

## 2020-03-23 NOTE — PROGRESS NOTES
This CM spoke to Manish at [facility] who reported patient had refused nurse visit yesterday but Nurse Keyur Levy is scheduled to visit today  Lona Barrientos reported no reason patient cancelled yesterday's visit

## 2020-03-23 NOTE — PROGRESS NOTES
This CM left message for Radha Bingham, patient's RN case manager at [facility] that patient's daughter was concerned patient may be becoming depressed and she wishes for the visiting nurses to pay close attention to signs of depression during their visits  CM contact number provided should visiting nurse wish to return CM call

## 2020-03-23 NOTE — PROGRESS NOTES
Patient contacted this CM apologizing for missing CM earlier call  CM said no reason to apologize, was calling to check on patient  Patient stated he was wondering if visiting nurse would be paying him a visit today  CM agreed to check on that and get back to him  CM then inquired if patient had been feeling down, depressed  Patient denied feeling depressed, had negative PHQ-2 score

## 2020-03-23 NOTE — PROGRESS NOTES
This CM spoke to patient's daughter Dwight Birmingham who stated she was thankful [facility] is visiting every other day and providing wound care for her father  Rupal inquired if patient was supposed to also receive physical therapy because she believed he had not yet gotten it  CM agreed to inquire and get back to her  Dwight Birmingham also stated she believes patient is becoming depressed  She will be transporting him to his palliative care appt tomorrow morning  She agreed to inform palliative care doctor tomorrow at visit  She also requested this CM ask [facility] to pay close attention to patient's MH status during visits  CM agreed to do so

## 2020-03-24 PROBLEM — E43 SEVERE PROTEIN-CALORIE MALNUTRITION (HCC): Status: ACTIVE | Noted: 2020-01-01

## 2020-03-24 NOTE — PATIENT INSTRUCTIONS
It was a pleasure to speak with you today  Thank you for your time  · I'm glad you've been doing relatively well! · Will refill baclofen as discussed  If the pharmacy has any issues w/ the early refill, please let us know  · Will prescribe lorazepam as needed for anxiousness, insomnia  · Please let us know if you are feeling sad or depressed - we may be able to help  · Please follow up with your visiting nurses about:  · Wound care supplies, bandages, etc  · Images for our electronic system so Dr Fatoumata Wisdom and I can see how the wound is doing  · Physical therapy in the home  · Options for Nutrition counseling in the home  · I'm glad you're appetite is improving  Try to eat high-calorie, high-protein meals (frequent, small meals) to gain and maintain weight  · For help w/ prepared meals, consider www [employer], as discussed  · Referring to Oncology Nutrition for assistance in healthy ways to gain and maintain weight  · Return in about 1 month  We can meet in person, via phone, or via video  · Call us for refills on medications that we supply, as needed  · If something changes and you need to come in sooner, please call our office! Please protect yourself from the novel Coronavirus (COVID-19)! Even though we do not have a vaccine or any antiviral drugs for this infection, the following strategies can help you stay healthy:    · Perform hand hygiene with soap and water or hand  with at least 60% alcohol often, but especially after going to the bathroom, after blowing your nose/coughing/sneezing, before eating, and after coming into contact with a potentially contaminated public surface  · Avoid touching your face! · Avoid close contact with people who are sick  Avoid large gatherings, and don't travel unnecessarily  · Stay home when you are sick, except to get medical care    If you can eat and drink and breathe and sleep, please consider calling your doctor's office instead of visiting in person  · Cover your coughs and sneezes with a tissue, or your elbow  · Clean frequently touched surfaces and objects daily (e g , tables, countertops, light switches, doorknobs, and cabinet handles)  Regular household detergent and water are sufficient

## 2020-03-24 NOTE — PROGRESS NOTES
Outpatient Virtual Brief Visit - [facility] and [facility] 66 y o  male [id number]    Intake and Identification:    Reason for visit is follow-up for symptom management  Encounter provider Dimitris Duran MD, located at:  [address] [place] [id number] [phone]    Recent Visits  Date Type Provider Dept   03/17/20 Office Visit Aiden Curtis, [address] recent visits within past 7 days and meeting all other requirements     Future Appointments  No visits were found meeting these conditions  Showing future appointments within next 150 days and meeting all other requirements      Patient agrees to participate in a virtual check in via telephone or video visit instead of presenting to the office to address urgent/immediate medical needs, or to respect quarantine and public health guidelines  Patient is aware this is a billable service  After connecting through telephone, the patient was identified by name and date of birth  Duane Perea was informed that this was a telemedicine visit and that the visit is being conducted through telephone which may not be secure and therefore might not be HIPAA-compliant  My office door was closed  No one else was in the room  He acknowledged consent and understanding of privacy and security of the virtual check-in visit  I informed the patient that I have reviewed his record in Epic and presented the opportunity for him to ask any questions regarding the visit today  The patient initiated communication and agreed to participate  ASSESSMENT & PLAN:  1  Colon cancer metastasized to multiple sites ([facility] )    2  Metastatic colon cancer to liver ([facility] )    3  Malignant neoplasm metastatic to lung, unspecified laterality ([facility] )    4  Malignant neoplasm of abdominal wall    5  Obstructive jaundice due to cancer ([facility] )    6   Atrial fibrillation, unspecified type ([facility] )    7  Trigeminal neuralgia    8  Palliative care patient    9  Cancer associated pain    10  Anxiousness    11  Severe protein-calorie malnutrition ([facility] )       Patient doing relatively well   He has continuous pain from trigeminal neuralgia and from his wound (flank/abdominal wall metastasis), but his oxyIR and ketamine cream and gabapentin provide adequate relief   Constipation improves w/ OTC regimen   Starting PRN lorazepam for anxiousness  Patient denies dysphoria (but this had been reported by daughter)   Appetite improved but patient continues to show weight loss - 243lb on 2/4/20, 191lb on 3/17/20  21% weight loss in 7 weeks, albumin 1 6 on 2/28/20, severe protein calorie malnutrition  o Counseled on nutrition, recommended Mom's Meals as an alternative to Meals on Wheels  o Referring to Oncology Nutrition  · Recommended he follow up w/ [facility] about:  · Wound care supplies, bandages, etc  · Images for our electronic system so Oncology & [facility] can his wound  · Physical therapy in the home   Options for Nutrition counseling in the home   Significant emotional support provided   Medication safety issues addressed - no driving under the influence of narcotics, watch for adverse effects including AMS and respiratory depression, keep medications stored in a safe/locked environment  Requested Prescriptions     Signed Prescriptions Disp Refills    Baclofen 5 MG TABS 90 tablet 0     Sig: Take 15 mg by mouth 3 (three) times a day    LORazepam (ATIVAN) 0 5 mg tablet 30 tablet 0     Sig: Take 1 tablet (0 5 mg total) by mouth 2 (two) times a day as needed for anxiety or sedation     Medications Discontinued During This Encounter   Medication Reason    Baclofen 5 MG TABS Reorder     Representatives have queried the patient's controlled substance dispensing history in the Prescription Drug Monitoring Program in compliance with regulations before I have prescribed any controlled substances   The prescription history is consistent with prescribed therapy and our practice policies  45+ minutes were spent with patient and family (wife Jay Nowak, daughter Shadi Bolanos) in this virtual check-in visit, with greater than 50% of the time spent in counseling or coordination of care including discussions of symptom assessment and management, medication review and adjustment, psychosocial support, chart review, imaging review, lab review  All of the patient's questions were answered during this discussion  He is invited to continue to follow with us  If there are questions or concerns, please contact us through our clinic/answering service 24 hours a day, seven days a week  Return in about 1 month (around 4/24/2020) for Virtual Visit / Telephone or Video  SUBJECTIVE:  Chief Complaint   Patient presents with    Virtual Brief Visit    Cancer    Cancer Pain    Anorexia    Constipation    Anxiety    Depression    Nausea    Fatigue    Virtual Brief Visit      HPI    Scott Renee is a 66 y o  male w/ 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 wall) s/p L hemicoloectomy + chemotherapy + RT; also obstructive jaundice d/t  tumor invasion s/p percutaneous stent, Afib not on AC, trigeminal neuralgia, severe protein calorie malnutrition (albumin 1 6 on 2/28/20; 54lb weight loss from 2/4/20  3/17/20, or loss of 21% body mass)  He follows w/ Dr Kelechi Walker (Medical Oncology), Dr Idalia Llanos (Gastroenterology) and had gone to [facility] for Oncology in the past     Patient is known to [facility]; last seen by me 3/3/20 for symptom management, psychosocial support  Visit today is for same  Patient did not wish to engage on goals of care at the last visit, though he knows there is no curative treatment available for his disease  Patient reports that overall he is not doing too bad   He notes that his appetite has improved (but he continues to show weight loss; last weight was 191lb on 3/17/20)  He checks his temperature daily (afebrile) as well as his BP (low, most recently 100/74 and 94/67 today)  He was recently started on fludrocortisone by his PCP  He feels that subjectively, dizziness and lightheadedness (sitting to standing) have improved  He notes ongoing weakness and would like to start physical therapy  He has pain and numbness from his trigeminal neuralgia; this pain is always present  He uses the ketamine cream 1x/day with moderate relief, and also takes the oxycodone IR 5mg 3-4x/day  This helps with his painful abdominal wall / flank wound as well  Patient has an appointment scheduled w/ Neurology but the first available date was in May; counseled on looking for open slots d/t cancellations  Patient notes that he has started to feel increasing anxiousness, about 1x/day, w/ trembling hands  He describes it as general unease, restlessness  No aggravating/alleviating factors  Patient denies dysphoric mood (but his daughter has been concerned about depression)  Counseled at length, supportive listening provided  He denies N/V  He endorses constipation, but OTC stool softener and laxative provide relief  He endorses fatigue  Daughter Janusz Armstrong had asked about Meals on Wheels in the past; they had not moved past initial conversation  They are concerned he might not qualify  Janusz Armstrong would feel more comfortable if he and Jessica Tripathi had a healthy option available for lunch (Janusz Armstrong ensures they have a healthy dinner)  Discussed Moms Meals as an option; information provided  PDMP shows no concerns  The following portions of the medical history were reviewed: past medical history, problem list, medication list, and social history      Current Outpatient Medications:     al mag oxide-diphenhydramine-lidocaine viscous (MAGIC MOUTHWASH) 1:1:1 suspension, Swish and spit 10 mL every 4 (four) hours as needed for mouth pain or discomfort, Disp: 180 mL, Rfl: 2   Baclofen 5 MG TABS, Take 15 mg by mouth 3 (three) times a day, Disp: 90 tablet, Rfl: 0    cephalexin (KEFLEX) 500 mg capsule, Take 1 capsule (500 mg total) by mouth 3 (three) times a day for 10 days, Disp: 30 capsule, Rfl: 0    fludrocortisone (FLORINEF) 0 1 mg tablet, Take 1 tablet (0 1 mg total) by mouth 2 (two) times a day, Disp: 60 tablet, Rfl: 5    furosemide (LASIX) 20 mg tablet, Take 1 tablet (20 mg total) by mouth daily, Disp: 30 tablet, Rfl: 2    gabapentin (NEURONTIN) 300 mg capsule, Take 1 capsule (300 mg total) by mouth every morning AND 2 capsules (600 mg total) daily after lunch AND 2 capsules (600 mg total) daily at bedtime  , Disp: 150 capsule, Rfl: 2    Ketamine HCl (KETAMINE 2% IN EUCERIN) CREA, Apply topically 3 (three) times a day as needed Thin amount to face for trigeminal neuralgia, Disp: , Rfl:     LORazepam (ATIVAN) 0 5 mg tablet, Take 1 tablet (0 5 mg total) by mouth 2 (two) times a day as needed for anxiety or sedation, Disp: 30 tablet, Rfl: 0    metoprolol succinate (TOPROL-XL) 25 mg 24 hr tablet, Take 1 tablet (25 mg total) by mouth daily, Disp: 30 tablet, Rfl: 5    oxyCODONE (ROXICODONE) 5 mg immediate release tablet, Take 1 tablet (5 mg total) by mouth every 4 (four) hours as needed for moderate painMax Daily Amount: 30 mg, Disp: 90 tablet, Rfl: 0    senna (SENOKOT) 8 6 mg, Take 1 tablet (8 6 mg total) by mouth 2 (two) times a day as needed for constipation, Disp: 120 each, Rfl: 0    sodium chloride, PF, 0 9 %, Infuse 10 mL into a venous catheter daily Flush tube as instructed once daily with 10cc normal saline (1 syringe), Disp: 30 Syringe, Rfl: 3    tamsulosin (FLOMAX) 0 4 mg, Take 1 capsule (0 4 mg total) by mouth daily with dinner, Disp: 30 capsule, Rfl: 5    Review of Systems   Constitutional: Positive for activity change, appetite change, fatigue and unexpected weight change  Gastrointestinal: Positive for abdominal pain     Genitourinary: Positive for flank pain (from wound)  Skin: Positive for color change (Jaundiced ) and wound  Neurological: Positive for weakness, light-headedness and numbness  Trigeminal neuralgia  Psychiatric/Behavioral: Positive for dysphoric mood (per daughter; patient denies)  The patient is nervous/anxious  All other systems reviewed and are negative       Thaddeus Haines MD  [address] [facility]  [phone]

## 2020-03-26 NOTE — TELEPHONE ENCOUNTER
Initial RD phone consult scheduled for this morning at 10:45am   Called pt two times, however, he did not answer  Left two voice messages asking for a call back  On final voice message asked pt to call back to reschedule his appointment    Will remain available per pt request

## 2020-03-27 NOTE — SOCIAL WORK
MSW received case from co-worker Doreen Ramos had been working with the pt on [facility] on [employer]  MSW reached out to the pt to assess his current needs with regards to his meals and other possible needs  MSW received his voicemail  MSW left a detailed message explaining the change in role and to contact this MSW  MSW then attempted to reach pt;s daughter, however her voice mailbox was full and no message was able to be left  If MSW does not receive a return call at the beginning of the week, another attempt will be made to reach the pt

## 2020-04-17 PROBLEM — R50.9 FEVER AND CHILLS: Status: ACTIVE | Noted: 2020-01-01

## 2020-04-21 PROBLEM — R53.83 OTHER FATIGUE: Status: ACTIVE | Noted: 2020-01-01

## 2020-04-24 PROBLEM — L03.311 CELLULITIS OF ABDOMINAL WALL: Status: ACTIVE | Noted: 2020-01-01

## 2020-04-29 PROBLEM — A41.89 VIRAL SEPSIS (HCC): Status: ACTIVE | Noted: 2020-01-01

## 2020-04-29 PROBLEM — J96.01 ACUTE HYPOXEMIC RESPIRATORY FAILURE (HCC): Status: ACTIVE | Noted: 2020-01-01

## 2020-04-29 PROBLEM — U07.1 PNEUMONIA DUE TO COVID-19 VIRUS: Status: ACTIVE | Noted: 2020-01-01

## 2020-04-29 PROBLEM — J12.82 PNEUMONIA DUE TO COVID-19 VIRUS: Status: ACTIVE | Noted: 2020-01-01

## 2020-04-29 PROBLEM — B97.89 VIRAL SEPSIS (HCC): Status: ACTIVE | Noted: 2020-01-01

## 2020-05-02 PROBLEM — R33.9 URINARY RETENTION: Status: ACTIVE | Noted: 2020-01-01

## 2020-05-02 PROBLEM — G93.41 ACUTE METABOLIC ENCEPHALOPATHY: Status: ACTIVE | Noted: 2020-01-01

## 2020-05-02 PROBLEM — Z46.82 ENCOUNTER FOR BILIARY DRAINAGE TUBE PLACEMENT: Status: ACTIVE | Noted: 2020-01-01

## 2020-05-04 PROBLEM — N17.9 AKI (ACUTE KIDNEY INJURY) (HCC): Status: ACTIVE | Noted: 2020-01-01

## 2020-05-05 PROBLEM — B97.89 VIRAL SEPSIS (HCC): Status: RESOLVED | Noted: 2020-01-01 | Resolved: 2020-01-01

## 2020-05-05 PROBLEM — A41.89 VIRAL SEPSIS (HCC): Status: RESOLVED | Noted: 2020-01-01 | Resolved: 2020-01-01

## 2020-05-05 PROBLEM — E87.1 CHRONIC HYPONATREMIA: Status: RESOLVED | Noted: 2020-01-01 | Resolved: 2020-01-01
